# Patient Record
Sex: FEMALE | Race: WHITE | NOT HISPANIC OR LATINO | Employment: OTHER | URBAN - METROPOLITAN AREA
[De-identification: names, ages, dates, MRNs, and addresses within clinical notes are randomized per-mention and may not be internally consistent; named-entity substitution may affect disease eponyms.]

---

## 2017-01-09 ENCOUNTER — TRANSCRIBE ORDERS (OUTPATIENT)
Dept: ADMINISTRATIVE | Facility: HOSPITAL | Age: 67
End: 2017-01-09

## 2017-01-09 DIAGNOSIS — Z12.31 ENCOUNTER FOR MAMMOGRAM TO ESTABLISH BASELINE MAMMOGRAM: Primary | ICD-10-CM

## 2017-01-12 ENCOUNTER — HOSPITAL ENCOUNTER (OUTPATIENT)
Dept: RADIOLOGY | Age: 67
Discharge: HOME | End: 2017-01-12
Payer: COMMERCIAL

## 2017-01-12 DIAGNOSIS — Z12.31 ENCOUNTER FOR MAMMOGRAM TO ESTABLISH BASELINE MAMMOGRAM: ICD-10-CM

## 2017-01-12 PROCEDURE — G0202 SCR MAMMO BI INCL CAD: HCPCS

## 2017-02-16 ENCOUNTER — ALLSCRIPTS OFFICE VISIT (OUTPATIENT)
Dept: OTHER | Facility: OTHER | Age: 67
End: 2017-02-16

## 2017-02-16 DIAGNOSIS — M76.891 OTHER SPECIFIED ENTHESOPATHIES OF RIGHT LOWER LIMB, EXCLUDING FOOT: ICD-10-CM

## 2017-02-21 ENCOUNTER — APPOINTMENT (OUTPATIENT)
Dept: PHYSICAL THERAPY | Facility: CLINIC | Age: 67
End: 2017-02-21
Payer: COMMERCIAL

## 2017-02-21 DIAGNOSIS — M76.891 OTHER SPECIFIED ENTHESOPATHIES OF RIGHT LOWER LIMB, EXCLUDING FOOT: ICD-10-CM

## 2017-02-21 PROCEDURE — 97162 PT EVAL MOD COMPLEX 30 MIN: CPT

## 2017-02-21 PROCEDURE — 97110 THERAPEUTIC EXERCISES: CPT

## 2017-02-23 ENCOUNTER — APPOINTMENT (OUTPATIENT)
Dept: PHYSICAL THERAPY | Facility: CLINIC | Age: 67
End: 2017-02-23
Payer: COMMERCIAL

## 2017-02-23 PROCEDURE — 97140 MANUAL THERAPY 1/> REGIONS: CPT

## 2017-02-23 PROCEDURE — 97110 THERAPEUTIC EXERCISES: CPT

## 2017-02-28 ENCOUNTER — APPOINTMENT (OUTPATIENT)
Dept: PHYSICAL THERAPY | Facility: CLINIC | Age: 67
End: 2017-02-28
Payer: COMMERCIAL

## 2017-02-28 PROCEDURE — 97110 THERAPEUTIC EXERCISES: CPT

## 2017-02-28 PROCEDURE — 97140 MANUAL THERAPY 1/> REGIONS: CPT

## 2017-03-02 ENCOUNTER — GENERIC CONVERSION - ENCOUNTER (OUTPATIENT)
Dept: OTHER | Facility: OTHER | Age: 67
End: 2017-03-02

## 2017-03-02 ENCOUNTER — APPOINTMENT (OUTPATIENT)
Dept: PHYSICAL THERAPY | Facility: CLINIC | Age: 67
End: 2017-03-02
Payer: COMMERCIAL

## 2017-03-02 PROCEDURE — 97140 MANUAL THERAPY 1/> REGIONS: CPT

## 2017-03-07 ENCOUNTER — APPOINTMENT (OUTPATIENT)
Dept: PHYSICAL THERAPY | Facility: CLINIC | Age: 67
End: 2017-03-07
Payer: COMMERCIAL

## 2017-03-07 PROCEDURE — 97140 MANUAL THERAPY 1/> REGIONS: CPT

## 2017-03-07 PROCEDURE — 97110 THERAPEUTIC EXERCISES: CPT

## 2017-03-09 ENCOUNTER — APPOINTMENT (OUTPATIENT)
Dept: PHYSICAL THERAPY | Facility: CLINIC | Age: 67
End: 2017-03-09
Payer: COMMERCIAL

## 2017-03-09 PROCEDURE — 97116 GAIT TRAINING THERAPY: CPT

## 2017-03-09 PROCEDURE — 97140 MANUAL THERAPY 1/> REGIONS: CPT

## 2017-03-09 PROCEDURE — 97110 THERAPEUTIC EXERCISES: CPT

## 2017-03-14 ENCOUNTER — APPOINTMENT (OUTPATIENT)
Dept: PHYSICAL THERAPY | Facility: CLINIC | Age: 67
End: 2017-03-14
Payer: COMMERCIAL

## 2017-03-15 ENCOUNTER — APPOINTMENT (OUTPATIENT)
Dept: PHYSICAL THERAPY | Facility: CLINIC | Age: 67
End: 2017-03-15
Payer: COMMERCIAL

## 2017-03-15 PROCEDURE — 97140 MANUAL THERAPY 1/> REGIONS: CPT

## 2017-03-15 PROCEDURE — 97110 THERAPEUTIC EXERCISES: CPT

## 2017-03-16 ENCOUNTER — APPOINTMENT (OUTPATIENT)
Dept: PHYSICAL THERAPY | Facility: CLINIC | Age: 67
End: 2017-03-16
Payer: COMMERCIAL

## 2017-03-16 PROCEDURE — 97010 HOT OR COLD PACKS THERAPY: CPT

## 2017-03-16 PROCEDURE — 97116 GAIT TRAINING THERAPY: CPT

## 2017-03-16 PROCEDURE — 97140 MANUAL THERAPY 1/> REGIONS: CPT

## 2017-03-16 PROCEDURE — 97110 THERAPEUTIC EXERCISES: CPT

## 2017-03-21 ENCOUNTER — APPOINTMENT (OUTPATIENT)
Dept: PHYSICAL THERAPY | Facility: CLINIC | Age: 67
End: 2017-03-21
Payer: COMMERCIAL

## 2017-03-21 PROCEDURE — 97116 GAIT TRAINING THERAPY: CPT

## 2017-03-21 PROCEDURE — 97140 MANUAL THERAPY 1/> REGIONS: CPT

## 2017-03-21 PROCEDURE — 97110 THERAPEUTIC EXERCISES: CPT

## 2017-03-23 ENCOUNTER — APPOINTMENT (OUTPATIENT)
Dept: PHYSICAL THERAPY | Facility: CLINIC | Age: 67
End: 2017-03-23
Payer: COMMERCIAL

## 2017-03-23 PROCEDURE — 97140 MANUAL THERAPY 1/> REGIONS: CPT

## 2017-03-23 PROCEDURE — 97110 THERAPEUTIC EXERCISES: CPT

## 2017-03-28 ENCOUNTER — APPOINTMENT (OUTPATIENT)
Dept: PHYSICAL THERAPY | Facility: CLINIC | Age: 67
End: 2017-03-28
Payer: COMMERCIAL

## 2017-03-28 PROCEDURE — 97116 GAIT TRAINING THERAPY: CPT

## 2017-03-28 PROCEDURE — 97110 THERAPEUTIC EXERCISES: CPT

## 2017-03-28 PROCEDURE — 97140 MANUAL THERAPY 1/> REGIONS: CPT

## 2017-03-29 DIAGNOSIS — E78.2 MIXED HYPERLIPIDEMIA: ICD-10-CM

## 2017-03-29 DIAGNOSIS — J45.909 UNCOMPLICATED ASTHMA: ICD-10-CM

## 2017-03-29 DIAGNOSIS — D17.71 BENIGN LIPOMATOUS NEOPLASM OF KIDNEY: ICD-10-CM

## 2017-03-29 DIAGNOSIS — M81.0 AGE-RELATED OSTEOPOROSIS WITHOUT CURRENT PATHOLOGICAL FRACTURE: ICD-10-CM

## 2017-03-29 DIAGNOSIS — K76.0 FATTY (CHANGE OF) LIVER, NOT ELSEWHERE CLASSIFIED: ICD-10-CM

## 2017-03-29 DIAGNOSIS — E72.11 HOMOCYSTINURIA (HCC): ICD-10-CM

## 2017-03-29 DIAGNOSIS — E03.9 HYPOTHYROIDISM: ICD-10-CM

## 2017-03-30 ENCOUNTER — APPOINTMENT (OUTPATIENT)
Dept: PHYSICAL THERAPY | Facility: CLINIC | Age: 67
End: 2017-03-30
Payer: COMMERCIAL

## 2017-03-30 PROCEDURE — 97140 MANUAL THERAPY 1/> REGIONS: CPT

## 2017-03-30 PROCEDURE — 97110 THERAPEUTIC EXERCISES: CPT

## 2017-04-04 ENCOUNTER — APPOINTMENT (OUTPATIENT)
Dept: PHYSICAL THERAPY | Facility: CLINIC | Age: 67
End: 2017-04-04
Payer: COMMERCIAL

## 2017-04-04 PROCEDURE — 97140 MANUAL THERAPY 1/> REGIONS: CPT

## 2017-04-04 PROCEDURE — 97110 THERAPEUTIC EXERCISES: CPT

## 2017-04-06 ENCOUNTER — APPOINTMENT (OUTPATIENT)
Dept: PHYSICAL THERAPY | Facility: CLINIC | Age: 67
End: 2017-04-06
Payer: COMMERCIAL

## 2017-04-06 PROCEDURE — 97140 MANUAL THERAPY 1/> REGIONS: CPT

## 2017-04-06 PROCEDURE — 97110 THERAPEUTIC EXERCISES: CPT

## 2017-04-11 ENCOUNTER — APPOINTMENT (OUTPATIENT)
Dept: PHYSICAL THERAPY | Facility: CLINIC | Age: 67
End: 2017-04-11
Payer: COMMERCIAL

## 2017-04-11 PROCEDURE — 97140 MANUAL THERAPY 1/> REGIONS: CPT

## 2017-04-11 PROCEDURE — 97110 THERAPEUTIC EXERCISES: CPT

## 2017-04-13 ENCOUNTER — APPOINTMENT (OUTPATIENT)
Dept: PHYSICAL THERAPY | Facility: CLINIC | Age: 67
End: 2017-04-13
Payer: COMMERCIAL

## 2017-04-13 PROCEDURE — 97110 THERAPEUTIC EXERCISES: CPT

## 2017-04-13 PROCEDURE — 97140 MANUAL THERAPY 1/> REGIONS: CPT

## 2017-04-18 ENCOUNTER — APPOINTMENT (OUTPATIENT)
Dept: PHYSICAL THERAPY | Facility: CLINIC | Age: 67
End: 2017-04-18
Payer: COMMERCIAL

## 2017-04-18 PROCEDURE — 97140 MANUAL THERAPY 1/> REGIONS: CPT

## 2017-04-18 PROCEDURE — 97110 THERAPEUTIC EXERCISES: CPT

## 2017-04-20 ENCOUNTER — APPOINTMENT (OUTPATIENT)
Dept: PHYSICAL THERAPY | Facility: CLINIC | Age: 67
End: 2017-04-20
Payer: COMMERCIAL

## 2017-04-21 ENCOUNTER — APPOINTMENT (OUTPATIENT)
Dept: PHYSICAL THERAPY | Facility: CLINIC | Age: 67
End: 2017-04-21
Payer: COMMERCIAL

## 2017-04-21 PROCEDURE — L3010 FOOT LONGITUDINAL ARCH SUPPO: HCPCS

## 2017-04-25 ENCOUNTER — APPOINTMENT (OUTPATIENT)
Dept: PHYSICAL THERAPY | Facility: CLINIC | Age: 67
End: 2017-04-25
Payer: COMMERCIAL

## 2017-04-25 PROCEDURE — 97140 MANUAL THERAPY 1/> REGIONS: CPT

## 2017-04-25 PROCEDURE — 97110 THERAPEUTIC EXERCISES: CPT

## 2017-04-27 ENCOUNTER — APPOINTMENT (OUTPATIENT)
Dept: PHYSICAL THERAPY | Facility: CLINIC | Age: 67
End: 2017-04-27
Payer: COMMERCIAL

## 2017-04-27 PROCEDURE — 97140 MANUAL THERAPY 1/> REGIONS: CPT

## 2017-04-27 PROCEDURE — 97110 THERAPEUTIC EXERCISES: CPT

## 2017-05-02 ENCOUNTER — APPOINTMENT (OUTPATIENT)
Dept: PHYSICAL THERAPY | Facility: CLINIC | Age: 67
End: 2017-05-02
Payer: COMMERCIAL

## 2017-05-02 PROCEDURE — 97110 THERAPEUTIC EXERCISES: CPT

## 2017-05-02 PROCEDURE — 97140 MANUAL THERAPY 1/> REGIONS: CPT

## 2017-05-04 ENCOUNTER — APPOINTMENT (OUTPATIENT)
Dept: PHYSICAL THERAPY | Facility: CLINIC | Age: 67
End: 2017-05-04
Payer: COMMERCIAL

## 2017-05-04 PROCEDURE — 97110 THERAPEUTIC EXERCISES: CPT

## 2017-05-04 PROCEDURE — 97140 MANUAL THERAPY 1/> REGIONS: CPT

## 2017-05-09 ENCOUNTER — APPOINTMENT (OUTPATIENT)
Dept: PHYSICAL THERAPY | Facility: CLINIC | Age: 67
End: 2017-05-09
Payer: COMMERCIAL

## 2017-05-09 PROCEDURE — 97110 THERAPEUTIC EXERCISES: CPT

## 2017-05-09 PROCEDURE — 97140 MANUAL THERAPY 1/> REGIONS: CPT

## 2017-05-11 ENCOUNTER — APPOINTMENT (OUTPATIENT)
Dept: PHYSICAL THERAPY | Facility: CLINIC | Age: 67
End: 2017-05-11
Payer: COMMERCIAL

## 2017-05-11 PROCEDURE — 97140 MANUAL THERAPY 1/> REGIONS: CPT

## 2017-05-11 PROCEDURE — 97110 THERAPEUTIC EXERCISES: CPT

## 2017-05-16 ENCOUNTER — APPOINTMENT (OUTPATIENT)
Dept: PHYSICAL THERAPY | Facility: CLINIC | Age: 67
End: 2017-05-16
Payer: COMMERCIAL

## 2017-05-16 PROCEDURE — 97110 THERAPEUTIC EXERCISES: CPT

## 2017-05-16 PROCEDURE — 97140 MANUAL THERAPY 1/> REGIONS: CPT

## 2017-05-18 ENCOUNTER — APPOINTMENT (OUTPATIENT)
Dept: PHYSICAL THERAPY | Facility: CLINIC | Age: 67
End: 2017-05-18
Payer: COMMERCIAL

## 2017-05-18 PROCEDURE — 97110 THERAPEUTIC EXERCISES: CPT

## 2017-05-18 PROCEDURE — 97140 MANUAL THERAPY 1/> REGIONS: CPT

## 2017-05-23 ENCOUNTER — APPOINTMENT (OUTPATIENT)
Dept: PHYSICAL THERAPY | Facility: CLINIC | Age: 67
End: 2017-05-23
Payer: COMMERCIAL

## 2017-05-23 PROCEDURE — 97110 THERAPEUTIC EXERCISES: CPT

## 2017-05-23 PROCEDURE — 97150 GROUP THERAPEUTIC PROCEDURES: CPT

## 2017-05-23 PROCEDURE — 97140 MANUAL THERAPY 1/> REGIONS: CPT

## 2017-05-25 ENCOUNTER — APPOINTMENT (OUTPATIENT)
Dept: PHYSICAL THERAPY | Facility: CLINIC | Age: 67
End: 2017-05-25
Payer: COMMERCIAL

## 2017-05-25 PROCEDURE — 97140 MANUAL THERAPY 1/> REGIONS: CPT

## 2017-05-25 PROCEDURE — 97110 THERAPEUTIC EXERCISES: CPT

## 2017-05-30 ENCOUNTER — APPOINTMENT (OUTPATIENT)
Dept: PHYSICAL THERAPY | Facility: CLINIC | Age: 67
End: 2017-05-30
Payer: COMMERCIAL

## 2017-05-30 PROCEDURE — 97110 THERAPEUTIC EXERCISES: CPT

## 2017-05-30 PROCEDURE — 97140 MANUAL THERAPY 1/> REGIONS: CPT

## 2017-06-01 ENCOUNTER — APPOINTMENT (OUTPATIENT)
Dept: PHYSICAL THERAPY | Facility: CLINIC | Age: 67
End: 2017-06-01
Payer: COMMERCIAL

## 2017-06-01 PROCEDURE — 97140 MANUAL THERAPY 1/> REGIONS: CPT

## 2017-06-01 PROCEDURE — 97110 THERAPEUTIC EXERCISES: CPT

## 2017-06-06 ENCOUNTER — APPOINTMENT (OUTPATIENT)
Dept: PHYSICAL THERAPY | Facility: CLINIC | Age: 67
End: 2017-06-06
Payer: COMMERCIAL

## 2017-06-06 PROCEDURE — 97140 MANUAL THERAPY 1/> REGIONS: CPT

## 2017-06-06 PROCEDURE — 97110 THERAPEUTIC EXERCISES: CPT

## 2017-06-08 ENCOUNTER — APPOINTMENT (OUTPATIENT)
Dept: PHYSICAL THERAPY | Facility: CLINIC | Age: 67
End: 2017-06-08
Payer: COMMERCIAL

## 2017-06-08 PROCEDURE — 97110 THERAPEUTIC EXERCISES: CPT

## 2017-06-08 PROCEDURE — 97140 MANUAL THERAPY 1/> REGIONS: CPT

## 2017-06-13 ENCOUNTER — APPOINTMENT (OUTPATIENT)
Dept: PHYSICAL THERAPY | Facility: CLINIC | Age: 67
End: 2017-06-13
Payer: COMMERCIAL

## 2017-06-15 ENCOUNTER — APPOINTMENT (OUTPATIENT)
Dept: PHYSICAL THERAPY | Facility: CLINIC | Age: 67
End: 2017-06-15
Payer: COMMERCIAL

## 2017-06-19 ENCOUNTER — ALLSCRIPTS OFFICE VISIT (OUTPATIENT)
Dept: OTHER | Facility: OTHER | Age: 67
End: 2017-06-19

## 2017-06-19 DIAGNOSIS — K58.9 IRRITABLE BOWEL SYNDROME WITHOUT DIARRHEA: ICD-10-CM

## 2017-06-19 DIAGNOSIS — E72.11 HOMOCYSTINURIA (HCC): ICD-10-CM

## 2017-06-19 DIAGNOSIS — E78.2 MIXED HYPERLIPIDEMIA: ICD-10-CM

## 2017-06-19 DIAGNOSIS — E03.9 HYPOTHYROIDISM: ICD-10-CM

## 2017-06-19 DIAGNOSIS — R15.9 FULL INCONTINENCE OF FECES: ICD-10-CM

## 2017-06-20 ENCOUNTER — APPOINTMENT (OUTPATIENT)
Dept: PHYSICAL THERAPY | Facility: CLINIC | Age: 67
End: 2017-06-20
Payer: COMMERCIAL

## 2017-06-20 PROCEDURE — 97110 THERAPEUTIC EXERCISES: CPT

## 2017-06-20 PROCEDURE — 97140 MANUAL THERAPY 1/> REGIONS: CPT

## 2017-06-22 ENCOUNTER — APPOINTMENT (OUTPATIENT)
Dept: PHYSICAL THERAPY | Facility: CLINIC | Age: 67
End: 2017-06-22
Payer: COMMERCIAL

## 2017-06-23 ENCOUNTER — LAB CONVERSION - ENCOUNTER (OUTPATIENT)
Dept: OTHER | Facility: OTHER | Age: 67
End: 2017-06-23

## 2017-06-23 LAB
A/G RATIO (HISTORICAL): 1.7 (CALC) (ref 1–2.5)
ALBUMIN SERPL BCP-MCNC: 4.3 G/DL (ref 3.6–5.1)
ALP SERPL-CCNC: 76 U/L (ref 33–130)
ALT SERPL W P-5'-P-CCNC: 38 U/L (ref 6–29)
AST SERPL W P-5'-P-CCNC: 33 U/L (ref 10–35)
BASOPHILS # BLD AUTO: 0.5 %
BASOPHILS # BLD AUTO: 20 CELLS/UL (ref 0–200)
BILIRUB SERPL-MCNC: 0.7 MG/DL (ref 0.2–1.2)
BUN SERPL-MCNC: 16 MG/DL (ref 7–25)
BUN/CREA RATIO (HISTORICAL): 14 (CALC) (ref 6–22)
CALCIUM SERPL-MCNC: 9.7 MG/DL (ref 8.6–10.4)
CHLORIDE SERPL-SCNC: 106 MMOL/L (ref 98–110)
CHOLEST SERPL-MCNC: 194 MG/DL (ref 125–200)
CHOLEST/HDLC SERPL: 2.9 (CALC)
CO2 SERPL-SCNC: 26 MMOL/L (ref 20–31)
CREAT SERPL-MCNC: 1.11 MG/DL (ref 0.5–0.99)
DEPRECATED RDW RBC AUTO: 13.2 % (ref 11–15)
EGFR AFRICAN AMERICAN (HISTORICAL): 60 ML/MIN/1.73M2
EGFR-AMERICAN CALC (HISTORICAL): 52 ML/MIN/1.73M2
EOSINOPHIL # BLD AUTO: 1.6 %
EOSINOPHIL # BLD AUTO: 64 CELLS/UL (ref 15–500)
GAMMA GLOBULIN (HISTORICAL): 2.6 G/DL (CALC) (ref 1.9–3.7)
GLUCOSE (HISTORICAL): 93 MG/DL (ref 65–99)
HCT VFR BLD AUTO: 42.5 % (ref 35–45)
HDLC SERPL-MCNC: 66 MG/DL
HGB BLD-MCNC: 14.2 G/DL (ref 11.7–15.5)
LDL CHOLESTEROL (HISTORICAL): 100 MG/DL (CALC)
LYMPHOCYTES # BLD AUTO: 1592 CELLS/UL (ref 850–3900)
LYMPHOCYTES # BLD AUTO: 39.8 %
MCH RBC QN AUTO: 31 PG (ref 27–33)
MCHC RBC AUTO-ENTMCNC: 33.5 G/DL (ref 32–36)
MCV RBC AUTO: 92.8 FL (ref 80–100)
MONOCYTES # BLD AUTO: 200 CELLS/UL (ref 200–950)
MONOCYTES (HISTORICAL): 5 %
NEUTROPHILS # BLD AUTO: 2124 CELLS/UL (ref 1500–7800)
NEUTROPHILS # BLD AUTO: 53.1 %
NON-HDL-CHOL (CHOL-HDL) (HISTORICAL): 128 MG/DL (CALC)
PLATELET # BLD AUTO: 311 THOUSAND/UL (ref 140–400)
PMV BLD AUTO: 9.6 FL (ref 7.5–12.5)
POTASSIUM SERPL-SCNC: 4.1 MMOL/L (ref 3.5–5.3)
RBC # BLD AUTO: 4.59 MILLION/UL (ref 3.8–5.1)
SODIUM SERPL-SCNC: 140 MMOL/L (ref 135–146)
TOTAL PROTEIN (HISTORICAL): 6.9 G/DL (ref 6.1–8.1)
TRIGL SERPL-MCNC: 138 MG/DL
TSH SERPL DL<=0.05 MIU/L-ACNC: 3.29 MIU/L (ref 0.4–4.5)
WBC # BLD AUTO: 4 THOUSAND/UL (ref 3.8–10.8)

## 2017-06-26 ENCOUNTER — GENERIC CONVERSION - ENCOUNTER (OUTPATIENT)
Dept: OTHER | Facility: OTHER | Age: 67
End: 2017-06-26

## 2017-07-20 ENCOUNTER — GENERIC CONVERSION - ENCOUNTER (OUTPATIENT)
Dept: OTHER | Facility: OTHER | Age: 67
End: 2017-07-20

## 2017-07-27 ENCOUNTER — GENERIC CONVERSION - ENCOUNTER (OUTPATIENT)
Dept: OTHER | Facility: OTHER | Age: 67
End: 2017-07-27

## 2017-08-15 ENCOUNTER — ALLSCRIPTS OFFICE VISIT (OUTPATIENT)
Dept: OTHER | Facility: OTHER | Age: 67
End: 2017-08-15

## 2017-09-21 ENCOUNTER — GENERIC CONVERSION - ENCOUNTER (OUTPATIENT)
Dept: OTHER | Facility: OTHER | Age: 67
End: 2017-09-21

## 2017-09-21 DIAGNOSIS — E55.9 VITAMIN D DEFICIENCY: ICD-10-CM

## 2017-09-21 DIAGNOSIS — M81.0 AGE-RELATED OSTEOPOROSIS WITHOUT CURRENT PATHOLOGICAL FRACTURE: ICD-10-CM

## 2017-09-28 ENCOUNTER — HOSPITAL ENCOUNTER (OUTPATIENT)
Dept: RADIOLOGY | Age: 67
Discharge: HOME | End: 2017-09-28
Payer: COMMERCIAL

## 2017-09-28 DIAGNOSIS — E55.9 VITAMIN D DEFICIENCY: ICD-10-CM

## 2017-09-28 DIAGNOSIS — M81.0 AGE-RELATED OSTEOPOROSIS WITHOUT CURRENT PATHOLOGICAL FRACTURE: ICD-10-CM

## 2017-09-28 PROCEDURE — 77080 DXA BONE DENSITY AXIAL: CPT

## 2017-10-03 ENCOUNTER — GENERIC CONVERSION - ENCOUNTER (OUTPATIENT)
Dept: OTHER | Facility: OTHER | Age: 67
End: 2017-10-03

## 2017-10-09 ENCOUNTER — GENERIC CONVERSION - ENCOUNTER (OUTPATIENT)
Dept: OTHER | Facility: OTHER | Age: 67
End: 2017-10-09

## 2017-10-26 ENCOUNTER — ALLSCRIPTS OFFICE VISIT (OUTPATIENT)
Dept: OTHER | Facility: OTHER | Age: 67
End: 2017-10-26

## 2017-10-26 DIAGNOSIS — K76.0 FATTY (CHANGE OF) LIVER, NOT ELSEWHERE CLASSIFIED: ICD-10-CM

## 2017-10-26 DIAGNOSIS — J30.9 ALLERGIC RHINITIS: ICD-10-CM

## 2017-10-26 DIAGNOSIS — E03.9 HYPOTHYROIDISM: ICD-10-CM

## 2017-10-26 DIAGNOSIS — E55.9 VITAMIN D DEFICIENCY: ICD-10-CM

## 2017-10-26 DIAGNOSIS — M81.0 AGE-RELATED OSTEOPOROSIS WITHOUT CURRENT PATHOLOGICAL FRACTURE: ICD-10-CM

## 2017-10-26 DIAGNOSIS — Z00.00 ENCOUNTER FOR GENERAL ADULT MEDICAL EXAMINATION WITHOUT ABNORMAL FINDINGS: ICD-10-CM

## 2017-10-26 DIAGNOSIS — D17.71 BENIGN LIPOMATOUS NEOPLASM OF KIDNEY: ICD-10-CM

## 2017-10-26 DIAGNOSIS — E78.2 MIXED HYPERLIPIDEMIA: ICD-10-CM

## 2018-01-04 ENCOUNTER — LAB CONVERSION - ENCOUNTER (OUTPATIENT)
Dept: OTHER | Facility: OTHER | Age: 68
End: 2018-01-04

## 2018-01-04 ENCOUNTER — GENERIC CONVERSION - ENCOUNTER (OUTPATIENT)
Dept: OTHER | Facility: OTHER | Age: 68
End: 2018-01-04

## 2018-01-04 LAB
25(OH)D3 SERPL-MCNC: 40 NG/ML (ref 30–100)
A/G RATIO (HISTORICAL): 1.8 (CALC) (ref 1–2.5)
ALBUMIN SERPL BCP-MCNC: 4.4 G/DL (ref 3.6–5.1)
ALP SERPL-CCNC: 88 U/L (ref 33–130)
ALT SERPL W P-5'-P-CCNC: 30 U/L (ref 6–29)
AST SERPL W P-5'-P-CCNC: 28 U/L (ref 10–35)
BILIRUB SERPL-MCNC: 0.6 MG/DL (ref 0.2–1.2)
BUN SERPL-MCNC: 17 MG/DL (ref 7–25)
BUN/CREA RATIO (HISTORICAL): 15 (CALC) (ref 6–22)
CALCIUM SERPL-MCNC: 10.1 MG/DL (ref 8.6–10.4)
CHLORIDE SERPL-SCNC: 107 MMOL/L (ref 98–110)
CHOLEST SERPL-MCNC: 213 MG/DL
CHOLEST/HDLC SERPL: 3.4 (CALC)
CO2 SERPL-SCNC: 27 MMOL/L (ref 20–31)
CREAT SERPL-MCNC: 1.13 MG/DL (ref 0.5–0.99)
DEPRECATED RDW RBC AUTO: 12.4 % (ref 11–15)
EGFR AFRICAN AMERICAN (HISTORICAL): 59 ML/MIN/1.73M2
EGFR-AMERICAN CALC (HISTORICAL): 51 ML/MIN/1.73M2
GAMMA GLOBULIN (HISTORICAL): 2.5 G/DL (CALC) (ref 1.9–3.7)
GLUCOSE (HISTORICAL): 90 MG/DL (ref 65–99)
HCT VFR BLD AUTO: 43.5 % (ref 35–45)
HDLC SERPL-MCNC: 62 MG/DL
HGB BLD-MCNC: 14.9 G/DL (ref 11.7–15.5)
LDL CHOLESTEROL (HISTORICAL): 125 MG/DL (CALC)
MCH RBC QN AUTO: 30.9 PG (ref 27–33)
MCHC RBC AUTO-ENTMCNC: 34.3 G/DL (ref 32–36)
MCV RBC AUTO: 90.2 FL (ref 80–100)
NON-HDL-CHOL (CHOL-HDL) (HISTORICAL): 151 MG/DL (CALC)
PLATELET # BLD AUTO: 325 THOUSAND/UL (ref 140–400)
PMV BLD AUTO: 11.5 FL (ref 7.5–12.5)
POTASSIUM SERPL-SCNC: 4.4 MMOL/L (ref 3.5–5.3)
RBC # BLD AUTO: 4.82 MILLION/UL (ref 3.8–5.1)
SODIUM SERPL-SCNC: 143 MMOL/L (ref 135–146)
TOTAL PROTEIN (HISTORICAL): 6.9 G/DL (ref 6.1–8.1)
TRIGL SERPL-MCNC: 147 MG/DL
TSH SERPL DL<=0.05 MIU/L-ACNC: 2.36 MIU/L (ref 0.4–4.5)
WBC # BLD AUTO: 4.1 THOUSAND/UL (ref 3.8–10.8)

## 2018-01-10 NOTE — PROGRESS NOTES
Assessment    1  Encounter for preventive health examination (V70 0) (Z00 00)   2  Advance directive discussed with patient (V65 49) (Z71 89)   3  Asthma (493 90) (J45 909)   4  Allergic rhinitis (477 9) (J30 9)   5  Fatty liver (571 8) (K76 0)   6  Hypothyroidism (244 9) (E03 9)   7  Mixed hyperlipidemia (272 2) (E78 2)   8  Osteoporosis (733 00) (M81 0)   9  Vitamin D deficiency (268 9) (E55 9)   10  Irritable bowel syndrome (564 1) (K58 9)    Plan  Allergic rhinitis, Angiomyolipoma of kidney, Health Maintenance, Fatty liver,  Hypothyroidism, Mixed hyperlipidemia, Osteoporosis, Vitamin D deficiency    · (1) CBC/ PLT (NO DIFF); Status:Active; Requested OKS:[id number];    · (1) COMPREHENSIVE METABOLIC PANEL; Status:Active; Requested CJV:[id number];    · (1) LIPID PANEL, FASTING; Status:Active; Requested for:26Oct2017;    · (1) TSH WITH FT4 REFLEX; Status:Active; Requested BZI:[id number];    · (1) VITAMIN D 25-HYDROXY; Status:Active; Requested XUX:[id number];   Elevated homocysteine    · L-Methylfolate-B6-B12 3-35-2 MG Oral Tablet; take 1 tablet by mouth every day  Need for 23-polyvalent pneumococcal polysaccharide vaccine    · Pneumovax 23 25 MCG/0 5ML Injection Injectable  Need for prophylactic vaccination and inoculation against influenza    · Fluzone High-Dose 0 5 ML Intramuscular Suspension Prefilled Syringe  Need for shingles vaccine    · Zostavax 42042 UNT/0 65ML Subcutaneous Solution Reconstituted (Zostavax  93014 UNT/0 65ML Subcutaneous Solution Reconstituted); INJECT 0 65 ML Once  Screening for genitourinary condition    · *VB - Urinary Incontinence Screen (Dx Z13 89 Screen for UI); Status:Complete -  Retrospective By Protocol Authorization;   Done: 27SWG0288 09:33AM    Discussion/Summary    Continue with current medications  flu vaccine and Pneumovax 23 today  script for Zostavax  Patient is scheduled to see Rheumatology next month for for evaluation of osteoporosis   continue with calcium and vitamin-D supplementation for now  Impression: Initial Annual Wellness Visit, with preventive exam as well as age and risk appropriate counseling completed  Cardiovascular screening and counseling: screening not indicated, counseling was given on maintaining a healthy diet and counseling was given on maintaining a healthy weight  Diabetes screening and counseling: screening is current  Colorectal cancer screening and counseling: screening is current and 07/2017 colonoscopy  Breast cancer screening and counseling: screening is current  Cervical cancer screening and counseling: screening is current  Osteoporosis screening and counseling: screening is current  Abdominal aortic aneurysm screening and counseling: screening not indicated  Glaucoma screening and counseling: screening is current  Immunizations: influenza vaccine is due today, pneumococcal vaccine due today, Prevnar 13 and the risks and benefits of the Zostavax vaccine were discussed with the patient  Advice and education were given regarding fall risk reduction, increasing physical activity, nutrition (non-diabetic) and weight loss  She was referred to rheumatology  Patient Discussion: follow-up visit needed in 6 months   Possible side effects of new medications were reviewed with the patient/guardian today  The treatment plan was reviewed with the patient/guardian  The patient/guardian understands and agrees with the treatment plan      History of Present Illness  HPI: follow up visit  medications reviewed  labs 06/2017 see note  mixed hyperlipidemia  on Fenofibrate 160 mg daily and fish oil capsules  cholesterol 194  TGs 138  HDL 66    LFTs normal except for ALT 38  FBS 93  creatinine 1 11  Hgb 14 2       Welcome to [employer] and Wellness Visits: The patient is being seen for the initial annual wellness visit  Medicare Screening and Risk Factors   Hospitalizations: she has been previously hospitalizied     Once per lifetime medicare screening tests: ECG (10/2016)  Medicare Screening Tests Risk Questions   Abdominal aortic aneurysm risk assessment: none indicated  Osteoporosis risk assessment: , female gender and over 48years of age  Drug and Alcohol Use: The patient has never smoked cigarettes  The patient reports occasional alcohol use  Diet and Physical Activity: Current diet includes well balanced meals  Exercise: walking  Mood Disorder and Cognitive Impairment Screening: She reports feeling down, depressed, or hopeless over the past two weeks  She reports feeling little interest or pleasure in doing things over the past two weeks  Cognitive impairment screening: denies difficulty learning/retaining new information, denies difficulty handling complex tasks, denies difficulty with reasoning, denies difficulty with spatial ability and orientation, denies difficulty with language and denies difficulty with behavior  Functional Ability/Level of Safety: She denies hearing difficulties  She does not use a hearing aid  The patient is currently able to do activities of daily living without limitations, able to do instrumental activities of daily living without limitations, able to participate in social activities without limitations and able to drive without limitations  Fall risk factors: The patient fell 1 times in the past 12 months  Injury History: no polypharmacy, no mobility impairment, no antidepressant use, no deconditioning, no postural hypotension, no sedative use, no visual impairment, no urinary incontinence, no antihypertensive use, no cognitive impairment and no previous fall  Advance Directives: Advance directives: no living will and no durable power of  for health care directives     Co-Managers and Medical Equipment/Suppliers: See Patient Care Team      Patient Care Team    Care Team Member Role Specialty Office Number   Luisana De Souza MD  Obstetrics/Gynecology [phone]   Monroe Clinton Dover MD  Urology [phone]     Review of Systems    Constitutional: no recent weight gain and no recent weight loss  Eyes: no eyesight problems  ENT: no earache, no sore throat, no nasal discharge and no hoarseness  Cardiovascular: no chest pain, no palpitations and no lower extremity edema  Respiratory: recurrent cough secondary to asthma  she uses infrequent prn Albuterol MDI  CT scan 01/2016 chest normal except for small HH  no reflux symptoms  allergy testing 10/2015 normal  remote history of allergy testing 40+ years ago with + test for mold and dust , but no orthopnea and no PND  Gastrointestinal: fatty liver  06/2017 LFTs normal except for ALT 38  colonoscopy 07/2017  IBS diarrhea  stable at present  on probiotics  Genitourinary: history of recurrent UTIs  on Macrodantin 50 mg at HS  followed by urology  07/2016 renal u/s several large echogenic lesions right kidney compatible with angiomyolipomas  possible 5 mm non obstructing left renal stone  PVR 24 ML  CT scan 10/2014 abdomen right renal angiomyolipoma  , but no dysuria  Musculoskeletal: no arthralgias and no myalgias  Integumentary: no rashes and no skin lesions  Neurological: no headache and no dizziness  Psychiatric: no anxiety and no depression  Endocrine: osteoporosis prior treatment with monthly Actonel  intolerance to Actonel  DEXA scan 09/2017 + osteoporosis with decrease in BMD compared to last study in 2016  vitamin D level 27  on vitamin D two 2,000 IU daily  hypothyroidism on Levothyroxine 50 mcg daily  10/2016 TSH 2 22  Hematologic/Lymphatic: history of elevated homocysteine 10/2016 homocysteine level 9 3 decrease from 13  on vitamin supplement  no history of venous thrombosis  , but no swollen glands and no tendency for easy bruising  Active Problems    1  Allergic rhinitis (477 9) (J30 9)   2  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)   3  Asthma (493 90) (J45 909)   4   Elevated homocysteine (270 4) (E72 11)   5  Fatty liver (571 8) (K76 0)   6  Hypothyroidism (244 9) (E03 9)   7  Irritable bowel syndrome (564 1) (K58 9)   8  Lumbar spondylosis (721 3) (M47 816)   9  Mixed hyperlipidemia (272 2) (E78 2)   10  Osteoporosis (733 00) (M81 0)   11  Right lumbar radiculopathy (724 4) (M54 16)   12  Vitamin D deficiency (268 9) (E55 9)    Past Medical History    · History of Acute asthma exacerbation (493 92) (J45 901)   · History of Greater trochanteric bursitis of right hip (726 5) (M70 61)   · History of Hip flexor tendinitis, right (727 09) (M76 891)   · History of furuncle (V13 3) (Z87 2)   · History of renal colic (R79 14) (D64 862)    Surgical History    · History of Appendectomy   · History of Hand Surgery    Family History  Mother    · Family history of Cirrhosis  Father    · Family history of Hypertension (V17 49)  Paternal Grandmother    · Family history of Cancer  Paternal Grandfather    · Family history of Heart Disease (V17 49)    The family history was reviewed and updated today  Social History    · Never A Smoker  The social history was reviewed and updated today  Current Meds   1  Albuterol Sulfate 0 63 MG/3ML Inhalation Nebulization Solution; USE 1 UNIT DOSE IN   NEBULIZER EVERY 4 TO 6 HOURS AS NEEDED; Therapy: 09COA5566 to [phone]  Requested for: 93Sft9974; Last   Rx:36Hpf0531 Ordered   2  Aspirin 81 MG TABS; Therapy: (Recorded:75Oxc3190) to Recorded   3  CoQ10 CAPS; Therapy: (Recorded:12Mar2014) to Recorded   4  Cyclobenzaprine HCl - 10 MG Oral Tablet; TAKE 1 TAB @ HS; Therapy: 08MYY7451 to (Last Rx:79Cvb0601)  Requested for: 65FZQ7488 Ordered   5  Fenofibrate 160 MG Oral Tablet; take 1 tablet by mouth every day; Therapy: 23IYY0141 to (Evaluate:27Pjq3345)  Requested for: 83SDC5957; Last   Rx:12Oct2017 Ordered   6  Fish Oil CAPS; Therapy: (Recorded:84Amy8736) to Recorded   7  Levothyroxine Sodium 50 MCG Oral Tablet; take 1 tablet by mouth every day;    Therapy: 86ARA4990 to (Evaluate:27Mar2018)  Requested for: 71EQN9115; Last   Rx:28Sep2017 Ordered   8  L-Methylfolate-B6-B12 3-35-2 MG Oral Tablet; take 1 tablet by mouth every day; Therapy: 13OEK0247 to (Olga Meier)  Requested for: 27PFA4258; Last   Rx:29Mar2017 Ordered   9  Nitrofurantoin Macrocrystal 50 MG Oral Capsule; Therapy: (Recorded:23Bln1756) to Recorded   10  Ventolin  (90 Base) MCG/ACT Inhalation Aerosol Solution; INHALE 1 TO 2    PUFFS EVERY 4 TO 6 HOURS AS NEEDED; Therapy: 79DWA5406 to (Evaluate:10Nov2017)  Requested for: 14Apr2017; Last    Rx:14Apr2017 Ordered   11  Vitamin D3 3000 UNIT Oral Tablet; Therapy: (Recorded:44Sjh4636) to Recorded    Allergies    1  Penicillins   2  Sulfa Drugs    3  Animal dander - Rabbits   4  Dust   5  Mold    Immunizations   1 2 3 4    Influenza  04-Nov-2013  (63y) 29-Oct-2014  (64y) 10-Dec-2015  (65y) 19-Oct-2016  (66y)    PCV  19-Oct-2016  (66y)        Vitals  Signs    Temperature: 96 7 F, Tympanic  Heart Rate: 78  Respiration: 16  Systolic: 596  Diastolic: 62  Height: 5 ft 5 in  Weight: 189 lb   BMI Calculated: 31 45  BSA Calculated: 1 93    Physical Exam    Constitutional   General appearance: No acute distress, well appearing and well nourished  Head and Face   Palpation of the face and sinuses: No sinus tenderness  Eyes   Conjunctiva and lids: No swelling, erythema or discharge  Ears, Nose, Mouth, and Throat   Otoscopic examination: Tympanic membranes translucent with normal light reflex  Canals patent without erythema  Oropharynx: Normal with no erythema, edema, exudate or lesions  Neck   Neck: Supple, symmetric, trachea midline, no masses  Thyroid: Normal, no thyromegaly  There were no thyroid nodules  Pulmonary   Auscultation of lungs: Clear to auscultation  Cardiovascular   Auscultation of heart: Normal rate and rhythm, normal S1 and S2, no murmurs  Heart sounds: no gallop heard  Carotid pulses: 2+ bilaterally    no bruit heard over the right carotid and no bruit heard over the left carotid  Abdominal aorta: Normal   Abdominal aorta: no bruit heard  Examination of extremities for edema and/or varicosities: Normal     Abdomen   Abdomen: Non-tender, no masses  Liver and spleen: No hepatomegaly or splenomegaly  Lymphatic   Palpation of lymph nodes in neck: No lymphadenopathy  no anterior cervical node enlargement, no posterior cervical node enlargement, no submandibular node enlargement and no supraclavicular node enlargement  Musculoskeletal   Gait and station: Normal     Skin   Skin and subcutaneous tissue: Normal without rashes or lesions  Neurologic   Cranial nerves: Cranial nerves II-XII intact  Psychiatric   Recent and remote memory: Intact  Mood and affect: Normal        Results/Data  Falls Risk Assessment (Dx Z13 89 Screen for Neurologic Disorder) 26Oct2017 09:29AM User, Caption Data     Test Name Result Flag Reference   Falls Risk      1 fall without injury in the past year     PHQ-2 Adult Depression Screening 26Oct2017 09:29AM User, Vadios     Test Name Result Flag Reference   PHQ-2 Adult Depression Score 2     Over the last two weeks, how often have you been bothered by any of the following problems? Little interest or pleasure in doing things: Several days - 1  Feeling down, depressed, or hopeless: Several days - 1   PHQ-2 Adult Depression Screening Negative       * DXA BONE DENSITY SPINE HIP AND PELVIS 48Vwg9181 06:56AM Errol Dumont Order Number: [id number]    - Patient Instructions: To schedule this appointment, please contact Central Scheduling at [phone]  Test Name Result Flag Reference   DXA BONE DENSITY SPINE HIP AND PELVIS (Report)     CENTRAL DXA SCAN     CLINICAL HISTORY:  78year old post-menopausal  female with history of hypothyroidism  The patient does not exercise and takes vitamin D supplements   She took Actonel in the past      TECHNIQUE: Bone densitometry was performed using a Hologic Horizon A bone densitometer  Regions of interest appear properly placed  There are degenerative changes of the lumbar spine, which can artificially elevate bone mineral density results  COMPARISON: July 7, 2016 and before     RESULTS:    LUMBAR SPINE: L1-L4:   BMD 0 948 gm/cm2   T-score -0 9   Z-score 0 9     LEFT TOTAL HIP:   BMD 0 756 gm/cm2   T-score -1 5   Z-score -0 2     LEFT FEMORAL NECK:   BMD 0 558 gm/cm2   T-score -2 6   Z-score -1 1             IMPRESSION:   1  Based on the [facility] classification, the T-score of -2 6 in the left femoral neck is consistent with osteoporosis  2  Since the prior study, there has been a significant decrease in BMD in the lumbar spine of 0 029 gm/cm2 or 3 0%  In the left hip, there has been a significant decrease in BMD of 0 035 gm/cm2 or 4 4%  These changes do exceed our own least    significant change and, therefore, are statistically significant within 95% confidence level  3  According to the [address] [place] [address], prescription therapy is recommended with a T-score of -2 5 or less in the spine or hip after appropriate evaluation to exclude secondary causes  4  A daily intake of at least 1200 mg Calcium and 800 to 1000 IU of Vitamin D, as well as weight bearing and muscle strengthening exercise, fall prevention and avoidance of tobacco and excessive alcohol intake as basic preventive measures are    suggested  5  Repeat DXA in 18 - 24 months, on the same machine, as clinically indicated  The 10 year risk of hip fracture is 5 2%, with the 10 year risk of major osteoporotic fracture being 21%, as calculated by the [facility] fracture risk assessment tool (FRAX)  The current NOF guidelines recommend treating patients with FRAX 10 year risk score    of >3% for hip fracture and >20% for major osteoporotic fracture        WHO CLASSIFICATION:   Normal (a T-score of -1 0 or higher)   Low bone mineral density (a T-score of less than -1 0 but higher than -2 5)   Osteoporosis (a T-score of -2 5 or less)   Severe osteoporosis (a T-score of -2 5 or less with a fragility fracture)             Workstation performed: [id number]     Signed by:   Jossue Vargas MD   9/28/17     COLONOSCOPY 43UQL3633 12:00AM      Test Name Result Flag Reference   Colonoscopy 07/20/2017       Summary / No summary entered :      No summary entered  Documents attached :      Thomas Amin; Enc: [id number] - Image Encounter - Marshal Cummins -      (Family Medicine) (Result Document)  (1) CBC/PLT/DIFF [id number] 07:19AM Marshal Cummins     Test Name Result Flag Reference   WHITE BLOOD CELL COUNT 4 0 Thousand/uL  3 8-10 8   RED BLOOD CELL COUNT 4 59 Million/uL  3 80-5 10   HEMOGLOBIN 14 2 g/dL  11 7-15 5   HEMATOCRIT 42 5 %  35 0-45 0   MCV 92 8 fL  80 0-100 0   MCH 31 0 pg  27 0-33 0   MCHC 33 5 g/dL  32 0-36 0   RDW 13 2 %  11 0-15 0   PLATELET COUNT 895 Thousand/uL  140-400   ABSOLUTE NEUTROPHILS 2124 cells/uL  9948-4434   ABSOLUTE LYMPHOCYTES 1592 cells/uL  850-3900   ABSOLUTE MONOCYTES 200 cells/uL  200-950   ABSOLUTE EOSINOPHILS 64 cells/uL     ABSOLUTE BASOPHILS 20 cells/uL  0-200   NEUTROPHILS 53 1 %     LYMPHOCYTES 39 8 %     MONOCYTES 5 0 %     EOSINOPHILS 1 6 %     BASOPHILS 0 5 %     MPV 9 6 fL  7 5-12 5     (1) COMPREHENSIVE METABOLIC PANEL [id number] 45:88EM Marshal Cummins     Test Name Result Flag Reference   GLUCOSE 93 mg/dL  65-99   Fasting reference interval   UREA NITROGEN (BUN) 16 mg/dL  7-25   CREATININE 1 11 mg/dL H 0 50-0 99   For patients >52years of age, the reference limit  for Creatinine is approximately 13% higher for people  identified as -American  eGFR NON-AFR   AMERICAN 52 mL/min/1 73m2 L > OR = 60   eGFR AFRICAN AMERICAN 60 mL/min/1 73m2  > OR = 60   BUN/CREATININE RATIO 14 (calc)  6-22   SODIUM 140 mmol/L  135-146   POTASSIUM 4 1 mmol/L  3 5-5 3   CHLORIDE 106 mmol/L     CARBON DIOXIDE 26 mmol/L  20-31   CALCIUM 9 7 mg/dL  8 6-10 4   PROTEIN, TOTAL 6 9 g/dL  6 1-8 1   ALBUMIN 4 3 g/dL  3 6-5 1   GLOBULIN 2 6 g/dL (calc)  1 9-3 7   ALBUMIN/GLOBULIN RATIO 1 7 (calc)  1 0-2 5   BILIRUBIN, TOTAL 0 7 mg/dL  0 2-1 2   ALKALINE PHOSPHATASE 76 U/L     AST 33 U/L  10-35   ALT 38 U/L H 6-29     (1) LIPID PANEL, FASTING 57LME9631 07:19AM Shelby Green     Test Name Result Flag Reference   CHOLESTEROL, TOTAL 194 mg/dL  125-200   HDL CHOLESTEROL 66 mg/dL  > OR = 46   TRIGLICERIDES 772 mg/dL  <150   LDL-CHOLESTEROL 100 mg/dL (calc)  <130   Desirable range <100 mg/dL for patients with CHD or  diabetes and <70 mg/dL for diabetic patients with  known heart disease  CHOL/HDLC RATIO 2 9 (calc)  < OR = 5 0   NON HDL CHOLESTEROL 128 mg/dL (calc)     Target for non-HDL cholesterol is 30 mg/dL higher than   LDL cholesterol target  (Q) TSH, 3RD GENERATION W/REFLEX TO FT4 [id number] 07:19AM Hallie Fitzpatrick   REPORT COMMENT:  FASTING:YES     Test Name Result Flag Reference   TSH W/REFLEX TO FT4 3 29 mIU/L  0 40-4 50     * MAMMO SCREENING BILATERAL W CAD 12Jan2017 11:40AM Dennys Thurman     Test Name Result Flag Reference   MAMMO SCREENING BILATERAL W CAD (Report)     Patient History:   Patient is postmenopausal    Family history of unknown cancer in father at age 80  Took hormonal contraceptives for 4 years  Patient is a former smoker, and smoked for 4 years  Patient's    BMI is 31 6  Reason for exam: screening (asymptomatic)  Mammo Screening Bilateral W CAD: January 12, 2017 - Check In #:    [de-identified]   Bilateral CC and MLO view(s) were taken  Technologist: RT Janis(R)(M)   Prior study comparison: September 15, 2015, digital bilateral    screening mammogram performed at [address] [place] [address]  August 12, 2014, digital bilateral screening mammogram performed    at [address] [place] [address]  August 8, 2013, left breast    unilateral diagnostic mammogram, performed at [address] [place] [address]  August 2, 2013, digital bilateral screening    mammogram performed at [facility]  February 27, 2009, bilateral SLNBIC DIGTL SCRN MAMMO performed at [facility]  August 20, 2007, bilateral screening mammogram   performed at [facility]  There are scattered fibroglandular densities  The parenchymal pattern appears stable  No dominant soft tissue    mass or suspicious calcifications are noted  The skin and nipple   contours are within normal limits  No mammographic evidence of malignancy  No    significant changes when compared with prior studies  ASSESSMENT: BiRad:1 - Negative     Recommendation:   Routine screening mammogram in 1 year  A reminder letter will be   scheduled  Analyzed by CAD     8-10% of cancers will be missed on mammography  Management of a    palpable abnormality must be based on clinical grounds  Patients   will be notified of their results via letter from our facility  Accredited by [employer] of Radiology and FDA  Transcription Location: LUCIA Loza 98: [id number]     Risk Value(s):   Tyrer-Cuzick 10 Year: 4 193%, Tyrer-Cuzick Lifetime: 8 696%,    Myriad Table: 1 5%, SHIVAM 5 Year: 2 1%, NCI Lifetime: 7 8%   Signed by:   Reid Fleming MD   1/12/17     * XR HIP/PELV 2-3 VWS RIGHT W PELVIS IF PERFORMED [id number] 11:51AM [place] Better Order Number: [id number]     Test Name Result Flag Reference   * XR HIP/PELV 2-3 VWS RIGHT (Report)     RIGHT HIP     INDICATION: Tom Arceo persistent pain     COMPARISON: None     VIEWS: AP pelvis and 2 coned down views of the hip; 4 images     FINDINGS:     There is no acute fracture or dislocation  No degenerative changes  No lytic or blastic lesions are seen  Soft tissues are unremarkable  IMPRESSION:     No acute osseous abnormality         Workstation performed: [id number]     Signed by:   Handy Vaca MD   12/20/16     EKG/ECG- POC [id number] 09:29AM Konrad Standard     Test Name Result Flag Reference   EKG/ECG 10/19/2016       Health Management  History of Colon cancer screening   COLONOSCOPY; every 5 years; Last 97Uln3124; Next Due: 35Wgm8373;  Active    Future Appointments    Date/Time Provider Specialty Site   09/26/2018 09:40 AM Travis Javier, [address] [facility]     Signatures   Electronically signed by : TANISHA Moore ; Oct 26 2017 10:58AM EST                       (Author)

## 2018-01-11 NOTE — RESULT NOTES
Message  Li Nbatoya I have enclosed a copy of your recent labs  all results are normal except for ALT one of several liver enzymes  this has been elevated in the past  no changes  most likely secondary to fatty liver  treatment diet and weight loss  creatinine or kidney function 1 11 normal range up to 1 30 normal  stay hydrated  try to limit Ibuprofen or Aleve use  Verified Results  (1) CBC/PLT/DIFF [id number] 07:19AM [facility]     Test Name Result Flag Reference   WHITE BLOOD CELL COUNT 4 0 Thousand/uL  3 8-10 8   RED BLOOD CELL COUNT 4 59 Million/uL  3 80-5 10   HEMOGLOBIN 14 2 g/dL  11 7-15 5   HEMATOCRIT 42 5 %  35 0-45 0   MCV 92 8 fL  80 0-100 0   MCH 31 0 pg  27 0-33 0   MCHC 33 5 g/dL  32 0-36 0   RDW 13 2 %  11 0-15 0   PLATELET COUNT 923 Thousand/uL  140-400   ABSOLUTE NEUTROPHILS 2124 cells/uL  8543-8869   ABSOLUTE LYMPHOCYTES 1592 cells/uL  850-3900   ABSOLUTE MONOCYTES 200 cells/uL  200-950   ABSOLUTE EOSINOPHILS 64 cells/uL     ABSOLUTE BASOPHILS 20 cells/uL  0-200   NEUTROPHILS 53 1 %     LYMPHOCYTES 39 8 %     MONOCYTES 5 0 %     EOSINOPHILS 1 6 %     BASOPHILS 0 5 %     MPV 9 6 fL  7 5-12 5     (1) COMPREHENSIVE METABOLIC PANEL 77PGF3442 65:27ZV [facility]     Test Name Result Flag Reference   GLUCOSE 93 mg/dL  65-99   Fasting reference interval   UREA NITROGEN (BUN) 16 mg/dL  7-25   CREATININE 1 11 mg/dL H 0 50-0 99   For patients >52years of age, the reference limit  for Creatinine is approximately 13% higher for people  identified as -American  eGFR NON-AFR   AMERICAN 52 mL/min/1 73m2 L > OR = 60   eGFR AFRICAN AMERICAN 60 mL/min/1 73m2  > OR = 60   BUN/CREATININE RATIO 14 (calc)  6-22   SODIUM 140 mmol/L  135-146   POTASSIUM 4 1 mmol/L  3 5-5 3   CHLORIDE 106 mmol/L     CARBON DIOXIDE 26 mmol/L  20-31   CALCIUM 9 7 mg/dL  8 6-10 4   PROTEIN, TOTAL 6 9 g/dL  6 1-8 1   ALBUMIN 4 3 g/dL  3 6-5 1   GLOBULIN 2 6 g/dL (calc)  1 9-3 7   ALBUMIN/GLOBULIN RATIO 1 7 (calc) 1 0-2 5   BILIRUBIN, TOTAL 0 7 mg/dL  0 2-1 2   ALKALINE PHOSPHATASE 76 U/L     AST 33 U/L  10-35   ALT 38 U/L H 6-29     (1) LIPID PANEL, FASTING 46DYH7818 07:19AM [facility]     Test Name Result Flag Reference   CHOLESTEROL, TOTAL 194 mg/dL  125-200   HDL CHOLESTEROL 66 mg/dL  > OR = 46   TRIGLICERIDES 630 mg/dL  <150   LDL-CHOLESTEROL 100 mg/dL (calc)  <130   Desirable range <100 mg/dL for patients with CHD or  diabetes and <70 mg/dL for diabetic patients with  known heart disease  CHOL/HDLC RATIO 2 9 (calc)  < OR = 5 0   NON HDL CHOLESTEROL 128 mg/dL (calc)     Target for non-HDL cholesterol is 30 mg/dL higher than   LDL cholesterol target       (Q) TSH, 3RD GENERATION W/REFLEX TO FT4 [id number] 07:19AM [facility]   REPORT COMMENT:  FASTING:YES     Test Name Result Flag Reference   TSH W/REFLEX TO FT4 3 29 mIU/L  0 40-4 50       Signatures   Electronically signed by : TANISHA Acevedo ; Jun 26 2017  8:11AM EST                       (Author)

## 2018-01-11 NOTE — MISCELLANEOUS
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  Please send slip for 25 0H vitamin D  If ok, we'll stick with plan and repeat BMD in 1-2 years, but she understands we may need to move on to other Rx  Please transmit to note  Diego Gilbert - 07 Jul 2016 2:02 PM     TASK IN PROGRESS   Avelino Shipman - 07 Jul 2016 2:06 PM     TASK EDITED  rx for lab work sent to EHR, lm making patient aware of needing to get labs done  Avelino Ramonita - 08 Jul 2016 9:38 AM     TASK EDITED  spoke to pt,rx faxed to  [address]  Active Problems    1  Allergic rhinitis (477 9) (J30 9)   2  Asthma (493 90) (J45 909)   3  Elevated homocysteine (270 4) (E72 11)   4  Fatty liver (571 8) (K76 0)   5  Hypothyroidism (244 9) (E03 9)   6  Low back pain (724 2) (M54 5)   7  Mixed hyperlipidemia (272 2) (E78 2)   8  Need for prophylactic vaccination and inoculation against influenza (V04 81) (Z23)   9  Osteoporosis (733 00) (M81 0)   10  Recurrent cough (786 2) (R05)   11  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)   12  URI (upper respiratory infection) (465 9) (J06 9)    Current Meds   1  Advair Diskus 500-50 MCG/DOSE Inhalation Aerosol Powder Breath Activated; INHALE   1 PUFFS Twice daily; Therapy: 53Izg0299 to (Last Rx:22Dec2015) Ordered   2  Albuterol Sulfate 0 63 MG/3ML Inhalation Nebulization Solution; USE 1 UNIT DOSE IN   NEBULIZER EVERY 4 TO 6 HOURS AS NEEDED; Therapy: 98VZG9394 to (Rilla Draft)  Requested for: 71STR9973; Last   Rx:05Jan2015 Ordered   3  Aspirin 81 MG TABS;    Therapy: (Recorded:66Jfn3437) to Recorded   4  CoQ10 CAPS; Therapy: (Recorded:12Mar2014) to Recorded   5  Cyclobenzaprine HCl - 10 MG Oral Tablet; TAKE 1 TAB @ HS; Therapy: 09CXJ1600 to (Last Rx:13May2014)  Requested for: 96OXZ6650 Ordered   6  Diazepam 5 MG Oral Tablet; TAKE 1 TABLET  THREE TIMES A DAY AS NEEDED; Last   Rx:88Fsz0370 Ordered   7  Fenofibrate 160 MG Oral Tablet; take 1 tablet by mouth every day; Therapy: 80TKB6028 to (Evaluate:42Tbc9526)  Requested for: 40HCP4622; Last   Rx:06Jan2016 Ordered   8  Fish Oil CAPS; Therapy: (Recorded:04May2015) to Recorded   9  Foltanx 3-35-2 MG Oral Tablet; One tablet daily  Requested for: 31XZC1045; Last   Rx:26Mar2015 Ordered   10  Levothyroxine Sodium 50 MCG Oral Tablet; take 1 tablet by mouth every day; Therapy: 99RGY7446 to (Evaluate:50Sad7294)  Requested for: 28Mar2016; Last    Rx:28Mar2016 Ordered   11  L-Methylfolate-B6-B12 3-35-2 MG Oral Tablet; take 1 tablet by mouth every day; Therapy: 43SRP2562 to (Evaluate:77Riz7516)  Requested for: 28WGT1617; Last    Rx:04Feb2016 Ordered   12  Meclizine HCl - 12 5 MG Oral Tablet; Therapy: (Recorded:26Mar2015) to Recorded   13  Methocarbamol 500 MG Oral Tablet; one tablet 4 times a day as needed; Therapy: 56XSR8769 to (Last Rx:01Mar2016)  Requested for: 04XSK0767 Ordered   14  Methocarbamol 500 MG Oral Tablet; Therapy: (Robb Willson) to Recorded   15  Montelukast Sodium 10 MG Oral Tablet (Singulair); TAKE 1 TABLET BY MOUTH ONCE    DAILY; Therapy: 68LLE1111 to (Last Rx:08Oct2015) Ordered   16  Nitrofurantoin Macrocrystal 50 MG Oral Capsule; Therapy: (Recorded:26Mar2015) to Recorded   17  Ventolin  (90 Base) MCG/ACT Inhalation Aerosol Solution; INHALE 1 TO 2    PUFFS EVERY 4 TO 6 HOURS AS NEEDED; Therapy: 08QCD6056 to (Evaluate:41Scp9811)  Requested for: 12JXY0246; Last    Rx:05Jan2015 Ordered   18  Vitamin D3 3000 UNIT Oral Tablet; Therapy: (Recorded:06Udc8824) to Recorded    Allergies    1  Penicillins   2  Sulfa Drugs    3  Animal dander - Rabbits   4  Dust   5   Mold    Signatures   Electronically signed by : Hiro Horta LPN; Jul 8 1980  5:28DP EST                       (Author)

## 2018-01-12 VITALS
WEIGHT: 189 LBS | RESPIRATION RATE: 16 BRPM | HEIGHT: 65 IN | DIASTOLIC BLOOD PRESSURE: 62 MMHG | BODY MASS INDEX: 31.49 KG/M2 | TEMPERATURE: 96.7 F | HEART RATE: 78 BPM | SYSTOLIC BLOOD PRESSURE: 128 MMHG

## 2018-01-12 DIAGNOSIS — Z12.31 ENCOUNTER FOR SCREENING MAMMOGRAM FOR MALIGNANT NEOPLASM OF BREAST: ICD-10-CM

## 2018-01-13 VITALS
HEART RATE: 91 BPM | HEIGHT: 65 IN | BODY MASS INDEX: 31.16 KG/M2 | DIASTOLIC BLOOD PRESSURE: 74 MMHG | SYSTOLIC BLOOD PRESSURE: 111 MMHG | WEIGHT: 187 LBS

## 2018-01-14 VITALS
SYSTOLIC BLOOD PRESSURE: 118 MMHG | HEIGHT: 65 IN | TEMPERATURE: 97.1 F | HEART RATE: 84 BPM | DIASTOLIC BLOOD PRESSURE: 66 MMHG | WEIGHT: 189 LBS | RESPIRATION RATE: 16 BRPM | BODY MASS INDEX: 31.49 KG/M2

## 2018-01-14 VITALS
TEMPERATURE: 97.1 F | HEIGHT: 65 IN | WEIGHT: 189 LBS | SYSTOLIC BLOOD PRESSURE: 110 MMHG | RESPIRATION RATE: 16 BRPM | DIASTOLIC BLOOD PRESSURE: 74 MMHG | HEART RATE: 80 BPM | BODY MASS INDEX: 31.49 KG/M2

## 2018-01-15 NOTE — MISCELLANEOUS
Message   Recorded as Task   Date: 10/02/2017 06:10 PM, Created By: Mira Carmichael   Task Name: Follow Up   Assigned To: Q.branch File   Regarding Patient: Leslye Cristina, Status: In Progress   WillyVilma Gupta - 02 Oct 2017 6:10 PM     TASK CREATED  Belinda Varela should be aware that her BMD is ever so slightly worst at -2 6 (was -2 5) but we did talk about her seeing a rheumatologist, and I believe Dr Casey Espinoza would be a good idea  thanks, Mihai Shearer - 48 Oct 2017 6:33 PM     TASK EDITED  lmtcb   Diogenes Mask - 37 Oct 2017 6:33 PM     TASK IN PROGRESS   Marielle Baumann - 66 Oct 2017 8:03 AM     TASK EDITED  Patient is aware of DEXA results and M87 recommendation  Mailed referral to pt  Active Problems    1  Acute asthma exacerbation (493 92) (J45 901)   2  Allergic rhinitis (477 9) (J30 9)   3  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)   4  Asthma (493 90) (J45 909)   5  Elevated homocysteine (270 4) (E72 11)   6  Encounter for gynecological examination without abnormal finding (V72 31) (Z01 419)   7  Encounter for screening mammogram for malignant neoplasm of breast (V76 12)   (Z12 31)   8  Fatty liver (571 8) (K76 0)   9  Hypothyroidism (244 9) (E03 9)   10  Irritable bowel syndrome (564 1) (K58 9)   11  Lumbar spondylosis (721 3) (M47 816)   12  Mixed hyperlipidemia (272 2) (E78 2)   13  Osteoporosis (733 00) (M81 0)   14  Right lumbar radiculopathy (724 4) (M54 16)   15  Vitamin D deficiency (268 9) (E55 9)    Current Meds   1  Albuterol Sulfate 0 63 MG/3ML Inhalation Nebulization Solution; USE 1 UNIT DOSE IN   NEBULIZER EVERY 4 TO 6 HOURS AS NEEDED; Therapy: 02YJE3131 to [phone])  Requested for: 76Aro6661; Last   Rx:10Gig0884 Ordered   2  Aspirin 81 MG TABS; Therapy: (Recorded:06Hbf0059) to Recorded   3  CoQ10 CAPS; Therapy: (Recorded:12Mar2014) to Recorded   4  Cyclobenzaprine HCl - 10 MG Oral Tablet; TAKE 1 TAB @ HS;    Therapy: 64PNY9568 to (Last Rx:94Cac4375) Requested for: 10LJJ2023 Ordered   5  Fenofibrate 160 MG Oral Tablet; take 1 tablet by mouth every day; Therapy: 07QFY6878 to (Evaluate:20Tes8951)  Requested for: 29NRX0401; Last   Rx:07Jhg6101 Ordered   6  Fish Oil CAPS; Therapy: (Recorded:53Aes2499) to Recorded   7  Levothyroxine Sodium 50 MCG Oral Tablet; take 1 tablet by mouth every day; Therapy: 67BXF3214 to (87 89 79)  Requested for: 70IGL9161; Last   Rx:62Mzw9112 Ordered   8  L-Methylfolate-B6-B12 3-35-2 MG Oral Tablet; take 1 tablet by mouth every day; Therapy: 92AWA6553 to (Simeon Garsia)  Requested for: 94CSO8562; Last   Rx:29Mar2017 Ordered   9  Nitrofurantoin Macrocrystal 50 MG Oral Capsule; Therapy: (Recorded:24Kaw7947) to Recorded   10  Ventolin  (90 Base) MCG/ACT Inhalation Aerosol Solution; INHALE 1 TO 2    PUFFS EVERY 4 TO 6 HOURS AS NEEDED; Therapy: 82JSD7953 to (Evaluate:01Ste0433)  Requested for: 40Naa1736; Last    Rx:32Kcu1216 Ordered   11  Vitamin D3 3000 UNIT Oral Tablet; Therapy: (Recorded:06Xdw8638) to Recorded    Allergies    1  Penicillins   2  Sulfa Drugs    3  Animal dander - Rabbits   4  Dust   5   Mold    Plan  Osteoporosis    · 2 Josep Blanco MD, Dior Perales  (Rheumatology) Co-Management  *  Status: Hold For -  Scheduling,Retrospective By Protocol Authorization  Requested for: 38AVC5933  Care Summary provided  : Yes    Signatures   Electronically signed by : Lisa Smith, ; Oct  3 2017  8:04AM EST                       (Author)

## 2018-01-15 NOTE — MISCELLANEOUS
Message   Recorded as Task   Date: 10/09/2017 11:30 AM, Created By: Marcella Lujan   Task Name: Care Coordination   Assigned To: John Hernandez   Regarding Patient: Ro Gallardo, Status: In Progress   Comment:    Adilene Richardson - 09 Oct 2017 11:30 AM     TASK CREATED  Caller: Self; Care Coordination; [phone] (Home)  pt was referred to Dr Vargas Garcia an appt 11/28-they need to know why she is being referred and test results  fax # [phone]  Their phone is [phone]  Marielle Baumann - 09 Oct 2017 11:33 AM     TASK IN PROGRESS   Marielle Baumann - 09 Oct 2017 11:39 [address] [place] [facility] office - wanted to know what they needed for the appointment  Faxed DEXA results, Dr Deep Crawford office note and telephone message  Active Problems    1  Acute asthma exacerbation (493 92) (J45 901)   2  Allergic rhinitis (477 9) (J30 9)   3  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)   4  Asthma (493 90) (J45 909)   5  Elevated homocysteine (270 4) (E72 11)   6  Encounter for gynecological examination without abnormal finding (V72 31) (Z01 419)   7  Encounter for screening mammogram for malignant neoplasm of breast (V76 12)   (Z12 31)   8  Fatty liver (571 8) (K76 0)   9  Hypothyroidism (244 9) (E03 9)   10  Irritable bowel syndrome (564 1) (K58 9)   11  Lumbar spondylosis (721 3) (M47 816)   12  Mixed hyperlipidemia (272 2) (E78 2)   13  Osteoporosis (733 00) (M81 0)   14  Right lumbar radiculopathy (724 4) (M54 16)   15  Vitamin D deficiency (268 9) (E55 9)    Current Meds   1  Albuterol Sulfate 0 63 MG/3ML Inhalation Nebulization Solution; USE 1 UNIT DOSE IN   NEBULIZER EVERY 4 TO 6 HOURS AS NEEDED; Therapy: 01MOZ1257 to ([phone])  Requested for: 51Ymq1185; Last   Rx:36Aho8476 Ordered   2  Aspirin 81 MG TABS; Therapy: (Recorded:04Hgj6068) to Recorded   3  CoQ10 CAPS; Therapy: (Recorded:12Mar2014) to Recorded   4  Cyclobenzaprine HCl - 10 MG Oral Tablet; TAKE 1 TAB @ HS;    Therapy: 70OAX9172 to (Last Rx:22Cnb3634)  Requested for: 10BEI2868 Ordered   5  Fenofibrate 160 MG Oral Tablet; take 1 tablet by mouth every day; Therapy: 38ZYU8663 to (Evaluate:07Vxh8564)  Requested for: 95KTA7445; Last   Rx:05Xwo5265 Ordered   6  Fish Oil CAPS; Therapy: (Recorded:80Eaw8986) to Recorded   7  Levothyroxine Sodium 50 MCG Oral Tablet; take 1 tablet by mouth every day; Therapy: 69MCX6717 to (Nohelia Moore)  Requested for: 46ONI9677; Last   Rx:30Zgp0540 Ordered   8  L-Methylfolate-B6-B12 3-35-2 MG Oral Tablet; take 1 tablet by mouth every day; Therapy: 57XTW2165 to (Kat Evans)  Requested for: 69KBE5968; Last   Rx:29Mar2017 Ordered   9  Nitrofurantoin Macrocrystal 50 MG Oral Capsule; Therapy: (Recorded:89Xoe0031) to Recorded   10  Ventolin  (90 Base) MCG/ACT Inhalation Aerosol Solution; INHALE 1 TO 2    PUFFS EVERY 4 TO 6 HOURS AS NEEDED; Therapy: 35WZU0643 to (Evaluate:10Nov2017)  Requested for: 14Apr2017; Last    Rx:14Apr2017 Ordered   11  Vitamin D3 3000 UNIT Oral Tablet; Therapy: (Recorded:65Pwn9243) to Recorded    Allergies    1  Penicillins   2  Sulfa Drugs    3  Animal dander - Rabbits   4  Dust   5   Mold    Signatures   Electronically signed by : Joe He, ; Oct  9 2017 11:39AM EST                       (Author)

## 2018-01-16 NOTE — PROGRESS NOTES
Assessment    1  Low back pain (724 2) (M54 5)    Plan  Low back pain    · Methocarbamol 500 MG Oral Tablet; one tablet 4 times a day as needed   · PredniSONE 20 MG Oral Tablet; TAKE 1 TABLET TWICE DAILY AFTER MEALS   · We recommend that you avoid straining your back while lifting ; Status:Complete;   Done:  49VMH5341   · Call [phone] if: The pain seems worse ; Status:Complete;   Done: 28AEQ0827   · Call [phone] if: You have pain or numbness from your back to your hip and leg ;  Status:Complete;   Done: 62GUU9533    Discussion/Summary    Trial of oral steroids  as needed Robaxin  heat  continue with PT  advised to call with any changes  Chief Complaint    1  Back Pain    History of Present Illness  HPI: history of recurrent lower back pain currently receiving PT with significant improvement  she bent over to grab her dog several days ago and developed acute onset of lower back pain  no pain radiating into either leg  no leg weakness or paresthesias  no bowel or bladder changes  no relief with Aleve  she had PT yesterday and was treated with heat and electric stim  ER evaluation 12/2015 for LBP  UA negative  x rays lumbar spine multilevel spondylosis particularly at L1-L2 increased from 2012 and stable mild disc space narrowing at L4-L5 and L5-S1  No fractures or osseous lesions seen  labs 09/2015 see note  Review of Systems    Constitutional: no fever, no recent weight gain, no chills and no recent weight loss  Gastrointestinal: no abdominal pain, no nausea, no constipation and no diarrhea  Genitourinary: no dysuria and no incontinence  Musculoskeletal: no joint swelling and no joint stiffness  Integumentary: no rashes  Active Problems    1  Allergic rhinitis (477 9) (J30 9)   2  Asthma (493 90) (J45 909)   3  Elevated homocysteine (270 4) (E72 11)   4  Fatty liver (571 8) (K76 0)   5  Hypothyroidism (244 9) (E03 9)   6  Low back pain (724 2) (M54 5)   7   Mixed hyperlipidemia (272  2) (E78 2)   8  Need for prophylactic vaccination and inoculation against influenza (V04 81) (Z23)   9  Osteoporosis (733 00) (M81 0)   10  Recurrent cough (786 2) (R05)   11  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)   12  URI (upper respiratory infection) (465 9) (J06 9)    Past Medical History    1  History of Encounter for routine gynecological examination (V72 31) (Z01 419)   2  History of Encounter for screening mammogram for malignant neoplasm of breast   (V76 12) (Z12 31)   3  History of furuncle (V13 3) (Z87 2)   4  History of renal colic (F25 05) (Y63 678)   5  History of Urinary Tract Infection (V13 02)    Family History    1  Family history of Cirrhosis    2  Family history of Hypertension (V17 49)    3  Family history of Cancer    4  Family history of Heart Disease (V17 49)    Social History    · Never A Smoker    Surgical History    1  History of Appendectomy   2  History of Hand Surgery    Current Meds   1  Advair Diskus 500-50 MCG/DOSE Inhalation Aerosol Powder Breath Activated; INHALE   1 PUFFS Twice daily; Therapy: 82Ohi7105 to (Last Rx:05Alp1545) Ordered   2  Albuterol Sulfate 0 63 MG/3ML Inhalation Nebulization Solution; USE 1 UNIT DOSE IN   NEBULIZER EVERY 4 TO 6 HOURS AS NEEDED; Therapy: 28YMA6365 to (Paulding Rosalba)  Requested for: 33XUQ7380; Last   Rx:05Jan2015 Ordered   3  Aspirin 81 MG Oral Tablet; Therapy: (99 668293) to Recorded   4  CoQ10 CAPS; Therapy: (Recorded:12Mar2014) to Recorded   5  Cyclobenzaprine HCl - 10 MG Oral Tablet; TAKE 1 TAB @ HS; Therapy: 18RNJ5994 to (Last Rx:99Def5346)  Requested for: 49MYU5359 Ordered   6  Diazepam 5 MG Oral Tablet; TAKE 1 TABLET  THREE TIMES A DAY AS NEEDED; Last   Rx:52Xzb5999 Ordered   7  Fenofibrate 160 MG Oral Tablet; take 1 tablet by mouth every day; Therapy: 77JHC8968 to (Evaluate:85Yct1723)  Requested for: 14UJF3828; Last   Rx:06Jan2016 Ordered   8  Fish Oil CAPS; Therapy: (Recorded:68Eyp6405) to Recorded   9  Foltanx 3-35-2 MG Oral Tablet; One tablet daily  Requested for: 89KSW9138; Last   Rx:26Mar2015 Ordered   10  Levothyroxine Sodium 50 MCG Oral Tablet; TAKE 1 TABLET DAILY; Therapy: 21QVK2020 to (Evaluate:05Apr2016); Last Rx:08Oct2015 Ordered   11  L-Methylfolate-B6-B12 3-35-2 MG Oral Tablet; take 1 tablet by mouth every day; Therapy: 68HGY5634 to (Evaluate:02Aug2016)  Requested for: 98EJZ1866; Last    Rx:04Feb2016 Ordered   12  Meclizine HCl - 12 5 MG Oral Tablet; Therapy: (Recorded:26Mar2015) to Recorded   13  Methocarbamol 500 MG Oral Tablet; Therapy: (Srini Princloyd) to Recorded   14  Montelukast Sodium 10 MG Oral Tablet; TAKE 1 TABLET BY MOUTH ONCE DAILY; Therapy: 58CHD6044 to (Last Rx:08Oct2015) Ordered   15  Nitrofurantoin Macrocrystal 50 MG Oral Capsule; Therapy: (Recorded:26Mar2015) to Recorded   16  Ventolin  (90 Base) MCG/ACT Inhalation Aerosol Solution; INHALE 1 TO 2    PUFFS EVERY 4 TO 6 HOURS AS NEEDED; Therapy: 41NBX8383 to (Evaluate:03Aug2015)  Requested for: 51FHF1496; Last    Rx:05Jan2015 Ordered   17  Vitamin D3 3000 UNIT Oral Tablet; Therapy: (Recorded:94Dbq4681) to Recorded    Allergies    1  Penicillins   2  Sulfa Drugs    3  Animal dander - Rabbits   4  Dust   5  Mold    Vitals   Recorded: 48LJG5526 02:44PM   Temperature 97 4 F   Heart Rate 82   Respiration 16   Systolic 401   Diastolic 78   Height 5 ft 5 in   Weight 192 lb    BMI Calculated 31 95   BSA Calculated 1 95     Physical Exam    Constitutional   General appearance: Abnormal   uncomfortable  Cardiovascular   Examination of extremities for edema and/or varicosities: Normal     Musculoskeletal   Gait and station: Normal     Joints, bones, and muscles: Abnormal   tenderness and spasms lumbar paraspinal muscles bilaterally  Range of motion: Normal   Range of Motion: Right Hip: Full  Left Hip: Full  Right Knee: Full  Left Knee: Full  negative SLR     Muscle strength/tone: Normal   Motor Strength Findings: normal lower extremity strength  Neurologic   Reflexes: 2+ and symmetric  Sensation: No sensory loss  Results/Data  * XR Chest XR PA/Lat 54HRH8370 09:24AM Clementina Jaylen     Test Name Result Flag Reference   XR Chest XR PA/Lat (Report)     [facility];[address] [facility];;[place];[address]   10/07/2015 9498   10/07/2015 0931   4 VIEWS     CHEST     INDICATION- Wheezing  Cough  Shortness of breath  COMPARISON- None     VIEWS- Frontal and lateral projections& 2 images^ 2 images     FINDINGS-       The cardiomediastinal silhouette is unremarkable  The lungs are clear  No pleural effusions  Osseous structures are age appropriate  IMPRESSION-     No active pulmonary disease           Transcribed on- TRACY Bush MD   Reading Radiologist- TRACY Enrique MD   Electronically Signed- TRACY Enrique MD   Released Date Time- 10/07/15 1552   ------------------------------------------------------------------------------   [id number]^TRACIE PANIAGUA   44140^TRACIE PANIAGUA     (1) CBC/PLT/DIFF 03UND6599 06:45AM Radha Diallo     Test Name Result Flag Reference   WHITE BLOOD CELL COUNT 4 6 Thousand/uL  3 8-10 8   RED BLOOD CELL COUNT 4 64 Million/uL  3 80-5 10   HEMOGLOBIN 14 0 g/dL  11 7-15 5   HEMATOCRIT 43 4 %  35 0-45 0   MCV 93 5 fL  80 0-100 0   MCH 30 2 pg  27 0-33 0   MCHC 32 3 g/dL  32 0-36 0   RDW 12 9 %  11 0-15 0   PLATELET COUNT 915 Thousand/uL  140-400   MPV 10 3 fL  7 5-11 5   ABSOLUTE NEUTROPHILS 2604 cells/uL  0802-1564   ABSOLUTE LYMPHOCYTES 1743 cells/uL  850-3900   ABSOLUTE MONOCYTES 161 cells/uL L 200-950   ABSOLUTE EOSINOPHILS 78 cells/uL     ABSOLUTE BASOPHILS 14 cells/uL  0-200   NEUTROPHILS 56 6 %     LYMPHOCYTES 37 9 %     MONOCYTES 3 5 %     EOSINOPHILS 1 7 %     BASOPHILS 0 3 %       (1) COMPREHENSIVE METABOLIC PANEL 09QQH4968 18:55CN Radha Diallo     Test Name Result Flag Reference   GLUCOSE 92 mg/dL  65-99   Fasting reference interval   UREA NITROGEN (BUN) 16 mg/dL  7-25   CREATININE 1 06 mg/dL H 0 50-0 99   For patients >52years of age, the reference limit  for Creatinine is approximately 13% higher for people  identified as -American  eGFR NON-AFR  AMERICAN 55 mL/min/1 73m2 L > OR = 60   eGFR AFRICAN AMERICAN 64 mL/min/1 73m2  > OR = 60   BUN/CREATININE RATIO 15 (calc)  6-22   SODIUM 142 mmol/L  135-146   POTASSIUM 4 1 mmol/L  3 5-5 3   CHLORIDE 108 mmol/L     CARBON DIOXIDE 26 mmol/L  19-30   CALCIUM 9 9 mg/dL  8 6-10 4   PROTEIN, TOTAL 6 4 g/dL  6 1-8 1   ALBUMIN 4 3 g/dL  3 6-5 1   GLOBULIN 2 1 g/dL (calc)  1 9-3 7   ALBUMIN/GLOBULIN RATIO 2 0 (calc)  1 0-2 5   BILIRUBIN, TOTAL 0 5 mg/dL  0 2-1 2   ALKALINE PHOSPHATASE 53 U/L     AST 33 U/L  10-35   ALT 39 U/L H 6-29     (1) HOMOCYSTEINE 22Txs6471 06:45AM [facility]     Test Name Result Flag Reference   HOMOCYSTEINE,$CARDIOVASCULAR 13 0 umol/L H <10 4   Homocysteine is increased by functional deficiency of   folate or vitamin B12  Testing for methylmalonic acid   differentiates between these deficiencies  Other causes   of increased homocysteine include renal failure, folate   antagonists such as methotrexate and phenytoin, and   exposure to nitrous oxide  (1) LIPID PANEL, FASTING 25Qmw5014 06:45AM [facility]     Test Name Result Flag Reference   CHOLESTEROL, TOTAL 191 mg/dL  125-200   HDL CHOLESTEROL 61 mg/dL  > OR = 46   TRIGLICERIDES 771 mg/dL H <150   LDL-CHOLESTEROL 97 mg/dL (calc)  <130   Desirable range <100 mg/dL for patients with CHD or  diabetes and <70 mg/dL for diabetic patients with  known heart disease  CHOL/HDLC RATIO 3 1 (calc)  < OR = 5 0   NON HDL CHOLESTEROL 130 mg/dL (calc)     Target for non-HDL cholesterol is 30 mg/dL higher than   LDL cholesterol target       (Q) TSH, 3RD GENERATION 83Jmw8604 06:45AM [facility] [employer]     Test Name Result Flag Reference   TSH 8 34 mIU/L H 0 40-4 50     *(Q) VITAMIN D, 25-HYDROXY, LC/MS/MS 23KLG8131 06:45AM Thomas Higgins   REPORT COMMENT:  FASTING:YES     Test Name Result Flag Reference   VITAMIN D, 25-OH, TOTAL 35 ng/mL     Vitamin D Status         25-OH Vitamin D:     Deficiency:                    <20 ng/mL  Insufficiency:             20 - 29 ng/mL  Optimal:                 > or = 30 ng/mL     For 25-OH Vitamin D testing on patients on   D2-supplementation and patients for whom quantitation   of D2 and D3 fractions is required, the QuestAssureD(TM)  25-OH VIT D, (D2,D3), LC/MS/MS is recommended: order   code 92016 (patients >2yrs)  For more information on this test, go to:  http://education  Nu-Pulse/faq/SPW928  (This link is being provided for   informational/educational purposes only )     Future Appointments    Date/Time Provider Specialty Site   09/08/2016 10:40 AM Rozella Schlatter, MD Obstetrics/Gynecology [facility] & [address], [address]     Signatures   Electronically signed by : MANOLO Crowder MD; Mar  1 2016  5:30PM EST                       (Author)

## 2018-01-22 VITALS
BODY MASS INDEX: 31.65 KG/M2 | HEIGHT: 65 IN | DIASTOLIC BLOOD PRESSURE: 76 MMHG | SYSTOLIC BLOOD PRESSURE: 134 MMHG | WEIGHT: 190 LBS

## 2018-01-23 NOTE — RESULT NOTES
Message  Leonid Rivas I have enclosed a copy of your recent labs  All results are normal except for mildly elevated cholesterol at 213 normal less than 200  watch diet  slight increase in one of your liver enzymes ALT 30  weight loss can correct this problem  Creatinine or kidney function is listed as mildly elevated at 1 13 up to 1 30 can be still be normal  try to stay hydrated  limit use of medications such as Advil or Aleve  TSH or thyroid test normal  no changes needed in your Levothyroxine dose  repeat labs 6 months  Verified Results  (1) COMPREHENSIVE METABOLIC PANEL 55MWR1654 67:40JJ Reather Karrie     Test Name Result Flag Reference   GLUCOSE 90 mg/dL  65-99   Fasting reference interval   UREA NITROGEN (BUN) 17 mg/dL  7-25   CREATININE 1 13 mg/dL H 0 50-0 99   For patients >52years of age, the reference limit  for Creatinine is approximately 13% higher for people  identified as -American  eGFR NON-AFR  AMERICAN 51 mL/min/1 73m2 L > OR = 60   eGFR AFRICAN AMERICAN 59 mL/min/1 73m2 L > OR = 60   BUN/CREATININE RATIO 15 (calc)  6-22   SODIUM 143 mmol/L  135-146   POTASSIUM 4 4 mmol/L  3 5-5 3   CHLORIDE 107 mmol/L     CARBON DIOXIDE 27 mmol/L  20-31   CALCIUM 10 1 mg/dL  8 6-10 4   PROTEIN, TOTAL 6 9 g/dL  6 1-8 1   ALBUMIN 4 4 g/dL  3 6-5 1   GLOBULIN 2 5 g/dL (calc)  1 9-3 7   ALBUMIN/GLOBULIN RATIO 1 8 (calc)  1 0-2 5   BILIRUBIN, TOTAL 0 6 mg/dL  0 2-1 2   ALKALINE PHOSPHATASE 88 U/L     AST 28 U/L  10-35   ALT 30 U/L H 6-29     (1) LIPID PANEL, FASTING [id number] 09:44AM Reather Karrie     Test Name Result Flag Reference   CHOLESTEROL, TOTAL 213 mg/dL H <200   HDL CHOLESTEROL 62 mg/dL  >71   TRIGLICERIDES 144 mg/dL  <150   LDL-CHOLESTEROL 125 mg/dL (calc) H    Reference range: <100     Desirable range <100 mg/dL for patients with CHD or  diabetes and <70 mg/dL for diabetic patients with  known heart disease       LDL-C is now calculated using the Speedy-Acuña   calculation, which is a validated novel method providing   better accuracy than the Friedewald equation in the   estimation of LDL-C  Kadeem Diaz  [facility]  8094;348(11): 3091-2995   (http://Re.Mu/faq/SYG270)   CHOL/HDLC RATIO 3 4 (calc)  <5 0   NON HDL CHOLESTEROL 151 mg/dL (calc) H <130   For patients with diabetes plus 1 major ASCVD risk   factor, treating to a non-HDL-C goal of <100 mg/dL   (LDL-C of <70 mg/dL) is considered a therapeutic   option  (Q) CBC (H/H, RBC, INDICES, WBC, PLT) [id number] 09:44AM [facility]     Test Name Result Flag Reference   WHITE BLOOD CELL COUNT 4 1 Thousand/uL  3 8-10 8   RED BLOOD CELL COUNT 4 82 Million/uL  3 80-5 10   HEMOGLOBIN 14 9 g/dL  11 7-15 5   HEMATOCRIT 43 5 %  35 0-45 0   MCV 90 2 fL  80 0-100 0   MCH 30 9 pg  27 0-33 0   MCHC 34 3 g/dL  32 0-36 0   RDW 12 4 %  11 0-15 0   PLATELET COUNT 418 Thousand/uL  140-400   MPV 11 5 fL  7 5-12 5     *(Q) VITAMIN D, 25-HYDROXY, LC/MS/MS [id number] 09:44AM [facility]     Test Name Result Flag Reference   VITAMIN D, 25-OH, TOTAL 40 ng/mL     Vitamin D Status         25-OH Vitamin D:     Deficiency:                    <20 ng/mL  Insufficiency:             20 - 29 ng/mL  Optimal:                 > or = 30 ng/mL     For 25-OH Vitamin D testing on patients on   D2-supplementation and patients for whom quantitation   of D2 and D3 fractions is required, the QuestAssureD(TM)  25-OH VIT D, (D2,D3), LC/MS/MS is recommended: order   code 52756 (patients >2yrs)  For more information on this test, go to:  http://Trovix/faq/KKV386  (This link is being provided for   informational/educational purposes only )     (Q) TSH, 3RD GENERATION W/REFLEX TO FT4 [id number] 09:44AM [facility]   REPORT COMMENT:  FASTING:YES     Test Name Result Flag Reference   TSH W/REFLEX TO FT4 2 36 mIU/L  0 40-4 50       Signatures   Electronically signed by : Noni Cabot, M D ; Jan 4 2018 10:50AM EST (Author)

## 2018-01-25 ENCOUNTER — HOSPITAL ENCOUNTER (OUTPATIENT)
Dept: RADIOLOGY | Age: 68
Discharge: HOME | End: 2018-01-25
Payer: COMMERCIAL

## 2018-01-25 DIAGNOSIS — Z12.31 ENCOUNTER FOR SCREENING MAMMOGRAM FOR MALIGNANT NEOPLASM OF BREAST: ICD-10-CM

## 2018-01-25 PROCEDURE — 77067 SCR MAMMO BI INCL CAD: CPT

## 2018-02-13 ENCOUNTER — OFFICE VISIT (OUTPATIENT)
Dept: FAMILY MEDICINE CLINIC | Facility: CLINIC | Age: 68
End: 2018-02-13
Payer: COMMERCIAL

## 2018-02-13 VITALS
SYSTOLIC BLOOD PRESSURE: 152 MMHG | DIASTOLIC BLOOD PRESSURE: 80 MMHG | HEART RATE: 120 BPM | TEMPERATURE: 98.4 F | OXYGEN SATURATION: 95 % | RESPIRATION RATE: 18 BRPM

## 2018-02-13 DIAGNOSIS — J45.41 MODERATE PERSISTENT ASTHMA WITH ACUTE EXACERBATION: Primary | ICD-10-CM

## 2018-02-13 PROBLEM — E55.9 VITAMIN D DEFICIENCY: Status: ACTIVE | Noted: 2017-09-21

## 2018-02-13 PROBLEM — K58.9 IRRITABLE BOWEL SYNDROME: Status: ACTIVE | Noted: 2017-06-19

## 2018-02-13 PROCEDURE — 99214 OFFICE O/P EST MOD 30 MIN: CPT | Performed by: FAMILY MEDICINE

## 2018-02-13 RX ORDER — CYCLOBENZAPRINE HCL 10 MG
1 TABLET ORAL AS NEEDED
COMMUNITY
Start: 2014-05-13 | End: 2019-06-05 | Stop reason: SDUPTHER

## 2018-02-13 RX ORDER — PREDNISONE 10 MG/1
10 TABLET ORAL 2 TIMES DAILY WITH MEALS
Qty: 8 TABLET | Refills: 0 | Status: SHIPPED | OUTPATIENT
Start: 2018-02-13 | End: 2018-02-17

## 2018-02-13 RX ORDER — ALBUTEROL SULFATE 90 UG/1
1-2 AEROSOL, METERED RESPIRATORY (INHALATION) AS NEEDED
COMMUNITY
Start: 2015-01-05 | End: 2018-02-26 | Stop reason: SDUPTHER

## 2018-02-13 RX ORDER — ALBUTEROL SULFATE 0.63 MG/3ML
1 SOLUTION RESPIRATORY (INHALATION) AS NEEDED
COMMUNITY
Start: 2015-01-05 | End: 2018-10-19 | Stop reason: SDUPTHER

## 2018-02-13 RX ORDER — NITROFURANTOIN MACROCRYSTALS 50 MG/1
1 CAPSULE ORAL DAILY
COMMUNITY

## 2018-02-13 RX ORDER — FENOFIBRATE 160 MG/1
1 TABLET ORAL DAILY
COMMUNITY
Start: 2014-01-20 | End: 2018-06-05 | Stop reason: SDUPTHER

## 2018-02-13 RX ORDER — BENZONATATE 100 MG/1
100 CAPSULE ORAL 3 TIMES DAILY PRN
Qty: 30 CAPSULE | Refills: 0 | Status: SHIPPED | OUTPATIENT
Start: 2018-02-13 | End: 2018-02-23

## 2018-02-13 RX ORDER — BUTALB/ACETAMINOPHEN/CAFFEINE 50-325-40
30 TABLET ORAL DAILY
COMMUNITY

## 2018-02-13 RX ORDER — LEVOTHYROXINE SODIUM 0.05 MG/1
1 TABLET ORAL DAILY
COMMUNITY
Start: 2015-10-08 | End: 2018-03-26 | Stop reason: SDUPTHER

## 2018-02-13 RX ORDER — MECOBAL/LEVOMEFOLAT CA/B6 PHOS 2-3-35 MG
1 TABLET ORAL DAILY
COMMUNITY
Start: 2016-02-04 | End: 2018-11-24 | Stop reason: SDUPTHER

## 2018-02-13 NOTE — PATIENT INSTRUCTIONS
-Start taking Mucinex DM 1200mg twice a day  -Drink at least 10 glasses of water per day  -Honey as been shown to help with coughs  -You can use Tylenol as needed for fevers  -Practice good hygiene and cover your mouth if you cough  -Call us back if you have new or worsening fevers and other symptoms  -Patient instructions handout provided      Bronchospasm   WHAT YOU NEED TO KNOW:   Bronchospasm is a narrowing of the airway that usually comes and goes  You may be at risk for bronchospasm if you have a chest cold or allergies  You may also be at risk if you are bothered by air pollution, certain medicines, cold, dry air, smoke, or strong odors  Exercise may worsen your symptoms  Bronchospasms may make it hard for you to breathe  DISCHARGE INSTRUCTIONS:   Medicines: You may need any of the following:  · Bronchodilators  help expand your airway for easier breathing  Some of these medicines may help prevent future spasms  · Inhaled steroids  help reduce swelling in your airway and soothe your breathing  These are used for long-term control  · Anticholinergics  help relax and open your airway  · Take your medicine as directed  Contact your healthcare provider if you think your medicine is not helping or if you have side effects  Tell him or her if you are allergic to any medicine  Keep a list of the medicines, vitamins, and herbs you take  Include the amounts, and when and why you take them  Bring the list or the pill bottles to follow-up visits  Carry your medicine list with you in case of an emergency  Follow up with your healthcare provider as directed: You may need more testing to find the cause of your condition  Write down your questions so you remember to ask them during your visits  Self-care:   · Avoid triggers  · Warm up before you exercise  Ask your healthcare provider about the best exercise plan for you  · Try to avoid people who are sick   Ask your healthcare provider if you need a flu or pneumonia vaccine  · Breathe through your nose when you are in cold, dry air or weather  This may help reduce lung irritation by warming the air before it reaches your lungs  Contact your healthcare provider if:   · You have a fever  · You have a cough that will not go away  · Your wheezing worsens  · You have questions or concerns about your condition or care  Seek care immediately or call 911 if:   · You cough or spit up blood  · You are short of breath  · You have blue fingernails or toenails  · You have chest pain  · You have a fast or uneven heartbeat  © 2017 [employer]  Information is for End User's use only and may not be sold, redistributed or otherwise used for commercial purposes  All illustrations and images included in CareNotes® are the copyrighted property of [employer]  or Carlos Laguna  The above information is an  only  It is not intended as medical advice for individual conditions or treatments  Talk to your doctor, nurse or pharmacist before following any medical regimen to see if it is safe and effective for you

## 2018-02-13 NOTE — PROGRESS NOTES
Assessment/Plan: Ethan Reyes is a 78 y o  female with:   Problem List Items Addressed This Visit        Respiratory    Asthma - Primary- pt with acute asthma exacerbation given persistent cough, wheezing and respiratory distress, though is not hypoxemic  Will have patient do scheduled albuterol nebulizers and prednisone burst  Will add Mucinex and Tessalon perles and reviewed symptomatic care  Will get CXR to rule out PNA and consider adding Z-jaylene  Relevant Medications    albuterol (ACCUNEB) 0 63 MG/3ML nebulizer solution    albuterol (VENTOLIN HFA) 90 mcg/act inhaler    predniSONE 10 mg tablet    benzonatate (TESSALON PERLES) 100 mg capsule    Other Relevant Orders    XR chest pa & lateral        Subjective:   Ethan Reyes is a 78 y o  female who presents today with a chief complaint of Cough and Nasal Congestion    Started 4-5 days ago with a hoarse voice, cough, rhinnorhea, chest congestion  Her cough is full of mucous and heard her cough whistling, but not quite wheezing  Had a really bad coughing fit where she thought she would vomit prior to coming to the hospital today  Has a history of asthma and uses an inhaler if she needs, but it doesn't feel like that  Cough   This is a new problem  The current episode started in the past 7 days  The problem has been rapidly worsening  The cough is productive of sputum  Associated symptoms include nasal congestion, rhinorrhea and a sore throat  Pertinent negatives include no chest pain, chills, ear pain, fever, hemoptysis, postnasal drip, shortness of breath or wheezing  She has tried rest and OTC inhaler for the symptoms  Review of Systems   Constitutional: Negative for chills, diaphoresis and fever  HENT: Positive for congestion, rhinorrhea, sneezing, sore throat and voice change  Negative for ear pain, postnasal drip, sinus pain and sinus pressure  Respiratory: Positive for cough  Negative for hemoptysis, shortness of breath and wheezing  Cardiovascular: Negative for chest pain and palpitations  Gastrointestinal: Negative for abdominal pain, diarrhea, nausea and vomiting  Objective:  /80 (BP Location: Left arm, Patient Position: Sitting, Cuff Size: Large)   Pulse (!) 120   Temp 98 4 °F (36 9 °C)   Resp 18   SpO2 95%   Breastfeeding? No   Physical Exam   Constitutional: She appears well-developed and well-nourished  No distress  HENT:   Head: Normocephalic and atraumatic  Nose: Right sinus exhibits no maxillary sinus tenderness and no frontal sinus tenderness  Left sinus exhibits no maxillary sinus tenderness and no frontal sinus tenderness  Mouth/Throat: Oropharynx is clear and moist  No oropharyngeal exudate  Eyes: Conjunctivae are normal  Pupils are equal, round, and reactive to light  Right eye exhibits no discharge  Left eye exhibits no discharge  Neck: Normal range of motion  Neck supple  Cardiovascular: Normal rate and regular rhythm  Pulmonary/Chest: Breath sounds normal  She is in respiratory distress  She has no wheezes  Inspiratory and expiratory wheezes worse on right   Lymphadenopathy:     She has cervical adenopathy  Skin: She is not diaphoretic  Vitals reviewed        Histories Reviewed 2/13/2018:  Patient's Medications   New Prescriptions    BENZONATATE (TESSALON PERLES) 100 MG CAPSULE    Take 1 capsule (100 mg total) by mouth 3 (three) times a day as needed for cough for up to 10 days    PREDNISONE 10 MG TABLET    Take 1 tablet (10 mg total) by mouth 2 (two) times a day with meals for 4 days   Previous Medications    ALBUTEROL (ACCUNEB) 0 63 MG/3ML NEBULIZER SOLUTION    Inhale 1 each as needed    ALBUTEROL (VENTOLIN HFA) 90 MCG/ACT INHALER    Inhale 1-2 puffs as needed    ASPIRIN 81 MG TABLET    Take 1 tablet by mouth daily    CHOLECALCIFEROL 4000 UNITS CAPS    Take 1 capsule by mouth daily    COENZYME Q10 (CO Q-10) 30 MG CAPS    Take 30 mg by mouth daily    CYCLOBENZAPRINE (FLEXERIL) 10 MG TABLET    Take 1 tablet by mouth as needed    FENOFIBRATE (TRIGLIDE) 160 MG TABLET    Take 1 tablet by mouth daily    L-METHYLFOLATE-B6-B12 3-35-2 MG TABS    Take 1 tablet by mouth daily    LEVOTHYROXINE 50 MCG TABLET    Take 1 tablet by mouth daily    NITROFURANTOIN (MACRODANTIN) 50 MG CAPSULE    Take 1 capsule by mouth daily   Modified Medications    No medications on file   Discontinued Medications    No medications on file     Allergies   Allergen Reactions    Dust Mite Extract     Molds & Smuts     Penicillins      Shown on allergy testing   Pseudoephedrine     Rabbit Epithelium     Sulfa Antibiotics Hives    Codeine Anxiety     No past medical history on file  Social History     Social History    Marital status: /Civil Union     Spouse name: N/A    Number of children: N/A    Years of education: N/A     Occupational History    Not on file  Social History Main Topics    Smoking status: Former Smoker    Smokeless tobacco: Never Used    Alcohol use No    Drug use: No    Sexual activity: Not on file     Other Topics Concern    Not on file     Social History Narrative    No narrative on file     Future Appointments  Date Time Provider Carlee Bravo   9/26/2018 9:40 AM JES[facility]   1/31/2019 8:00 AM [facility]     Patient Instructions   -Start taking Mucinex DM 1200mg twice a day  -Drink at least 10 glasses of water per day  -Honey as been shown to help with coughs  -You can use Tylenol as needed for fevers  -Practice good hygiene and cover your mouth if you cough  -Call us back if you have new or worsening fevers and other symptoms  -Patient instructions handout provided      Bronchospasm   WHAT YOU NEED TO KNOW:   Bronchospasm is a narrowing of the airway that usually comes and goes  You may be at risk for bronchospasm if you have a chest cold or allergies   You may also be at risk if you are bothered by air pollution, certain medicines, cold, dry air, smoke, or strong odors  Exercise may worsen your symptoms  Bronchospasms may make it hard for you to breathe  DISCHARGE INSTRUCTIONS:   Medicines: You may need any of the following:  · Bronchodilators  help expand your airway for easier breathing  Some of these medicines may help prevent future spasms  · Inhaled steroids  help reduce swelling in your airway and soothe your breathing  These are used for long-term control  · Anticholinergics  help relax and open your airway  · Take your medicine as directed  Contact your healthcare provider if you think your medicine is not helping or if you have side effects  Tell him or her if you are allergic to any medicine  Keep a list of the medicines, vitamins, and herbs you take  Include the amounts, and when and why you take them  Bring the list or the pill bottles to follow-up visits  Carry your medicine list with you in case of an emergency  Follow up with your healthcare provider as directed: You may need more testing to find the cause of your condition  Write down your questions so you remember to ask them during your visits  Self-care:   · Avoid triggers  · Warm up before you exercise  Ask your healthcare provider about the best exercise plan for you  · Try to avoid people who are sick  Ask your healthcare provider if you need a flu or pneumonia vaccine  · Breathe through your nose when you are in cold, dry air or weather  This may help reduce lung irritation by warming the air before it reaches your lungs  Contact your healthcare provider if:   · You have a fever  · You have a cough that will not go away  · Your wheezing worsens  · You have questions or concerns about your condition or care  Seek care immediately or call 911 if:   · You cough or spit up blood  · You are short of breath  · You have blue fingernails or toenails  · You have chest pain      · You have a fast or uneven heartbeat  © 2017 [facility] Information is for End User's use only and may not be sold, redistributed or otherwise used for commercial purposes  All illustrations and images included in CareNotes® are the copyrighted property of A D [employer] M [employer]  or [address]  The above information is an  only  It is not intended as medical advice for individual conditions or treatments  Talk to your doctor, nurse or pharmacist before following any medical regimen to see if it is safe and effective for you

## 2018-02-14 ENCOUNTER — TRANSCRIBE ORDERS (OUTPATIENT)
Dept: ADMINISTRATIVE | Age: 68
End: 2018-02-14

## 2018-02-14 ENCOUNTER — APPOINTMENT (OUTPATIENT)
Dept: RADIOLOGY | Age: 68
End: 2018-02-14
Payer: COMMERCIAL

## 2018-02-14 ENCOUNTER — TELEPHONE (OUTPATIENT)
Dept: FAMILY MEDICINE CLINIC | Facility: CLINIC | Age: 68
End: 2018-02-14

## 2018-02-14 DIAGNOSIS — J45.41 MODERATE PERSISTENT ASTHMA WITH EXACERBATION: ICD-10-CM

## 2018-02-14 DIAGNOSIS — J45.41 MODERATE PERSISTENT ASTHMA WITH EXACERBATION: Primary | ICD-10-CM

## 2018-02-14 PROCEDURE — 71046 X-RAY EXAM CHEST 2 VIEWS: CPT

## 2018-02-26 DIAGNOSIS — J45.20 MILD INTERMITTENT ASTHMA WITHOUT COMPLICATION: Primary | ICD-10-CM

## 2018-02-26 RX ORDER — ALBUTEROL SULFATE 90 UG/1
1-2 AEROSOL, METERED RESPIRATORY (INHALATION) EVERY 4 HOURS PRN
Qty: 1 INHALER | Refills: 2 | Status: SHIPPED | OUTPATIENT
Start: 2018-02-26 | End: 2018-10-19 | Stop reason: SDUPTHER

## 2018-03-26 DIAGNOSIS — E03.9 ACQUIRED HYPOTHYROIDISM: Primary | ICD-10-CM

## 2018-03-26 RX ORDER — LEVOTHYROXINE SODIUM 0.05 MG/1
50 TABLET ORAL DAILY
Qty: 90 TABLET | Refills: 1 | Status: SHIPPED | OUTPATIENT
Start: 2018-03-26 | End: 2018-09-12 | Stop reason: SDUPTHER

## 2018-06-05 DIAGNOSIS — E78.2 MIXED HYPERLIPIDEMIA: Primary | ICD-10-CM

## 2018-06-05 RX ORDER — FENOFIBRATE 160 MG/1
TABLET ORAL
Qty: 30 TABLET | Refills: 5 | Status: SHIPPED | OUTPATIENT
Start: 2018-06-05 | End: 2018-12-30 | Stop reason: SDUPTHER

## 2018-06-28 ENCOUNTER — TRANSCRIBE ORDERS (OUTPATIENT)
Dept: ADMINISTRATIVE | Facility: HOSPITAL | Age: 68
End: 2018-06-28

## 2018-06-28 DIAGNOSIS — C64.1 MALIGNANT NEOPLASM OF RIGHT KIDNEY, EXCEPT RENAL PELVIS (HCC): Primary | ICD-10-CM

## 2018-07-11 ENCOUNTER — HOSPITAL ENCOUNTER (OUTPATIENT)
Dept: RADIOLOGY | Age: 68
Discharge: HOME | End: 2018-07-11
Payer: COMMERCIAL

## 2018-07-11 DIAGNOSIS — D17.71 ANGIOMYOLIPOMA OF RIGHT KIDNEY: ICD-10-CM

## 2018-07-11 DIAGNOSIS — C64.1 MALIGNANT NEOPLASM OF RIGHT KIDNEY, EXCEPT RENAL PELVIS (HCC): ICD-10-CM

## 2018-07-11 PROCEDURE — 76770 US EXAM ABDO BACK WALL COMP: CPT

## 2018-08-08 ENCOUNTER — TELEPHONE (OUTPATIENT)
Dept: FAMILY MEDICINE CLINIC | Facility: CLINIC | Age: 68
End: 2018-08-08

## 2018-08-13 ENCOUNTER — OFFICE VISIT (OUTPATIENT)
Dept: FAMILY MEDICINE CLINIC | Facility: CLINIC | Age: 68
End: 2018-08-13
Payer: COMMERCIAL

## 2018-08-13 VITALS
RESPIRATION RATE: 16 BRPM | DIASTOLIC BLOOD PRESSURE: 70 MMHG | SYSTOLIC BLOOD PRESSURE: 128 MMHG | BODY MASS INDEX: 31.25 KG/M2 | WEIGHT: 187.8 LBS | TEMPERATURE: 97.5 F
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
## 2018-08-13 DIAGNOSIS — J01.90 ACUTE NON-RECURRENT SINUSITIS, UNSPECIFIED LOCATION: ICD-10-CM

## 2018-08-13 DIAGNOSIS — M54.50 ACUTE BILATERAL LOW BACK PAIN WITHOUT SCIATICA: ICD-10-CM

## 2018-08-13 PROCEDURE — 99214 OFFICE O/P EST MOD 30 MIN: CPT | Performed by: FAMILY MEDICINE

## 2018-08-13 RX ORDER — AZITHROMYCIN 250 MG/1
TABLET, FILM COATED ORAL
Qty: 6 TABLET | Refills: 0 | Status: SHIPPED | OUTPATIENT
Start: 2018-08-13 | End: 2018-08-17

## 2018-08-13 NOTE — PROGRESS NOTES
Assessment/Plan:     Diagnoses and all orders for this visit:    Fall, initial encounter  -     XR spine lumbar 2 or 3 views injury; Future    Acute bilateral low back pain without sciatica  -     XR spine lumbar 2 or 3 views injury; Future    Acute non-recurrent sinusitis, unspecified location  -     azithromycin (ZITHROMAX) 250 mg tablet; 2 tablets day 1  1 tablet daily for days 2 through 5    Other orders  -     BIOTIN PO; Take by mouth        X rays lumbar spine  Continue with heat  Aleve 1 to 2 tablets BID with food  Trial of antibiotics for suspected sinusitis  Consider further testing for persistent headaches CT head/sinuses  Advised to call if any changes  Patient ID: Duane Chant is a 80 y o  female  Recurrent lower back pain  No radicular pain  No leg weakness or numbness  No new bowel or bladder changes  Symptoms started on 07/24/2018 when she fell  She went to sit on a chair but the chair had been moved  She landed on her buttocks  She has taken one dose of Advil and one dose of Flexeril  She has been using heat prn and doing home exercises and stretching  She has had problems in the past with lower back pain  She completed a course of PT  07/2018 renal 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  Labs 01/2018 see note  The following portions of the patient's history were reviewed and updated as appropriate: allergies, current medications, past family history, past medical history, past social history, past surgical history and problem list     Review of Systems   Constitutional: Negative for chills and fever  HENT: Positive for congestion, sinus pain and sinus pressure  Negative for ear pain, facial swelling, postnasal drip, rhinorrhea, sore throat and trouble swallowing  Head pressure/ head pain on the top and the back of her head  Pressure temple areas  No pain with chewing  Mild nasal congestion  Eyes: Negative for visual disturbance     Respiratory: Negative for cough, shortness of breath and wheezing  Cardiovascular: Negative for chest pain and palpitations  Gastrointestinal: Negative for abdominal distention, abdominal pain, constipation, diarrhea, nausea and vomiting  Endocrine:        See HPI 09/2017 DEXA scan shows osteoporosis   Genitourinary: Negative for difficulty urinating, dysuria and hematuria  Musculoskeletal: Negative for joint swelling and myalgias  See HPI  No pain in shoulders, neck and/or buttocks    Skin: Negative for rash  Neurological: Negative for dizziness and headaches  Hematological: Negative for adenopathy  Objective:      /70 (BP Location: Left arm, Patient Position: Sitting, Cuff Size: Large)   Temp 97 5 °F (36 4 °C) (Tympanic)   Resp 16   Wt 85 2 kg (187 lb 12 8 oz)   BMI 31 25 kg/m²          Physical Exam   Constitutional: She is oriented to person, place, and time  She appears well-developed and well-nourished  No distress  HENT:   Right Ear: Tympanic membrane normal    Left Ear: Tympanic membrane normal    Nose: Right sinus exhibits no maxillary sinus tenderness and no frontal sinus tenderness  Left sinus exhibits no maxillary sinus tenderness and no frontal sinus tenderness  Mouth/Throat: Oropharynx is clear and moist  No oral lesions  No trismus in the jaw  Normal dentition  No scalp or temple area tenderness  Temporal arteries normal  No TMJ tenderness  Eyes: Conjunctivae and EOM are normal  Pupils are equal, round, and reactive to light  No scleral icterus  Neck: Normal range of motion  Neck supple  No tracheal deviation present  No thyromegaly present  Cardiovascular: Normal rate, regular rhythm and normal heart sounds  Exam reveals no gallop  No murmur heard  Pulmonary/Chest: Effort normal and breath sounds normal  No respiratory distress  She has no wheezes  She has no rales  Musculoskeletal: Normal range of motion  Full ROM hips and knees  Negative SLR   Mild tenderness lower lumbar spine  No SI tenderness  No lumbar paraspinal tenderness  Lymphadenopathy:     She has no cervical adenopathy  Neurological: She is alert and oriented to person, place, and time  She has normal strength and normal reflexes  No sensory deficit  Gait normal    No ankle clonus  Skin: No rash noted  Psychiatric: She has a normal mood and affect  Nursing note and vitals reviewed         Recent Results (from the past 6048 hour(s))   TSH, 3RD GENERATION WITH T4 REFLEX (HISTORICAL)    Collection Time: 01/03/18  9:44 AM   Result Value Ref Range    TSH 3RD GENERATON 2 36 0 40 - 4 50 mIU/L   VITAMIN D 25 HYDROXY (HISTORICAL)    Collection Time: 01/03/18  9:44 AM   Result Value Ref Range    Vit D, 25-Hydroxy 40 30 - 100 ng/mL   CBC AND PLATELET (HISTORICAL)    Collection Time: 01/03/18  9:44 AM   Result Value Ref Range    WBC 4 1 3 8 - 10 8 Thousand/uL    RBC 4 82 3 80 - 5 10 Million/uL    Hemoglobin 14 9 11 7 - 15 5 g/dL    Hematocrit 43 5 35 0 - 45 0 %    MCV 90 2 80 0 - 100 0 fL    MCH 30 9 27 0 - 33 0 pg    MCHC 34 3 32 0 - 36 0 g/dL    RDW 12 4 11 0 - 15 0 %    Platelets 282 120 - 465 Thousand/uL    MPV 11 5 7 5 - 12 5 fL   LIPID PANEL (HISTORICAL)    Collection Time: 01/03/18  9:44 AM   Result Value Ref Range    Cholesterol 213 (H) <200 mg/dL    HDL 62 >50 mg/dL    Triglycerides 147 <150 mg/dL    LDL CHOLESTEROL 125 (H) mg/dL (calc)    Chol/HDL Ratio 3 4 <5 0 (calc)    NON-HDL-CHOL (CHOL-HDL) 151 (H) <130 mg/dL (calc)   COMPREHENSIVE METABOLIC PANEL (HISTORICAL)    Collection Time: 01/03/18  9:44 AM   Result Value Ref Range    Glucose 90 65 - 99 mg/dL    BUN 17 7 - 25 mg/dL    Creatinine 1 13 (H) 0 50 - 0 99 mg/dL    EGFR-AMERICAN CALC 51 (L) > OR = 60 mL/min/1 73m2    eGFR  59 (L) > OR = 60 mL/min/1 73m2    BUN/CREA Ratio 15 6 - 22 (calc)    Sodium 143 135 - 146 mmol/L    Potassium 4 4 3 5 - 5 3 mmol/L    Chloride 107 98 - 110 mmol/L    CO2 27 20 - 31 mmol/L    Calcium 10 1 8 6 - 10 4 mg/dL    Total Protein 6 9 6 1 - 8 1 g/dL    Albumin 4 4 3 6 - 5 1 g/dL    GAMMA GLOBULIN 2 5 1 9 - 3 7 g/dL (calc)    A/G RATIO 1 8 1 0 - 2 5 (calc)    Total Bilirubin 0 6 0 2 - 1 2 mg/dL    Alkaline Phosphatase 88 33 - 130 U/L    AST 28 10 - 35 U/L    ALT 30 (H) 6 - 29 U/L      Procedure: Us Kidney And Bladder    Result Date: 7/13/2018  Narrative: RENAL ULTRASOUND INDICATION:   Follow-up angiomyolipoma  COMPARISON: 7/7/2016 and 10/1/2014 TECHNIQUE:   Ultrasound of the retroperitoneum was performed with a curvilinear transducer utilizing volumetric sweeps and still imaging techniques  FINDINGS: KIDNEYS: Symmetric and normal size  Right kidney:  10 7 x 4 2 cm  Left kidney:  10 6 x 5 2 cm  Right kidney Normal echogenicity and contour  No suspicious masses detected  Fat-containing mass in the right kidney as seen on prior CT is measured as 3 separate lesions though this appeared as a single confluent mass on CT  Right interpolar angiomyolipoma measures 1 8 x 1 7 x 1 8 cm, previously 1 7 x 1 6 x 2 0 cm  Adjacent angiomyolipoma measures 2 9 x 3 0 x 3 0 cm, previously 2 5 x 2 8 x 2 8 cm  The largest lesion measures 5 9 x 4 7 x 11 3 cm, previously 6 0 x 3 7 x 11 2 cm  No hydronephrosis  No shadowing calculi  No perinephric fluid collections  Left kidney Normal echogenicity and contour  No suspicious masses detected  No hydronephrosis  No shadowing calculi  No perinephric fluid collections  URETERS: Nonvisualized  BLADDER: Normally distended  No focal thickening or mass lesions  Bilateral ureteral jets detected  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

## 2018-08-14 NOTE — TELEPHONE ENCOUNTER
Patient's insurance will require authorization  Has she tried and failed on oral meds or is there a reason she cannot take them?

## 2018-08-16 ENCOUNTER — TRANSCRIBE ORDERS (OUTPATIENT)
Dept: ADMINISTRATIVE | Age: 68
End: 2018-08-16

## 2018-08-16 ENCOUNTER — APPOINTMENT (OUTPATIENT)
Dept: RADIOLOGY | Age: 68
End: 2018-08-16
Payer: COMMERCIAL

## 2018-08-16 ENCOUNTER — TELEPHONE (OUTPATIENT)
Dept: FAMILY MEDICINE CLINIC | Facility: CLINIC | Age: 68
End: 2018-08-16

## 2018-08-16 DIAGNOSIS — M54.50 ACUTE BILATERAL LOW BACK PAIN WITHOUT SCIATICA: ICD-10-CM
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
## 2018-08-16 PROCEDURE — 72100 X-RAY EXAM L-S SPINE 2/3 VWS: CPT

## 2018-08-16 NOTE — TELEPHONE ENCOUNTER
Call re x rays  Lumbar spine degenerative disc disease / arthritis no fracture seen  consider MRI for persistent pain symptoms    Procedure: Xr Spine Lumbar 2 Or 3 Views Injury    Result Date: 8/16/2018  Narrative: LUMBAR SPINE INDICATION:   W19  XXXA: Unspecified fall, initial encounter M54 5: Low back pain  COMPARISON:  12/24/2015 VIEWS:  XR SPINE LUMBAR 2 OR 3 VIEWS INJURY FINDINGS: There is no evidence of acute fracture or destructive osseous lesion  Curvature of the lumbar spine convex right noted Multilevel discogenic degenerative changes of the lumbar spine again noted  Facet joint arthritis is present at the lumbosacral junction The pedicles appear intact  Soft tissues are unremarkable  Impression: 1  No fracture or subluxation 2    Discogenic degenerative changes of the lumbar spine Workstation performed: [id number]

## 2018-09-12 DIAGNOSIS — E03.9 ACQUIRED HYPOTHYROIDISM: ICD-10-CM

## 2018-09-12 RX ORDER — LEVOTHYROXINE SODIUM 0.05 MG/1
50 TABLET ORAL DAILY
Qty: 90 TABLET | Refills: 2 | Status: SHIPPED | OUTPATIENT
Start: 2018-09-12 | End: 2019-06-10 | Stop reason: SDUPTHER

## 2018-09-20 ENCOUNTER — CLINICAL SUPPORT (OUTPATIENT)
Dept: FAMILY MEDICINE CLINIC | Facility: CLINIC | Age: 68
End: 2018-09-20
Payer: COMMERCIAL

## 2018-09-20 DIAGNOSIS — M81.0 AGE-RELATED OSTEOPOROSIS WITHOUT CURRENT PATHOLOGICAL FRACTURE: Primary | ICD-10-CM

## 2018-09-20 PROCEDURE — 96372 THER/PROPH/DIAG INJ SC/IM: CPT

## 2018-09-28 ENCOUNTER — TELEPHONE (OUTPATIENT)
Dept: FAMILY MEDICINE CLINIC | Facility: CLINIC | Age: 68
End: 2018-09-28

## 2018-09-28 NOTE — TELEPHONE ENCOUNTER
Patient called  She has an appt scheduled for November  She is supposed to have labs drawn Thurs for her Rheum   She wants to know if you want labs also and if so please order so that she can get them done Thursday

## 2018-10-01 DIAGNOSIS — E03.9 ACQUIRED HYPOTHYROIDISM: ICD-10-CM

## 2018-10-01 DIAGNOSIS — K76.0 FATTY LIVER: ICD-10-CM

## 2018-10-01 DIAGNOSIS — E78.2 MIXED HYPERLIPIDEMIA: ICD-10-CM

## 2018-10-01 DIAGNOSIS — E55.9 VITAMIN D DEFICIENCY: Primary | ICD-10-CM

## 2018-10-01 DIAGNOSIS — M81.0 AGE-RELATED OSTEOPOROSIS WITHOUT CURRENT PATHOLOGICAL FRACTURE: ICD-10-CM

## 2018-10-05 LAB
25(OH)D3 SERPL-MCNC: 44 NG/ML (ref 30–100)
ALBUMIN SERPL-MCNC: 4.3 G/DL (ref 3.6–5.1)
ALBUMIN/GLOB SERPL: 1.8 (CALC) (ref 1–2.5)
ALP SERPL-CCNC: 73 U/L (ref 33–130)
ALT SERPL-CCNC: 27 U/L (ref 6–29)
AST SERPL-CCNC: 25 U/L (ref 10–35)
BASOPHILS # BLD AUTO: 22 CELLS/UL (ref 0–200)
BASOPHILS NFR BLD AUTO: 0.5 %
BILIRUB SERPL-MCNC: 0.7 MG/DL (ref 0.2–1.2)
BUN SERPL-MCNC: 20 MG/DL (ref 7–25)
BUN/CREAT SERPL: 18 (CALC) (ref 6–22)
CALCIUM SERPL-MCNC: 9.2 MG/DL (ref 8.6–10.4)
CHLORIDE SERPL-SCNC: 105 MMOL/L (ref 98–110)
CHOLEST SERPL-MCNC: 181 MG/DL
CHOLEST/HDLC SERPL: 3 (CALC)
CO2 SERPL-SCNC: 29 MMOL/L (ref 20–32)
CREAT SERPL-MCNC: 1.09 MG/DL (ref 0.5–0.99)
EOSINOPHIL # BLD AUTO: 101 CELLS/UL (ref 15–500)
EOSINOPHIL NFR BLD AUTO: 2.3 %
ERYTHROCYTE [DISTWIDTH] IN BLOOD BY AUTOMATED COUNT: 12.5 % (ref 11–15)
GLOBULIN SER CALC-MCNC: 2.4 G/DL (CALC) (ref 1.9–3.7)
GLUCOSE SERPL-MCNC: 89 MG/DL (ref 65–99)
HCT VFR BLD AUTO: 39.7 % (ref 35–45)
HDLC SERPL-MCNC: 61 MG/DL
HGB BLD-MCNC: 13.6 G/DL (ref 11.7–15.5)
LDLC SERPL CALC-MCNC: 97 MG/DL (CALC)
LYMPHOCYTES # BLD AUTO: 1421 CELLS/UL (ref 850–3900)
LYMPHOCYTES NFR BLD AUTO: 32.3 %
MCH RBC QN AUTO: 30.8 PG (ref 27–33)
MCHC RBC AUTO-ENTMCNC: 34.3 G/DL (ref 32–36)
MCV RBC AUTO: 90 FL (ref 80–100)
MONOCYTES # BLD AUTO: 229 CELLS/UL (ref 200–950)
MONOCYTES NFR BLD AUTO: 5.2 %
NEUTROPHILS # BLD AUTO: 2627 CELLS/UL (ref 1500–7800)
NEUTROPHILS NFR BLD AUTO: 59.7 %
NONHDLC SERPL-MCNC: 120 MG/DL (CALC)
PLATELET # BLD AUTO: 326 THOUSAND/UL (ref 140–400)
PMV BLD REES-ECKER: 10.9 FL (ref 7.5–12.5)
POTASSIUM SERPL-SCNC: 4.6 MMOL/L (ref 3.5–5.3)
PROT SERPL-MCNC: 6.7 G/DL (ref 6.1–8.1)
RBC # BLD AUTO: 4.41 MILLION/UL (ref 3.8–5.1)
SL AMB EGFR AFRICAN AMERICAN: 61 ML/MIN/1.73M2
SL AMB EGFR NON AFRICAN AMERICAN: 52 ML/MIN/1.73M2
SODIUM SERPL-SCNC: 141 MMOL/L (ref 135–146)
TRIGL SERPL-MCNC: 124 MG/DL
TSH SERPL-ACNC: 2.62 MIU/L (ref 0.4–4.5)
WBC # BLD AUTO: 4.4 THOUSAND/UL (ref 3.8–10.8)

## 2018-10-19 DIAGNOSIS — J45.20 MILD INTERMITTENT ASTHMA WITHOUT COMPLICATION: ICD-10-CM

## 2018-10-19 RX ORDER — ALBUTEROL SULFATE 0.63 MG/3ML
0.63 SOLUTION RESPIRATORY (INHALATION) EVERY 6 HOURS PRN
Qty: 75 ML | Refills: 3 | Status: SHIPPED | OUTPATIENT
Start: 2018-10-19 | End: 2020-02-05 | Stop reason: ALTCHOICE

## 2018-10-19 RX ORDER — ALBUTEROL SULFATE 90 UG/1
1-2 AEROSOL, METERED RESPIRATORY (INHALATION) EVERY 6 HOURS PRN
Qty: 1 INHALER | Refills: 3 | Status: SHIPPED | OUTPATIENT
Start: 2018-10-19 | End: 2020-03-26

## 2018-10-19 NOTE — TELEPHONE ENCOUNTER
Patient requesting 0 63/ 3 ml vials for nebulizer  90 day supply with refills to [facility] in [place]

## 2018-11-19 ENCOUNTER — OFFICE VISIT (OUTPATIENT)
Dept: FAMILY MEDICINE CLINIC | Facility: CLINIC | Age: 68
End: 2018-11-19
Payer: COMMERCIAL

## 2018-11-19 VITALS
SYSTOLIC BLOOD PRESSURE: 124 MMHG | TEMPERATURE: 95.5 F | BODY MASS INDEX: 35.87 KG/M2 | WEIGHT: 190 LBS | DIASTOLIC BLOOD PRESSURE: 80 MMHG | RESPIRATION RATE: 16 BRPM | HEIGHT: 61 IN | HEART RATE: 72 BPM

## 2018-11-19 DIAGNOSIS — J45.20 MILD INTERMITTENT ASTHMA WITHOUT COMPLICATION: ICD-10-CM

## 2018-11-19 DIAGNOSIS — E55.9 VITAMIN D DEFICIENCY: ICD-10-CM

## 2018-11-19 DIAGNOSIS — Z00.00 MEDICARE ANNUAL WELLNESS VISIT, SUBSEQUENT: ICD-10-CM

## 2018-11-19 DIAGNOSIS — E78.2 MIXED HYPERLIPIDEMIA: ICD-10-CM

## 2018-11-19 DIAGNOSIS — R79.89 ELEVATED HOMOCYSTEINE: ICD-10-CM

## 2018-11-19 DIAGNOSIS — E03.9 ACQUIRED HYPOTHYROIDISM: Primary | ICD-10-CM

## 2018-11-19 DIAGNOSIS — Z23 NEED FOR SHINGLES VACCINE: ICD-10-CM

## 2018-11-19 DIAGNOSIS — K58.0 IRRITABLE BOWEL SYNDROME WITH DIARRHEA: ICD-10-CM

## 2018-11-19 DIAGNOSIS — M81.0 AGE-RELATED OSTEOPOROSIS WITHOUT CURRENT PATHOLOGICAL FRACTURE: ICD-10-CM

## 2018-11-19 PROCEDURE — 1170F FXNL STATUS ASSESSED: CPT | Performed by: FAMILY MEDICINE

## 2018-11-19 PROCEDURE — G0439 PPPS, SUBSEQ VISIT: HCPCS | Performed by: FAMILY MEDICINE

## 2018-11-19 PROCEDURE — 1160F RVW MEDS BY RX/DR IN RCRD: CPT | Performed by: FAMILY MEDICINE

## 2018-11-19 PROCEDURE — 1125F AMNT PAIN NOTED PAIN PRSNT: CPT | Performed by: FAMILY MEDICINE

## 2018-11-19 PROCEDURE — 3008F BODY MASS INDEX DOCD: CPT | Performed by: FAMILY MEDICINE

## 2018-11-19 PROCEDURE — 99214 OFFICE O/P EST MOD 30 MIN: CPT | Performed by: FAMILY MEDICINE

## 2018-11-19 PROCEDURE — 4040F PNEUMOC VAC/ADMIN/RCVD: CPT | Performed by: FAMILY MEDICINE

## 2018-11-19 NOTE — PROGRESS NOTES
Assessment and Plan:    Problem List Items Addressed This Visit     Asthma    Elevated homocysteine (HCC)    Relevant Orders    Homocysteine, serum    Hypothyroidism - Primary    Relevant Orders    TSH, 3rd generation with Free T4 reflex    Irritable bowel syndrome    Mixed hyperlipidemia    Osteoporosis    Vitamin D deficiency    Relevant Orders    Basic metabolic panel      Other Visit Diagnoses     Need for shingles vaccine        Relevant Medications    Zoster Vac Recomb Adjuvanted 50 MCG SUSR    Medicare annual wellness visit, subsequent            Health Maintenance Due   Topic Date Due    Hepatitis C Screening  1950    DTaP,Tdap,and Td Vaccines (1 - Tdap) 07/24/1972     Hep C and Tdap declined  Colonoscopy 07/2017  Up to date with flu vaccine and pneumococcal vaccines  HPI:  Alfredo Hooper is a 80 y o  female here for her Subsequent Wellness Visit      Patient Active Problem List   Diagnosis    Allergic rhinitis    Angiomyolipoma of kidney    Asthma    Elevated homocysteine (HCC)    Fatty liver    Hypothyroidism    Irritable bowel syndrome    Lumbar spondylosis    Mixed hyperlipidemia    Osteoporosis    Right lumbar radiculopathy    Vitamin D deficiency     Past Medical History:   Diagnosis Date    Acute asthma exacerbation     last assessed 8/15/17     Past Surgical History:   Procedure Laterality Date    APPENDECTOMY  1979    HAND SURGERY Right 2007    thumb     Family History   Problem Relation Age of Onset    Cirrhosis Mother     Hypertension Father     Cancer Paternal Grandmother     Heart disease Paternal Grandfather      History   Smoking Status    Former Smoker    Quit date: 1/1/1979   Smokeless Tobacco    Never Used     Comment:       History   Alcohol Use No      History   Drug Use No       Current Outpatient Prescriptions   Medication Sig Dispense Refill    albuterol (ACCUNEB) 0 63 MG/3ML nebulizer solution Take 3 mL (0 63 mg total) by nebulization every 6 (six) hours as needed for wheezing 75 mL 3    albuterol (VENTOLIN HFA) 90 mcg/act inhaler Inhale 1-2 puffs every 6 (six) hours as needed for wheezing (for asthma) 1 Inhaler 3    aspirin 81 MG tablet Take 1 tablet by mouth daily      BIOTIN PO Take by mouth      Cholecalciferol 4000 units CAPS Take 1 capsule by mouth daily      Coenzyme Q10 (CO Q-10) 30 MG CAPS Take 30 mg by mouth daily      cyclobenzaprine (FLEXERIL) 10 mg tablet Take 1 tablet by mouth as needed      fenofibrate (TRIGLIDE) 160 MG tablet TAKE 1 TABLET BY MOUTH EVERY DAY 30 tablet 5    L-Methylfolate-B6-B12 3-35-2 MG TABS Take 1 tablet by mouth daily      levothyroxine 50 mcg tablet Take 1 tablet (50 mcg total) by mouth daily 90 tablet 2    nitrofurantoin (MACRODANTIN) 50 mg capsule Take 1 capsule by mouth daily      Zoster Vac Recomb Adjuvanted 50 MCG SUSR Inject 50 mcg into a muscle once for 1 dose 1 each 1     No current facility-administered medications for this visit  Allergies   Allergen Reactions    Dust Mite Extract     Molds & Smuts     Penicillins      Shown on allergy testing   Pseudoephedrine     Rabbit Epithelium     Sulfa Antibiotics Hives    Codeine Anxiety     Immunization History   Administered Date(s) Administered    Influenza 12/10/2015, 10/19/2016, 10/26/2017, 10/20/2018    Influenza Quadrivalent, 6-35 Months IM 10/29/2014, 12/10/2015    Influenza Split High Dose Preservative Free IM 10/19/2016, 10/26/2017    Influenza TIV (IM) 11/04/2013    Pneumococcal Conjugate 13-Valent 10/19/2016    Pneumococcal Polysaccharide PPV23 10/26/2017       Patient Care Team:  MD José Miguel Walsh MD (Urology)  Berry Marin MD (Rheumatology)    Medicare Screening Tests and Risk Assessments:      Health Risk Assessment:  Patient rates overall health as good  Patient feels that their physical health rating is Same  Eyesight was rated as Same  Hearing was rated as Same   Patient feels that their emotional and mental health rating is Much better  Pain experienced by patient in the last 7 days has been None  Emotional/Mental Health:  Patient has been feeling nervous/anxious  PHQ-9 Depression Screening:    Frequency of the following problems over the past two weeks:      1  Little interest or pleasure in doing things: 0 - not at all      2  Feeling down, depressed, or hopeless: 0 - not at all  PHQ-2 Score: 0          Broken Bones/Falls: Fall Risk Assessment:    In the past year, patient has experienced: History of falling in past year     Number of falls: 1  Patient does not feel she is unsteady standing  Patient is not taking medication that can cause feelings of lightheadedness or tiredness  Bladder/Bowel:  Patient has not leaked urine accidently in the last six months  Patient reports no loss of bowel control  Immunizations:  Patient has had a flu vaccination within the last year  Patient has received a pneumonia shot  Patient has not received a shingles shot  Patient has not received tetanus/diphtheria shot  Home Safety:  Patient does not have trouble with stairs inside or outside of their home  Patient currently reports that there are safety hazards present in home , working smoke alarms, working carbon monoxide detectors  Preventative Screenings:   Breast cancer screening performed, colon cancer screen completed, cholesterol screen completed, glaucoma eye exam completed, (Additional Comments: Mammogram 01/2019  Colonoscopy 07/2017  Lipid profile 10/2018  Eye exam 08/2018)    Nutrition:  Current diet: Low Saturated Fat and Limited junk food with servings of the following:Lifestyle Choices:  Patient reports no tobacco use  Patient has smoked or used tobacco in the past   Patient has stopped her tobacco use  Tobacco use quit date: 07/1997  Patient reports no alcohol use  Patient drives a vehicle  Patient wears seat belt      Current level of exercise of physical activity described by patient as: walking   Activities of Daily Living:  Can get out of bed by his or her self, able to dress self, able to make own meals, able to do own shopping, able to bathe self, can do own laundry/housekeeping, can manage own money, pay bills and track expenses    Previous Hospitalizations:  No hospitalization or ED visit in past 12 months        Advanced Directives:  Patient has decided on a power of   Patient has not spoken to designated power of   Patient has not completed advanced directive  Preventative Screening/Counseling:      Cardiovascular:      General: Screening Not Indicated      Counseling: Healthy Diet and Healthy Weight          Diabetes:      General: Screening Current          Colorectal Cancer:      General: Screening Current          Breast Cancer:      General: Screening Current          Cervical Cancer:      General: Screening Current          Osteoporosis:      General: Screening Current          AAA:      General: Screening Not Indicated          Glaucoma:      General: Screening Current          HIV:      General: Patient Declines          Hepatitis C:      General: Patient Declines        Advanced Directives:   Patient has no living will for healthcare, does not have durable POA for healthcare, patient does not have an advanced directive  Immunizations:      Influenza: Influenza UTD This Year      Pneumococcal: Lifetime Vaccine Completed      Shingrix: Shingrix Vaccine UTD      TD: Risks & Benefits Discussed and Patient Declines      Other Preventative Counseling (Non-Medicare):   Fall Prevention, Increase physical activity, Nutrition Counseling and Weight reduction discussed

## 2018-11-19 NOTE — PROGRESS NOTES
Assessment/Plan:         Diagnoses and all orders for this visit:    Acquired hypothyroidism  -     TSH, 3rd generation with Free T4 reflex    Mild intermittent asthma without complication    Mixed hyperlipidemia    Age-related osteoporosis without current pathological fracture    Vitamin D deficiency  -     Basic metabolic panel; Future    Irritable bowel syndrome with diarrhea    Elevated homocysteine (HCC)  -     Homocysteine, serum; Future    Need for shingles vaccine  -     Zoster Vac Recomb Adjuvanted 50 MCG SUSR; Inject 50 mcg into a muscle once for 1 dose    Medicare annual wellness visit, subsequent        Up-to-date with flu vaccine and pneumococcal vaccine  Script for shingles  Continue with Prolia injections every 6 months  DEXA scan next year  Mammogram 01/2019  She is scheduled to see GYN this week  Patient ID: Sandra Rubio is a 80 y o  female  Follow up visit  Medications reviewed  Labs 10/2018 see note  Hyperlipidemia mixed type on Fenofibrate 160 mg daily  Lipid profile cholesterol 181  Triglycerides 124  HDL 61  LDL 97  LFTs normal   FBS 89         The following portions of the patient's history were reviewed and updated as appropriate: allergies, current medications, past family history, past medical history, past social history, past surgical history and problem list     Review of Systems   Constitutional: Negative for appetite change, chills, fatigue, fever and unexpected weight change  HENT: Negative for congestion, ear pain, postnasal drip, rhinorrhea, sore throat and trouble swallowing  Eyes: Negative for visual disturbance  Respiratory: Negative for cough, shortness of breath and wheezing  History of asthma patient uses as needed albuterol inhaler  Prior evaluation included CT scan chest 01/2016 normal except for small hiatal hernia  Infrequent reflux symptoms  Allergy testing normal    Cardiovascular: Negative for chest pain, palpitations and leg swelling  Gastrointestinal: Negative for abdominal pain, blood in stool, constipation, diarrhea, nausea and vomiting  fatty liver 10/2018 LFTs normal   Albumin 4 3  I BS diarrhea  Stable on probiotics  Last colonoscopy 07/2017   Endocrine:        09/2017 DEXA scan osteoporosis  Decrease in BMD compared to study 2016  On Prolia  Prior intolerance to Actonel  Vitamin-D level 44  On vitamin D 4,000 IU daily  Hypothyroidism on Levothyroxine 50 mcg daily  10/2018  TSH 2 62   Genitourinary: Negative for difficulty urinating  History of recurrent UTIs on Macrodantin 50 mg at [facility]  She is followed by Urology renal ultrasound 07/2016 several large echogenic lesions right kidney compatible with angiomyolipomas  Possible 5 mm nonobstructing left renal stone  PVR 24 mL  CT scan abdomen pelvis 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  Pap smear 09/2016   Musculoskeletal: Negative for arthralgias and myalgias  Skin: Negative for rash  Neurological: Negative for dizziness and headaches  Hematological: Negative for adenopathy  Does not bruise/bleed easily  History of elevated homocystine level on vitamin supplement  No history of venous thrombosis   Psychiatric/Behavioral: Negative for dysphoric mood and sleep disturbance  Objective:      /80   Pulse 72   Temp (!) 95 5 °F (35 3 °C)   Resp 16   Ht 5' 0 5" (1 537 m)   Wt 86 2 kg (190 lb)   BMI 36 50 kg/m²          Physical Exam   Constitutional: She is oriented to person, place, and time  She appears well-developed and well-nourished  No distress  HENT:   Mouth/Throat: Oropharynx is clear and moist and mucous membranes are normal  No oral lesions  Normal dentition  Eyes: Pupils are equal, round, and reactive to light  Conjunctivae and EOM are normal  No scleral icterus  Neck: No JVD present  Carotid bruit is not present  No tracheal deviation present  No thyroid mass and no thyromegaly present     Cardiovascular: Normal rate, regular rhythm and normal heart sounds  Exam reveals no gallop  No murmur heard  Pulmonary/Chest: Effort normal and breath sounds normal  No respiratory distress  She has no wheezes  She has no rales  Abdominal: Soft  Bowel sounds are normal  She exhibits no distension and no mass  There is no tenderness  There is no rebound and no guarding  Musculoskeletal: Normal range of motion  She exhibits no edema or deformity  Lymphadenopathy:     She has no cervical adenopathy  Neurological: She is alert and oriented to person, place, and time  She displays normal reflexes  No cranial nerve deficit  Skin: No rash noted  Psychiatric: She has a normal mood and affect  Nursing note and vitals reviewed          Recent Results (from the past 1344 hour(s))   Lipid panel    Collection Time: 10/04/18  9:14 AM   Result Value Ref Range    Total Cholesterol 181 <200 mg/dL    HDL 61 >50 mg/dL    Triglycerides 124 <150 mg/dL    LDL Direct 97 mg/dL (calc)    Chol HDLC Ratio 3 0 <5 0 (calc)    Non-HDL Cholesterol 120 <130 mg/dL (calc)   Comprehensive metabolic panel    Collection Time: 10/04/18  9:14 AM   Result Value Ref Range    Glucose, Random 89 65 - 99 mg/dL    BUN 20 7 - 25 mg/dL    Creatinine 1 09 (H) 0 50 - 0 99 mg/dL    eGFR Non  52 (L) > OR = 60 mL/min/1 73m2    SL AMB EGFR  61 > OR = 60 mL/min/1 73m2    SL AMB BUN/CREATININE RATIO 18 6 - 22 (calc)    Sodium 141 135 - 146 mmol/L    Potassium 4 6 3 5 - 5 3 mmol/L    Chloride 105 98 - 110 mmol/L    CO2 29 20 - 32 mmol/L    SL AMB CALCIUM 9 2 8 6 - 10 4 mg/dL    SL AMB PROTEIN, TOTAL 6 7 6 1 - 8 1 g/dL    Albumin 4 3 3 6 - 5 1 g/dL    Globulin 2 4 1 9 - 3 7 g/dL (calc)    Albumin/Globulin Ratio 1 8 1 0 - 2 5 (calc)    TOTAL BILIRUBIN 0 7 0 2 - 1 2 mg/dL    Alkaline Phosphatase 73 33 - 130 U/L    SL AMB AST 25 10 - 35 U/L    SL AMB ALT 27 6 - 29 U/L   CBC and differential    Collection Time: 10/04/18  9:14 AM   Result Value Ref Range    White Blood Cell Count 4 4 3 8 - 10 8 Thousand/uL    Red Blood Cell Count 4 41 3 80 - 5 10 Million/uL    Hemoglobin 13 6 11 7 - 15 5 g/dL    HCT 39 7 35 0 - 45 0 %    MCV 90 0 80 0 - 100 0 fL    MCH 30 8 27 0 - 33 0 pg    MCHC 34 3 32 0 - 36 0 g/dL    RDW 12 5 11 0 - 15 0 %    Platelet Count 298 045 - 400 Thousand/uL    SL AMB MPV 10 9 7 5 - 12 5 fL    Neutrophils (Absolute) 2,627 1,500 - 7,800 cells/uL    Lymphocytes (Absolute) 1,421 850 - 3,900 cells/uL    Monocytes (Absolute) 229 200 - 950 cells/uL    Eosinophils (Absolute) 101 15 - 500 cells/uL    Basophils ABS 22 0 - 200 cells/uL    Neutrophils 59 7 %    Lymphocytes 32 3 %    Monocytes 5 2 %    Eosinophils 2 3 %    Basophils PCT 0 5 %   Vitamin D 25 hydroxy    Collection Time: 10/04/18  9:14 AM   Result Value Ref Range    Vitamin D, 25-Hydroxy, Serum 44 30 - 100 ng/mL   TSH, 3rd generation with Free T4 reflex    Collection Time: 10/04/18  9:14 AM   Result Value Ref Range    TSH W/RFX TO FREE T4 2 62 0 40 - 4 50 mIU/L

## 2018-11-21 ENCOUNTER — ANNUAL EXAM (OUTPATIENT)
Dept: OBGYN CLINIC | Facility: MEDICAL CENTER | Age: 68
End: 2018-11-21
Payer: COMMERCIAL

## 2018-11-21 VITALS
DIASTOLIC BLOOD PRESSURE: 74 MMHG | BODY MASS INDEX: 31.29 KG/M2 | SYSTOLIC BLOOD PRESSURE: 120 MMHG | HEIGHT: 65 IN | WEIGHT: 187.8 LBS

## 2018-11-21 DIAGNOSIS — Z12.39 ENCOUNTER FOR SCREENING FOR MALIGNANT NEOPLASM OF BREAST: ICD-10-CM

## 2018-11-21 DIAGNOSIS — Z01.419 ENCOUNTER FOR GYNECOLOGICAL EXAMINATION (GENERAL) (ROUTINE) WITHOUT ABNORMAL FINDINGS: Primary | ICD-10-CM

## 2018-11-21 PROCEDURE — G0101 CA SCREEN;PELVIC/BREAST EXAM: HCPCS | Performed by: NURSE PRACTITIONER

## 2018-11-24 DIAGNOSIS — R79.89 ELEVATED HOMOCYSTEINE: Primary | ICD-10-CM

## 2018-11-25 RX ORDER — MECOBAL/LEVOMEFOLAT CA/B6 PHOS 2-3-35 MG
TABLET ORAL
Qty: 90 TABLET | Refills: 3 | Status: SHIPPED | OUTPATIENT
Start: 2018-11-25 | End: 2019-12-03 | Stop reason: SDUPTHER

## 2018-11-27 PROBLEM — Z01.419 ENCOUNTER FOR GYNECOLOGICAL EXAMINATION (GENERAL) (ROUTINE) WITHOUT ABNORMAL FINDINGS: Status: ACTIVE | Noted: 2018-11-27

## 2018-11-27 NOTE — PROGRESS NOTES
Assessment/Plan:    Encounter for gynecological examination (general) (routine) without abnormal findings  Benign findings on routine gyn exam  Recommended monthly SBE, annual CBE and annual screening mammo  ASCCP guidelines reviewed and this low risk patient was advised she meets criteria to d/c pap screening given age >71  DEXA and colonoscopy up to date per pt  The patient denies STI risk factors and declines testing at this time  Reviewed diet/activity recommendations  Discussed postmenopausal considerations and symptoms to report  RTO in one year for routine annual gyn exam or sooner PRN  Diagnoses and all orders for this visit:    Encounter for gynecological examination (general) (routine) without abnormal findings    Encounter for screening for malignant neoplasm of breast  -     Mammo screening bilateral w cad; Future    Other orders  -     denosumab (PROLIA) 60 mg/mL; Inject 60 mg under the skin once          Subjective:      Patient ID: Lian Mcadams is a 80 y o  female  This patient presents for routine annual gyn exam   Medically stable  Bone health managed by Rheum  She denies acute gyn complaints  She denies  bleeding or spotting, VM sx, pelvic pain, breast concerns, abn discharge, bowel/bladder dysfunction, depression/anx  Not currently SA  Denies STI concerns  The following portions of the patient's history were reviewed and updated as appropriate: allergies, current medications, past family history, past medical history, past social history, past surgical history and problem list     Review of Systems   Constitutional: Negative  HENT: Negative  Eyes: Negative  Respiratory: Negative  Cardiovascular: Negative  Gastrointestinal: Negative  Endocrine: Negative  Genitourinary: Negative  Musculoskeletal: Negative  Skin: Negative  Allergic/Immunologic: Negative  Neurological: Negative  Hematological: Negative  Psychiatric/Behavioral: Negative  Objective:      /74 (BP Location: Left arm, Cuff Size: Standard)   Ht 5' 4 5" (1 638 m)   Wt 85 2 kg (187 lb 12 8 oz)   BMI 31 74 kg/m²          Physical Exam   Constitutional: She is oriented to person, place, and time  She appears well-developed and well-nourished  HENT:   Head: Normocephalic and atraumatic  Eyes: Pupils are equal, round, and reactive to light  EOM are normal    Neck: Normal range of motion  Neck supple  No thyromegaly present  Cardiovascular: Normal rate, regular rhythm and normal heart sounds  Pulmonary/Chest: Effort normal and breath sounds normal  No respiratory distress  She has no wheezes  She has no rales  She exhibits no mass, no tenderness and no deformity  Right breast exhibits no inverted nipple, no mass, no nipple discharge, no skin change and no tenderness  Left breast exhibits no inverted nipple, no mass, no nipple discharge, no skin change and no tenderness  Breasts are symmetrical    Abdominal: Soft  She exhibits no distension and no mass  There is no splenomegaly or hepatomegaly  There is no tenderness  There is no rebound and no guarding  Genitourinary: Rectum normal, vagina normal and uterus normal        No breast swelling, tenderness or discharge  No labial fusion  There is no rash, tenderness, lesion or injury on the right labia  There is no rash, tenderness, lesion or injury on the left labia  Cervix exhibits no motion tenderness, no discharge and no friability  Right adnexum displays no mass, no tenderness and no fullness  Left adnexum displays no mass, no tenderness and no fullness  No erythema, tenderness or bleeding in the vagina  No foreign body in the vagina  No vaginal discharge found  Musculoskeletal: Normal range of motion  Lymphadenopathy:     She has no cervical adenopathy  She has no axillary adenopathy  Neurological: She is alert and oriented to person, place, and time  No cranial nerve deficit  Skin: Skin is warm and dry  No rash noted  No cyanosis  Nails show no clubbing  Psychiatric: She has a normal mood and affect   Her speech is normal and behavior is normal  Judgment and thought content normal  Cognition and memory are normal

## 2018-11-27 NOTE — ASSESSMENT & PLAN NOTE
Benign findings on routine gyn exam  Recommended monthly SBE, annual CBE and annual screening mammo  ASCCP guidelines reviewed and this low risk patient was advised she meets criteria to d/c pap screening given age >72  DEXA and colonoscopy up to date per pt  The patient denies STI risk factors and declines testing at this time  Reviewed diet/activity recommendations  Discussed postmenopausal considerations and symptoms to report  RTO in one year for routine annual gyn exam or sooner PRN

## 2018-12-30 DIAGNOSIS — E78.2 MIXED HYPERLIPIDEMIA: ICD-10-CM

## 2018-12-31 RX ORDER — FENOFIBRATE 160 MG/1
TABLET ORAL
Qty: 30 TABLET | Refills: 0 | Status: SHIPPED | OUTPATIENT
Start: 2018-12-31 | End: 2019-01-31 | Stop reason: SDUPTHER

## 2019-01-31 DIAGNOSIS — E78.2 MIXED HYPERLIPIDEMIA: ICD-10-CM

## 2019-01-31 RX ORDER — FENOFIBRATE 160 MG/1
TABLET ORAL
Qty: 30 TABLET | Refills: 5 | Status: SHIPPED | OUTPATIENT
Start: 2019-01-31 | End: 2019-08-23 | Stop reason: SDUPTHER

## 2019-03-07 ENCOUNTER — HOSPITAL ENCOUNTER (OUTPATIENT)
Dept: RADIOLOGY | Age: 69
Discharge: HOME | End: 2019-03-07
Payer: COMMERCIAL

## 2019-03-07 VITALS — WEIGHT: 187 LBS | BODY MASS INDEX: 31.16 KG/M2 | HEIGHT: 65 IN

## 2019-03-07 DIAGNOSIS — Z12.39 ENCOUNTER FOR SCREENING FOR MALIGNANT NEOPLASM OF BREAST: ICD-10-CM

## 2019-03-07 PROCEDURE — 77067 SCR MAMMO BI INCL CAD: CPT

## 2019-03-18 ENCOUNTER — TELEPHONE (OUTPATIENT)
Dept: FAMILY MEDICINE CLINIC | Facility: CLINIC | Age: 69
End: 2019-03-18

## 2019-03-21 ENCOUNTER — CLINICAL SUPPORT (OUTPATIENT)
Dept: FAMILY MEDICINE CLINIC | Facility: CLINIC | Age: 69
End: 2019-03-21
Payer: COMMERCIAL

## 2019-03-21 DIAGNOSIS — M81.0 AGE-RELATED OSTEOPOROSIS WITHOUT CURRENT PATHOLOGICAL FRACTURE: Primary | ICD-10-CM

## 2019-03-21 PROCEDURE — 96372 THER/PROPH/DIAG INJ SC/IM: CPT

## 2019-04-18 ENCOUNTER — OFFICE VISIT (OUTPATIENT)
Dept: FAMILY MEDICINE CLINIC | Facility: CLINIC | Age: 69
End: 2019-04-18
Payer: COMMERCIAL

## 2019-04-18 VITALS
HEIGHT: 65 IN | HEART RATE: 76 BPM | RESPIRATION RATE: 16 BRPM | BODY MASS INDEX: 31.65 KG/M2 | WEIGHT: 190 LBS | OXYGEN SATURATION: 98 % | TEMPERATURE: 98.1 F | DIASTOLIC BLOOD PRESSURE: 64 MMHG | SYSTOLIC BLOOD PRESSURE: 124 MMHG

## 2019-04-18 DIAGNOSIS — G43.109 OCULAR MIGRAINE: Primary | ICD-10-CM

## 2019-04-18 PROBLEM — Z01.419 ENCOUNTER FOR GYNECOLOGICAL EXAMINATION (GENERAL) (ROUTINE) WITHOUT ABNORMAL FINDINGS: Status: RESOLVED | Noted: 2018-11-27 | Resolved: 2019-04-18

## 2019-04-18 PROCEDURE — 1111F DSCHRG MED/CURRENT MED MERGE: CPT | Performed by: FAMILY MEDICINE

## 2019-04-18 PROCEDURE — 99214 OFFICE O/P EST MOD 30 MIN: CPT | Performed by: FAMILY MEDICINE

## 2019-04-18 RX ORDER — NAPROXEN 500 MG/1
500 TABLET ORAL 2 TIMES DAILY PRN
Qty: 20 TABLET | Refills: 0 | Status: SHIPPED | OUTPATIENT
Start: 2019-04-18 | End: 2019-06-05

## 2019-06-05 ENCOUNTER — OFFICE VISIT (OUTPATIENT)
Dept: FAMILY MEDICINE CLINIC | Facility: CLINIC | Age: 69
End: 2019-06-05
Payer: COMMERCIAL

## 2019-06-05 VITALS
WEIGHT: 194 LBS | DIASTOLIC BLOOD PRESSURE: 82 MMHG | TEMPERATURE: 98 F | BODY MASS INDEX: 32.32 KG/M2 | SYSTOLIC BLOOD PRESSURE: 124 MMHG | HEIGHT: 65 IN

## 2019-06-05 DIAGNOSIS — M54.50 LOW BACK PAIN WITHOUT SCIATICA, UNSPECIFIED BACK PAIN LATERALITY, UNSPECIFIED CHRONICITY: Primary | ICD-10-CM

## 2019-06-05 LAB
SL AMB  POCT GLUCOSE, UA: NORMAL
SL AMB LEUKOCYTE ESTERASE,UA: NORMAL
SL AMB POCT BILIRUBIN,UA: NORMAL
SL AMB POCT BLOOD,UA: NORMAL
SL AMB POCT CLARITY,UA: CLEAR
SL AMB POCT COLOR,UA: YELLOW
SL AMB POCT KETONES,UA: NORMAL
SL AMB POCT NITRITE,UA: NORMAL
SL AMB POCT PH,UA: 6.5
SL AMB POCT SPECIFIC GRAVITY,UA: 1
SL AMB POCT URINE PROTEIN: NORMAL
SL AMB POCT UROBILINOGEN: 0.2

## 2019-06-05 PROCEDURE — 3008F BODY MASS INDEX DOCD: CPT | Performed by: NURSE PRACTITIONER

## 2019-06-05 PROCEDURE — 81002 URINALYSIS NONAUTO W/O SCOPE: CPT | Performed by: NURSE PRACTITIONER

## 2019-06-05 PROCEDURE — 1160F RVW MEDS BY RX/DR IN RCRD: CPT | Performed by: NURSE PRACTITIONER

## 2019-06-05 PROCEDURE — 99213 OFFICE O/P EST LOW 20 MIN: CPT | Performed by: NURSE PRACTITIONER

## 2019-06-05 PROCEDURE — 1036F TOBACCO NON-USER: CPT | Performed by: NURSE PRACTITIONER

## 2019-06-05 RX ORDER — FENOFIBRATE 160 MG/1
TABLET ORAL
COMMUNITY
Start: 2017-04-25 | End: 2020-03-01

## 2019-06-05 RX ORDER — CYCLOBENZAPRINE HCL 5 MG
5 TABLET ORAL
Qty: 5 TABLET | Refills: 0 | Status: SHIPPED | OUTPATIENT
Start: 2019-06-05

## 2019-06-05 RX ORDER — ALBUTEROL SULFATE 90 UG/1
AEROSOL, METERED RESPIRATORY (INHALATION)
COMMUNITY
Start: 2017-04-14 | End: 2019-08-29 | Stop reason: SDUPTHER

## 2019-06-05 RX ORDER — LEVOTHYROXINE SODIUM 0.05 MG/1
TABLET ORAL
COMMUNITY
Start: 2017-05-28 | End: 2019-11-27 | Stop reason: SDUPTHER

## 2019-06-10 DIAGNOSIS — E03.9 ACQUIRED HYPOTHYROIDISM: ICD-10-CM

## 2019-06-10 RX ORDER — LEVOTHYROXINE SODIUM 0.05 MG/1
TABLET ORAL
Qty: 90 TABLET | Refills: 2 | Status: SHIPPED | OUTPATIENT
Start: 2019-06-10 | End: 2019-08-29 | Stop reason: SDUPTHER

## 2019-07-12 LAB
BUN SERPL-MCNC: 19 MG/DL (ref 7–25)
BUN/CREAT SERPL: 17 (CALC) (ref 6–22)
CALCIUM SERPL-MCNC: 9.4 MG/DL (ref 8.6–10.4)
CHLORIDE SERPL-SCNC: 107 MMOL/L (ref 98–110)
CO2 SERPL-SCNC: 27 MMOL/L (ref 20–32)
CREAT SERPL-MCNC: 1.11 MG/DL (ref 0.5–0.99)
GLUCOSE SERPL-MCNC: 92 MG/DL (ref 65–99)
HCYS SERPL-SCNC: 11.9 UMOL/L
POTASSIUM SERPL-SCNC: 4.6 MMOL/L (ref 3.5–5.3)
SL AMB EGFR AFRICAN AMERICAN: 60 ML/MIN/1.73M2
SL AMB EGFR NON AFRICAN AMERICAN: 51 ML/MIN/1.73M2
SODIUM SERPL-SCNC: 141 MMOL/L (ref 135–146)
TSH SERPL-ACNC: 2.88 MIU/L (ref 0.4–4.5)

## 2019-08-23 DIAGNOSIS — E78.2 MIXED HYPERLIPIDEMIA: ICD-10-CM

## 2019-08-23 RX ORDER — FENOFIBRATE 160 MG/1
TABLET ORAL
Qty: 30 TABLET | Refills: 5 | Status: SHIPPED | OUTPATIENT
Start: 2019-08-23 | End: 2019-08-29 | Stop reason: SDUPTHER

## 2019-08-29 ENCOUNTER — OFFICE VISIT (OUTPATIENT)
Dept: FAMILY MEDICINE CLINIC | Facility: CLINIC | Age: 69
End: 2019-08-29
Payer: COMMERCIAL

## 2019-08-29 VITALS
BODY MASS INDEX: 32.45 KG/M2 | WEIGHT: 195 LBS | TEMPERATURE: 98.2 F | OXYGEN SATURATION: 93 % | RESPIRATION RATE: 16 BRPM | SYSTOLIC BLOOD PRESSURE: 124 MMHG | DIASTOLIC BLOOD PRESSURE: 62 MMHG | HEART RATE: 90 BPM

## 2019-08-29 DIAGNOSIS — J40 BRONCHITIS: Primary | ICD-10-CM

## 2019-08-29 PROCEDURE — 99214 OFFICE O/P EST MOD 30 MIN: CPT | Performed by: FAMILY MEDICINE

## 2019-08-29 RX ORDER — BUDESONIDE AND FORMOTEROL FUMARATE DIHYDRATE 80; 4.5 UG/1; UG/1
2 AEROSOL RESPIRATORY (INHALATION) 2 TIMES DAILY
Qty: 1 INHALER | Refills: 0 | Status: SHIPPED | COMMUNITY
Start: 2019-08-29 | End: 2019-11-27 | Stop reason: ALTCHOICE

## 2019-08-29 RX ORDER — DOXYCYCLINE 100 MG/1
100 CAPSULE ORAL 2 TIMES DAILY
Qty: 14 CAPSULE | Refills: 0 | Status: SHIPPED | OUTPATIENT
Start: 2019-08-29 | End: 2019-09-05

## 2019-08-29 RX ORDER — BENZONATATE 200 MG/1
200 CAPSULE ORAL 3 TIMES DAILY PRN
Qty: 20 CAPSULE | Refills: 0 | Status: SHIPPED | OUTPATIENT
Start: 2019-08-29 | End: 2019-12-29

## 2019-08-29 NOTE — PROGRESS NOTES
FAMILY MEDICINE PROGRESS NOTE  Cuong Ramos 77 y o  female   DATE: August 29, 2019     ASSESSMENT and PLAN:  Cuong Ramos is a 77 y o  female with:     1  Bronchitis  Likely acute bronchitis, but given symptoms have been present for >2 weeks, will add Abx and Tessalon perles  Do Symbicort x 1 week and use Albuterol PRN  Once acute symptoms resolve, will recommend routine complete spirometry  CXR if symptoms don't improve  - benzonatate (TESSALON) 200 MG capsule; Take 1 capsule (200 mg total) by mouth 3 (three) times a day as needed for cough  Dispense: 20 capsule; Refill: 0  - doxycycline monohydrate (MONODOX) 100 mg capsule; Take 1 capsule (100 mg total) by mouth 2 (two) times a day for 7 days  Dispense: 14 capsule; Refill: 0  - budesonide-formoterol (SYMBICORT) 80-4 5 MCG/ACT inhaler; Inhale 2 puffs 2 (two) times a day Rinse mouth after use  Dispense: 1 Inhaler; Refill: 0    Patient agreeable with the plan and expressed understanding  I discucssed signs and symptoms for which to RTC, go to ER or seek urgent medical care  SUBJECTIVE:  Cuong Ramos is a 77 y o  female who presents today with a chief complaint of Cough and Shortness of Breath  Cough   This is a new problem  The current episode started 1 to 4 weeks ago  The problem has been unchanged  The problem occurs every few minutes  The cough is non-productive  Pertinent negatives include no chills, nasal congestion, shortness of breath or sweats  Nothing aggravates the symptoms  She has tried a beta-agonist inhaler for the symptoms  The treatment provided mild relief  Review of Systems   Constitutional: Negative for chills  HENT: Positive for congestion  Negative for sinus pressure, sinus pain and sneezing  Respiratory: Positive for cough  Negative for chest tightness and shortness of breath  Gastrointestinal: Negative for diarrhea and nausea  I have reviewed the patient's Past Medical History      OBJECTIVE:  /62   Pulse 90   Temp 98 2 °F (36 8 °C)   Resp 16   Wt 88 5 kg (195 lb)   SpO2 93%   BMI 32 45 kg/m²    Physical Exam   Constitutional: She appears well-developed and well-nourished  No distress  HENT:   Head: Normocephalic and atraumatic  Right Ear: External ear normal    Left Ear: External ear normal    Nose: Right sinus exhibits no maxillary sinus tenderness and no frontal sinus tenderness  Left sinus exhibits no maxillary sinus tenderness and no frontal sinus tenderness  Mouth/Throat: Uvula is midline, oropharynx is clear and moist and mucous membranes are normal  No oropharyngeal exudate, posterior oropharyngeal edema, posterior oropharyngeal erythema or tonsillar abscesses  Eyes: Conjunctivae are normal  Right eye exhibits no discharge  Left eye exhibits no discharge  Neck: Normal range of motion  Neck supple  Cardiovascular: Normal rate and normal heart sounds  Pulmonary/Chest: Effort normal  No respiratory distress  She has wheezes in the right middle field and the right lower field  She has rhonchi in the right lower field  She has no rales  Lymphadenopathy:     She has no cervical adenopathy  Skin: She is not diaphoretic  Vitals reviewed  Daphne Liriano MD    Note: Portions of the record have been created with voice recognition software  Occasional wrong word or "sound a like" substitutions may have occurred due to the inherent limitations of voice recognition software  Read the chart carefully and recognize, using context, where substitutions have occurred

## 2019-11-27 ENCOUNTER — OFFICE VISIT (OUTPATIENT)
Dept: FAMILY MEDICINE CLINIC | Facility: CLINIC | Age: 69
End: 2019-11-27
Payer: COMMERCIAL

## 2019-11-27 VITALS
DIASTOLIC BLOOD PRESSURE: 62 MMHG | BODY MASS INDEX: 32.49 KG/M2 | SYSTOLIC BLOOD PRESSURE: 118 MMHG | TEMPERATURE: 97.1 F | RESPIRATION RATE: 16 BRPM | HEIGHT: 65 IN | WEIGHT: 195 LBS | HEART RATE: 76 BPM

## 2019-11-27 DIAGNOSIS — J45.20 MILD INTERMITTENT ASTHMA WITHOUT COMPLICATION: Primary | ICD-10-CM

## 2019-11-27 DIAGNOSIS — Z00.00 MEDICARE ANNUAL WELLNESS VISIT, SUBSEQUENT: ICD-10-CM

## 2019-11-27 DIAGNOSIS — E03.9 ACQUIRED HYPOTHYROIDISM: ICD-10-CM

## 2019-11-27 DIAGNOSIS — D17.71 ANGIOMYOLIPOMA OF KIDNEY: ICD-10-CM

## 2019-11-27 DIAGNOSIS — E78.2 MIXED HYPERLIPIDEMIA: ICD-10-CM

## 2019-11-27 DIAGNOSIS — R79.89 ELEVATED HOMOCYSTEINE: ICD-10-CM

## 2019-11-27 DIAGNOSIS — E55.9 VITAMIN D DEFICIENCY: ICD-10-CM

## 2019-11-27 DIAGNOSIS — K76.0 FATTY LIVER: ICD-10-CM

## 2019-11-27 DIAGNOSIS — M81.0 AGE-RELATED OSTEOPOROSIS WITHOUT CURRENT PATHOLOGICAL FRACTURE: ICD-10-CM

## 2019-11-27 PROBLEM — C64.1 MALIGNANT NEOPLASM OF RIGHT KIDNEY, EXCEPT RENAL PELVIS (HCC): Status: ACTIVE | Noted: 2019-11-27

## 2019-11-27 PROBLEM — C64.1 MALIGNANT NEOPLASM OF RIGHT KIDNEY, EXCEPT RENAL PELVIS (HCC): Status: RESOLVED | Noted: 2019-11-27 | Resolved: 2019-11-27

## 2019-11-27 PROCEDURE — 1101F PT FALLS ASSESS-DOCD LE1/YR: CPT | Performed by: FAMILY MEDICINE

## 2019-11-27 PROCEDURE — 3725F SCREEN DEPRESSION PERFORMED: CPT | Performed by: FAMILY MEDICINE

## 2019-11-27 PROCEDURE — 1036F TOBACCO NON-USER: CPT | Performed by: FAMILY MEDICINE

## 2019-11-27 PROCEDURE — G0439 PPPS, SUBSEQ VISIT: HCPCS | Performed by: FAMILY MEDICINE

## 2019-11-27 PROCEDURE — 1160F RVW MEDS BY RX/DR IN RCRD: CPT | Performed by: FAMILY MEDICINE

## 2019-11-27 PROCEDURE — 99214 OFFICE O/P EST MOD 30 MIN: CPT | Performed by: FAMILY MEDICINE

## 2019-11-27 RX ORDER — MONTELUKAST SODIUM 10 MG/1
10 TABLET ORAL
Qty: 30 TABLET | Refills: 1 | Status: SHIPPED | OUTPATIENT
Start: 2019-11-27 | End: 2020-02-03 | Stop reason: SDUPTHER

## 2019-11-27 RX ORDER — LEVOTHYROXINE SODIUM 0.05 MG/1
50 TABLET ORAL DAILY
Qty: 90 TABLET | Refills: 3 | Status: SHIPPED | OUTPATIENT
Start: 2019-11-27 | End: 2020-11-30

## 2019-11-27 NOTE — PATIENT INSTRUCTIONS
Medicare Preventive Visit Patient Instructions  Thank you for completing your Welcome to Medicare Visit or Medicare Annual Wellness Visit today  Your next wellness visit will be due in one year (11/27/2020)  The screening/preventive services that you may require over the next 5-10 years are detailed below  Some tests may not apply to you based off risk factors and/or age  Screening tests ordered at today's visit but not completed yet may show as past due  Also, please note that scanned in results may not display below  Preventive Screenings:  Service Recommendations Previous Testing/Comments   Colorectal Cancer Screening  * Colonoscopy    * Fecal Occult Blood Test (FOBT)/Fecal Immunochemical Test (FIT)  * Fecal DNA/Cologuard Test  * Flexible Sigmoidoscopy Age: 54-65 years old   Colonoscopy: every 10 years (may be performed more frequently if at higher risk)  OR  FOBT/FIT: every 1 year  OR  Cologuard: every 3 years  OR  Sigmoidoscopy: every 5 years  Screening may be recommended earlier than age 48 if at higher risk for colorectal cancer  Also, an individualized decision between you and your healthcare provider will decide whether screening between the ages of 74-80 would be appropriate  Colonoscopy: 07/20/2017  FOBT/FIT: Not on file  Cologuard: Not on file  Sigmoidoscopy: Not on file    Screening Current     Breast Cancer Screening Age: 36 years old  Frequency: every 1-2 years  Not required if history of left and right mastectomy Mammogram: 03/07/2019    Screening Current   Cervical Cancer Screening Between the ages of 21-29, pap smear recommended once every 3 years  Between the ages of 33-67, can perform pap smear with HPV co-testing every 5 years     Recommendations may differ for women with a history of total hysterectomy, cervical cancer, or abnormal pap smears in past  Pap Smear: 11/21/2018    Screening Not Indicated   Hepatitis C Screening Once for adults born between 1945 and 1965  More frequently in patients at high risk for Hepatitis C Hep C Antibody: Not on file    Screening Current   Diabetes Screening 1-2 times per year if you're at risk for diabetes or have pre-diabetes Fasting glucose: No results in last 5 years   A1C: No results in last 5 years    Screening Current   Cholesterol Screening Once every 5 years if you don't have a lipid disorder  May order more often based on risk factors  Lipid panel: 10/04/2018    Screening Not Indicated  History Lipid Disorder     Other Preventive Screenings Covered by Medicare:  1  Abdominal Aortic Aneurysm (AAA) Screening: covered once if your at risk  You're considered to be at risk if you have a family history of AAA  2  Lung Cancer Screening: covers low dose CT scan once per year if you meet all of the following conditions: (1) Age 50-69; (2) No signs or symptoms of lung cancer; (3) Current smoker or have quit smoking within the last 15 years; (4) You have a tobacco smoking history of at least 30 pack years (packs per day multiplied by number of years you smoked); (5) You get a written order from a healthcare provider  3  Glaucoma Screening: covered annually if you're considered high risk: (1) You have diabetes OR (2) Family history of glaucoma OR (3)  aged 48 and older OR (3)  American aged 72 and older  3  Osteoporosis Screening: covered every 2 years if you meet one of the following conditions: (1) You're estrogen deficient and at risk for osteoporosis based off medical history and other findings; (2) Have a vertebral abnormality; (3) On glucocorticoid therapy for more than 3 months; (4) Have primary hyperparathyroidism; (5) On osteoporosis medications and need to assess response to drug therapy  · Last bone density test (DXA Scan): 09/28/2017  5  HIV Screening: covered annually if you're between the age of 12-76  Also covered annually if you are younger than 13 and older than 72 with risk factors for HIV infection   For pregnant patients, it is covered up to 3 times per pregnancy  Immunizations:  Immunization Recommendations   Influenza Vaccine Annual influenza vaccination during flu season is recommended for all persons aged >= 6 months who do not have contraindications   Pneumococcal Vaccine (Prevnar and Pneumovax)  * Prevnar = PCV13  * Pneumovax = PPSV23   Adults 25-60 years old: 1-3 doses may be recommended based on certain risk factors  Adults 72 years old: Prevnar (PCV13) vaccine recommended followed by Pneumovax (PPSV23) vaccine  If already received PPSV23 since turning 65, then PCV13 recommended at least one year after PPSV23 dose  Hepatitis B Vaccine 3 dose series if at intermediate or high risk (ex: diabetes, end stage renal disease, liver disease)   Tetanus (Td) Vaccine - COST NOT COVERED BY MEDICARE PART B Following completion of primary series, a booster dose should be given every 10 years to maintain immunity against tetanus  Td may also be given as tetanus wound prophylaxis  Tdap Vaccine - COST NOT COVERED BY MEDICARE PART B Recommended at least once for all adults  For pregnant patients, recommended with each pregnancy  Shingles Vaccine (Shingrix) - COST NOT COVERED BY MEDICARE PART B  2 shot series recommended in those aged 48 and above     Health Maintenance Due:      Topic Date Due    Hepatitis C Screening  04/18/2020 (Originally 1950)    MAMMOGRAM  03/07/2020    CRC Screening: Colonoscopy  07/20/2022     Immunizations Due:  There are no preventive care reminders to display for this patient  Advance Directives   What are advance directives? Advance directives are legal documents that state your wishes and plans for medical care  These plans are made ahead of time in case you lose your ability to make decisions for yourself  Advance directives can apply to any medical decision, such as the treatments you want, and if you want to donate organs  What are the types of advance directives?   There are many types of advance directives, and each state has rules about how to use them  You may choose a combination of any of the following:  · Living will: This is a written record of the treatment you want  You can also choose which treatments you do not want, which to limit, and which to stop at a certain time  This includes surgery, medicine, IV fluid, and tube feedings  · Durable power of  for healthcare [facility]): This is a written record that states who you want to make healthcare choices for you when you are unable to make them for yourself  This person, called a proxy, is usually a family member or a friend  You may choose more than 1 proxy  · Do not resuscitate (DNR) order:  A DNR order is used in case your heart stops beating or you stop breathing  It is a request not to have certain forms of treatment, such as CPR  A DNR order may be included in other types of advance directives  · Medical directive: This covers the care that you want if you are in a coma, near death, or unable to make decisions for yourself  You can list the treatments you want for each condition  Treatment may include pain medicine, surgery, blood transfusions, dialysis, IV or tube feedings, and a ventilator (breathing machine)  · Values history: This document has questions about your views, beliefs, and how you feel and think about life  This information can help others choose the care that you would choose  Why are advance directives important? An advance directive helps you control your care  Although spoken wishes may be used, it is better to have your wishes written down  Spoken wishes can be misunderstood, or not followed  Treatments may be given even if you do not want them  An advance directive may make it easier for your family to make difficult choices about your care     Weight Management   Why it is important to manage your weight:  Being overweight increases your risk of health conditions such as heart disease, high blood pressure, type 2 diabetes, and certain types of cancer  It can also increase your risk for osteoarthritis, sleep apnea, and other respiratory problems  Aim for a slow, steady weight loss  Even a small amount of weight loss can lower your risk of health problems  How to lose weight safely:  A safe and healthy way to lose weight is to eat fewer calories and get regular exercise  You can lose up about 1 pound a week by decreasing the number of calories you eat by 500 calories each day  Healthy meal plan for weight management:  A healthy meal plan includes a variety of foods, contains fewer calories, and helps you stay healthy  A healthy meal plan includes the following:  · Eat whole-grain foods more often  A healthy meal plan should contain fiber  Fiber is the part of grains, fruits, and vegetables that is not broken down by your body  Whole-grain foods are healthy and provide extra fiber in your diet  Some examples of whole-grain foods are whole-wheat breads and pastas, oatmeal, brown rice, and bulgur  · Eat a variety of vegetables every day  Include dark, leafy greens such as spinach, kale, liz greens, and mustard greens  Eat yellow and orange vegetables such as carrots, sweet potatoes, and winter squash  · Eat a variety of fruits every day  Choose fresh or canned fruit (canned in its own juice or light syrup) instead of juice  Fruit juice has very little or no fiber  · Eat low-fat dairy foods  Drink fat-free (skim) milk or 1% milk  Eat fat-free yogurt and low-fat cottage cheese  Try low-fat cheeses such as mozzarella and other reduced-fat cheeses  · Choose meat and other protein foods that are low in fat  Choose beans or other legumes such as split peas or lentils  Choose fish, skinless poultry (chicken or turkey), or lean cuts of red meat (beef or pork)  Before you cook meat or poultry, cut off any visible fat  · Use less fat and oil  Try baking foods instead of frying them   Add less fat, such as margarine, sour cream, regular salad dressing and mayonnaise to foods  Eat fewer high-fat foods  Some examples of high-fat foods include french fries, doughnuts, ice cream, and cakes  · Eat fewer sweets  Limit foods and drinks that are high in sugar  This includes candy, cookies, regular soda, and sweetened drinks  Exercise:  Exercise at least 30 minutes per day on most days of the week  Some examples of exercise include walking, biking, dancing, and swimming  You can also fit in more physical activity by taking the stairs instead of the elevator or parking farther away from stores  Ask your healthcare provider about the best exercise plan for you  © Copyright [employer] 2018 Information is for End User's use only and may not be sold, redistributed or otherwise used for commercial purposes   All illustrations and images included in CareNotes® are the copyrighted property of A D A M , Inc  or [address] [place] [address]

## 2019-11-27 NOTE — PROGRESS NOTES
Assessment and Plan:     Problem List Items Addressed This Visit        Digestive    Fatty liver    Relevant Orders    CBC and differential    Comprehensive metabolic panel       Endocrine    Hypothyroidism    Relevant Medications    levothyroxine 50 mcg tablet       Respiratory    Asthma - Primary    Relevant Medications    montelukast (SINGULAIR) 10 mg tablet       Musculoskeletal and Integument    Osteoporosis       Genitourinary    Angiomyolipoma of kidney       Other    Elevated homocysteine (HCC)    Mixed hyperlipidemia    Relevant Orders    Lipid panel    Vitamin D deficiency    Relevant Orders    Vitamin D 25 hydroxy      Other Visit Diagnoses     Medicare annual wellness visit, subsequent               Preventive health issues were discussed with patient, and age appropriate screening tests were ordered as noted in patient's After Visit Summary  Personalized health advice and appropriate referrals for health education or preventive services given if needed, as noted in patient's After Visit Summary       History of Present Illness:     Patient presents for Medicare Annual Wellness visit    Patient Care Team:  Cindi Mosher MD as PCP - General (Family Medicine)  MD Johnny Estevez MD (Urology)  Hany Navarrete MD, [facility] (Rheumatology)     Problem List:     Patient Active Problem List   Diagnosis    Allergic rhinitis    Angiomyolipoma of kidney    Asthma    Elevated homocysteine ([address] )    Fatty liver    Hypothyroidism    Irritable bowel syndrome    Lumbar spondylosis    Mixed hyperlipidemia    Osteoporosis    Right lumbar radiculopathy    Vitamin D deficiency      Past Medical and Surgical History:     Past Medical History:   Diagnosis Date    Acute asthma exacerbation     last assessed 8/15/17    Disease of thyroid gland     Hypertriglyceridemia     Osteoporosis     Urinary tract infection      Past Surgical History:   Procedure Laterality Date    APPENDECTOMY  1979    HAND SURGERY Right 2007    thumb    LAPAROSCOPY        Family History:     Family History   Problem Relation Age of Onset    Cirrhosis Mother     Kidney nephrosis Mother     Hypertension Father     Other Father 80        extra mammary Padget's dx    Cancer Paternal Grandmother     Heart disease Paternal Grandfather     Heart attack Paternal Grandfather     Kidney nephrosis Daughter     Osteoporosis Maternal Grandmother     Arthritis Maternal Grandmother     Heart attack Maternal Grandfather     Heart disease Maternal Grandfather     Kidney nephrosis Daughter       Social History:     Social History     Socioeconomic History    Marital status: /Civil Union     Spouse name: None    Number of children: None    Years of education: None    Highest education level: None   Occupational History    None   Social Needs    Financial resource strain: None    Food insecurity:     Worry: None     Inability: None    Transportation needs:     Medical: None     Non-medical: None   Tobacco Use    Smoking status: Former Smoker     Last attempt to quit: 1979     Years since quittin 9    Smokeless tobacco: Never Used    Tobacco comment:     Substance and Sexual Activity    Alcohol use: No    Drug use: No    Sexual activity: None   Lifestyle    Physical activity:     Days per week: None     Minutes per session: None    Stress: None   Relationships    Social connections:     Talks on phone: None     Gets together: None     Attends Hinduism service: None     Active member of club or organization: None     Attends meetings of clubs or organizations: None     Relationship status: None    Intimate partner violence:     Fear of current or ex partner: None     Emotionally abused: None     Physically abused: None     Forced sexual activity: None   Other Topics Concern    None   Social History Narrative    None       Medications and Allergies:     Current Outpatient Medications   Medication Sig Dispense Refill    albuterol (ACCUNEB) 0 63 MG/3ML nebulizer solution Take 3 mL (0 63 mg total) by nebulization every 6 (six) hours as needed for wheezing 75 mL 3    albuterol (VENTOLIN HFA) 90 mcg/act inhaler Inhale 1-2 puffs every 6 (six) hours as needed for wheezing (for asthma) 1 Inhaler 3    aspirin 81 MG tablet Take 1 tablet by mouth daily      benzonatate (TESSALON) 200 MG capsule Take 1 capsule (200 mg total) by mouth 3 (three) times a day as needed for cough 20 capsule 0    BIOTIN PO Take by mouth      Cholecalciferol 4000 units CAPS Take 1 capsule by mouth daily      Coenzyme Q10 (CO Q-10) 30 MG CAPS Take 30 mg by mouth daily      cyclobenzaprine (FLEXERIL) 5 mg tablet Take 1 tablet (5 mg total) by mouth daily at bedtime 5 tablet 0    denosumab (PROLIA) 60 mg/mL Inject 60 mg under the skin once      fenofibrate (TRIGLIDE) 160 MG tablet       L-Methylfolate-B6-B12 3-35-2 MG TABS TAKE 1 TABLET BY MOUTH EVERY DAY 90 tablet 3    levothyroxine 50 mcg tablet Take 1 tablet (50 mcg total) by mouth daily 90 tablet 3    nitrofurantoin (MACRODANTIN) 50 mg capsule Take 1 capsule by mouth daily Patient takes 4 times a week      montelukast (SINGULAIR) 10 mg tablet Take 1 tablet (10 mg total) by mouth daily at bedtime 30 tablet 1     No current facility-administered medications for this visit  Allergies   Allergen Reactions    Penicillins      Shown on allergy testing      Codeine Anxiety    Dust Mite Extract     Molds & Smuts     Pseudoephedrine     Rabbit Epithelium     Sulfa Antibiotics Hives      Immunizations:     Immunization History   Administered Date(s) Administered    INFLUENZA 12/10/2015, 10/19/2016, 10/26/2017, 10/20/2018    Influenza Quadrivalent, 6-35 Months IM 10/29/2014, 12/10/2015    Influenza Split High Dose Preservative Free IM 10/19/2016, 10/26/2017, 10/20/2018, 10/07/2019    Influenza TIV (IM) 11/04/2013    Pneumococcal Conjugate 13-Valent 10/19/2016    Pneumococcal Polysaccharide PPV23 10/26/2017      Health Maintenance:         Topic Date Due    Hepatitis C Screening  04/18/2019 (Originally 1950)    MAMMOGRAM  03/07/2020    CRC Screening: Colonoscopy  07/20/2022     There are no preventive care reminders to display for this patient  Medicare Health Risk Assessment:     /62   Pulse 76   Temp (!) 97 1 °F (36 2 °C)   Resp 16   Ht 5' 5" (1 651 m)   Wt 88 5 kg (195 lb)   BMI 32 45 kg/m²      Dwayne Barrios is here for her Subsequent Wellness visit  Last Medicare Wellness visit information reviewed, patient interviewed and updates made to the record today  Health Risk Assessment:   Patient rates overall health as very good  Patient feels that their physical health rating is same  Eyesight was rated as slightly worse  Hearing was rated as same  Patient feels that their emotional and mental health rating is same  Pain experienced in the last 7 days has been some  Patient's pain rating has been 7/10  Patient states that she has experienced no weight loss or gain in last 6 months  Depression Screening:   PHQ-2 Score: 0      Fall Risk Screening: In the past year, patient has experienced: no history of falling in past year      Urinary Incontinence Screening:   Patient has not leaked urine accidently in the last six months  Home Safety:  Patient does not have trouble with stairs inside or outside of their home  Patient has working smoke alarms and has working carbon monoxide detector  Home safety hazards include: loose rugs on the floor  Nutrition:   Current diet is Regular and Limited junk food  Medications:   Patient is currently taking over-the-counter supplements  OTC medications include: see medication list  Patient is able to manage medications       Activities of Daily Living (ADLs)/Instrumental Activities of Daily Living (IADLs):   Walk and transfer into and out of bed and chair?: Yes  Dress and groom yourself?: Yes    Bathe or shower yourself?: Yes    Feed yourself?  Yes  Do your laundry/housekeeping?: Yes  Manage your money, pay your bills and track your expenses?: Yes  Make your own meals?: Yes    Do your own shopping?: Yes    Previous Hospitalizations:   Any hospitalizations or ED visits within the last 12 months?: No      Advance Care Planning:   Living will: No      PREVENTIVE SCREENINGS      Cardiovascular Screening:    General: Screening Not Indicated and History Lipid Disorder      Diabetes Screening:     General: Screening Current      Colorectal Cancer Screening:     General: Screening Current      Breast Cancer Screening:     General: Screening Current      Cervical Cancer Screening:    General: Screening Not Indicated      Osteoporosis Screening:    General: Screening Not Indicated and History Osteoporosis      Hepatitis C Screening:    General: Screening Current      Mirela Varela MD
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
## 2019-12-03 ENCOUNTER — TELEPHONE (OUTPATIENT)
Dept: FAMILY MEDICINE CLINIC | Facility: CLINIC | Age: 69
End: 2019-12-03

## 2019-12-03 DIAGNOSIS — R79.89 ELEVATED HOMOCYSTEINE: ICD-10-CM

## 2019-12-03 RX ORDER — MECOBAL/LEVOMEFOLAT CA/B6 PHOS 2-3-35 MG
TABLET ORAL
Qty: 90 TABLET | Refills: 3 | Status: SHIPPED | OUTPATIENT
Start: 2019-12-03 | End: 2021-01-15

## 2019-12-04 ENCOUNTER — ANNUAL EXAM (OUTPATIENT)
Dept: OBGYN CLINIC | Facility: MEDICAL CENTER | Age: 69
End: 2019-12-04
Payer: COMMERCIAL

## 2019-12-04 VITALS — BODY MASS INDEX: 32.62 KG/M2 | SYSTOLIC BLOOD PRESSURE: 138 MMHG | DIASTOLIC BLOOD PRESSURE: 70 MMHG | WEIGHT: 196 LBS

## 2019-12-04 DIAGNOSIS — Z12.31 ENCOUNTER FOR SCREENING MAMMOGRAM FOR MALIGNANT NEOPLASM OF BREAST: ICD-10-CM

## 2019-12-04 DIAGNOSIS — M81.0 AGE-RELATED OSTEOPOROSIS WITHOUT CURRENT PATHOLOGICAL FRACTURE: ICD-10-CM

## 2019-12-04 DIAGNOSIS — Z01.419 ENCOUNTER FOR GYNECOLOGICAL EXAMINATION (GENERAL) (ROUTINE) WITHOUT ABNORMAL FINDINGS: Primary | ICD-10-CM

## 2019-12-04 DIAGNOSIS — Z78.0 POSTMENOPAUSAL STATUS: ICD-10-CM

## 2019-12-04 PROCEDURE — G0101 CA SCREEN;PELVIC/BREAST EXAM: HCPCS | Performed by: NURSE PRACTITIONER

## 2019-12-04 NOTE — PROGRESS NOTES
Assessment/Plan:    Osteoporosis  Managed by Rheum at [facility]  Tolerating prolia; s/p dose #3  Order provided today for DEXA scan  Reviewed additional bone health considerations  Encounter for gynecological examination (general) (routine) without abnormal findings  Benign findings on routine gyn exam  Recommended monthly SBE, annual CBE and annual screening mammo  ASCCP guidelines reviewed and  this low risk patient was advised she meets criteria to d/c pap screening given age >71  DEXA order provided today  Colonoscopy up to date per pt  The patient denies STI risk factors and declines testing at this time  Reviewed diet/activity recommendations:  Encouraged daily Ca++ and vitamin D intake as well as daily weight bearing exercise for promotion of bone health    Discussed postmenopausal considerations and symptoms to report  RTO in one year for routine annual gyn exam or sooner PRN  Diagnoses and all orders for this visit:    Encounter for gynecological examination (general) (routine) without abnormal findings    Encounter for screening mammogram for malignant neoplasm of breast  -     Mammo screening bilateral w cad; Future    Postmenopausal status  -     DXA bone density spine hip and pelvis; Future    Age-related osteoporosis without current pathological fracture          Subjective:      Patient ID: Eder Bernstein is a 77 y o  female  This patient presents for routine annual gyn exam   Medically stable  Osteoporosis managed by Rheum  Tolerating prolia  She denies acute gyn complaints  She denies  bleeding or spotting, VM sx, pelvic pain, breast concerns, abn discharge, bowel/bladder dysfunction, depression/anx   and monog  Denies STI concerns        Family is well       The following portions of the patient's history were reviewed and updated as appropriate: allergies, current medications, past family history, past medical history, past social history, past surgical history and problem list     Review of Systems   Constitutional: Negative  Respiratory: Negative  Cardiovascular: Negative  Gastrointestinal: Negative  Genitourinary: Negative  Musculoskeletal: Negative  Skin: Negative  Neurological: Negative  Psychiatric/Behavioral: Negative  Objective:      /70   Wt 88 9 kg (196 lb)   BMI 32 62 kg/m²          Physical Exam   Constitutional: She is oriented to person, place, and time  She appears well-developed and well-nourished  HENT:   Head: Normocephalic and atraumatic  Eyes: Pupils are equal, round, and reactive to light  EOM are normal    Neck: Normal range of motion  Neck supple  No thyromegaly present  Cardiovascular: Normal rate, regular rhythm and normal heart sounds  Pulmonary/Chest: Effort normal and breath sounds normal  No respiratory distress  She has no wheezes  She has no rales  She exhibits no mass, no tenderness and no deformity  Right breast exhibits no inverted nipple, no mass, no nipple discharge, no skin change and no tenderness  Left breast exhibits no inverted nipple, no mass, no nipple discharge, no skin change and no tenderness  No breast tenderness or discharge  Breasts are symmetrical    Abdominal: Soft  She exhibits no distension and no mass  There is no splenomegaly or hepatomegaly  There is no tenderness  There is no rebound and no guarding  Genitourinary: Rectum normal, vagina normal and uterus normal        No breast tenderness or discharge  No labial fusion  There is no rash, tenderness, lesion or injury on the right labia  There is no rash, tenderness, lesion or injury on the left labia  Cervix exhibits no motion tenderness, no discharge and no friability  Right adnexum displays no mass, no tenderness and no fullness  Left adnexum displays no mass, no tenderness and no fullness  No erythema, tenderness or bleeding in the vagina  No foreign body in the vagina  No vaginal discharge found         Musculoskeletal: Normal range of motion  Lymphadenopathy:     She has no cervical adenopathy  She has no axillary adenopathy  Neurological: She is alert and oriented to person, place, and time  No cranial nerve deficit  Skin: Skin is warm and dry  No rash noted  No cyanosis  Nails show no clubbing  Psychiatric: She has a normal mood and affect   Her speech is normal and behavior is normal  Judgment and thought content normal  Cognition and memory are normal

## 2019-12-04 NOTE — ASSESSMENT & PLAN NOTE
Benign findings on routine gyn exam  Recommended monthly SBE, annual CBE and annual screening mammo  ASCCP guidelines reviewed and  this low risk patient was advised she meets criteria to d/c pap screening given age >72  DEXA order provided today  Colonoscopy up to date per pt  The patient denies STI risk factors and declines testing at this time  Reviewed diet/activity recommendations:  Encouraged daily Ca++ and vitamin D intake as well as daily weight bearing exercise for promotion of bone health    Discussed postmenopausal considerations and symptoms to report  RTO in one year for routine annual gyn exam or sooner PRN

## 2019-12-04 NOTE — ASSESSMENT & PLAN NOTE
Managed by Rheum at [facility]  Tolerating prolia; s/p dose #3  Order provided today for DEXA scan  Reviewed additional bone health considerations

## 2019-12-29 ENCOUNTER — OFFICE VISIT (OUTPATIENT)
Dept: URGENT CARE | Facility: CLINIC | Age: 69
End: 2019-12-29
Payer: COMMERCIAL

## 2019-12-29 VITALS
DIASTOLIC BLOOD PRESSURE: 80 MMHG | WEIGHT: 195 LBS | RESPIRATION RATE: 18 BRPM | HEART RATE: 78 BPM | SYSTOLIC BLOOD PRESSURE: 140 MMHG | BODY MASS INDEX: 32.49 KG/M2 | OXYGEN SATURATION: 96 % | TEMPERATURE: 98 F | HEIGHT: 65 IN

## 2019-12-29 DIAGNOSIS — J45.21 MILD INTERMITTENT ASTHMA WITH ACUTE EXACERBATION: Primary | ICD-10-CM

## 2019-12-29 PROCEDURE — 99213 OFFICE O/P EST LOW 20 MIN: CPT | Performed by: NURSE PRACTITIONER

## 2019-12-29 PROCEDURE — S9083 URGENT CARE CENTER GLOBAL: HCPCS | Performed by: NURSE PRACTITIONER

## 2019-12-29 RX ORDER — PREDNISONE 20 MG/1
40 TABLET ORAL DAILY
Qty: 10 TABLET | Refills: 0 | Status: SHIPPED | OUTPATIENT
Start: 2019-12-29 | End: 2020-01-03

## 2019-12-29 RX ORDER — ALBUTEROL SULFATE 2.5 MG/3ML
2.5 SOLUTION RESPIRATORY (INHALATION) EVERY 6 HOURS PRN
Qty: 30 VIAL | Refills: 0 | Status: SHIPPED | OUTPATIENT
Start: 2019-12-29

## 2019-12-29 NOTE — PROGRESS NOTES
[facility]        NAME: Sofiya Palmer is a 77 y o  female  : 1950    MRN: [id number]  DATE: 2019  TIME: 12:17 PM    Assessment and Plan   Mild intermittent asthma with acute exacerbation [J45 21]  1  Mild intermittent asthma with acute exacerbation           Patient Instructions   Albuterol nebulizer or inhaler every 4 hours   Increase fluid intake   Tylenol or motrin as needed for pain   Throat lozenges, honey, salt water gargles for throat discomfort  Delsym or Robitussin as directed for cough   Follow up with your PCP for worsening or concerning symptoms     Follow up with PCP in 3-5 days  Proceed to  ER if symptoms worsen  Chief Complaint     Chief Complaint   Patient presents with    Laryngitis     x this am    Cough     fits    Shortness of Breath     using inhailer/neb    Allergies         History of Present Illness       Patient is a 61-year-old female with significant past medical history of asthma presents with cough and hoarse voice for the past 2 days  Associated with rhinorrhea  Denies fever, chills, or ear pain  She has been using the inhaler and nebulizer with improvement  Review of Systems   Review of Systems   Constitutional: Negative for activity change, chills and fever  HENT: Positive for postnasal drip, rhinorrhea and voice change  Negative for congestion, ear pain and sore throat  Respiratory: Positive for cough  Negative for shortness of breath  Gastrointestinal: Negative for abdominal pain, diarrhea, nausea and vomiting  Skin: Negative for rash  Neurological: Negative for headaches           Current Medications       Current Outpatient Medications:     albuterol (ACCUNEB) 0 63 MG/3ML nebulizer solution, Take 3 mL (0 63 mg total) by nebulization every 6 (six) hours as needed for wheezing, Disp: 75 mL, Rfl: 3    albuterol (VENTOLIN HFA) 90 mcg/act inhaler, Inhale 1-2 puffs every 6 (six) hours as needed for wheezing (for asthma), Disp: 1 Inhaler, Rfl: 3    aspirin 81 MG tablet, Take 1 tablet by mouth daily, Disp: , Rfl:     BIOTIN PO, Take by mouth, Disp: , Rfl:     Cholecalciferol 4000 units CAPS, Take 1 capsule by mouth daily, Disp: , Rfl:     Coenzyme Q10 (CO Q-10) 30 MG CAPS, Take 30 mg by mouth daily, Disp: , Rfl:     denosumab (PROLIA) 60 mg/mL, Inject 60 mg under the skin once, Disp: , Rfl:     fenofibrate (TRIGLIDE) 160 MG tablet, , Disp: , Rfl:     L-Methylfolate-B6-B12 3-35-2 MG TABS, TAKE 1 TABLET BY MOUTH EVERY DAY, Disp: 90 tablet, Rfl: 3    levothyroxine 50 mcg tablet, Take 1 tablet (50 mcg total) by mouth daily, Disp: 90 tablet, Rfl: 3    nitrofurantoin (MACRODANTIN) 50 mg capsule, Take 1 capsule by mouth daily Patient takes 4 times a week, Disp: , Rfl:     cyclobenzaprine (FLEXERIL) 5 mg tablet, Take 1 tablet (5 mg total) by mouth daily at bedtime, Disp: 5 tablet, Rfl: 0    montelukast (SINGULAIR) 10 mg tablet, Take 1 tablet (10 mg total) by mouth daily at bedtime, Disp: 30 tablet, Rfl: 1    Current Allergies     Allergies as of 12/29/2019 - Reviewed 12/29/2019   Allergen Reaction Noted    Penicillins  06/11/2017    Codeine Anxiety 06/11/2017    Dust mite extract  07/24/2013    Molds & smuts  07/24/2013    Pseudoephedrine  06/11/2017    Rabbit epithelium  07/24/2013    Sulfa antibiotics Hives 06/11/2017            The following portions of the patient's history were reviewed and updated as appropriate: allergies, current medications, past family history, past medical history, past social history, past surgical history and problem list      Past Medical History:   Diagnosis Date    Acute asthma exacerbation     last assessed 8/15/17    Disease of thyroid gland     Hypertriglyceridemia     Osteoporosis     Urinary tract infection        Past Surgical History:   Procedure Laterality Date    APPENDECTOMY  1979    HAND SURGERY Right 2007    thumb    LAPAROSCOPY         Family History   Problem Relation Age of Onset    Cirrhosis Mother     Kidney nephrosis Mother     Hypertension Father    [employer] Other Father 80        extra mammary Padget's dx    Cancer Paternal Grandmother     Heart disease Paternal Grandfather     Heart attack Paternal Grandfather     Kidney nephrosis Daughter     Osteoporosis Maternal Grandmother     Arthritis Maternal Grandmother     Heart attack Maternal Grandfather     Heart disease Maternal Grandfather     Kidney nephrosis Daughter          Medications have been verified  Objective   /80   Pulse 78   Temp 98 °F (36 7 °C)   Resp 18   Ht 5' 5 25" (1 657 m)   Wt 88 5 kg (195 lb)   SpO2 96%   BMI 32 20 kg/m²        Physical Exam     Physical Exam   Constitutional: She is oriented to person, place, and time  Vital signs are normal  She appears well-developed and well-nourished  She is active  She does not appear ill  HENT:   Head: Normocephalic  Right Ear: Hearing, tympanic membrane, external ear and ear canal normal    Left Ear: Hearing, tympanic membrane, external ear and ear canal normal    Nose: Rhinorrhea present  Mouth/Throat: Oropharynx is clear and moist and mucous membranes are normal    Cardiovascular: Normal rate, regular rhythm, S1 normal, S2 normal and normal heart sounds  Pulmonary/Chest: Effort normal  No respiratory distress  She has wheezes (scattered )  She has no rhonchi  She has no rales  Neurological: She is alert and oriented to person, place, and time  She is not disoriented

## 2019-12-29 NOTE — PATIENT INSTRUCTIONS
Albuterol nebulizer or inhaler every 4 hours   Increase fluid intake   Tylenol or motrin as needed for pain   Throat lozenges, honey, salt water gargles for throat discomfort  Delsym or Robitussin as directed for cough   Follow up with your PCP for worsening or concerning symptoms       Asthma   WHAT YOU NEED TO KNOW:   Asthma is a lung disease that makes breathing difficult  Chronic inflammation and reactions to triggers narrow the airways in the lungs  Asthma can become life-threatening if it is not managed  DISCHARGE INSTRUCTIONS:   Return to the emergency department if:   · You have severe shortness of breath  · Your lips or nails turn blue or gray  · The skin around your neck and ribs pulls in with each breath  · You have shortness of breath, even after you take your short-term medicine as directed  · Your peak flow numbers are in the red zone of your AAP  Contact your healthcare provider if:   · You run out of medicine before your next refill is due  · Your symptoms get worse  · You need to take more medicine than usual to control your symptoms  · You have questions or concerns about your condition or care  Medicines:   · Medicines  decrease inflammation, open airways, and make it easier to breathe  Medicines may be inhaled, taken as a pill, or injected  Short-term medicines relieve your symptoms quickly  Long-term medicines are used to prevent future attacks  You may also need medicine to help control your allergies  Ask your healthcare provider for more information about the medicine you are given and how to take it safely  · Take your medicine as directed  Contact your healthcare provider if you think your medicine is not helping or if you have side effects  Tell him of her if you are allergic to any medicine  Keep a list of the medicines, vitamins, and herbs you take  Include the amounts, and when and why you take them   Bring the list or the pill bottles to follow-up visits  Carry your medicine list with you in case of an emergency  Follow up with your healthcare provider as directed: You will need to return to make sure your medicine is working and your symptoms are controlled  You may be referred to an asthma specialist  Letitiadeonte Baker may be asked to keep a record of your peak flow values and bring it with you to your appointments  Write down your questions so you remember to ask them during your visits  Manage your symptoms and prevent future attacks:   · Follow your Asthma Action Plan (AAP)  This is a written plan that you and your healthcare provider create  It explains which medicine you need and when to change doses if necessary  It also explains how you can monitor symptoms and use a peak flow meter  The meter measures how well your lungs are working  · Manage other health conditions , such as allergies, acid reflux, and sleep apnea  · Identify and avoid triggers  These may include pets, dust mites, mold, and cockroaches  · Do not smoke or be around others who smoke  Nicotine and other chemicals in cigarettes and cigars can cause lung damage  Ask your healthcare provider for information if you currently smoke and need help to quit  E-cigarettes or smokeless tobacco still contain nicotine  Talk to your healthcare provider before you use these products  · Ask about the flu vaccine  The flu can make your asthma worse  You may need a yearly flu shot  © 2017 [address] Information is for End User's use only and may not be sold, redistributed or otherwise used for commercial purposes  All illustrations and images included in CareNotes® are the copyrighted property of [employer]  or [employer] Laguna  The above information is an  only  It is not intended as medical advice for individual conditions or treatments   Talk to your doctor, nurse or pharmacist before following any medical regimen to see if it is safe and effective for you

## 2020-01-03 ENCOUNTER — OFFICE VISIT (OUTPATIENT)
Dept: FAMILY MEDICINE CLINIC | Facility: CLINIC | Age: 70
End: 2020-01-03
Payer: COMMERCIAL

## 2020-01-03 VITALS
BODY MASS INDEX: 32.15 KG/M2 | DIASTOLIC BLOOD PRESSURE: 82 MMHG | WEIGHT: 193 LBS | HEART RATE: 76 BPM | HEIGHT: 65 IN | TEMPERATURE: 97.9 F | SYSTOLIC BLOOD PRESSURE: 122 MMHG

## 2020-01-03 DIAGNOSIS — T78.40XS ALLERGIC STATE, SEQUELA: ICD-10-CM

## 2020-01-03 DIAGNOSIS — J45.20 MILD INTERMITTENT ASTHMA WITHOUT COMPLICATION: Primary | ICD-10-CM

## 2020-01-03 PROCEDURE — 1160F RVW MEDS BY RX/DR IN RCRD: CPT | Performed by: NURSE PRACTITIONER

## 2020-01-03 PROCEDURE — 3008F BODY MASS INDEX DOCD: CPT | Performed by: NURSE PRACTITIONER

## 2020-01-03 PROCEDURE — 99213 OFFICE O/P EST LOW 20 MIN: CPT | Performed by: NURSE PRACTITIONER

## 2020-01-03 RX ORDER — AZITHROMYCIN 250 MG/1
TABLET, FILM COATED ORAL
Qty: 6 TABLET | Refills: 0 | Status: SHIPPED | OUTPATIENT
Start: 2020-01-03 | End: 2020-01-07

## 2020-01-03 NOTE — PROGRESS NOTES
Assessment/Plan:     Diagnoses and all orders for this visit:    Mild intermittent asthma without complication  -     Mometasone Furoate (ASMANEX HFA) 100 MCG/ACT AERO; Inhale 2 Act (200 mcg total) 2 (two) times a day  -     azithromycin (ZITHROMAX) 250 mg tablet; Take 2 tabs on Day 1 than 1 tab Days 2-5    Allergic state, sequela  -     Ambulatory referral to Allergy; Future    #1 Mild intermittent asthma without complication  Discussed with patient plan to have her try an inhaled steroid so Asmanex sample given to patient  Discussed with patient that she may need an inhaler with a steroid and long term bronchodilator  Discussed with patient plan to treat potential bronchitis with a course of azithromycin  #2 Allergic state, sequela  Discussed with patient plan to refer her to a new allergist to have allergy testing updated  Patient instructed to call if no improvement in 72 hours or symptoms worsen    Subjective:      Patient ID: Ok Liu is a 77 y o  female  77 y  o female presenting with increasing chest tightness and cough  Patient was seen at [address] [place] [address] on 12/29/2019 for mild intermittent asthma with acute exacerbation  She was given 80 mg prednisone for 5 days, instructed to use albuterol nebulizer every 6 hours along with albuterol inhaler as needed  She was also given benzonatate 200 mg to be used three times a day as needed  She reports that her PCP spoke to her about possible need for newer allergy testing due to recent asthma excerebration occurring more frequently  She was started on montelukast on 11/27/19 and discussion about start a steroid inhaler occurred        Family History   Problem Relation Age of Onset    Cirrhosis Mother     Kidney nephrosis Mother     Hypertension Father    [facility] Other Father 80        extra mammary Padget's dx    Cancer Paternal Grandmother     Heart disease Paternal Grandfather     Heart attack Paternal Grandfather     Kidney nephrosis Daughter     Osteoporosis Maternal Grandmother     Arthritis Maternal Grandmother     Heart attack Maternal Grandfather     Heart disease Maternal Grandfather     Kidney nephrosis Daughter      Social History     Socioeconomic History    Marital status: /Civil Union     Spouse name: Not on file    Number of children: Not on file    Years of education: Not on file    Highest education level: Not on file   Occupational History    Not on file   Social Needs    Financial resource strain: Not on file    Food insecurity:     Worry: Not on file     Inability: Not on file    Transportation needs:     Medical: Not on file     Non-medical: Not on file   Tobacco Use    Smoking status: Former Smoker     Last attempt to quit: 1979     Years since quittin 0    Smokeless tobacco: Never Used    Tobacco comment:     Substance and Sexual Activity    Alcohol use: No    Drug use: No    Sexual activity: Not Currently   Lifestyle    Physical activity:     Days per week: Not on file     Minutes per session: Not on file    Stress: Not on file   Relationships    Social connections:     Talks on phone: Not on file     Gets together: Not on file     Attends Oriental orthodox service: Not on file     Active member of club or organization: Not on file     Attends meetings of clubs or organizations: Not on file     Relationship status: Not on file    Intimate partner violence:     Fear of current or ex partner: Not on file     Emotionally abused: Not on file     Physically abused: Not on file     Forced sexual activity: Not on file   Other Topics Concern    Not on file   Social History Narrative    Not on file     Past Medical History:   Diagnosis Date    Acute asthma exacerbation     last assessed 8/15/17    Disease of thyroid gland     Hypertriglyceridemia     Osteoporosis     Urinary tract infection      Past Surgical History:   Procedure Laterality Date    APPENDECTOMY  1979    HAND SURGERY Right 2007 thumb    LAPAROSCOPY       Allergies   Allergen Reactions    Penicillins      Shown on allergy testing      Codeine Anxiety    Dust Mite Extract     Molds & Smuts     Pseudoephedrine     Rabbit Epithelium     Sulfa Antibiotics Hives       Current Outpatient Medications:     albuterol (2 5 mg/3 mL) 0 083 % nebulizer solution, Take 1 vial (2 5 mg total) by nebulization every 6 (six) hours as needed for wheezing or shortness of breath, Disp: 30 vial, Rfl: 0    albuterol (ACCUNEB) 0 63 MG/3ML nebulizer solution, Take 3 mL (0 63 mg total) by nebulization every 6 (six) hours as needed for wheezing, Disp: 75 mL, Rfl: 3    albuterol (VENTOLIN HFA) 90 mcg/act inhaler, Inhale 1-2 puffs every 6 (six) hours as needed for wheezing (for asthma), Disp: 1 Inhaler, Rfl: 3    aspirin 81 MG tablet, Take 1 tablet by mouth daily, Disp: , Rfl:     azithromycin (ZITHROMAX) 250 mg tablet, Take 2 tabs on Day 1 than 1 tab Days 2-5, Disp: 6 tablet, Rfl: 0    BIOTIN PO, Take by mouth, Disp: , Rfl:     Cholecalciferol 4000 units CAPS, Take 1 capsule by mouth daily, Disp: , Rfl:     Coenzyme Q10 (CO Q-10) 30 MG CAPS, Take 30 mg by mouth daily, Disp: , Rfl:     cyclobenzaprine (FLEXERIL) 5 mg tablet, Take 1 tablet (5 mg total) by mouth daily at bedtime, Disp: 5 tablet, Rfl: 0    denosumab (PROLIA) 60 mg/mL, Inject 60 mg under the skin once, Disp: , Rfl:     fenofibrate (TRIGLIDE) 160 MG tablet, , Disp: , Rfl:     L-Methylfolate-B6-B12 3-35-2 MG TABS, TAKE 1 TABLET BY MOUTH EVERY DAY, Disp: 90 tablet, Rfl: 3    levothyroxine 50 mcg tablet, Take 1 tablet (50 mcg total) by mouth daily, Disp: 90 tablet, Rfl: 3    Mometasone Furoate (ASMANEX HFA) 100 MCG/ACT AERO, Inhale 2 Act (200 mcg total) 2 (two) times a day, Disp: 1 Inhaler, Rfl: 0    montelukast (SINGULAIR) 10 mg tablet, Take 1 tablet (10 mg total) by mouth daily at bedtime, Disp: 30 tablet, Rfl: 1    nitrofurantoin (MACRODANTIN) 50 mg capsule, Take 1 capsule by mouth daily Patient takes 4 times a week, Disp: , Rfl:     predniSONE 20 mg tablet, Take 2 tablets (40 mg total) by mouth daily for 5 days, Disp: 10 tablet, Rfl: 0      Review of Systems   Constitutional: Negative for chills and fever  HENT: Positive for congestion  Respiratory: Positive for cough, chest tightness and wheezing  Negative for shortness of breath  Cardiovascular: Negative for chest pain, palpitations and leg swelling  Gastrointestinal: Negative for abdominal pain, diarrhea and nausea  Musculoskeletal: Negative for myalgias  Allergic/Immunologic: Positive for environmental allergies  Neurological: Negative for dizziness, weakness, light-headedness and headaches  Psychiatric/Behavioral: Negative  Objective:    /82   Pulse 76   Temp 97 9 °F (36 6 °C)   Ht 5' 5 25" (1 657 m)   Wt 87 5 kg (193 lb)   BMI 31 87 kg/m² (Reviewed)     Physical Exam   Constitutional: She is oriented to person, place, and time  Vital signs are normal  She appears well-developed and well-nourished  HENT:   Head: Normocephalic and atraumatic  Right Ear: Tympanic membrane, external ear and ear canal normal    Left Ear: Tympanic membrane, external ear and ear canal normal    Nose: Nose normal    Mouth/Throat: Uvula is midline and mucous membranes are normal  Posterior oropharyngeal erythema present  No oropharyngeal exudate  Eyes: Pupils are equal, round, and reactive to light  Conjunctivae, EOM and lids are normal    Neck: Trachea normal  Neck supple  Cardiovascular: Normal rate, regular rhythm and normal heart sounds  Pulmonary/Chest: Effort normal  She has no decreased breath sounds  She has wheezes in the right upper field and the left upper field  She has rhonchi in the right lower field and the left lower field  She has no rales  Lymphadenopathy:     She has no cervical adenopathy  Neurological: She is alert and oriented to person, place, and time  Skin: Skin is warm and dry  Psychiatric: She has a normal mood and affect  Her behavior is normal          BMI Counseling: There is no height or weight on file to calculate BMI  The BMI is above normal  Nutrition recommendations include decreasing portion sizes, encouraging healthy choices of fruits and vegetables, consuming healthier snacks, moderation in carbohydrate intake and increasing intake of lean protein  Exercise recommendations include exercising 3-5 times per week and strength training exercises

## 2020-02-03 DIAGNOSIS — J45.20 MILD INTERMITTENT ASTHMA WITHOUT COMPLICATION: ICD-10-CM

## 2020-02-03 RX ORDER — MONTELUKAST SODIUM 10 MG/1
10 TABLET ORAL
Qty: 30 TABLET | Refills: 3 | Status: SHIPPED | OUTPATIENT
Start: 2020-02-03 | End: 2020-11-18 | Stop reason: ALTCHOICE

## 2020-02-03 NOTE — TELEPHONE ENCOUNTER
Refill request for Singulair 10 mg  1 tab daily   [facility]

## 2020-02-04 NOTE — PROGRESS NOTES
Assessment/Plan:     Diagnoses and all orders for this visit:    Recurrent cough  -     XR chest pa & lateral; Future  -     benzonatate (TESSALON) 200 MG capsule; Take 1 capsule (200 mg total) by mouth 3 (three) times a day as needed for cough    Mild intermittent asthma without complication    Non-seasonal allergic rhinitis due to fungal spores          CXR PA and lateral  Prn Tessalon 200 mg for cough  Sample of Symbicort 160/4 5 two puffs BID  Continue with Singulair  Allergist referral  Richar Loja to call if any changes  Patient ID: Michaela Garcia is a 77 y o  female  Follow up visit for recurrent cough  Patient was seen at urgent care on 12/29/2019 with a cough  She was treated with Albuterol via nebulizer, Prednisone and prn Mucinex DM  Her symptoms persisted and she was re evaluated on 01/03/2020  She was treated with a steroid inhaler ( Asmanex) and a Z pack  She stopped using Asmanex and Mucinex as she reports not feeling well  Cough has improved but has persisted  No nasal congestion or post nasal drainage  Not on ACE inhibitor  History of asthma on daily Singulair  02/2018 CXR normal except for mildly elevated right hemidiaphragm  CT scan chest 01/2016 normal except for small hiatal hernia  Infrequent reflux symptoms  Remote allergy testing + mold allergy  Hyperlipidemia mixed type on Fenofibrate 160 mg daily  Lipid profile 10/2018  cholesterol 181  Triglycerides 124  HDL 61  LDL 97  LFTs normal   07/2019 FBS 92  Creatinine 1  11  Electrolytes normal       The following portions of the patient's history were reviewed and updated as appropriate: allergies, current medications, past family history, past medical history, past social history, past surgical history and problem list     Review of Systems   Constitutional: Negative for appetite change, chills, fatigue, fever and unexpected weight change  No fevers or night sweats      HENT: Negative for congestion, ear pain, postnasal drip, rhinorrhea, sore throat and trouble swallowing  See HPI   Eyes: Negative for visual disturbance  Respiratory: Positive for cough and shortness of breath  Negative for wheezing  See HPI  Shortness of breath during coughing episodes  Cardiovascular: Negative for chest pain, palpitations and leg swelling  Gastrointestinal: Negative for abdominal pain, blood in stool, constipation, diarrhea, nausea and vomiting  fatty liver 10/2018 LFTs normal   Albumin 4 3  I BS diarrhea  Stable on probiotics  Last colonoscopy 07/2017   Endocrine:        Hypothyroidism on Levothyroxine 50 mcg daily  07/2019  TSH 2 88   09/2017 DEXA scan osteoporosis with a decrease in BMD compared to study 2016  On Prolia  Prior intolerance to Actonel  11/2019 Ca++ 9 4  Vitamin-D level 44  On vitamin D 4,000 IU daily  Genitourinary: Negative for difficulty urinating  History of recurrent UTIs on Macrodantin 50 mg every other day at [facility]  She is followed by Urology renal ultrasound 07/2016 several large echogenic lesions right kidney compatible with angiomyolipomas  Possible 5 mm nonobstructing left renal stone  PVR 24 mL  CT scan abdomen pelvis 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  Pap smear 09/2016   Musculoskeletal: Negative for arthralgias and myalgias  Skin: Negative for rash  Neurological: Negative for dizziness and headaches  Hematological: Negative for adenopathy  Does not bruise/bleed easily  History of elevated homocystine level on vitamin supplement  No history of venous thrombosis  07/2019 homocysteine mildly elevated at 11 9 increased from 9 3   Psychiatric/Behavioral: Negative for dysphoric mood and sleep disturbance  Objective:      /64   Pulse 84   Temp (!) 96 5 °F (35 8 °C)   Resp 16   Ht 5' 5 2" (1 656 m)   Wt 89 4 kg (197 lb)   BMI 32 58 kg/m²          Physical Exam   Constitutional: She is oriented to person, place, and time   She appears well-developed and well-nourished  No distress  HENT:   Nose: Right sinus exhibits no maxillary sinus tenderness and no frontal sinus tenderness  Left sinus exhibits no maxillary sinus tenderness and no frontal sinus tenderness  Mouth/Throat: Oropharynx is clear and moist and mucous membranes are normal  No oral lesions  Normal dentition  Eyes: Pupils are equal, round, and reactive to light  Conjunctivae and EOM are normal  No scleral icterus  Neck: No JVD present  Carotid bruit is not present  No tracheal deviation present  No thyroid mass and no thyromegaly present  Cardiovascular: Normal rate, regular rhythm and normal heart sounds  Exam reveals no gallop  No murmur heard  Pulmonary/Chest: Effort normal and breath sounds normal  No respiratory distress  She has no wheezes  She has no rales  Abdominal: Soft  Bowel sounds are normal  She exhibits no distension, no abdominal bruit and no mass  There is no hepatosplenomegaly  There is no tenderness  There is no rebound and no guarding  Musculoskeletal: She exhibits no edema  Lymphadenopathy:     She has no cervical adenopathy  Neurological: She is alert and oriented to person, place, and time  Skin: No rash noted  No cyanosis  Nails show no clubbing  Psychiatric: She has a normal mood and affect  Nursing note and vitals reviewed          Lab Results   Component Value Date    WBC 4 4 10/04/2018    HGB 13 6 10/04/2018    HCT 39 7 10/04/2018    MCV 90 0 10/04/2018     10/04/2018       Lab Results   Component Value Date     01/03/2018    SODIUM 141 07/11/2019    K 4 6 07/11/2019     07/11/2019    CO2 27 07/11/2019    BUN 19 07/11/2019    CREATININE 1 11 (H) 07/11/2019    GLUC 92 07/11/2019    CALCIUM 9 4 07/11/2019    AST 25 10/04/2018    ALT 27 10/04/2018    ALKPHOS 73 10/04/2018    PROT 6 9 01/03/2018    TP 6 7 10/04/2018    BILITOT 0 6 01/03/2018    TBILI 0 7 10/04/2018

## 2020-02-05 ENCOUNTER — TELEPHONE (OUTPATIENT)
Dept: FAMILY MEDICINE CLINIC | Facility: CLINIC | Age: 70
End: 2020-02-05

## 2020-02-05 ENCOUNTER — OFFICE VISIT (OUTPATIENT)
Dept: FAMILY MEDICINE CLINIC | Facility: CLINIC | Age: 70
End: 2020-02-05
Payer: COMMERCIAL

## 2020-02-05 VITALS
SYSTOLIC BLOOD PRESSURE: 118 MMHG | HEIGHT: 65 IN | HEART RATE: 84 BPM | DIASTOLIC BLOOD PRESSURE: 64 MMHG | TEMPERATURE: 96.5 F | WEIGHT: 197 LBS | BODY MASS INDEX: 32.82 KG/M2 | RESPIRATION RATE: 16 BRPM

## 2020-02-05 DIAGNOSIS — J45.20 MILD INTERMITTENT ASTHMA WITHOUT COMPLICATION: ICD-10-CM

## 2020-02-05 DIAGNOSIS — R05.8 RECURRENT COUGH: Primary | ICD-10-CM

## 2020-02-05 DIAGNOSIS — J30.89 NON-SEASONAL ALLERGIC RHINITIS DUE TO FUNGAL SPORES: ICD-10-CM

## 2020-02-05 PROBLEM — Z01.419 ENCOUNTER FOR GYNECOLOGICAL EXAMINATION (GENERAL) (ROUTINE) WITHOUT ABNORMAL FINDINGS: Status: RESOLVED | Noted: 2019-12-04 | Resolved: 2020-02-05

## 2020-02-05 PROCEDURE — 1160F RVW MEDS BY RX/DR IN RCRD: CPT | Performed by: FAMILY MEDICINE

## 2020-02-05 PROCEDURE — 3008F BODY MASS INDEX DOCD: CPT | Performed by: FAMILY MEDICINE

## 2020-02-05 PROCEDURE — 1036F TOBACCO NON-USER: CPT | Performed by: FAMILY MEDICINE

## 2020-02-05 PROCEDURE — 99214 OFFICE O/P EST MOD 30 MIN: CPT | Performed by: FAMILY MEDICINE

## 2020-02-05 PROCEDURE — 4040F PNEUMOC VAC/ADMIN/RCVD: CPT | Performed by: FAMILY MEDICINE

## 2020-02-05 RX ORDER — BENZONATATE 200 MG/1
200 CAPSULE ORAL 3 TIMES DAILY PRN
Qty: 20 CAPSULE | Refills: 1 | Status: SHIPPED | OUTPATIENT
Start: 2020-02-05 | End: 2020-11-18 | Stop reason: ALTCHOICE

## 2020-02-05 NOTE — TELEPHONE ENCOUNTER
Patient called requesting a doctor to doctor referral to be added to her chart to the an allergist  She made an apt with Dr Radha Gabriel [id number] [id number]

## 2020-02-06 ENCOUNTER — TRANSCRIBE ORDERS (OUTPATIENT)
Dept: ADMINISTRATIVE | Age: 70
End: 2020-02-06

## 2020-02-06 ENCOUNTER — TELEPHONE (OUTPATIENT)
Dept: FAMILY MEDICINE CLINIC | Facility: CLINIC | Age: 70
End: 2020-02-06

## 2020-02-06 ENCOUNTER — APPOINTMENT (OUTPATIENT)
Dept: RADIOLOGY | Age: 70
End: 2020-02-06
Payer: COMMERCIAL

## 2020-02-06 DIAGNOSIS — R05.8 RECURRENT COUGH: ICD-10-CM

## 2020-02-06 DIAGNOSIS — T78.40XS ALLERGIC STATE, SEQUELA: Primary | ICD-10-CM

## 2020-02-06 PROCEDURE — 71046 X-RAY EXAM CHEST 2 VIEWS: CPT

## 2020-02-06 NOTE — TELEPHONE ENCOUNTER
Call re CXR normal     Procedure: Xr Chest Pa & Lateral    Result Date: 2/6/2020  Narrative: CHEST INDICATION:   R05: Cough  History of asthma  COMPARISON:  Chest radiograph from 2/14/2018 and 10/7/2015  Chest CT from 1/7/2016  EXAM PERFORMED/VIEWS:  XR CHEST PA & LATERAL  DUAL ENERGY SUBTRACTION TECHNIQUE FINDINGS: Cardiomediastinal silhouette appears unremarkable  The lungs are clear  No pneumothorax or pleural effusion  Osseous structures appear within normal limits for patient age  Chronic elevation of the right hemidiaphragm  Impression: No acute cardiopulmonary disease   Workstation performed: [id number]

## 2020-02-06 NOTE — TELEPHONE ENCOUNTER
[facility] Radiology department called just an FYI on (STAT Results)   No acute cardio pulmonary disease

## 2020-02-27 ENCOUNTER — OFFICE VISIT (OUTPATIENT)
Dept: FAMILY MEDICINE CLINIC | Facility: CLINIC | Age: 70
End: 2020-02-27
Payer: COMMERCIAL

## 2020-02-27 VITALS
TEMPERATURE: 98.9 F | DIASTOLIC BLOOD PRESSURE: 76 MMHG | HEART RATE: 100 BPM | SYSTOLIC BLOOD PRESSURE: 138 MMHG | BODY MASS INDEX: 32.82 KG/M2 | WEIGHT: 197 LBS | HEIGHT: 65 IN | OXYGEN SATURATION: 98 %

## 2020-02-27 DIAGNOSIS — H81.12 BENIGN PAROXYSMAL POSITIONAL VERTIGO OF LEFT EAR: Primary | ICD-10-CM

## 2020-02-27 PROCEDURE — 4040F PNEUMOC VAC/ADMIN/RCVD: CPT | Performed by: PHYSICIAN ASSISTANT

## 2020-02-27 PROCEDURE — 1036F TOBACCO NON-USER: CPT | Performed by: PHYSICIAN ASSISTANT

## 2020-02-27 PROCEDURE — 1160F RVW MEDS BY RX/DR IN RCRD: CPT | Performed by: PHYSICIAN ASSISTANT

## 2020-02-27 PROCEDURE — 3008F BODY MASS INDEX DOCD: CPT | Performed by: PHYSICIAN ASSISTANT

## 2020-02-27 PROCEDURE — 99214 OFFICE O/P EST MOD 30 MIN: CPT | Performed by: PHYSICIAN ASSISTANT

## 2020-02-27 NOTE — PROGRESS NOTES
Assessment/Plan:     Diagnoses and all orders for this visit:    Benign paroxysmal positional vertigo of left ear  Triggered vertigo on left side occurring at rest while lying in bed  Positive [place]-Hallpike maneuver  No evidence of cardiac etiology  - referral to PT for vestibular therapy  -     Ambulatory referral to Physical Therapy; Future              Subjective:    Patient ID: Shukri Matthews is a 77 y o  female  Pt is presenting today for Room spinning for 6 days  Started in middle of night  Occurs with head movement  Has occurred while lying flat    Difficulty ambulating feeling off balance    Last week she finished a 7 days course of Prednisone  Denies any change in hearing  Dizziness   Associated symptoms include vertigo  Pertinent negatives include no arthralgias, chest pain, chills, coughing, fatigue, fever, headaches, myalgias, nausea, numbness, rash or sore throat  The following portions of the patient's history were reviewed and updated as appropriate: allergies, current medications and problem list     Review of Systems   Constitutional: Negative for chills, fatigue, fever and unexpected weight change  HENT: Negative for rhinorrhea and sore throat  Respiratory: Negative for cough, shortness of breath and wheezing  Cardiovascular: Negative for chest pain, palpitations and leg swelling  Gastrointestinal: Negative for constipation, diarrhea and nausea  Musculoskeletal: Negative for arthralgias and myalgias  Skin: Negative for rash  Neurological: Positive for dizziness and vertigo  Negative for seizures, numbness and headaches  Objective:  /76 (BP Location: Left arm, Patient Position: Sitting, Cuff Size: Standard)   Pulse 100   Temp 98 9 °F (37 2 °C)   Ht 5' 5 2" (1 656 m)   Wt 89 4 kg (197 lb)   SpO2 98%   BMI 32 58 kg/m²      Physical Exam   Constitutional: She is oriented to person, place, and time  She appears well-developed and well-nourished   No distress  HENT:   Head: Normocephalic and atraumatic  Eyes: Pupils are equal, round, and reactive to light  Conjunctivae and EOM are normal    Neck: Normal range of motion  Neck supple  Cardiovascular: Normal rate, regular rhythm, normal heart sounds and intact distal pulses  Exam reveals no gallop and no friction rub  No murmur heard  Pulmonary/Chest: Effort normal and breath sounds normal  No respiratory distress  She has no wheezes  She has no rales  Neurological: She is alert and oriented to person, place, and time  No cranial nerve deficit  Positive [place]-Hallpike   Skin: Skin is warm and dry  She is not diaphoretic  Psychiatric: She has a normal mood and affect  Her behavior is normal  Thought content normal    Vitals reviewed

## 2020-02-28 ENCOUNTER — TRANSCRIBE ORDERS (OUTPATIENT)
Dept: ADMINISTRATIVE | Facility: HOSPITAL | Age: 70
End: 2020-02-28

## 2020-02-28 DIAGNOSIS — R05.3 CHRONIC COUGH: Primary | ICD-10-CM

## 2020-02-28 DIAGNOSIS — J98.6 DISORDERS OF DIAPHRAGM: ICD-10-CM

## 2020-03-01 DIAGNOSIS — E78.2 MIXED HYPERLIPIDEMIA: Primary | ICD-10-CM

## 2020-03-01 RX ORDER — FENOFIBRATE 160 MG/1
TABLET ORAL
Qty: 30 TABLET | Refills: 5 | Status: SHIPPED | OUTPATIENT
Start: 2020-03-01 | End: 2020-09-15

## 2020-03-01 NOTE — PROGRESS NOTES
PT Evaluation     Today's date: 3/3/2020  Patient name: Reese Canavan  : 1950  MRN: [id number]  Referring provider: Diogenes Spencer  Dx:   Encounter Diagnosis     ICD-10-CM    1  BPPV (benign paroxysmal positional vertigo), left H81 12        Start Time: 930  Stop Time: 1010  Total time in clinic (min): 40 minutes    Assessment  Assessment details: Reese Canavan is a 77 y o  female who presents with signs and symptoms consistent of left sided BPPV  Patient demonstrates a positive left torsional upbeating nystagmus during left chyna-hallpike  She demonstrates a positive results to the Epley maneuver after following the examination and had no dizziness when she sat up right  Patient also demonstrate decreased convergence with decreased teaming of left eye at 8"  Patient also has hypometric and slow saccades, vertical worse than horizontal  Due to these impairments, Patient has difficulty performing mostly lying on the left side, looking, up, and quick head movements  She does feel as though she need dizziness in crowded spaces; however, this seems chronic compared to recent onset dizziness  Patient would benefit from skilled physical therapy to address the impairments, improve their level of function, and to improve their overall quality of life  Impairments: abnormal movement, activity intolerance, difficulty understanding and impaired balance  Understanding of Dx/Px/POC: good   Prognosis: good    Goals  Short Term Goals: to be achieved by 4 weeks  1) Decrease episodes of dizziness with head turns by 75%  2) Patient will demonstrate full relief of symptoms     Long Term Goals: to be achieved by discharge  1) FOTO equal to or greater than expected    2) Negative [place] Hallpike test to the L   3) Improve vertical and horizontal saccades to normal    Plan  Patient would benefit from: PT eval and skilled physical therapy  Planned therapy interventions: balance, manual therapy, neuromuscular re-education, patient education, therapeutic activities, therapeutic exercise and home exercise program  Frequency: 1-2 x per week for 3-4 weeks  Treatment plan discussed with: patient        Subjective Evaluation    History of Present Illness  Mechanism of injury: History of Current Injury: Patient referred to PT by PCP with diagnosis of BPPV  Patient reports she has had multiple episodes of BPPV  Patient states that she woke up during the middle of the night (last week) and was spinning  She states that she had to close her eyes for symptoms to subside  Patient states she was like this all day and symptoms slowly resolved over 4 days  Patient states that she could lie on her right side but unable on the left  Pt states that she has been off and on sick since [address] and had been taking prednisone, antiboitics, and other medication to manage her wheezing, coughing, and asthma like symptoms  Pt states that she is feeling much better recently but is here for evaluation and treatment  Aggravating factors: lying on left side  Easing factors: lying still, lying on right side  24 HR pattern: Movement dependent  Imaging: None    Patient goals:  Reduce dizziness, return to normal lifestyle      Pain  No pain reported          Dysequilibrium: Yes  Lightheadedness: No  Vertigo: Yes  Rocking or Swaying: No         Oscillopsia: Yes  Diplopia: No  Motion sickness: No  Floating, Swimming, Disconnected: No    Exacerbation Factors:  Bending over: Yes  Turning Head: Yes  Rolling in bed: Yes  Walking: No  Looking up: No  Supine to/from sitting: Yes  Optokinetic movement: No  Walking in busy environment: No    Duration of Symptoms:     Concurrent Complaints:  Tinnitus:Yes  Aural Fullness:No  Known hearing loss:Yes  Nausea, Vomiting: No  Altered Vision: No  Poor Concentration: Yes  Memory Loss: No  Peripheral Neuropathy:No  Cervical Pain: No   Headache: Yes      PHYSICAL FINDINGS:  Cervical ROM:  Flexion: 40  Extension: 45  Right rotation: 75  Left rotation: 75  Right lateral flexion: 35  Left lateral flexion: 25    Oculomotor ROM :  Resting nystagmus: No  Gaze holding nystagmus No   Smooth pursuit Normal    Vertical Saccades:abnormal  Hypometric  Horizontal Saccades:hypometric but normal   Convergence: Abnormal  Decrease teaming of left eye  8" max       Head thrust (room light):  Abnormal when head turned fast to the left    VOR Cancellation: negative    VBI testing: normal      Positional testing: Right Left   Sherman Zarate pike  Positive with left torsional upbeating nystagmus    Roll test:         Objective    Flowsheet Rows      Most Recent Value   PT/OT G-Codes   Current Score  69   Projected Score  83             Precautions: IBS, Asthma, Osteoporosis      Manual  3/3            Epley maneuver to the left  performed                                                                    Exercise Diary              VOR x1 viewing             Convergence             Horizontal saccades             Vertical saccades             M-CTSIB test on Checkd.In Prn                                                                                                                                                                                                                    Modalities

## 2020-03-03 ENCOUNTER — EVALUATION (OUTPATIENT)
Dept: PHYSICAL THERAPY | Facility: CLINIC | Age: 70
End: 2020-03-03
Payer: COMMERCIAL

## 2020-03-03 DIAGNOSIS — H81.12 BPPV (BENIGN PAROXYSMAL POSITIONAL VERTIGO), LEFT: Primary | ICD-10-CM

## 2020-03-03 PROCEDURE — 97162 PT EVAL MOD COMPLEX 30 MIN: CPT | Performed by: PHYSICAL THERAPIST

## 2020-03-03 PROCEDURE — 97140 MANUAL THERAPY 1/> REGIONS: CPT | Performed by: PHYSICAL THERAPIST

## 2020-03-06 ENCOUNTER — OFFICE VISIT (OUTPATIENT)
Dept: PHYSICAL THERAPY | Facility: CLINIC | Age: 70
End: 2020-03-06
Payer: COMMERCIAL

## 2020-03-06 DIAGNOSIS — H81.12 BPPV (BENIGN PAROXYSMAL POSITIONAL VERTIGO), LEFT: Primary | ICD-10-CM

## 2020-03-06 PROCEDURE — 97112 NEUROMUSCULAR REEDUCATION: CPT | Performed by: PHYSICAL THERAPIST

## 2020-03-06 PROCEDURE — 97110 THERAPEUTIC EXERCISES: CPT | Performed by: PHYSICAL THERAPIST

## 2020-03-06 PROCEDURE — 97140 MANUAL THERAPY 1/> REGIONS: CPT | Performed by: PHYSICAL THERAPIST

## 2020-03-06 NOTE — PROGRESS NOTES
Daily Note     Today's date: 3/6/2020  Patient name: Brady Jay  : 1950  MRN: [id number]  Referring provider: Kirstin Gaming  Dx:   Encounter Diagnosis     ICD-10-CM    1  BPPV (benign paroxysmal positional vertigo), left H81 12        Start Time: 930  Stop Time: 1030  Total time in clinic (min): 60 minutes    Subjective: Patient reports that she had two good days following initial evaluation  However, this morning she complains of increased dizziness, unsteadiness with gait, and nausea  Objective: See treatment diary below    Roll test L: negative   Roll test R: positive torsional upbeating  L Sherman-Hallpike: positive with torsional upbeating nystagmus > 2 minutes initially (~30 sec during last Epley maneuver) with report of dizziness  R Ericson-Hallpike: negative with neck in extension but positive torsional upbeating nystagmus ~10 seconds with neck in neutral/slight flexion with report of dizziness    L Epley x 2   L Wallace x 1  L Epley x 1    Assessment: Due to duration of sx, sx on L side may be due to cupulolithiasis vs  Canalithiasis, which was treated with both the Epley and Wallace maneuvers  Following these techniques, patient reported full resolution of nausea and significant improvement in dizziness sx that she initially reported upon entering clinic  During Epley, patient demonstrated positive R posterior canalithiasis (more in neutral head position vs  Extended position), which should be addressed next session  Maneuvers: billed as neuromuscular re-education  Plan: Assess/treat R posterior canalithiasis  Then assess/treat any residual L posterior cupulolithiasis Eli Doshi followed by Epley maneuver)        Precautions: IBS, Asthma, Osteoporosis      Manual  3/3 3/6           Epley maneuver to the left  performed x3           L Wallace  x1           Canal assessment  10'                                         Exercise Diary              VOR x1 viewing             Convergence Horizontal saccades             Vertical saccades             M-CTSIB test on Nanomech Prn                          Patient education  13'                                                                                                                                                                                        Modalities

## 2020-03-10 ENCOUNTER — OFFICE VISIT (OUTPATIENT)
Dept: PHYSICAL THERAPY | Facility: CLINIC | Age: 70
End: 2020-03-10
Payer: COMMERCIAL

## 2020-03-10 DIAGNOSIS — H81.12 BPPV (BENIGN PAROXYSMAL POSITIONAL VERTIGO), LEFT: Primary | ICD-10-CM

## 2020-03-10 PROCEDURE — 97112 NEUROMUSCULAR REEDUCATION: CPT | Performed by: PHYSICAL THERAPIST

## 2020-03-10 PROCEDURE — 97110 THERAPEUTIC EXERCISES: CPT | Performed by: PHYSICAL THERAPIST

## 2020-03-10 NOTE — PROGRESS NOTES
Daily Note     Today's date: 3/10/2020  Patient name: Keara Staley  : 1950  MRN: [id number]  Referring provider: Alexia Prado  Dx:   Encounter Diagnosis     ICD-10-CM    1  BPPV (benign paroxysmal positional vertigo), left H81 12        Start Time: 930  Stop Time: 1005  Total time in clinic (min): 35 minutes    Subjective: Patient states that last evening she was able to lie on her right side but did feel "waving" when she lied on her left sided  Objective: See treatment diary below  L Sherman-Hallpike: positive with torsional upbeating nystagmus > 2 minutes with dizziness which may a result of cupulolithiasis  Increased dizziness with torsional upbeating nystagmus with position two of L epley maneuver  Assessment: Positive Bolivar hallpike to the left was treated with the epley maneuver  Due to increased dizziness with position 2 and >2' of dizziness/nysatgmus of position 1, this vestibular issues seems more complex than Left sided posterior canal BPPV  Patient will be referred to [address] [place] [address] neuro [place] for PT evaluation and treatment  Patient felt minimal dizziness or symptoms upon leaving the office this morning  Plan: Transfer to [address] [place] [address] neuro PT center        Precautions: IBS, Asthma, Osteoporosis      Manual  3/3 3/6 3/10          Epley maneuver to the left  performed x3 x1          L Wallace  x1           Canal assessment  10' 10'                                        Exercise Diary   3/6 3/10          VOR x1 viewing             Convergence             Horizontal saccades             Vertical saccades             M-CTSIB test on biodex Prn                          Patient education  13'                                                                                                                                                                                        Modalities                                                     1:1 with PT from 930-1005  Patient was educated about planned transfer to [address] [place] [address] neuro PTs and probable causes of her continued dizziness

## 2020-03-12 ENCOUNTER — HOSPITAL ENCOUNTER (OUTPATIENT)
Dept: RADIOLOGY | Age: 70
Discharge: HOME | End: 2020-03-12
Payer: COMMERCIAL

## 2020-03-12 ENCOUNTER — EVALUATION (OUTPATIENT)
Dept: PHYSICAL THERAPY | Facility: CLINIC | Age: 70
End: 2020-03-12
Payer: COMMERCIAL

## 2020-03-12 ENCOUNTER — APPOINTMENT (OUTPATIENT)
Dept: PHYSICAL THERAPY | Facility: CLINIC | Age: 70
End: 2020-03-12
Payer: COMMERCIAL

## 2020-03-12 VITALS — BODY MASS INDEX: 30.53 KG/M2 | WEIGHT: 190 LBS | HEIGHT: 66 IN

## 2020-03-12 DIAGNOSIS — Z78.0 POSTMENOPAUSAL STATUS: ICD-10-CM

## 2020-03-12 DIAGNOSIS — Z12.31 ENCOUNTER FOR SCREENING MAMMOGRAM FOR MALIGNANT NEOPLASM OF BREAST: ICD-10-CM

## 2020-03-12 DIAGNOSIS — H81.12 BPPV (BENIGN PAROXYSMAL POSITIONAL VERTIGO), LEFT: Primary | ICD-10-CM

## 2020-03-12 PROCEDURE — 77080 DXA BONE DENSITY AXIAL: CPT

## 2020-03-12 PROCEDURE — 77063 BREAST TOMOSYNTHESIS BI: CPT

## 2020-03-12 PROCEDURE — 97164 PT RE-EVAL EST PLAN CARE: CPT | Performed by: PHYSICAL THERAPIST

## 2020-03-12 PROCEDURE — 77067 SCR MAMMO BI INCL CAD: CPT

## 2020-03-12 PROCEDURE — 97112 NEUROMUSCULAR REEDUCATION: CPT | Performed by: PHYSICAL THERAPIST

## 2020-03-12 NOTE — PROGRESS NOTES
PT Re-Evaluation     Today's date: 3/12/2020  Patient name: Brady Jay  : 1950  MRN: [id number]  Referring provider: Kirstin Gaming  Dx:   Encounter Diagnosis     ICD-10-CM    1  BPPV (benign paroxysmal positional vertigo), left H81 12                   Assessment  Assessment details: Pt presents to PT with L sided posterior canal cupulolithiasis with concurrent L sided vestibular hypofunction, convergence insufficiency, VOR insufficiency, and hypertonic cervical musculature with limited ROM  Pt would benefit from PT 2x/week for 2 weeks to resolve BPPV and continue to monitor symptoms of convergence and VOR insufficency  Pt in agreement with POC  Impairments: abnormal muscle tone, impaired balance and lacks appropriate home exercise program  Understanding of Dx/Px/POC: good   Prognosis: good    Goals  ST week  Pt will be HEP as needed within 1 week  Pt will report reduction of dizziness for 1:30 or less within 1 week  Pt will test 31 or less on DHI      LT week  Pt will deny spinning dizziness within 2 weeks  Pt will test negative for L BPPV within 3 treatments or within 2 weeks  Pt will test in mild category for [facility]    Plan  Plan details: Perform FGA, DVA, assess L chyna  Planned therapy interventions: joint mobilization, manual therapy, balance, canalith repositioning, therapeutic exercise, therapeutic training, home exercise program, graded exercise, graded activity, therapeutic activities, patient education and neuromuscular re-education  Duration in visits: 4  Duration in weeks: 2  Plan of Care beginning date: 3/12/2020  Plan of Care expiration date: 3/26/2020  Treatment plan discussed with: patient        Subjective Evaluation    History of Present Illness  Mechanism of injury: 3 weeks on started  Woke up in the middle of the night and the room was spinning, tried to focus on target and it wasn't able to go away    Wnt to go to the bathroom and was wall walking on the way to bathroom and felt completely offbalanced  If she looked to the side then it made it worse     The next day was better but still had balance issues and was wall walking    Went to doctor the following Thursday because she was going to allergist as she just came off pregnisone and he recommended , had been on pregnisone due    soped sleeping on L side,     Sleeping flat     Went to other     Has gotten better since previous treatment    When she went to L its decreased in intensity    Severity has gone done since previous treatment,    Now she is having issues with R side but it feels different it feels like swaying    Events havent been bad since that initial event  Quality of life: fair    Pain  No pain reported    Social Support  Steps to enter house: yes (2 flights)  Stairs in house: yes (2 flights )   Lives in: multiple-level home  Lives with: spouse    Treatments  Previous treatment: physical therapy  Patient Goals  Patient goals for therapy: improved balance  Patient goal: reduce dizziness        Objective       Dysequilibrium: Yes  Lightheadedness: Yes- sometimes  Vertigo: Yes  Rocking or Swaying: Yes         Oscillopsia: No  Diplopia: says it takes a while for things to focus   Motion sickness: No  Floating, Swimming, Disconnected: No    Exacerbation Factors:  Bending over: Yes  Turning Head: Yes  Rolling in bed: Yes to L is worse than R  Walking: No  Looking up: No  Supine to/from sitting: No- moves slowly in bed to avoid excerabation  Optokinetic movement: No  Walking in busy environment: No     Concurrent Complaints:  Tinnitus:Yes- both   Aural Fullness:No  Known hearing loss:Yes- unsepcified  Nausea, Vomiting: Yes- nausea  Altered Vision: No  Peripheral Neuropathy:No      Pain Assessment      Headache Frequency:Feels fullness like pressure when episodes occur but denies h/a  Duration:  Intensity:        Best:        Worst:        Average:   Location:  Exacerbating Factors:  Relieving Factors:   Cervical  Denies neck pain w/ dizziness     Cervical Range of Motion     Flexion     Extension 25% limited     Left Right   Lateral Flexion 75% limited 75% limited   Rotation WNL WNL       Ligament Laxity Testing:    Alar Ligament: NW   Transverse Ligament:WNL    Modified VBI: R -" felt something coing on"    Cervical Palpation: UT and zahida b/l   Cervical Posture:     Computerized Posturography:         PHYSICAL FINDINGS:  Oculomotor ROM :  Resting nystagmus: No  Gaze holding nystagmus No   Smooth pursuit Normal    Vertical Saccades:Normal hypometric   Horizontal Saccades:Normal  Convergence: Abnormal 27Cm    Cover/Uncover/Crosscover Test: Normal    Head thrust (room light):  Abnormal when head turned fast to the left     Dynamic Visual Acuity:   Dynamic Head: 20/  Static Head: 20/      MCTSIB  30 eyes open firm surface   30 eyes closed form surface  30 eyes open foam surface  30 eyes closed foam surface    DGI:    DHI:   0-30 mild , 30-60  Positional testing  Sherman-Hallpike: positive L torsional upbeat nystagmus greater than 1min  Roll Test:  Positive L torsional negative right       Performed semontx1    Dixhallpike L : torsional upbeat greater than 1 min    Semontx1: torsional upbeat less than 1 min    epleyx1 for L: torsional upbeat in R head position of epley                         Precautions: Osteoporosis      Manual                                                                                   Exercise Diary              DVA             FGA                                                                                                                                                                                                                                                           Modalities

## 2020-03-16 ENCOUNTER — HOSPITAL ENCOUNTER (OUTPATIENT)
Dept: PULMONOLOGY | Facility: HOSPITAL | Age: 70
Discharge: HOME | End: 2020-03-16
Payer: COMMERCIAL

## 2020-03-16 DIAGNOSIS — R05.3 CHRONIC COUGH: ICD-10-CM

## 2020-03-16 DIAGNOSIS — J98.6 DISORDERS OF DIAPHRAGM: ICD-10-CM

## 2020-03-16 PROCEDURE — 94729 DIFFUSING CAPACITY: CPT

## 2020-03-16 PROCEDURE — 94727 GAS DIL/WSHOT DETER LNG VOL: CPT | Performed by: INTERNAL MEDICINE

## 2020-03-16 PROCEDURE — 94760 N-INVAS EAR/PLS OXIMETRY 1: CPT

## 2020-03-16 PROCEDURE — 94727 GAS DIL/WSHOT DETER LNG VOL: CPT

## 2020-03-16 PROCEDURE — 94060 EVALUATION OF WHEEZING: CPT | Performed by: INTERNAL MEDICINE

## 2020-03-16 PROCEDURE — 94729 DIFFUSING CAPACITY: CPT | Performed by: INTERNAL MEDICINE

## 2020-03-16 PROCEDURE — 94060 EVALUATION OF WHEEZING: CPT

## 2020-03-16 RX ORDER — ALBUTEROL SULFATE 2.5 MG/3ML
2.5 SOLUTION RESPIRATORY (INHALATION) ONCE
Status: COMPLETED | OUTPATIENT
Start: 2020-03-16 | End: 2020-03-16

## 2020-03-16 RX ADMIN — ALBUTEROL SULFATE 2.5 MG: 2.5 SOLUTION RESPIRATORY (INHALATION) at 07:30

## 2020-03-17 ENCOUNTER — TRANSCRIBE ORDERS (OUTPATIENT)
Dept: MAMMOGRAPHY | Facility: CLINIC | Age: 70
End: 2020-03-17

## 2020-03-17 ENCOUNTER — APPOINTMENT (OUTPATIENT)
Dept: PHYSICAL THERAPY | Facility: CLINIC | Age: 70
End: 2020-03-17
Payer: COMMERCIAL

## 2020-03-17 ENCOUNTER — HOSPITAL ENCOUNTER (OUTPATIENT)
Dept: MAMMOGRAPHY | Facility: CLINIC | Age: 70
Discharge: HOME | End: 2020-03-17
Payer: COMMERCIAL

## 2020-03-17 ENCOUNTER — HOSPITAL ENCOUNTER (OUTPATIENT)
Dept: ULTRASOUND IMAGING | Facility: CLINIC | Age: 70
Discharge: HOME | End: 2020-03-17
Payer: COMMERCIAL

## 2020-03-17 VITALS — BODY MASS INDEX: 33.66 KG/M2 | WEIGHT: 190 LBS | HEIGHT: 63 IN

## 2020-03-17 VITALS — HEIGHT: 63 IN | BODY MASS INDEX: 33.39 KG/M2

## 2020-03-17 DIAGNOSIS — R92.8 ABNORMAL MAMMOGRAM: ICD-10-CM

## 2020-03-17 DIAGNOSIS — R92.8 ABNORMAL FINDINGS ON DIAGNOSTIC IMAGING OF BREAST: Primary | ICD-10-CM

## 2020-03-17 PROCEDURE — 76642 ULTRASOUND BREAST LIMITED: CPT

## 2020-03-17 PROCEDURE — G0279 TOMOSYNTHESIS, MAMMO: HCPCS

## 2020-03-17 PROCEDURE — 77065 DX MAMMO INCL CAD UNI: CPT

## 2020-03-19 ENCOUNTER — APPOINTMENT (OUTPATIENT)
Dept: PHYSICAL THERAPY | Facility: CLINIC | Age: 70
End: 2020-03-19
Payer: COMMERCIAL

## 2020-03-20 ENCOUNTER — TELEPHONE (OUTPATIENT)
Dept: OBGYN CLINIC | Facility: CLINIC | Age: 70
End: 2020-03-20

## 2020-03-20 DIAGNOSIS — R92.8 ABNORMAL MAMMOGRAM: Primary | ICD-10-CM

## 2020-03-20 NOTE — TELEPHONE ENCOUNTER
Spoke with Pt today via phone call  Pt states she is aware that Radiology recommended additional imaging of her left breast   Pt further states she has scheduled an appointment on 9/17/20 for additional imaging of left breast   Radiology order completed in Epic for diagnostic mammogram of left breast within 6 months (9/2020)  Copy of said order mailed today to mailing address in Pt's EHR per Pt's request   Reiterated to Pt that if she has any questions/concerns to contact office

## 2020-03-20 NOTE — TELEPHONE ENCOUNTER
----- Message from Didier Sanders, [address]  sent at 3/20/2020  2:19 PM EDT -----  Diagnostic imaging with probably benign findings in the left breast   Diagnostic mammo in 6 mos was advised per radiology   Please notify patient and confirm she is aware of recommendations

## 2020-03-26 DIAGNOSIS — J45.20 MILD INTERMITTENT ASTHMA WITHOUT COMPLICATION: ICD-10-CM

## 2020-07-27 ENCOUNTER — TRANSCRIBE ORDERS (OUTPATIENT)
Dept: ADMINISTRATIVE | Facility: HOSPITAL | Age: 70
End: 2020-07-27

## 2020-07-27 DIAGNOSIS — D17.71 RENAL ANGIOMYOLIPOMA: ICD-10-CM

## 2020-07-27 DIAGNOSIS — N39.0 URINARY TRACT INFECTION WITHOUT HEMATURIA, SITE UNSPECIFIED: Primary | ICD-10-CM

## 2020-07-29 ENCOUNTER — HOSPITAL ENCOUNTER (OUTPATIENT)
Dept: RADIOLOGY | Age: 70
Discharge: HOME | End: 2020-07-29
Payer: COMMERCIAL

## 2020-07-29 DIAGNOSIS — D17.71 RENAL ANGIOMYOLIPOMA: ICD-10-CM

## 2020-07-29 DIAGNOSIS — N39.0 URINARY TRACT INFECTION WITHOUT HEMATURIA, SITE UNSPECIFIED: ICD-10-CM

## 2020-07-29 PROCEDURE — 76770 US EXAM ABDO BACK WALL COMP: CPT

## 2020-08-19 NOTE — PROGRESS NOTES
PT-Discharge:  DC from from [facility] office to Neuro PT specialist- Jethro Petersen DPT secondary to complex vestibular diagnosis

## 2020-09-15 DIAGNOSIS — E78.2 MIXED HYPERLIPIDEMIA: ICD-10-CM

## 2020-09-15 RX ORDER — FENOFIBRATE 160 MG/1
TABLET ORAL
Qty: 30 TABLET | Refills: 5 | Status: SHIPPED | OUTPATIENT
Start: 2020-09-15 | End: 2021-05-11

## 2020-09-17 ENCOUNTER — HOSPITAL ENCOUNTER (OUTPATIENT)
Dept: MAMMOGRAPHY | Facility: CLINIC | Age: 70
Discharge: HOME | End: 2020-09-17
Payer: COMMERCIAL

## 2020-09-17 ENCOUNTER — HOSPITAL ENCOUNTER (OUTPATIENT)
Dept: ULTRASOUND IMAGING | Facility: CLINIC | Age: 70
Discharge: HOME | End: 2020-09-17
Payer: COMMERCIAL

## 2020-09-17 VITALS — WEIGHT: 190 LBS | BODY MASS INDEX: 33.66 KG/M2 | HEIGHT: 63 IN

## 2020-09-17 DIAGNOSIS — R92.8 ABNORMAL MAMMOGRAM: ICD-10-CM

## 2020-09-17 DIAGNOSIS — R92.8 ABNORMAL FINDINGS ON DIAGNOSTIC IMAGING OF BREAST: ICD-10-CM

## 2020-09-17 PROCEDURE — 77065 DX MAMMO INCL CAD UNI: CPT

## 2020-09-17 PROCEDURE — 76642 ULTRASOUND BREAST LIMITED: CPT

## 2020-09-17 PROCEDURE — G0279 TOMOSYNTHESIS, MAMMO: HCPCS

## 2020-09-18 ENCOUNTER — TELEPHONE (OUTPATIENT)
Dept: OBGYN CLINIC | Facility: CLINIC | Age: 70
End: 2020-09-18

## 2020-09-18 NOTE — TELEPHONE ENCOUNTER
----- Message from [place], [place] sent at 9/17/2020 12:46 PM EDT -----  Benign findings on diagnostic follow up breast imaging   Please notify patient that she may resume routine annual screening mammo per radiology

## 2020-10-15 ENCOUNTER — TELEPHONE (OUTPATIENT)
Dept: FAMILY MEDICINE CLINIC | Facility: CLINIC | Age: 70
End: 2020-10-15

## 2020-10-15 DIAGNOSIS — R79.89 ELEVATED HOMOCYSTEINE: Primary | ICD-10-CM

## 2020-11-13 LAB
25(OH)D3 SERPL-MCNC: 42 NG/ML (ref 30–100)
ALBUMIN SERPL-MCNC: 4.2 G/DL (ref 3.6–5.1)
ALBUMIN/GLOB SERPL: 1.8 (CALC) (ref 1–2.5)
ALP SERPL-CCNC: 50 U/L (ref 37–153)
ALT SERPL-CCNC: 29 U/L (ref 6–29)
AST SERPL-CCNC: 26 U/L (ref 10–35)
BASOPHILS # BLD AUTO: 41 CELLS/UL (ref 0–200)
BASOPHILS NFR BLD AUTO: 0.9 %
BILIRUB SERPL-MCNC: 0.6 MG/DL (ref 0.2–1.2)
BUN SERPL-MCNC: 17 MG/DL (ref 7–25)
BUN/CREAT SERPL: ABNORMAL (CALC) (ref 6–22)
CALCIUM SERPL-MCNC: 9.6 MG/DL (ref 8.6–10.4)
CHLORIDE SERPL-SCNC: 106 MMOL/L (ref 98–110)
CHOLEST SERPL-MCNC: 167 MG/DL
CHOLEST/HDLC SERPL: 3 (CALC)
CO2 SERPL-SCNC: 29 MMOL/L (ref 20–32)
CREAT SERPL-MCNC: 0.99 MG/DL (ref 0.5–0.99)
EOSINOPHIL # BLD AUTO: 59 CELLS/UL (ref 15–500)
EOSINOPHIL NFR BLD AUTO: 1.3 %
ERYTHROCYTE [DISTWIDTH] IN BLOOD BY AUTOMATED COUNT: 12.4 % (ref 11–15)
GLOBULIN SER CALC-MCNC: 2.3 G/DL (CALC) (ref 1.9–3.7)
GLUCOSE SERPL-MCNC: 86 MG/DL (ref 65–99)
HCT VFR BLD AUTO: 43 % (ref 35–45)
HCYS SERPL-SCNC: 11.4 UMOL/L
HDLC SERPL-MCNC: 56 MG/DL
HGB BLD-MCNC: 14.4 G/DL (ref 11.7–15.5)
LDLC SERPL CALC-MCNC: 91 MG/DL (CALC)
LYMPHOCYTES # BLD AUTO: 1499 CELLS/UL (ref 850–3900)
LYMPHOCYTES NFR BLD AUTO: 33.3 %
MCH RBC QN AUTO: 30.6 PG (ref 27–33)
MCHC RBC AUTO-ENTMCNC: 33.5 G/DL (ref 32–36)
MCV RBC AUTO: 91.5 FL (ref 80–100)
MONOCYTES # BLD AUTO: 203 CELLS/UL (ref 200–950)
MONOCYTES NFR BLD AUTO: 4.5 %
NEUTROPHILS # BLD AUTO: 2700 CELLS/UL (ref 1500–7800)
NEUTROPHILS NFR BLD AUTO: 60 %
NONHDLC SERPL-MCNC: 111 MG/DL (CALC)
PLATELET # BLD AUTO: 339 THOUSAND/UL (ref 140–400)
PMV BLD REES-ECKER: 11.1 FL (ref 7.5–12.5)
POTASSIUM SERPL-SCNC: 4.3 MMOL/L (ref 3.5–5.3)
PROT SERPL-MCNC: 6.5 G/DL (ref 6.1–8.1)
RBC # BLD AUTO: 4.7 MILLION/UL (ref 3.8–5.1)
SL AMB EGFR AFRICAN AMERICAN: 67 ML/MIN/1.73M2
SL AMB EGFR NON AFRICAN AMERICAN: 58 ML/MIN/1.73M2
SODIUM SERPL-SCNC: 142 MMOL/L (ref 135–146)
TRIGL SERPL-MCNC: 107 MG/DL
WBC # BLD AUTO: 4.5 THOUSAND/UL (ref 3.8–10.8)

## 2020-11-18 ENCOUNTER — OFFICE VISIT (OUTPATIENT)
Dept: FAMILY MEDICINE CLINIC | Facility: CLINIC | Age: 70
End: 2020-11-18
Payer: COMMERCIAL

## 2020-11-18 VITALS
BODY MASS INDEX: 34.02 KG/M2 | SYSTOLIC BLOOD PRESSURE: 128 MMHG | WEIGHT: 192 LBS | HEIGHT: 63 IN | RESPIRATION RATE: 16 BRPM | DIASTOLIC BLOOD PRESSURE: 66 MMHG | HEART RATE: 86 BPM | TEMPERATURE: 96.4 F

## 2020-11-18 DIAGNOSIS — E55.9 VITAMIN D DEFICIENCY: ICD-10-CM

## 2020-11-18 DIAGNOSIS — M81.0 AGE-RELATED OSTEOPOROSIS WITHOUT CURRENT PATHOLOGICAL FRACTURE: ICD-10-CM

## 2020-11-18 DIAGNOSIS — D17.71 ANGIOMYOLIPOMA OF KIDNEY: ICD-10-CM

## 2020-11-18 DIAGNOSIS — K76.0 FATTY LIVER: ICD-10-CM

## 2020-11-18 DIAGNOSIS — E03.9 ACQUIRED HYPOTHYROIDISM: ICD-10-CM

## 2020-11-18 DIAGNOSIS — E78.2 MIXED HYPERLIPIDEMIA: ICD-10-CM

## 2020-11-18 DIAGNOSIS — J45.30 MILD PERSISTENT ASTHMA WITHOUT COMPLICATION: ICD-10-CM

## 2020-11-18 DIAGNOSIS — Z01.818 PRE-OPERATIVE CLEARANCE: Primary | ICD-10-CM

## 2020-11-18 PROCEDURE — 3725F SCREEN DEPRESSION PERFORMED: CPT | Performed by: FAMILY MEDICINE

## 2020-11-18 PROCEDURE — 99214 OFFICE O/P EST MOD 30 MIN: CPT | Performed by: FAMILY MEDICINE

## 2020-11-18 PROCEDURE — 1036F TOBACCO NON-USER: CPT | Performed by: FAMILY MEDICINE

## 2020-11-18 PROCEDURE — 3008F BODY MASS INDEX DOCD: CPT | Performed by: FAMILY MEDICINE

## 2020-11-18 PROCEDURE — 1160F RVW MEDS BY RX/DR IN RCRD: CPT | Performed by: FAMILY MEDICINE

## 2020-11-30 DIAGNOSIS — E03.9 ACQUIRED HYPOTHYROIDISM: ICD-10-CM

## 2020-11-30 RX ORDER — LEVOTHYROXINE SODIUM 0.05 MG/1
TABLET ORAL
Qty: 90 TABLET | Refills: 3 | Status: SHIPPED | OUTPATIENT
Start: 2020-11-30 | End: 2021-05-28 | Stop reason: SDUPTHER

## 2020-12-30 ENCOUNTER — ANNUAL EXAM (OUTPATIENT)
Dept: OBGYN CLINIC | Facility: MEDICAL CENTER | Age: 70
End: 2020-12-30
Payer: COMMERCIAL

## 2020-12-30 VITALS — DIASTOLIC BLOOD PRESSURE: 62 MMHG | BODY MASS INDEX: 34.54 KG/M2 | WEIGHT: 195 LBS | SYSTOLIC BLOOD PRESSURE: 118 MMHG

## 2020-12-30 DIAGNOSIS — Z01.419 ENCOUNTER FOR GYNECOLOGICAL EXAMINATION (GENERAL) (ROUTINE) WITHOUT ABNORMAL FINDINGS: Primary | ICD-10-CM

## 2020-12-30 PROCEDURE — G0101 CA SCREEN;PELVIC/BREAST EXAM: HCPCS | Performed by: NURSE PRACTITIONER

## 2021-01-15 DIAGNOSIS — R79.89 ELEVATED HOMOCYSTEINE: ICD-10-CM

## 2021-01-15 RX ORDER — MECOBAL/LEVOMEFOLAT CA/B6 PHOS 2-3-35 MG
TABLET ORAL
Qty: 90 TABLET | Refills: 3 | Status: SHIPPED | OUTPATIENT
Start: 2021-01-15 | End: 2022-02-21 | Stop reason: SDUPTHER

## 2021-03-15 ENCOUNTER — HOSPITAL ENCOUNTER (OUTPATIENT)
Dept: RADIOLOGY | Age: 71
Discharge: HOME | End: 2021-03-15
Payer: COMMERCIAL

## 2021-03-15 VITALS — HEIGHT: 66 IN | BODY MASS INDEX: 31.34 KG/M2 | WEIGHT: 195 LBS

## 2021-03-15 DIAGNOSIS — Z12.31 ENCOUNTER FOR SCREENING MAMMOGRAM FOR MALIGNANT NEOPLASM OF BREAST: ICD-10-CM

## 2021-03-15 PROCEDURE — 77067 SCR MAMMO BI INCL CAD: CPT

## 2021-03-15 PROCEDURE — 77063 BREAST TOMOSYNTHESIS BI: CPT

## 2021-04-02 LAB
25(OH)D3 SERPL-MCNC: 65 NG/ML (ref 30–100)
ALBUMIN SERPL-MCNC: 4.3 G/DL (ref 3.6–5.1)
ALBUMIN/GLOB SERPL: 1.9 (CALC) (ref 1–2.5)
ALP SERPL-CCNC: 75 U/L (ref 37–153)
ALT SERPL-CCNC: 30 U/L (ref 6–29)
AST SERPL-CCNC: 25 U/L (ref 10–35)
BASOPHILS # BLD AUTO: 38 CELLS/UL (ref 0–200)
BASOPHILS NFR BLD AUTO: 0.8 %
BILIRUB SERPL-MCNC: 0.6 MG/DL (ref 0.2–1.2)
BUN SERPL-MCNC: 17 MG/DL (ref 7–25)
BUN/CREAT SERPL: ABNORMAL (CALC) (ref 6–22)
CALCIUM SERPL-MCNC: 9.7 MG/DL (ref 8.6–10.4)
CHLORIDE SERPL-SCNC: 105 MMOL/L (ref 98–110)
CHOLEST SERPL-MCNC: 189 MG/DL
CHOLEST/HDLC SERPL: 3.2 (CALC)
CO2 SERPL-SCNC: 28 MMOL/L (ref 20–32)
CREAT SERPL-MCNC: 0.97 MG/DL (ref 0.5–0.99)
EOSINOPHIL # BLD AUTO: 58 CELLS/UL (ref 15–500)
EOSINOPHIL NFR BLD AUTO: 1.2 %
ERYTHROCYTE [DISTWIDTH] IN BLOOD BY AUTOMATED COUNT: 12.6 % (ref 11–15)
GLOBULIN SER CALC-MCNC: 2.3 G/DL (CALC) (ref 1.9–3.7)
GLUCOSE SERPL-MCNC: 97 MG/DL (ref 65–99)
HCT VFR BLD AUTO: 41.8 % (ref 35–45)
HDLC SERPL-MCNC: 59 MG/DL
HGB BLD-MCNC: 14.3 G/DL (ref 11.7–15.5)
LDLC SERPL CALC-MCNC: 107 MG/DL (CALC)
LYMPHOCYTES # BLD AUTO: 1330 CELLS/UL (ref 850–3900)
LYMPHOCYTES NFR BLD AUTO: 27.7 %
MCH RBC QN AUTO: 31 PG (ref 27–33)
MCHC RBC AUTO-ENTMCNC: 34.2 G/DL (ref 32–36)
MCV RBC AUTO: 90.5 FL (ref 80–100)
MONOCYTES # BLD AUTO: 269 CELLS/UL (ref 200–950)
MONOCYTES NFR BLD AUTO: 5.6 %
NEUTROPHILS # BLD AUTO: 3106 CELLS/UL (ref 1500–7800)
NEUTROPHILS NFR BLD AUTO: 64.7 %
NONHDLC SERPL-MCNC: 130 MG/DL (CALC)
PLATELET # BLD AUTO: 341 THOUSAND/UL (ref 140–400)
PMV BLD REES-ECKER: 10.7 FL (ref 7.5–12.5)
POTASSIUM SERPL-SCNC: 4.3 MMOL/L (ref 3.5–5.3)
PROT SERPL-MCNC: 6.6 G/DL (ref 6.1–8.1)
RBC # BLD AUTO: 4.62 MILLION/UL (ref 3.8–5.1)
SL AMB EGFR AFRICAN AMERICAN: 69 ML/MIN/1.73M2
SL AMB EGFR NON AFRICAN AMERICAN: 60 ML/MIN/1.73M2
SODIUM SERPL-SCNC: 140 MMOL/L (ref 135–146)
TRIGL SERPL-MCNC: 122 MG/DL
TSH SERPL-ACNC: 2.71 MIU/L (ref 0.4–4.5)
WBC # BLD AUTO: 4.8 THOUSAND/UL (ref 3.8–10.8)

## 2021-05-11 DIAGNOSIS — E78.2 MIXED HYPERLIPIDEMIA: ICD-10-CM

## 2021-05-11 RX ORDER — FENOFIBRATE 160 MG/1
TABLET ORAL
Qty: 30 TABLET | Refills: 5 | Status: SHIPPED | OUTPATIENT
Start: 2021-05-11 | End: 2021-05-18

## 2021-05-18 ENCOUNTER — OFFICE VISIT (OUTPATIENT)
Dept: FAMILY MEDICINE CLINIC | Facility: CLINIC | Age: 71
End: 2021-05-18
Payer: COMMERCIAL

## 2021-05-18 VITALS
SYSTOLIC BLOOD PRESSURE: 128 MMHG | DIASTOLIC BLOOD PRESSURE: 64 MMHG | HEART RATE: 84 BPM | WEIGHT: 198 LBS | TEMPERATURE: 97.4 F | OXYGEN SATURATION: 96 % | HEIGHT: 66 IN | BODY MASS INDEX: 31.82 KG/M2 | RESPIRATION RATE: 18 BRPM

## 2021-05-18 DIAGNOSIS — E78.2 MIXED HYPERLIPIDEMIA: ICD-10-CM

## 2021-05-18 DIAGNOSIS — S46.811A TRAPEZIUS MUSCLE STRAIN, RIGHT, INITIAL ENCOUNTER: Primary | ICD-10-CM

## 2021-05-18 PROBLEM — Z01.419 ENCOUNTER FOR GYNECOLOGICAL EXAMINATION (GENERAL) (ROUTINE) WITHOUT ABNORMAL FINDINGS: Status: RESOLVED | Noted: 2020-12-30 | Resolved: 2021-05-18

## 2021-05-18 PROCEDURE — 3725F SCREEN DEPRESSION PERFORMED: CPT | Performed by: FAMILY MEDICINE

## 2021-05-18 PROCEDURE — 99213 OFFICE O/P EST LOW 20 MIN: CPT | Performed by: FAMILY MEDICINE

## 2021-05-18 RX ORDER — DENOSUMAB 60 MG/ML
60 INJECTION SUBCUTANEOUS
COMMUNITY

## 2021-05-18 RX ORDER — FENOFIBRATE 160 MG/1
160 TABLET ORAL DAILY
Qty: 90 TABLET | Refills: 3 | Status: SHIPPED | OUTPATIENT
Start: 2021-05-18 | End: 2022-05-31 | Stop reason: SDUPTHER

## 2021-05-18 RX ORDER — FENOFIBRATE 160 MG/1
160 TABLET ORAL DAILY
Qty: 30 TABLET | Refills: 5 | Status: CANCELLED | OUTPATIENT
Start: 2021-05-18

## 2021-05-18 NOTE — PATIENT INSTRUCTIONS
Cervical Spine Strain, Ambulatory Care   GENERAL INFORMATION:   A cervical spine strain  is when the tissues and muscles in your neck are stretched  It is called whiplash because it happens when your neck is quickly whipped forward and back  The pain may be sudden, or it may begin hours after the injury  Cervical spine strain is most commonly caused by a car accident or a contact sports injury  Seek immediate care for the following symptoms:   · Pain, numbness, tingling, or weakness in your arms, face, or scalp    · Shortness of breath, a hoarse voice, or problems swallowing  Treatment for a cervical spine strain  may include any of the following:  · Ibuprofen  decreases pain and swelling  It is available without a doctor's order  Ask how much to take and how often to take it  Follow directions  Ibuprofen can cause stomach bleeding and kidney damage if not taken correctly  · Acetaminophen  decreases pain  It is available without a doctor's order  Ask how much to take and how often to take it  Follow directions  Acetaminophen can cause liver damage if not taken correctly  · Pain medicine  may be prescribed for you  Ask for information on how to take this medicine safely  · Muscle relaxers  decrease pain and muscle spasms  Manage your symptoms:   · Wear a soft cervical collar to support your neck and hold it still  You may need to wear this collar for 7 to 10 days  By day 3, your healthcare provider may tell you to take the collar off for short periods of time  He may tell you to wear the collar less each day until you no longer need it  · Rest and avoid moving your neck as your injury heals  Rest will help decrease the risk of more damage to your neck  Gradually return to your normal activities  Stop if you have pain  Avoid activities that can cause more damage to your neck, such as heavy lifting or strenuous exercise  · Ice your neck to help decrease swelling and pain    Put crushed ice in a plastic bag  Cover the ice bag with a towel and place the ice on your neck for 15 to 20 minutes every hour  Do this for as many days as directed  · Sleep without a pillow to help decrease pain  Instead of a pillow, you may roll a small towel tightly and place it under your neck  · Go to physical therapy as directed by your healthcare provider  A physical therapist can teach you exercises to help improve movement and decrease pain  Physical therapy can also help improve strength and decrease your risk for loss of function  Follow up with your healthcare provider as directed:  Write down your questions so you remember to ask them during your visits  CARE AGREEMENT:   You have the right to help plan your care  Learn about your health condition and how it may be treated  Discuss treatment options with your caregivers to decide what care you want to receive  You always have the right to refuse treatment  The above information is an  only  It is not intended as medical advice for individual conditions or treatments  Talk to your doctor, nurse or pharmacist before following any medical regimen to see if it is safe and effective for you  © 2014 [address] is for End User's use only and may not be sold, redistributed or otherwise used for commercial purposes  All illustrations and images included in CareNotes® are the copyrighted property of [employer]  or Carlos Luz Elena  Neck Exercises   AMBULATORY CARE:   Neck exercises  help reduce neck pain, and improve neck movement and strength  Neck exercises also help prevent long-term neck problems  What you need to know about neck exercises:   Do the exercises every day,  or as often as directed by your healthcare provider  Move slowly, gently, and smoothly  Avoid fast or jerky motions  Stand and sit the way your healthcare provider shows you  Good posture may reduce your neck pain   Check your posture often, even when you are not doing your neck exercises  How to perform neck exercises safely:   Exercise position:  You may sit or stand while you do neck exercises  Face forward  Your shoulders should be straight and relaxed, with a good posture  Head tilts, forward and back:  Gently bow your head and try to touch your chin to your chest  Your healthcare provider may tell you to push on the back of your neck to help bow your head  Raise your chin back to the starting position  Tilt your head back as far as possible so you are looking up at the ceiling  Your healthcare provider may tell you to lift your chin to help tilt your head back  Return your head to the starting position  Head tilts, side to side:  Tilt your head, bringing your ear toward your shoulder  Then tilt your head toward the other shoulder  Head turns:  Turn your head to look over your shoulder  Tilt your chin down and try to touch it to your shoulder  Do not raise your shoulder to your chin  Face forward again  Do the same on the other side  Head rolls:  Slowly bring your chin toward your chest  Next, roll your head to the right  Your ear should be positioned over your shoulder  Hold this position for 5 seconds  Roll your head back toward your chest and to the left into the same position  Hold for 5 seconds  Gently roll your head back and around in a clockwise [place] 3 times  Next, move your head in the reverse direction (counterclockwise) in a [place] 3 times  Do not shrug your shoulders upwards while you do this exercise  Contact your healthcare provider if:   Your pain does not get better, or gets worse  You have questions or concerns about your condition, care, or exercise program     © Copyright [address] Information is for End User's use only and may not be sold, redistributed or otherwise used for commercial purposes   All illustrations and images included in CareNotes® are the copyrighted property of [employer]  or [facility] Bob Deras   The above information is an  only  It is not intended as medical advice for individual conditions or treatments  Talk to your doctor, nurse or pharmacist before following any medical regimen to see if it is safe and effective for you

## 2021-05-18 NOTE — PROGRESS NOTES
FAMILY MEDICINE PROGRESS NOTE    Date of Service: 21  Primary Care Provider:   Carlos Thomas MD       Name: Dinora Zafar       : 1950       Age:70 y o  Sex: female      MRN: [id number]      Chief Complaint:Neck Pain (For the last 2 weeks)       ASSESSMENT and PLAN:  Dinora Zafar is a 72 y o  female with:     Problem List Items Addressed This Visit        Other    Mixed hyperlipidemia      Other Visit Diagnoses     Trapezius muscle strain, right, initial encounter    -  Primary        Neck pain due to trapezius muscle strain  Recommended OTC NSAIDs with Aleve or ibuprofen  Reviewed gentle range of motion exercises  She does have flexeril at home which she can try as well  Consider referral to PT if symptoms do not improve  SUBJECTIVE:  Dinora Zafar is a 72 y o  female who presents today with a chief complaint of Neck Pain (For the last 2 weeks)  HPI     She reports neck pain for the last 2 5 weeks  The pain started on the right side of her neck, initially felt like she slept wrong  The pain is a dull pain  She denies any injury or trauma  She has been using heat twice daily  She took Aleve today which was helpful  She has decreased range of motion on right rotation  The pain starts in her trapezius muscles that radiates up to her neck  Review of Systems   Constitutional: Negative for chills and fever  Musculoskeletal: Positive for neck pain  Neurological: Negative for weakness and numbness  I have reviewed the patient's Past Medical History      Current Outpatient Medications:     albuterol (2 5 mg/3 mL) 0 083 % nebulizer solution, Take 1 vial (2 5 mg total) by nebulization every 6 (six) hours as needed for wheezing or shortness of breath (Patient taking differently: Take 2 5 mg by nebulization as needed for wheezing or shortness of breath ), Disp: 30 vial, Rfl: 0    aspirin 81 MG tablet, Take 1 tablet by mouth daily, Disp: , Rfl:     BREO ELLIPTA 200-25 MCG/INH inhaler, Inhale 1 puff as needed , Disp: , Rfl:     Cholecalciferol 4000 units CAPS, Take 1 capsule by mouth daily, Disp: , Rfl:     Coenzyme Q10 (CO Q-10) 30 MG CAPS, Take 30 mg by mouth daily, Disp: , Rfl:     cyclobenzaprine (FLEXERIL) 5 mg tablet, Take 1 tablet (5 mg total) by mouth daily at bedtime (Patient taking differently: Take 5 mg by mouth as needed ), Disp: 5 tablet, Rfl: 0    denosumab (Prolia) 60 mg/mL, Inject 60 mL under the skin every 6 (six) months, Disp: , Rfl:     fenofibrate (TRIGLIDE) 160 MG tablet, TAKE 1 TABLET BY MOUTH EVERY DAY, Disp: 30 tablet, Rfl: 5    L-Methylfolate-B6-B12 3-35-2 MG TABS, TAKE 1 TABLET BY MOUTH EVERY DAY, Disp: 90 tablet, Rfl: 3    levothyroxine 50 mcg tablet, TAKE 1 TABLET BY MOUTH EVERY DAY, Disp: 90 tablet, Rfl: 3    nitrofurantoin (MACRODANTIN) 50 mg capsule, Take 1 capsule by mouth daily , Disp: , Rfl:     VENTOLIN  (90 Base) MCG/ACT inhaler, INHALE 1-2 PUFFS INTO LUNGS EVERY 6 HOURS AS NEEDED FOR WHEEZING (FOR ASTHMA), Disp: 18 g, Rfl: 3    BIOTIN PO, Take by mouth, Disp: , Rfl:     denosumab (PROLIA) 60 mg/mL, Inject 60 mg under the skin once, Disp: , Rfl:     OBJECTIVE:  /64 (BP Location: Left arm, Patient Position: Sitting, Cuff Size: Large)   Pulse 84   Temp (!) 97 4 °F (36 3 °C)   Resp 18   Ht 5' 5 75" (1 67 m)   Wt 89 8 kg (198 lb)   SpO2 96%   Breastfeeding No   BMI 32 20 kg/m²    BP Readings from Last 3 Encounters:   05/18/21 128/64   12/30/20 118/62   11/18/20 128/66      Wt Readings from Last 3 Encounters:   05/18/21 89 8 kg (198 lb)   03/15/21 88 5 kg (195 lb)   12/30/20 88 5 kg (195 lb)      Physical Exam  Constitutional:       General: She is not in acute distress  Appearance: Normal appearance  She is normal weight  She is not ill-appearing or toxic-appearing  HENT:      Head: Normocephalic and atraumatic  Mouth/Throat:      Mouth: Mucous membranes are moist    Eyes:      Extraocular Movements: Extraocular movements intact  Conjunctiva/sclera: Conjunctivae normal    Neck:      Musculoskeletal: Neck supple  Decreased range of motion (on right rotation )  Pain with movement and muscular tenderness (over trapezius and suboccipital muscles ) present  No spinous process tenderness  Pulmonary:      Effort: Pulmonary effort is normal  No respiratory distress  Skin:     Findings: No erythema or rash  Neurological:      General: No focal deficit present  Mental Status: She is alert and oriented to person, place, and time  Psychiatric:         Mood and Affect: Mood normal          Behavior: Behavior normal                 Return if symptoms worsen or fail to improve  Mark Wood MD    Note: Portions of the record have been created with voice recognition software  Occasional wrong word or "sound a like" substitutions may have occurred due to the inherent limitations of voice recognition software  Read the chart carefully and recognize, using context, where substitutions have occurred

## 2021-05-21 ENCOUNTER — RA CDI HCC (OUTPATIENT)
Dept: OTHER | Facility: HOSPITAL | Age: 71
End: 2021-05-21

## 2021-05-21 NOTE — PROGRESS NOTES
[facility] 75  coding opportunities          Chart reviewed, no opportunity found: CHART REVIEWED, NO OPPORTUNITY FOUND              Patients insurance company: [facility] [address]  (Medicare Advantage and Commercial)

## 2021-05-28 ENCOUNTER — OFFICE VISIT (OUTPATIENT)
Dept: FAMILY MEDICINE CLINIC | Facility: CLINIC | Age: 71
End: 2021-05-28
Payer: COMMERCIAL

## 2021-05-28 VITALS
DIASTOLIC BLOOD PRESSURE: 80 MMHG | WEIGHT: 196 LBS | SYSTOLIC BLOOD PRESSURE: 128 MMHG | HEART RATE: 88 BPM | HEIGHT: 66 IN | BODY MASS INDEX: 31.5 KG/M2 | RESPIRATION RATE: 16 BRPM | TEMPERATURE: 98.2 F

## 2021-05-28 DIAGNOSIS — M81.0 AGE-RELATED OSTEOPOROSIS WITHOUT CURRENT PATHOLOGICAL FRACTURE: ICD-10-CM

## 2021-05-28 DIAGNOSIS — Z00.00 MEDICARE ANNUAL WELLNESS VISIT, SUBSEQUENT: ICD-10-CM

## 2021-05-28 DIAGNOSIS — K76.0 FATTY LIVER: ICD-10-CM

## 2021-05-28 DIAGNOSIS — E55.9 VITAMIN D DEFICIENCY: ICD-10-CM

## 2021-05-28 DIAGNOSIS — E03.9 ACQUIRED HYPOTHYROIDISM: ICD-10-CM

## 2021-05-28 DIAGNOSIS — J45.30 MILD PERSISTENT ASTHMA WITHOUT COMPLICATION: ICD-10-CM

## 2021-05-28 DIAGNOSIS — E78.2 MIXED HYPERLIPIDEMIA: Primary | ICD-10-CM

## 2021-05-28 PROCEDURE — G0439 PPPS, SUBSEQ VISIT: HCPCS | Performed by: FAMILY MEDICINE

## 2021-05-28 PROCEDURE — 99214 OFFICE O/P EST MOD 30 MIN: CPT | Performed by: FAMILY MEDICINE

## 2021-05-28 PROCEDURE — 1125F AMNT PAIN NOTED PAIN PRSNT: CPT | Performed by: FAMILY MEDICINE

## 2021-05-28 PROCEDURE — 1101F PT FALLS ASSESS-DOCD LE1/YR: CPT | Performed by: FAMILY MEDICINE

## 2021-05-28 PROCEDURE — 3008F BODY MASS INDEX DOCD: CPT | Performed by: FAMILY MEDICINE

## 2021-05-28 PROCEDURE — 1160F RVW MEDS BY RX/DR IN RCRD: CPT | Performed by: FAMILY MEDICINE

## 2021-05-28 PROCEDURE — 3288F FALL RISK ASSESSMENT DOCD: CPT | Performed by: FAMILY MEDICINE

## 2021-05-28 PROCEDURE — 1170F FXNL STATUS ASSESSED: CPT | Performed by: FAMILY MEDICINE

## 2021-05-28 PROCEDURE — 1036F TOBACCO NON-USER: CPT | Performed by: FAMILY MEDICINE

## 2021-05-28 RX ORDER — LEVOTHYROXINE SODIUM 0.05 MG/1
50 TABLET ORAL DAILY
Qty: 90 TABLET | Refills: 3 | Status: SHIPPED | OUTPATIENT
Start: 2021-05-28 | End: 2021-11-29 | Stop reason: SDUPTHER

## 2021-05-28 NOTE — PROGRESS NOTES
Assessment/Plan:         Diagnoses and all orders for this visit:    Mixed hyperlipidemia  -     Comprehensive metabolic panel  -     Lipid panel    Fatty liver  -     CBC and differential    Mild persistent asthma without complication    Acquired hypothyroidism  -     levothyroxine 50 mcg tablet; Take 1 tablet (50 mcg total) by mouth daily  -     TSH, 3rd generation with Free T4 reflex    Age-related osteoporosis without current pathological fracture    Vitamin D deficiency  -     Vitamin D 25 hydroxy    Medicare annual wellness visit, subsequent            Continue with current medications  Office visit in 6 months with repeat labs at that time  BMI Counseling: Body mass index is 32 12 kg/m²  The BMI is above normal  Nutrition recommendations include reducing portion sizes, decreasing overall calorie intake, consuming healthier snacks, moderation in carbohydrate intake, reducing intake of saturated fat and trans fat and reducing intake of cholesterol  Exercise recommendations include exercising 3-5 times per week  Patient ID: Alfonso Rahman is a 72 y o  female  Follow up visit  Medications reviewed  Hyperlipidemia mixed type on Fenofibrate 160 mg daily  Lipid profile 04/2021 cholesterol 189  Triglycerides 122  HDL 59    LFTs normal except for ALT 30  FBS 97  Creatinine 0 97    Electrolytes normal       Lab Results   Component Value Date    CHOLESTEROL 189 04/01/2021    CHOLESTEROL 167 11/11/2020    CHOLESTEROL 181 10/04/2018     Lab Results   Component Value Date    HDL 59 04/01/2021    HDL 56 11/11/2020    HDL 61 10/04/2018     Lab Results   Component Value Date    TRIG 122 04/01/2021    TRIG 107 11/11/2020    TRIG 124 10/04/2018     Lab Results   Component Value Date    LDLCALC 107 (H) 04/01/2021           Recent Results (from the past 2016 hour(s))   Lipid panel    Collection Time: 04/01/21  8:49 AM   Result Value Ref Range    Total Cholesterol 189 <200 mg/dL    HDL 59 > OR = 50 mg/dL Triglycerides 122 <150 mg/dL    LDL Calculated 107 (H) mg/dL (calc)    Chol HDLC Ratio 3 2 <5 0 (calc)    Non-HDL Cholesterol 130 (H) <130 mg/dL (calc)   Comprehensive metabolic panel    Collection Time: 04/01/21  8:49 AM   Result Value Ref Range    Glucose, Random 97 65 - 99 mg/dL    BUN 17 7 - 25 mg/dL    Creatinine 0 97 0 50 - 0 99 mg/dL    eGFR Non  60 > OR = 60 mL/min/1 73m2    eGFR  69 > OR = 60 mL/min/1 73m2    SL AMB BUN/CREATININE RATIO NOT APPLICABLE 6 - 22 (calc)    Sodium 140 135 - 146 mmol/L    Potassium 4 3 3 5 - 5 3 mmol/L    Chloride 105 98 - 110 mmol/L    CO2 28 20 - 32 mmol/L    Calcium 9 7 8 6 - 10 4 mg/dL    Protein, Total 6 6 6 1 - 8 1 g/dL    Albumin 4 3 3 6 - 5 1 g/dL    Globulin 2 3 1 9 - 3 7 g/dL (calc)    Albumin/Globulin Ratio 1 9 1 0 - 2 5 (calc)    TOTAL BILIRUBIN 0 6 0 2 - 1 2 mg/dL    Alkaline Phosphatase 75 37 - 153 U/L    AST 25 10 - 35 U/L    ALT 30 (H) 6 - 29 U/L   CBC and differential    Collection Time: 04/01/21  8:49 AM   Result Value Ref Range    White Blood Cell Count 4 8 3 8 - 10 8 Thousand/uL    Red Blood Cell Count 4 62 3 80 - 5 10 Million/uL    Hemoglobin 14 3 11 7 - 15 5 g/dL    HCT 41 8 35 0 - 45 0 %    MCV 90 5 80 0 - 100 0 fL    MCH 31 0 27 0 - 33 0 pg    MCHC 34 2 32 0 - 36 0 g/dL    RDW 12 6 11 0 - 15 0 %    Platelet Count 944 925 - 400 Thousand/uL    SL AMB MPV 10 7 7 5 - 12 5 fL    Neutrophils (Absolute) 3,106 1,500 - 7,800 cells/uL    Lymphocytes (Absolute) 1,330 850 - 3,900 cells/uL    Monocytes (Absolute) 269 200 - 950 cells/uL    Eosinophils (Absolute) 58 15 - 500 cells/uL    Basophils ABS 38 0 - 200 cells/uL    Neutrophils 64 7 %    Lymphocytes 27 7 %    Monocytes 5 6 %    Eosinophils 1 2 %    Basophils PCT 0 8 %   Vitamin D 25 hydroxy    Collection Time: 04/01/21  8:49 AM   Result Value Ref Range    Vitamin D, 25-Hydroxy, Serum 65 30 - 100 ng/mL   TSH, 3rd generation with Free T4 reflex    Collection Time: 04/01/21  8:49 AM Result Value Ref Range    TSH W/RFX TO FREE T4 2 71 0 40 - 4 50 mIU/L              The following portions of the patient's history were reviewed and updated as appropriate: allergies, current medications, past family history, past medical history, past social history, past surgical history and problem list     Review of Systems   Constitutional: Negative for appetite change, chills, fatigue, fever and unexpected weight change  HENT: Negative for congestion, ear pain, postnasal drip, rhinorrhea, sore throat and trouble swallowing  Eyes: Negative for visual disturbance  Cataract surgery 12/2020 OD   Respiratory: Negative for cough, shortness of breath and wheezing  Asthma on prn Breo daily Sept through April  infrequent Albuterol use  02/2020 PFTs no large airway obstruction or bronchodilator responsiveness  Decreased forced vital capacity and total lung capacity consistent with mild restrictive lung disease  Mild diffuse impairment  02/2018 CXR normal except for mildly elevated right hemidiaphragm  CT scan chest 01/2016 normal except for small hiatal hernia  Cardiovascular: Negative for chest pain, palpitations and leg swelling  Gastrointestinal: Negative for abdominal pain, blood in stool, constipation, diarrhea, nausea and vomiting  Small hiatal hernia on CT scan  Infrequent reflux symptoms  Fatty liver 11/2020 LFTs normal   Albumin 4 3  I BS diarrhea  Stable on probiotics  Last colonoscopy 07/2017   Endocrine:        Hypothyroidism on Levothyroxine 50 mcg daily  04/2021  TSH 2 71  Osteoporosis on 09/2017 DEXA scan  03/2020 repeat DEXA scan T-score of -2 4 in the left hip is consistent with low bone mineral density OSTEOPENIA)  When compared to the prior examination, there has been a  3 % INCREASE in the total bone mineral density of the lumbar spine and a  6 % INCREASE in the total bone mineral density of the left hip  On Prolia  Prior intolerance to Actonel   04/2021 Vitamin-D level 65  On vitamin D 5,000 IU daily  Genitourinary: Negative for difficulty urinating  History of recurrent UTIs on Macrodantin 50 mg every other day at [facility]  She is followed by Urology  07/2020 renal ultrasound 1 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 with increase in size of 1 of the measured components  No hydronephrosis  Bladder postvoid residual 247 mL  CT scan abdomen pelvis 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  Pap smear 09/2016   Musculoskeletal: Positive for myalgias and neck pain  Negative for arthralgias  Patient seen this month with neck and trapezius pain symptomatic improvement with NSAIDs, heating pad   Skin: Negative for rash  Allergic/Immunologic: Positive for environmental allergies  Remote allergy testing + mold allergy  Neurological: Negative for dizziness and headaches  Status post vestibular therapy for vertigo asymptomatic at present   Hematological: Negative for adenopathy  Does not bruise/bleed easily  History of elevated homocystine level on vitamin supplement  No history of venous thrombosis  11/2020 homocysteine mildly elevated at 11 4   Psychiatric/Behavioral: Negative for dysphoric mood and sleep disturbance  Objective:    /80   Pulse 88   Temp 98 2 °F (36 8 °C)   Resp 16   Ht 5' 5 5" (1 664 m)   Wt 88 9 kg (196 lb)   BMI 32 12 kg/m²     BP Readings from Last 3 Encounters:   05/28/21 128/80   05/18/21 128/64   12/30/20 118/62     Wt Readings from Last 3 Encounters:   05/28/21 88 9 kg (196 lb)   05/18/21 89 8 kg (198 lb)   03/15/21 88 5 kg (195 lb)          Physical Exam  Vitals signs and nursing note reviewed  Constitutional:       General: She is not in acute distress  Appearance: She is well-developed  HENT:      Right Ear: Tympanic membrane and ear canal normal       Left Ear: Tympanic membrane and ear canal normal    Eyes:      General: No scleral icterus  Extraocular Movements: Extraocular movements intact  Conjunctiva/sclera: Conjunctivae normal       Pupils: Pupils are equal, round, and reactive to light  Neck:      Thyroid: No thyroid mass or thyromegaly  Vascular: No carotid bruit or JVD  Trachea: No tracheal deviation  Cardiovascular:      Rate and Rhythm: Normal rate and regular rhythm  Heart sounds: Normal heart sounds  No murmur  No gallop  Pulmonary:      Effort: Pulmonary effort is normal  No respiratory distress  Breath sounds: Normal breath sounds  No wheezing or rales  Abdominal:      General: Bowel sounds are normal  There is no distension or abdominal bruit  Palpations: Abdomen is soft  There is no hepatomegaly, splenomegaly or mass  Tenderness: There is no abdominal tenderness  There is no guarding or rebound  Musculoskeletal:         General: Tenderness present  Right lower leg: No edema  Left lower leg: No edema  Comments: Mild trigger point tenderness both trapezius areas full range of motion of neck   Lymphadenopathy:      Cervical: No cervical adenopathy  Upper Body:      Right upper body: No supraclavicular adenopathy  Left upper body: No supraclavicular adenopathy  Skin:     Findings: No rash  Nails: There is no clubbing  Neurological:      General: No focal deficit present  Mental Status: She is alert and oriented to person, place, and time  Motor: No weakness     Psychiatric:         Mood and Affect: Mood normal          Behavior: Behavior normal          Cognition and Memory: Cognition normal

## 2021-05-28 NOTE — PROGRESS NOTES
Assessment and Plan:     Problem List Items Addressed This Visit        Digestive    Fatty liver    Relevant Orders    CBC and differential       Endocrine    Hypothyroidism    Relevant Medications    levothyroxine 50 mcg tablet    Other Relevant Orders    TSH, 3rd generation with Free T4 reflex       Respiratory    Asthma       Musculoskeletal and Integument    Osteoporosis       Other    Mixed hyperlipidemia - Primary    Relevant Orders    Comprehensive metabolic panel    Lipid panel    Vitamin D deficiency    Relevant Orders    Vitamin D 25 hydroxy      Other Visit Diagnoses     Medicare annual wellness visit, subsequent            BMI Counseling: Body mass index is 32 12 kg/m²  The BMI is above normal  Nutrition recommendations include decreasing portion sizes, consuming healthier snacks, moderation in carbohydrate intake, reducing intake of saturated and trans fat and reducing intake of cholesterol  Exercise recommendations include exercising 3-5 times per week  No pharmacotherapy was ordered  PHQ-9 Depression Screening    PHQ-9:   Frequency of the following problems over the past two weeks:      Little interest or pleasure in doing things: 0 - not at all  Feeling down, depressed, or hopeless: 0 - not at all  PHQ-2 Score: 0           Preventive health issues were discussed with patient, and age appropriate screening tests were ordered as noted in patient's After Visit Summary  Personalized health advice and appropriate referrals for health education or preventive services given if needed, as noted in patient's After Visit Summary       History of Present Illness:     Patient presents for Medicare Annual Wellness visit    Patient Care Team:  Katarzyna Lugo MD as PCP - General (Family Medicine)  MD Padma Everett MD (Urology)  Reynaldo Shannon MD, [facility] (Rheumatology)     Problem List:     Patient Active Problem List   Diagnosis    Allergic rhinitis    Angiomyolipoma of kidney    Asthma  Elevated homocysteine    Fatty liver    Hypothyroidism    Irritable bowel syndrome    Lumbar spondylosis    Mixed hyperlipidemia    Osteoporosis    Vitamin D deficiency      Past Medical and Surgical History:     Past Medical History:   Diagnosis Date    Acute asthma exacerbation     last assessed 8/15/17    Cataract     Disease of thyroid gland     Hypertriglyceridemia     Osteoporosis     Right lumbar radiculopathy 12/15/2016    Urinary tract infection     Vertigo      Past Surgical History:   Procedure Laterality Date    APPENDECTOMY  1979    CATARACT EXTRACTION      HAND SURGERY Right 2007    thumb    LAPAROSCOPY        Family History:     Family History   Problem Relation Age of Onset    Cirrhosis Mother     Kidney nephrosis Mother     Hypertension Father     Other Father 80        extra mammary Padget's dx    Cancer Paternal Grandmother [de-identified]        unknown type    Heart disease Paternal Grandfather     Heart attack Paternal Grandfather     Kidney nephrosis Daughter     Osteoporosis Maternal Grandmother     Arthritis Maternal Grandmother     Heart attack Maternal Grandfather     Heart disease Maternal Grandfather     Kidney nephrosis Daughter     Lymphoma Maternal Aunt 79    No Known Problems Paternal Aunt     No Known Problems Paternal Aunt     No Known Problems Paternal Aunt       Social History:        Social History     Socioeconomic History    Marital status: /Civil Union     Spouse name: None    Number of children: None    Years of education: None    Highest education level: None   Occupational History    None   Social Needs    Financial resource strain: None    Food insecurity     Worry: None     Inability: None    Transportation needs     Medical: None     Non-medical: None   Tobacco Use    Smoking status: Former Smoker     Packs/day: 0 25     Years: 4 00     Pack years: 1 00     Types: Cigarettes     Quit date: 1/1/1979     Years since quitting: 42 4    Smokeless tobacco: Never Used    Tobacco comment:     Substance and Sexual Activity    Alcohol use: No    Drug use: No    Sexual activity: Not Currently   Lifestyle    Physical activity     Days per week: None     Minutes per session: None    Stress: None   Relationships    Social connections     Talks on phone: None     Gets together: None     Attends Sikh service: None     Active member of club or organization: None     Attends meetings of clubs or organizations: None     Relationship status: None    Intimate partner violence     Fear of current or ex partner: None     Emotionally abused: None     Physically abused: None     Forced sexual activity: None   Other Topics Concern    None   Social History Narrative    None      Medications and Allergies:     Current Outpatient Medications   Medication Sig Dispense Refill    albuterol (2 5 mg/3 mL) 0 083 % nebulizer solution Take 1 vial (2 5 mg total) by nebulization every 6 (six) hours as needed for wheezing or shortness of breath (Patient taking differently: Take 2 5 mg by nebulization as needed for wheezing or shortness of breath ) 30 vial 0    aspirin 81 MG tablet Take 1 tablet by mouth daily      BIOTIN PO Take by mouth      BREO ELLIPTA 200-25 MCG/INH inhaler Inhale 1 puff as needed       Cholecalciferol 4000 units CAPS Take 1 capsule by mouth daily      Coenzyme Q10 (CO Q-10) 30 MG CAPS Take 30 mg by mouth daily      cyclobenzaprine (FLEXERIL) 5 mg tablet Take 1 tablet (5 mg total) by mouth daily at bedtime (Patient taking differently: Take 5 mg by mouth as needed ) 5 tablet 0    denosumab (PROLIA) 60 mg/mL Inject 60 mg under the skin once      denosumab (Prolia) 60 mg/mL Inject 60 mL under the skin every 6 (six) months      fenofibrate (TRIGLIDE) 160 MG tablet Take 1 tablet (160 mg total) by mouth daily 90 tablet 3    L-Methylfolate-B6-B12 3-35-2 MG TABS TAKE 1 TABLET BY MOUTH EVERY DAY 90 tablet 3    levothyroxine 50 mcg tablet Take 1 tablet (50 mcg total) by mouth daily 90 tablet 3    nitrofurantoin (MACRODANTIN) 50 mg capsule Take 1 capsule by mouth daily       VENTOLIN  (90 Base) MCG/ACT inhaler INHALE 1-2 PUFFS INTO LUNGS EVERY 6 HOURS AS NEEDED FOR WHEEZING (FOR ASTHMA) 18 g 3     No current facility-administered medications for this visit  Allergies   Allergen Reactions    Penicillins      Shown on allergy testing   Codeine Anxiety    Dust Mite Extract     Molds & Smuts     Pseudoephedrine     Rabbit Epithelium     Sulfa Antibiotics Hives      Immunizations:     Immunization History   Administered Date(s) Administered    INFLUENZA 12/10/2015, 10/19/2016, 10/26/2017, 10/20/2018    Influenza Quadrivalent, 6-35 Months IM 10/29/2014, 12/10/2015    Influenza Split High Dose Preservative Free IM 10/19/2016, 10/26/2017, 10/20/2018, 10/07/2019    Influenza, high dose seasonal 0 7 mL 09/30/2020    Influenza, seasonal, injectable 11/04/2013    Pneumococcal Conjugate 13-Valent 10/19/2016    Pneumococcal Polysaccharide PPV23 10/26/2017    SARS-CoV-2 / COVID-19 mRNA IM (Moderna) 01/27/2021    Zoster 10/22/2020    Zoster Vaccine Recombinant 10/22/2020, 01/06/2021      Health Maintenance:         Topic Date Due    Hepatitis C Screening  11/18/[birth year]21 (Originally 1950)    MAMMOGRAM  03/15/2022    Colonoscopy Surveillance  07/20/2022    Colorectal Cancer Screening  07/20/2027         Topic Date Due    COVID-19 Vaccine (2 - Moderna 2-dose series) 02/24/2021      Medicare Health Risk Assessment:     /80   Pulse 88   Temp 98 2 °F (36 8 °C)   Resp 16   Ht 5' 5 5" (1 664 m)   Wt 88 9 kg (196 lb)   BMI 32 12 kg/m²      Ignacio Cuevas is here for her Subsequent Wellness visit  Last Medicare Wellness visit information reviewed, patient interviewed and updates made to the record today  Health Risk Assessment:   Patient rates overall health as good  Patient feels that their physical health rating is same  Patient is satisfied with their life  Eyesight was rated as slightly worse  Hearing was rated as slightly worse  Patient feels that their emotional and mental health rating is slightly better  Patients states they are never, rarely angry  Patient states they are sometimes unusually tired/fatigued  Pain experienced in the last 7 days has been a lot  Patient's pain rating has been 6/10  Patient states that she has experienced no weight loss or gain in last 6 months  Depression Screening:   PHQ-2 Score: 0      Fall Risk Screening: In the past year, patient has experienced: no history of falling in past year      Urinary Incontinence Screening:   Patient has not leaked urine accidently in the last six months  Home Safety:  Patient does not have trouble with stairs inside or outside of their home  Patient has working smoke alarms and has working carbon monoxide detector  Home safety hazards include: loose rugs on the floor  Nutrition:   Current diet is Regular and Limited junk food  Medications:   Patient is currently taking over-the-counter supplements  OTC medications include: see medication list  Patient is able to manage medications  Activities of Daily Living (ADLs)/Instrumental Activities of Daily Living (IADLs):   Walk and transfer into and out of bed and chair?: Yes  Dress and groom yourself?: Yes    Bathe or shower yourself?: Yes    Feed yourself?  Yes  Do your laundry/housekeeping?: Yes  Manage your money, pay your bills and track your expenses?: Yes  Make your own meals?: Yes    Do your own shopping?: Yes    Previous Hospitalizations:   Any hospitalizations or ED visits within the last 12 months?: No      Advance Care Planning:   Living will: No    Advanced directive: No      Cognitive Screening:   Provider or family/friend/caregiver concerned regarding cognition?: No    PREVENTIVE SCREENINGS      Cardiovascular Screening:    General: Screening Not Indicated and History Lipid Disorder Diabetes Screening:     General: Screening Current      Colorectal Cancer Screening:     General: Screening Current      Breast Cancer Screening:     General: Screening Current      Cervical Cancer Screening:    General: Screening Not Indicated      Osteoporosis Screening:    General: Screening Not Indicated and History Osteoporosis      Abdominal Aortic Aneurysm (AAA) Screening:        General: Screening Not Indicated      Lung Cancer Screening:     General: Screening Not Indicated      Hepatitis C Screening:    General: Screening Current    Screening, Brief Intervention, and Referral to Treatment (SBIRT)    Screening  Typical number of drinks in a day: 0  Typical number of drinks in a week: 0  Interpretation: Low risk drinking behavior  Single Item Drug Screening:  How often have you used an illegal drug (including marijuana) or a prescription medication for non-medical reasons in the past year? never    Single Item Drug Screen Score: 0  Interpretation: Negative screen for possible drug use disorder    Brief Intervention  Alcohol & drug use screenings were reviewed  No concerns regarding substance use disorder identified  Other Counseling Topics:   Calcium and vitamin D intake and regular weightbearing exercise         Gamal Youngblood MD

## 2021-05-28 NOTE — PATIENT INSTRUCTIONS
Medicare Preventive Visit Patient Instructions  Thank you for completing your Welcome to Medicare Visit or Medicare Annual Wellness Visit today  Your next wellness visit will be due in one year (5/29/2022)  The screening/preventive services that you may require over the next 5-10 years are detailed below  Some tests may not apply to you based off risk factors and/or age  Screening tests ordered at today's visit but not completed yet may show as past due  Also, please note that scanned in results may not display below  Preventive Screenings:  Service Recommendations Previous Testing/Comments   Colorectal Cancer Screening  * Colonoscopy    * Fecal Occult Blood Test (FOBT)/Fecal Immunochemical Test (FIT)  * Fecal DNA/Cologuard Test  * Flexible Sigmoidoscopy Age: 54-65 years old   Colonoscopy: every 10 years (may be performed more frequently if at higher risk)  OR  FOBT/FIT: every 1 year  OR  Cologuard: every 3 years  OR  Sigmoidoscopy: every 5 years  Screening may be recommended earlier than age 48 if at higher risk for colorectal cancer  Also, an individualized decision between you and your healthcare provider will decide whether screening between the ages of 74-80 would be appropriate  Colonoscopy: 07/20/2017  FOBT/FIT: Not on file  Cologuard: Not on file  Sigmoidoscopy: Not on file    Screening Current     Breast Cancer Screening Age: 36 years old  Frequency: every 1-2 years  Not required if history of left and right mastectomy Mammogram: 03/15/2021    Screening Current   Cervical Cancer Screening Between the ages of 21-29, pap smear recommended once every 3 years  Between the ages of 33-67, can perform pap smear with HPV co-testing every 5 years     Recommendations may differ for women with a history of total hysterectomy, cervical cancer, or abnormal pap smears in past  Pap Smear: 12/30/2020    Screening Not Indicated   Hepatitis C Screening Once for adults born between 1945 and 1965  More frequently in patients at high risk for Hepatitis C Hep C Antibody: Not on file    Screening Current   Diabetes Screening 1-2 times per year if you're at risk for diabetes or have pre-diabetes Fasting glucose: No results in last 5 years   A1C: No results in last 5 years    Screening Current   Cholesterol Screening Once every 5 years if you don't have a lipid disorder  May order more often based on risk factors  Lipid panel: 04/01/2021    Screening Not Indicated  History Lipid Disorder     Other Preventive Screenings Covered by Medicare:  1  Abdominal Aortic Aneurysm (AAA) Screening: covered once if your at risk  You're considered to be at risk if you have a family history of AAA  2  Lung Cancer Screening: covers low dose CT scan once per year if you meet all of the following conditions: (1) Age 50-69; (2) No signs or symptoms of lung cancer; (3) Current smoker or have quit smoking within the last 15 years; (4) You have a tobacco smoking history of at least 30 pack years (packs per day multiplied by number of years you smoked); (5) You get a written order from a healthcare provider  3  Glaucoma Screening: covered annually if you're considered high risk: (1) You have diabetes OR (2) Family history of glaucoma OR (3)  aged 48 and older OR (3)  American aged 72 and older  3  Osteoporosis Screening: covered every 2 years if you meet one of the following conditions: (1) You're estrogen deficient and at risk for osteoporosis based off medical history and other findings; (2) Have a vertebral abnormality; (3) On glucocorticoid therapy for more than 3 months; (4) Have primary hyperparathyroidism; (5) On osteoporosis medications and need to assess response to drug therapy  · Last bone density test (DXA Scan): 03/12/2020   5  HIV Screening: covered annually if you're between the age of 15-65  Also covered annually if you are younger than 13 and older than 72 with risk factors for HIV infection   For pregnant patients, it is covered up to 3 times per pregnancy  Immunizations:  Immunization Recommendations   Influenza Vaccine Annual influenza vaccination during flu season is recommended for all persons aged >= 6 months who do not have contraindications   Pneumococcal Vaccine (Prevnar and Pneumovax)  * Prevnar = PCV13  * Pneumovax = PPSV23   Adults 25-60 years old: 1-3 doses may be recommended based on certain risk factors  Adults 72 years old: Prevnar (PCV13) vaccine recommended followed by Pneumovax (PPSV23) vaccine  If already received PPSV23 since turning 65, then PCV13 recommended at least one year after PPSV23 dose  Hepatitis B Vaccine 3 dose series if at intermediate or high risk (ex: diabetes, end stage renal disease, liver disease)   Tetanus (Td) Vaccine - COST NOT COVERED BY MEDICARE PART B Following completion of primary series, a booster dose should be given every 10 years to maintain immunity against tetanus  Td may also be given as tetanus wound prophylaxis  Tdap Vaccine - COST NOT COVERED BY MEDICARE PART B Recommended at least once for all adults  For pregnant patients, recommended with each pregnancy  Shingles Vaccine (Shingrix) - COST NOT COVERED BY MEDICARE PART B  2 shot series recommended in those aged 48 and above     Health Maintenance Due:      Topic Date Due    Hepatitis C Screening  11/18/2021 (Originally 1950)    MAMMOGRAM  03/15/2022    Colonoscopy Surveillance  07/20/2022    Colorectal Cancer Screening  07/20/2027     Immunizations Due:      Topic Date Due    DTaP,Tdap,and Td Vaccines (1 - Tdap) Never done    COVID-19 Vaccine (2 - Moderna 2-dose series) 02/24/2021     Advance Directives   What are advance directives? Advance directives are legal documents that state your wishes and plans for medical care  These plans are made ahead of time in case you lose your ability to make decisions for yourself   Advance directives can apply to any medical decision, such as the treatments you want, and if you want to donate organs  What are the types of advance directives? There are many types of advance directives, and each state has rules about how to use them  You may choose a combination of any of the following:  · Living will: This is a written record of the treatment you want  You can also choose which treatments you do not want, which to limit, and which to stop at a certain time  This includes surgery, medicine, IV fluid, and tube feedings  · Durable power of  for healthcare [facility]): This is a written record that states who you want to make healthcare choices for you when you are unable to make them for yourself  This person, called a proxy, is usually a family member or a friend  You may choose more than 1 proxy  · Do not resuscitate (DNR) order:  A DNR order is used in case your heart stops beating or you stop breathing  It is a request not to have certain forms of treatment, such as CPR  A DNR order may be included in other types of advance directives  · Medical directive: This covers the care that you want if you are in a coma, near death, or unable to make decisions for yourself  You can list the treatments you want for each condition  Treatment may include pain medicine, surgery, blood transfusions, dialysis, IV or tube feedings, and a ventilator (breathing machine)  · Values history: This document has questions about your views, beliefs, and how you feel and think about life  This information can help others choose the care that you would choose  Why are advance directives important? An advance directive helps you control your care  Although spoken wishes may be used, it is better to have your wishes written down  Spoken wishes can be misunderstood, or not followed  Treatments may be given even if you do not want them  An advance directive may make it easier for your family to make difficult choices about your care     Weight Management   Why it is important to manage your weight:  Being overweight increases your risk of health conditions such as heart disease, high blood pressure, type 2 diabetes, and certain types of cancer  It can also increase your risk for osteoarthritis, sleep apnea, and other respiratory problems  Aim for a slow, steady weight loss  Even a small amount of weight loss can lower your risk of health problems  How to lose weight safely:  A safe and healthy way to lose weight is to eat fewer calories and get regular exercise  You can lose up about 1 pound a week by decreasing the number of calories you eat by 500 calories each day  Healthy meal plan for weight management:  A healthy meal plan includes a variety of foods, contains fewer calories, and helps you stay healthy  A healthy meal plan includes the following:  · Eat whole-grain foods more often  A healthy meal plan should contain fiber  Fiber is the part of grains, fruits, and vegetables that is not broken down by your body  Whole-grain foods are healthy and provide extra fiber in your diet  Some examples of whole-grain foods are whole-wheat breads and pastas, oatmeal, brown rice, and bulgur  · Eat a variety of vegetables every day  Include dark, leafy greens such as spinach, kale, liz greens, and mustard greens  Eat yellow and orange vegetables such as carrots, sweet potatoes, and winter squash  · Eat a variety of fruits every day  Choose fresh or canned fruit (canned in its own juice or light syrup) instead of juice  Fruit juice has very little or no fiber  · Eat low-fat dairy foods  Drink fat-free (skim) milk or 1% milk  Eat fat-free yogurt and low-fat cottage cheese  Try low-fat cheeses such as mozzarella and other reduced-fat cheeses  · Choose meat and other protein foods that are low in fat  Choose beans or other legumes such as split peas or lentils  Choose fish, skinless poultry (chicken or turkey), or lean cuts of red meat (beef or pork)   Before you cook meat or poultry, cut off any visible fat  · Use less fat and oil  Try baking foods instead of frying them  Add less fat, such as margarine, sour cream, regular salad dressing and mayonnaise to foods  Eat fewer high-fat foods  Some examples of high-fat foods include french fries, doughnuts, ice cream, and cakes  · Eat fewer sweets  Limit foods and drinks that are high in sugar  This includes candy, cookies, regular soda, and sweetened drinks  Exercise:  Exercise at least 30 minutes per day on most days of the week  Some examples of exercise include walking, biking, dancing, and swimming  You can also fit in more physical activity by taking the stairs instead of the elevator or parking farther away from stores  Ask your healthcare provider about the best exercise plan for you  © Copyright [address] 2018 Information is for End User's use only and may not be sold, redistributed or otherwise used for commercial purposes   All illustrations and images included in CareNotes® are the copyrighted property of A D A M , Inc  or [address] [place] [address]

## 2021-08-10 ENCOUNTER — OFFICE VISIT (OUTPATIENT)
Dept: FAMILY MEDICINE CLINIC | Facility: CLINIC | Age: 71
End: 2021-08-10
Payer: COMMERCIAL

## 2021-08-10 VITALS
HEIGHT: 66 IN | DIASTOLIC BLOOD PRESSURE: 76 MMHG | WEIGHT: 194 LBS | TEMPERATURE: 98.7 F | HEART RATE: 96 BPM | SYSTOLIC BLOOD PRESSURE: 124 MMHG | BODY MASS INDEX: 31.18 KG/M2 | OXYGEN SATURATION: 96 %

## 2021-08-10 DIAGNOSIS — G89.29 CHRONIC RIGHT SHOULDER PAIN: Primary | ICD-10-CM

## 2021-08-10 DIAGNOSIS — M25.511 CHRONIC RIGHT SHOULDER PAIN: Primary | ICD-10-CM

## 2021-08-10 DIAGNOSIS — J45.30 MILD PERSISTENT ASTHMA WITHOUT COMPLICATION: ICD-10-CM

## 2021-08-10 DIAGNOSIS — G56.31 IRRITATION OF RADIAL NERVE, RIGHT: ICD-10-CM

## 2021-08-10 PROCEDURE — 1036F TOBACCO NON-USER: CPT | Performed by: PHYSICIAN ASSISTANT

## 2021-08-10 PROCEDURE — 1160F RVW MEDS BY RX/DR IN RCRD: CPT | Performed by: PHYSICIAN ASSISTANT

## 2021-08-10 PROCEDURE — 99214 OFFICE O/P EST MOD 30 MIN: CPT | Performed by: PHYSICIAN ASSISTANT

## 2021-08-10 NOTE — ASSESSMENT & PLAN NOTE
Mild diffuse wheezing bilaterally  Currently managed on Breo and albuterol PRN  Scheduled to see Asthma/Allergist

## 2021-08-10 NOTE — PROGRESS NOTES
Assessment/Plan:    Mild persistent asthma without complication  Mild diffuse wheezing bilaterally  Currently managed on Breo and albuterol PRN  Scheduled to see Asthma/Allergist     Chronic right shoulder pain  Only partal improvement with stretching and flexeril  - Referral to PT  -     Ambulatory referral to Physical Therapy; Future    Irritation of radial nerve, right  Posterior hand pain   Negative Phalen and finkelstein test  May be referred from shoulder injury  Discussed conservative treatment options  -     Ambulatory referral to Physical Therapy; Future                Scheduled visit with Asthma/Allergist in 1 month      Subjective:    Patient ID: Loida Weaver is a 80 y o  female  Pt is presenting today for Right hand/wrist pain and tingling for 2 months      worse with typing   Does not wake at night  Right hand dominant  Hand Pain   There was no injury mechanism  The pain is moderate  The pain has been worsening since the incident  Associated symptoms include muscle weakness and tingling  Pertinent negatives include no chest pain or numbness  The following portions of the patient's history were reviewed and updated as appropriate: allergies, current medications and problem list     Review of Systems   Constitutional: Negative for fatigue, fever and unexpected weight change  HENT: Negative for rhinorrhea and sore throat  Respiratory: Positive for shortness of breath (trace with stairs)  Negative for cough and wheezing  Cardiovascular: Negative for chest pain, palpitations and leg swelling  Gastrointestinal: Negative for constipation, diarrhea and nausea  Musculoskeletal: Positive for arthralgias (right neck pain)  Negative for myalgias  Neurological: Positive for tingling  Negative for numbness           Objective:  /76 (BP Location: Left arm, Patient Position: Sitting, Cuff Size: Standard)   Pulse 96   Temp 98 7 °F (37 1 °C)   Ht 5' 5 5" (1 664 m)   Wt 88 kg (194 lb) SpO2 96%   BMI 31 79 kg/m²      Physical Exam  Vitals reviewed  Constitutional:       General: She is not in acute distress  Appearance: She is well-developed  She is not diaphoretic  HENT:      Head: Normocephalic and atraumatic  Eyes:      Pupils: Pupils are equal, round, and reactive to light  Cardiovascular:      Rate and Rhythm: Normal rate and regular rhythm  Heart sounds: Normal heart sounds  No murmur heard  No friction rub  No gallop  Pulmonary:      Effort: Pulmonary effort is normal  No respiratory distress  Breath sounds: Wheezing (trace diffuse) present  No rales  Musculoskeletal:      Cervical back: Normal range of motion and neck supple  Comments: 4/5 right hand weakness, vs 5/5 left hand  Negative finkelstein and Phalen test   Skin:     General: Skin is warm and dry  Neurological:      Mental Status: She is alert and oriented to person, place, and time  Psychiatric:         Behavior: Behavior normal          Thought Content:  Thought content normal
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
## 2021-08-18 ENCOUNTER — EVALUATION (OUTPATIENT)
Dept: PHYSICAL THERAPY | Facility: CLINIC | Age: 71
End: 2021-08-18
Payer: COMMERCIAL

## 2021-08-18 DIAGNOSIS — G56.31 IRRITATION OF RADIAL NERVE, RIGHT: ICD-10-CM

## 2021-08-18 DIAGNOSIS — G89.29 CHRONIC RIGHT SHOULDER PAIN: ICD-10-CM

## 2021-08-18 DIAGNOSIS — M25.511 CHRONIC RIGHT SHOULDER PAIN: ICD-10-CM

## 2021-08-18 DIAGNOSIS — M54.12 CERVICAL RADICULOPATHY: Primary | ICD-10-CM

## 2021-08-18 PROCEDURE — 97162 PT EVAL MOD COMPLEX 30 MIN: CPT | Performed by: PHYSICAL THERAPIST

## 2021-08-18 PROCEDURE — 97530 THERAPEUTIC ACTIVITIES: CPT | Performed by: PHYSICAL THERAPIST

## 2021-08-18 PROCEDURE — 97110 THERAPEUTIC EXERCISES: CPT | Performed by: PHYSICAL THERAPIST

## 2021-08-20 DIAGNOSIS — J45.21 MILD INTERMITTENT ASTHMA WITH ACUTE EXACERBATION: ICD-10-CM

## 2021-08-20 DIAGNOSIS — J45.20 MILD INTERMITTENT ASTHMA WITHOUT COMPLICATION: ICD-10-CM

## 2021-08-20 RX ORDER — ALBUTEROL SULFATE 90 UG/1
AEROSOL, METERED RESPIRATORY (INHALATION)
Qty: 18 G | Refills: 3 | Status: SHIPPED | OUTPATIENT
Start: 2021-08-20

## 2021-08-23 ENCOUNTER — OFFICE VISIT (OUTPATIENT)
Dept: PHYSICAL THERAPY | Facility: CLINIC | Age: 71
End: 2021-08-23
Payer: COMMERCIAL

## 2021-08-23 DIAGNOSIS — G89.29 CHRONIC RIGHT SHOULDER PAIN: ICD-10-CM

## 2021-08-23 DIAGNOSIS — M25.511 CHRONIC RIGHT SHOULDER PAIN: ICD-10-CM

## 2021-08-23 DIAGNOSIS — M54.12 CERVICAL RADICULOPATHY: Primary | ICD-10-CM

## 2021-08-23 DIAGNOSIS — G56.31 IRRITATION OF RADIAL NERVE, RIGHT: ICD-10-CM

## 2021-08-23 PROCEDURE — 97112 NEUROMUSCULAR REEDUCATION: CPT

## 2021-08-23 PROCEDURE — 97110 THERAPEUTIC EXERCISES: CPT

## 2021-08-23 PROCEDURE — 97140 MANUAL THERAPY 1/> REGIONS: CPT

## 2021-08-23 NOTE — PROGRESS NOTES
Daily Note     Today's date: 2021  Patient name: Cesar Cornell  : 1950  MRN: [id number]  Referring provider: Keke Chavez  Dx:   Encounter Diagnosis     ICD-10-CM    1  Cervical radiculopathy  M54 12    2  Chronic right shoulder pain  M25 511     G89 29    3  Irritation of radial nerve, right  G56 31                   Subjective: Pt states compliance w/ current HEP  Objective: See treatment diary below      Assessment: Tolerated treatment fair  Patient would benefit from continued PT  Pt states her upper body/neck muscles feel "twitchy" following UBE  Pt notes relief from manual traction; TTP of rhomboids noted  No significant increase in radicular sx noted w/ tx  Plan: Progress treatment as tolerated  Update HEP NV       Precautions: Vertigo     Needs High Low, slightly elevated head of table   Manuals            Cervical traction NV 6'           UT/levator STM NV 9'           C/S PROM                          Neuro Re-Ed             Tb IR  NV            Tb ER NV            TB rows NV RTB 2x10           Tb extensions NV NV           Tb horizontal abd              Chin tucks  NV           sustained neck flexion supine  NV                                                                            Ther Ex             UBE NV 2'/2'           Door pec stretch  NV 15"x3           seated t/s extensions             Upper  Trap S NV 10"x3 ea           Levator stretch NV NV                        Wall push ups              scap retraction and depression             Supine scap punches             Towel rotation stretch 10" x5 10x5" ea           Towel extension stretch 10x5" 10x5"                        Ther Activity             Radial nerve glides                                                                  Modalities

## 2021-08-25 ENCOUNTER — OFFICE VISIT (OUTPATIENT)
Dept: PHYSICAL THERAPY | Facility: CLINIC | Age: 71
End: 2021-08-25
Payer: COMMERCIAL

## 2021-08-25 DIAGNOSIS — M25.511 CHRONIC RIGHT SHOULDER PAIN: ICD-10-CM

## 2021-08-25 DIAGNOSIS — G56.31 IRRITATION OF RADIAL NERVE, RIGHT: ICD-10-CM

## 2021-08-25 DIAGNOSIS — M54.12 CERVICAL RADICULOPATHY: Primary | ICD-10-CM

## 2021-08-25 DIAGNOSIS — G89.29 CHRONIC RIGHT SHOULDER PAIN: ICD-10-CM

## 2021-08-25 PROCEDURE — 97140 MANUAL THERAPY 1/> REGIONS: CPT | Performed by: PHYSICAL THERAPIST

## 2021-08-25 PROCEDURE — 97530 THERAPEUTIC ACTIVITIES: CPT | Performed by: PHYSICAL THERAPIST

## 2021-08-25 PROCEDURE — 97110 THERAPEUTIC EXERCISES: CPT | Performed by: PHYSICAL THERAPIST

## 2021-08-25 NOTE — PROGRESS NOTES
Daily Note     Today's date: 2021  Patient name: Chitra Wilson  : 1950  MRN: [id number]  Referring provider: Estelle Morocho  Dx:   Encounter Diagnosis     ICD-10-CM    1  Cervical radiculopathy  M54 12    2  Chronic right shoulder pain  M25 511     G89 29    3  Irritation of radial nerve, right  G56 31                   Subjective: Patient states she had discomfort after last PT session  Notes her neck spasm in neck after picking up 20 lb dog yesterday and found relief with heat  Objective: See treatment diary below      Assessment: Tolerated treatment fair  Patient would benefit from continued PT  Patient notes spasm in posterior cervical region from towel extensions  Patient notices symptoms radiating down to L elbow with the last rep of L upper trap stretch  Patient notes relief from manual traction though feels pressure  in L deltoid with sustained traction  Patient felt a spasm of L rhomboids while performing STM to upper trap  Plan: Progress treatment as tolerated         Precautions: Vertigo     Needs High Low, slightly elevated head of table   Manuals           Cervical traction NV 6' 6'          UT/levator STM NV 9' 6'          C/S PROM                          Neuro Re-Ed             Tb IR  NV            Tb ER NV            TB rows NV RTB 2x10 RTB 2x10          Tb extensions NV NV RTB x10          Tb horizontal abd              Chin tucks  NV           sustained neck flexion supine  NV                                                                            Ther Ex             UBE NV 2'/2' 2'/2'          Door pec stretch  NV 15"x3 20"x3          seated t/s extensions   NV          Upper  Trap S NV 10"x3 ea 20"x3 ea          Levator stretch NV NV 20"x3                       Wall push ups              scap retraction and depression             Supine scap punches             Towel rotation stretch 10" x5 10x5" ea 5x5" ea          Towel extension stretch 10x5" 10x5" HEP Ther Activity             Radial nerve glides                                                                  Modalities                                       Patient treated by LUCIAN Pope under my supervision    Layne Heck, PT,DPT, ATC

## 2021-08-30 ENCOUNTER — OFFICE VISIT (OUTPATIENT)
Dept: PHYSICAL THERAPY | Facility: CLINIC | Age: 71
End: 2021-08-30
Payer: COMMERCIAL

## 2021-08-30 DIAGNOSIS — G56.31 IRRITATION OF RADIAL NERVE, RIGHT: ICD-10-CM

## 2021-08-30 DIAGNOSIS — M54.12 CERVICAL RADICULOPATHY: Primary | ICD-10-CM

## 2021-08-30 DIAGNOSIS — G89.29 CHRONIC RIGHT SHOULDER PAIN: ICD-10-CM

## 2021-08-30 DIAGNOSIS — M25.511 CHRONIC RIGHT SHOULDER PAIN: ICD-10-CM

## 2021-08-30 PROCEDURE — 97112 NEUROMUSCULAR REEDUCATION: CPT | Performed by: PHYSICAL THERAPIST

## 2021-08-30 PROCEDURE — 97110 THERAPEUTIC EXERCISES: CPT | Performed by: PHYSICAL THERAPIST

## 2021-08-30 PROCEDURE — 97140 MANUAL THERAPY 1/> REGIONS: CPT | Performed by: PHYSICAL THERAPIST

## 2021-08-30 NOTE — PROGRESS NOTES
Daily Note     Today's date: 2021  Patient name: Cesar Cornell  : 1950  MRN: [id number]  Referring provider: Keke Chavez  Dx:   Encounter Diagnosis     ICD-10-CM    1  Cervical radiculopathy  M54 12    2  Chronic right shoulder pain  M25 511     G89 29    3  Irritation of radial nerve, right  G56 31        Start Time: 820  Stop Time: 903  Total time in clinic (min): 43 minutes    Subjective: Patient states she is having a bad day today  She had difficulty sleeping and getting comfortable last night  She did use HP before bed  She notes most pain in left side of neck into left shoulder with mild soreness in right  Objective: See treatment diary below      Assessment: Tolerated treatment fair  Patient would benefit from continued PT  Patient with increased soresness with added TB IR on left arm only today  She has good form with added exercises, moderate tenderness with chin tucks  Patient fearful of movement today, demonstrating minimal AROM while in PT session  Discussed with patient to attempt to focus on AROM throughout the day to decreased c/s tissue tension and improve mobility  She tolerates manuals with decreased pain today  Focused on added c/s stability exercises as well as increases RTC stretch for improved posture and shoulder function today as well  Plan: Progress treatment as tolerated         Precautions: Vertigo     Needs High Low, slightly elevated head of table   Manuals          Cervical traction NV 6' 6' 6'         UT/levator STM NV 9' 6' 5'         C/S PROM    3'                      Neuro Re-Ed             Tb IR  NV   R Tb x15 (x10 left)         Tb ER NV   R Tb x15          TB rows NV RTB 2x10 RTB 2x10 R TB 2x10          Tb extensions NV NV RTB x10 R TB 2x10          Tb horizontal abd              Chin tucks  NV  10x5"         sustained neck flexion supine  NV  NV                                                                          Ther Ex UBE NV 2'/2' 2'/2' 2'/2' 2'/2'        Door pec stretch  NV 15"x3 20"x3          seated t/s extensions   NV          Upper  Trap S NV 10"x3 ea 20"x3 ea          Levator stretch NV NV 20"x3                       Wall push ups              scap retraction and depression             Supine scap punches             Towel rotation stretch 10" x5 10x5" ea 5x5" ea          Towel extension stretch 10x5" 10x5" HEP                       Ther Activity             Radial nerve glides                                                                  Modalities

## 2021-09-01 ENCOUNTER — OFFICE VISIT (OUTPATIENT)
Dept: PHYSICAL THERAPY | Facility: CLINIC | Age: 71
End: 2021-09-01
Payer: COMMERCIAL

## 2021-09-01 DIAGNOSIS — M54.12 CERVICAL RADICULOPATHY: Primary | ICD-10-CM

## 2021-09-01 DIAGNOSIS — G56.31 IRRITATION OF RADIAL NERVE, RIGHT: ICD-10-CM

## 2021-09-01 DIAGNOSIS — M25.511 CHRONIC RIGHT SHOULDER PAIN: ICD-10-CM

## 2021-09-01 DIAGNOSIS — G89.29 CHRONIC RIGHT SHOULDER PAIN: ICD-10-CM

## 2021-09-01 PROCEDURE — 97140 MANUAL THERAPY 1/> REGIONS: CPT

## 2021-09-01 PROCEDURE — 97110 THERAPEUTIC EXERCISES: CPT

## 2021-09-01 PROCEDURE — 97112 NEUROMUSCULAR REEDUCATION: CPT

## 2021-09-01 NOTE — PROGRESS NOTES
Daily Note     Today's date: 2021  Patient name: Librado Cadena  : 1950  MRN: [id number]  Referring provider: Melisa Terry  Dx:   Encounter Diagnosis     ICD-10-CM    1  Cervical radiculopathy  M54 12    2  Chronic right shoulder pain  M25 511     G89 29    3  Irritation of radial nerve, right  G56 31                   Subjective: Pt states her neck is feeling better than Monday, noting she has more flexibility looking to the left  However, pt continues to c/o L UT tightness  Objective: See treatment diary below      Assessment: Tolerated treatment fair  Patient would benefit from continued PT  Most challenged by TB IR  Tolerates gentle cervical ROM fair  Cues needed for proper technique w/ TE  Plan: Progress treatment as tolerated         Precautions: Vertigo     Needs High Low, slightly elevated head of table   Manuals         Cervical traction NV 6' 6' 6' 5'        UT/levator STM NV 9' 6' 5' 6'        C/S PROM    3' 5'                     Neuro Re-Ed             Tb IR  NV   R Tb x15 (x10 left) RTB x15 ea        Tb ER NV   R Tb x15  RTB x15  ea        TB rows NV RTB 2x10 RTB 2x10 R TB 2x10  RTB 2x10        Tb extensions NV NV RTB x10 R TB 2x10  RTB 2x10        Tb horizontal abd              Chin tucks  NV  10x5" 10x5"        sustained neck flexion supine  NV  NV 5x5"                                                                         Ther Ex             UBE NV 2'/2' 2'/2' 2'/2' 2'/2'        Door pec stretch  NV 15"x3 20"x3          seated t/s extensions   NV          Upper  Trap S NV 10"x3 ea 20"x3 ea          Levator stretch NV NV 20"x3                       Wall push ups              scap retraction and depression             Supine scap punches             Towel rotation stretch 10" x5 10x5" ea 5x5" ea          Towel extension stretch 10x5" 10x5" HEP                       Ther Activity             Radial nerve glides Modalities

## 2021-09-07 ENCOUNTER — OFFICE VISIT (OUTPATIENT)
Dept: PHYSICAL THERAPY | Facility: CLINIC | Age: 71
End: 2021-09-07
Payer: COMMERCIAL

## 2021-09-07 DIAGNOSIS — G56.31 IRRITATION OF RADIAL NERVE, RIGHT: ICD-10-CM

## 2021-09-07 DIAGNOSIS — M25.511 CHRONIC RIGHT SHOULDER PAIN: ICD-10-CM

## 2021-09-07 DIAGNOSIS — G89.29 CHRONIC RIGHT SHOULDER PAIN: ICD-10-CM

## 2021-09-07 DIAGNOSIS — M54.12 CERVICAL RADICULOPATHY: Primary | ICD-10-CM

## 2021-09-07 PROCEDURE — 97110 THERAPEUTIC EXERCISES: CPT

## 2021-09-07 PROCEDURE — 97140 MANUAL THERAPY 1/> REGIONS: CPT

## 2021-09-07 PROCEDURE — 97112 NEUROMUSCULAR REEDUCATION: CPT

## 2021-09-07 NOTE — PROGRESS NOTES
Daily Note     Today's date: 2021  Patient name: Stacey Mosher  : 1950  MRN: [id number]  Referring provider: Mando Rosario  Dx:   Encounter Diagnosis     ICD-10-CM    1  Cervical radiculopathy  M54 12    2  Chronic right shoulder pain  M25 511     G89 29    3  Irritation of radial nerve, right  G56 31                   Subjective: Pt c/o increased neck spasm, discomfort when making cards at her friend's house over the weekend  Objective: See treatment diary below      Assessment: Tolerated treatment fair  Patient would benefit from continued PT  Need cues w/ chin tucks for proper technique  Pt experiences increased vertigo w/ manual cervical rotation to the R; sx resolve once sitting  Added seated thoracic extension w/out sig difficulty/discomfort  Plan: Progress treatment as tolerated         Precautions: Vertigo     Needs High Low, slightly elevated head of table   Manuals        Cervical traction NV 6' 6' 6' 5' 5'       UT/levator STM NV 9' 6' 5' 6' 5'       C/S PROM    3' 5' 5'                    Neuro Re-Ed             Tb IR  NV   R Tb x15 (x10 left) RTB x15 ea RTB 15 ea       Tb ER NV   R Tb x15  RTB x15  ea RTB 15 ea       TB rows NV RTB 2x10 RTB 2x10 R TB 2x10  RTB 2x10 RTB 2x10       Tb extensions NV NV RTB x10 R TB 2x10  RTB 2x10 RTB 2x10       Tb horizontal abd              Chin tucks  NV  10x5" 10x5" 10x5"       sustained neck flexion supine  NV  NV 5x5" 5x5"                                                                         Ther Ex             UBE NV 2'/2' 2'/2' 2'/2' 2'/2' 2'/2'       Door pec stretch  NV 15"x3 20"x3          seated t/s extensions   NV   10x5"       Upper  Trap S NV 10"x3 ea 20"x3 ea          Levator stretch NV NV 20"x3                       Wall push ups              scap retraction and depression             Supine scap punches             Towel rotation stretch 10" x5 10x5" ea 5x5" ea          Towel extension stretch 10x5" 10x5" HEP                       Ther Activity             Radial nerve glides                                                                  Modalities

## 2021-09-09 ENCOUNTER — OFFICE VISIT (OUTPATIENT)
Dept: PHYSICAL THERAPY | Facility: CLINIC | Age: 71
End: 2021-09-09
Payer: COMMERCIAL

## 2021-09-09 DIAGNOSIS — M54.12 CERVICAL RADICULOPATHY: Primary | ICD-10-CM

## 2021-09-09 DIAGNOSIS — G89.29 CHRONIC RIGHT SHOULDER PAIN: ICD-10-CM

## 2021-09-09 DIAGNOSIS — G56.31 IRRITATION OF RADIAL NERVE, RIGHT: ICD-10-CM

## 2021-09-09 DIAGNOSIS — M25.511 CHRONIC RIGHT SHOULDER PAIN: ICD-10-CM

## 2021-09-09 PROCEDURE — 97140 MANUAL THERAPY 1/> REGIONS: CPT

## 2021-09-09 PROCEDURE — 97110 THERAPEUTIC EXERCISES: CPT

## 2021-09-09 PROCEDURE — 97112 NEUROMUSCULAR REEDUCATION: CPT

## 2021-09-09 NOTE — PROGRESS NOTES
Daily Note     Today's date: 2021  Patient name: Heath Muniz  : 1950  MRN: [id number]  Referring provider: Lisa Rosario  Dx:   Encounter Diagnosis     ICD-10-CM    1  Cervical radiculopathy  M54 12    2  Chronic right shoulder pain  M25 511     G89 29    3  Irritation of radial nerve, right  G56 31                   Subjective: Pt c/o vertigo issues last night  Objective: See treatment diary below      Assessment: Tolerated treatment fair  Patient would benefit from continued PT  Held on manual cervical stretching and traction due to pt's reluctance to lie down due to vertigo sx night before; performed seated STM only  Min challenge w/ progression to GTB w/ TB rows, extension  Plan: Progress treatment as tolerated         Precautions: Vertigo     Needs High Low, slightly elevated head of table   Manuals       Cervical traction NV 6' 6' 6' 5' 5' NV      UT/levator STM NV 9' 6' 5' 6' 5' 10'      C/S PROM    3' 5' 5' NV                   Neuro Re-Ed             Tb IR  NV   R Tb x15 (x10 left) RTB x15 ea RTB 15 ea RTB 15      Tb ER NV   R Tb x15  RTB x15  ea RTB 15 ea RTB 15      TB rows NV RTB 2x10 RTB 2x10 R TB 2x10  RTB 2x10 RTB 2x10 GTB 2x10      Tb extensions NV NV RTB x10 R TB 2x10  RTB 2x10 RTB 2x10 GTB 2x10      Tb horizontal abd        RTB 15      Chin tucks  NV  10x5" 10x5" 10x5" 10x5" seated      sustained neck flexion supine  NV  NV 5x5" 5x5"                                                                         Ther Ex             UBE NV 2'/2' 2'/2' 2'/2' 2'/2' 2'/2' 2'/2'      Door pec stretch  NV 15"x3 20"x3          seated t/s extensions   NV   10x5" 10x5"      Upper  Trap S NV 10"x3 ea 20"x3 ea          Levator stretch NV NV 20"x3                       Wall push ups              scap retraction and depression             Supine scap punches             Towel rotation stretch 10" x5 10x5" ea 5x5" ea          Towel extension stretch 10x5" 10x5" HEP                       Ther Activity             Radial nerve glides                                                                  Modalities

## 2021-09-13 ENCOUNTER — OFFICE VISIT (OUTPATIENT)
Dept: PHYSICAL THERAPY | Facility: CLINIC | Age: 71
End: 2021-09-13
Payer: COMMERCIAL

## 2021-09-13 DIAGNOSIS — G89.29 CHRONIC RIGHT SHOULDER PAIN: ICD-10-CM

## 2021-09-13 DIAGNOSIS — M25.511 CHRONIC RIGHT SHOULDER PAIN: ICD-10-CM

## 2021-09-13 DIAGNOSIS — G56.31 IRRITATION OF RADIAL NERVE, RIGHT: ICD-10-CM

## 2021-09-13 DIAGNOSIS — M54.12 CERVICAL RADICULOPATHY: Primary | ICD-10-CM

## 2021-09-13 PROCEDURE — 97110 THERAPEUTIC EXERCISES: CPT

## 2021-09-13 PROCEDURE — 97112 NEUROMUSCULAR REEDUCATION: CPT

## 2021-09-13 PROCEDURE — 97140 MANUAL THERAPY 1/> REGIONS: CPT

## 2021-09-13 NOTE — PROGRESS NOTES
Daily Note     Today's date: 2021  Patient name: Duane Chant  : 1950  MRN: [id number]  Referring provider: Shubham Collazo  Dx:   Encounter Diagnosis     ICD-10-CM    1  Cervical radiculopathy  M54 12    2  Chronic right shoulder pain  M25 511     G89 29    3  Irritation of radial nerve, right  G56 31                   Subjective: Pt reports no incidences of vertigo lately  Pt also states her neck continues to be "sore" and feels applying heat continues to help alleviate those sx  Objective: See treatment diary below      Assessment: Tolerated treatment fair  Patient would benefit from continued PT  Increased discomfort w/ TB L ER  Pt guarded w/ manual rotation stretch to R  L cervical/UT soreness > R       Plan: Progress treatment as tolerated         Precautions: Vertigo     Needs High Low, slightly elevated head of table   Manuals      Cervical traction NV 6' 6' 6' 5' 5' NV 5'     UT/levator STM NV 9' 6' 5' 6' 5' 10' 5'     C/S PROM    3' 5' 5' NV 5'                  Neuro Re-Ed             Tb IR  NV   R Tb x15 (x10 left) RTB x15 ea RTB 15 ea RTB 15 RTB 15 ea     Tb ER NV   R Tb x15  RTB x15  ea RTB 15 ea RTB 15 RTB 15 ea     TB rows NV RTB 2x10 RTB 2x10 R TB 2x10  RTB 2x10 RTB 2x10 GTB 2x10 GTB 2x10     Tb extensions NV NV RTB x10 R TB 2x10  RTB 2x10 RTB 2x10 GTB 2x10 GTB 2x10     Tb horizontal abd        RTB 15 RTB 15     Chin tucks  NV  10x5" 10x5" 10x5" 10x5" seated 10x5" seated      sustained neck flexion supine  NV  NV 5x5" 5x5"                                                                         Ther Ex             UBE NV 2'/2' 2'/2' 2'/2' 2'/2' 2'/2' 2'/2' 2'/2'     Door pec stretch  NV 15"x3 20"x3          seated t/s extensions   NV   10x5" 10x5" 10x5"     Upper  Trap S NV 10"x3 ea 20"x3 ea          Levator stretch NV NV 20"x3                       Wall push ups              scap retraction and depression             Supine scap punches Towel rotation stretch 10" x5 10x5" ea 5x5" ea          Towel extension stretch 10x5" 10x5" HEP                       Ther Activity             Radial nerve glides                                                                  Modalities

## 2021-09-15 ENCOUNTER — OFFICE VISIT (OUTPATIENT)
Dept: PHYSICAL THERAPY | Facility: CLINIC | Age: 71
End: 2021-09-15
Payer: COMMERCIAL

## 2021-09-15 DIAGNOSIS — G56.31 IRRITATION OF RADIAL NERVE, RIGHT: ICD-10-CM

## 2021-09-15 DIAGNOSIS — M54.12 CERVICAL RADICULOPATHY: Primary | ICD-10-CM

## 2021-09-15 DIAGNOSIS — M25.511 CHRONIC RIGHT SHOULDER PAIN: ICD-10-CM

## 2021-09-15 DIAGNOSIS — G89.29 CHRONIC RIGHT SHOULDER PAIN: ICD-10-CM

## 2021-09-15 PROCEDURE — 97110 THERAPEUTIC EXERCISES: CPT | Performed by: PHYSICAL THERAPIST

## 2021-09-15 PROCEDURE — 97112 NEUROMUSCULAR REEDUCATION: CPT | Performed by: PHYSICAL THERAPIST

## 2021-09-15 PROCEDURE — 97140 MANUAL THERAPY 1/> REGIONS: CPT | Performed by: PHYSICAL THERAPIST

## 2021-09-15 NOTE — PROGRESS NOTES
Daily Note     Today's date: 9/15/2021  Patient name: Henrik Licona  : 1950  MRN: [id number]  Referring provider: Tiffany Ordaz  Dx:   Encounter Diagnosis     ICD-10-CM    1  Cervical radiculopathy  M54 12    2  Chronic right shoulder pain  M25 511     G89 29    3  Irritation of radial nerve, right  G56 31                   Subjective: Pt reports she felt really tight over the weekend and she needed to use heat repeatedly before it would loosen up  She reports increased tenderness into left side of neck now as apposed to right  Objective: See treatment diary below      Assessment: Tolerated treatment fair  Patient would benefit from continued PT   Patient with soreness with IR/ER in left shoulder today and requires cues for proper shoulder/arm position  She has significantly improved c/s mobility, most specifically right rotation, progressing ot nearly 80 degrees  Left rotation improved as well though continues to be more limited  Continue progressing as tolerated  RE next week  Plan: Progress treatment as tolerated         Precautions: Vertigo     Needs High Low, slightly elevated head of table   Manuals 8/18 8/23 8/25 8/30 9/1 9/7 9/9 9/13 9/15    Cervical traction NV 6' 6' 6' 5' 5' NV 5' 5'    UT/levator STM NV 9' 6' 5' 6' 5' 10' 5' 5'    C/S PROM    3' 5' 5' NV 5' 5'                 Neuro Re-Ed             Tb IR  NV   R Tb x15 (x10 left) RTB x15 ea RTB 15 ea RTB 15 RTB 15 ea R TB x15 bl    Tb ER NV   R Tb x15  RTB x15  ea RTB 15 ea RTB 15 RTB 15 ea R T bx15 bl     TB rows NV RTB 2x10 RTB 2x10 R TB 2x10  RTB 2x10 RTB 2x10 GTB 2x10 GTB 2x10 G TB 2x10     Tb extensions NV NV RTB x10 R TB 2x10  RTB 2x10 RTB 2x10 GTB 2x10 GTB 2x10 G TB 2x10     Tb horizontal abd        RTB 15 RTB 15 R TB 2x10     Chin tucks  NV  10x5" 10x5" 10x5" 10x5" seated 10x5" seated      sustained neck flexion supine  NV  NV 5x5" 5x5"                                                                         Ther Ex UBE NV 2'/2' 2'/2' 2'/2' 2'/2' 2'/2' 2'/2' 2'/2' 3'/3'    Door pec stretch  NV 15"x3 20"x3          seated t/s extensions   NV   10x5" 10x5" 10x5"     Upper  Trap S NV 10"x3 ea 20"x3 ea          Levator stretch NV NV 20"x3                       Wall push ups              scap retraction and depression             Supine scap punches             Towel rotation stretch 10" x5 10x5" ea 5x5" ea          Towel extension stretch 10x5" 10x5" HEP                       Ther Activity             Radial nerve glides                                                                  Modalities

## 2021-09-20 ENCOUNTER — OFFICE VISIT (OUTPATIENT)
Dept: PHYSICAL THERAPY | Facility: CLINIC | Age: 71
End: 2021-09-20
Payer: COMMERCIAL

## 2021-09-20 DIAGNOSIS — G56.31 IRRITATION OF RADIAL NERVE, RIGHT: ICD-10-CM

## 2021-09-20 DIAGNOSIS — M54.12 CERVICAL RADICULOPATHY: Primary | ICD-10-CM

## 2021-09-20 DIAGNOSIS — G89.29 CHRONIC RIGHT SHOULDER PAIN: ICD-10-CM

## 2021-09-20 DIAGNOSIS — M25.511 CHRONIC RIGHT SHOULDER PAIN: ICD-10-CM

## 2021-09-20 PROCEDURE — 97110 THERAPEUTIC EXERCISES: CPT

## 2021-09-20 PROCEDURE — 97112 NEUROMUSCULAR REEDUCATION: CPT

## 2021-09-20 PROCEDURE — 97140 MANUAL THERAPY 1/> REGIONS: CPT

## 2021-09-20 NOTE — PROGRESS NOTES
Daily Note     Today's date: 2021  Patient name: Raul Dove  : 1950  MRN: [id number]  Referring provider: Alexi Elizalde  Dx:   Encounter Diagnosis     ICD-10-CM    1  Cervical radiculopathy  M54 12    2  Chronic right shoulder pain  M25 511     G89 29    3  Irritation of radial nerve, right  G56 31                   Subjective: Pt states she was having a "pretty good week" until she lifted a pumpkin over the weekend; c/o L cervical tightness today  Objective: See treatment diary below      Assessment: Tolerated treatment fair  Patient would benefit from continued PT  No sig increase in discomfort noted w/ manual cervical stretching  Few cues needed for proper technique w/ balance activities  Plan: Progress treatment as tolerated         Precautions: Vertigo     Needs High Low, slightly elevated head of table   Manuals 8/18 8/23 8/25 8/30 9/1 9/7 9/9 9/13 9/15 9/20   Cervical traction NV 6' 6' 6' 5' 5' NV 5' 5' 5'   UT/levator STM NV 9' 6' 5' 6' 5' 10' 5' 5' 5'   C/S PROM    3' 5' 5' NV 5' 5' 5'                Neuro Re-Ed             Tb IR  NV   R Tb x15 (x10 left) RTB x15 ea RTB 15 ea RTB 15 RTB 15 ea R TB x15 bl RTB 15 ea   Tb ER NV   R Tb x15  RTB x15  ea RTB 15 ea RTB 15 RTB 15 ea R T bx15 bl  RTB 15 ea   TB rows NV RTB 2x10 RTB 2x10 R TB 2x10  RTB 2x10 RTB 2x10 GTB 2x10 GTB 2x10 G TB 2x10  GTB 2x10   Tb extensions NV NV RTB x10 R TB 2x10  RTB 2x10 RTB 2x10 GTB 2x10 GTB 2x10 G TB 2x10  GTB 2x10   Tb horizontal abd        RTB 15 RTB 15 R TB 2x10  RTB 2x10   Chin tucks  NV  10x5" 10x5" 10x5" 10x5" seated 10x5" seated   10x5" seated   sustained neck flexion supine  NV  NV 5x5" 5x5"                                                                         Ther Ex             UBE NV 2'/2' 2'/2' 2'/2' 2'/2' 2'/2' 2'/2' 2'/2' 3'/3' 2'/2'   Door pec stretch  NV 15"x3 20"x3          seated t/s extensions   NV   10x5" 10x5" 10x5"     Upper  Trap S NV 10"x3 ea 20"x3 ea          Levator stretch NV NV 20"x3                       Wall push ups              scap retraction and depression             Supine scap punches             Towel rotation stretch 10" x5 10x5" ea 5x5" ea          Towel extension stretch 10x5" 10x5" HEP                       Ther Activity             Radial nerve glides                                                                  Modalities

## 2021-09-22 ENCOUNTER — EVALUATION (OUTPATIENT)
Dept: PHYSICAL THERAPY | Facility: CLINIC | Age: 71
End: 2021-09-22
Payer: COMMERCIAL

## 2021-09-22 DIAGNOSIS — G56.31 IRRITATION OF RADIAL NERVE, RIGHT: ICD-10-CM

## 2021-09-22 DIAGNOSIS — M25.511 CHRONIC RIGHT SHOULDER PAIN: ICD-10-CM

## 2021-09-22 DIAGNOSIS — G89.29 CHRONIC RIGHT SHOULDER PAIN: ICD-10-CM

## 2021-09-22 DIAGNOSIS — M54.12 CERVICAL RADICULOPATHY: Primary | ICD-10-CM

## 2021-09-22 PROCEDURE — 97140 MANUAL THERAPY 1/> REGIONS: CPT | Performed by: PHYSICAL THERAPIST

## 2021-09-22 PROCEDURE — 97530 THERAPEUTIC ACTIVITIES: CPT | Performed by: PHYSICAL THERAPIST

## 2021-09-22 PROCEDURE — 97112 NEUROMUSCULAR REEDUCATION: CPT | Performed by: PHYSICAL THERAPIST
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
## 2021-09-27 ENCOUNTER — OFFICE VISIT (OUTPATIENT)
Dept: PHYSICAL THERAPY | Facility: CLINIC | Age: 71
End: 2021-09-27
Payer: COMMERCIAL

## 2021-09-27 DIAGNOSIS — G56.31 IRRITATION OF RADIAL NERVE, RIGHT: ICD-10-CM

## 2021-09-27 DIAGNOSIS — M54.12 CERVICAL RADICULOPATHY: Primary | ICD-10-CM

## 2021-09-27 DIAGNOSIS — G89.29 CHRONIC RIGHT SHOULDER PAIN: ICD-10-CM

## 2021-09-27 DIAGNOSIS — M25.511 CHRONIC RIGHT SHOULDER PAIN: ICD-10-CM

## 2021-09-27 PROCEDURE — 97112 NEUROMUSCULAR REEDUCATION: CPT

## 2021-09-27 PROCEDURE — 97140 MANUAL THERAPY 1/> REGIONS: CPT

## 2021-09-27 PROCEDURE — 97110 THERAPEUTIC EXERCISES: CPT

## 2021-09-27 NOTE — PROGRESS NOTES
Daily Note     Today's date: 2021  Patient name: Raul Dove  : 1950  MRN: [id number]  Referring provider: Alxei Elizalde  Dx:   Encounter Diagnosis     ICD-10-CM    1  Cervical radiculopathy  M54 12    2  Chronic right shoulder pain  M25 511     G89 29    3  Irritation of radial nerve, right  G56 31                   Subjective: Pt states she is feeling "pretty good" today  Objective: See treatment diary below      Assessment: Tolerated treatment well  Patient would benefit from continued PT  No adverse effects noted w/ MT today  Min cues needed for proper technique w/ exercises  Challenged by progression to GTB w/ HA  Plan: Progress treatment as tolerated  Progress strengthening exercises nv  Precautions: Vertigo       Needs High Low, slightly elevated head of table   Manuals  RE   Cervical traction 5'       5'   UT/levator STM 5'       5'   C/S PROM 5'       5'              Neuro Re-Ed           Tb IR  R Tb x15 ea       R TB x15 ea   Tb ER R Tb x15  ea       R TB x15   TB rows G TB 2x10        G TB 2x10   Tb extensions GTB 2x10        G TB 2x10    Tb horizontal abd  GTB x15       G TB x15   Chin tucks        NV    sustained neck flexion supine                                                                  Ther Ex           UBE 3'/3'       2'/2'   Door pec stretch            seated t/s extensions           Upper  Trap S           Levator stretch                      Wall push ups  NV?          scap retraction and depression           Supine scap punches NV?            Towel rotation stretch           Towel extension stretch                      Ther Activity           Radial nerve glides                                                        Modalities

## 2021-09-29 ENCOUNTER — APPOINTMENT (OUTPATIENT)
Dept: PHYSICAL THERAPY | Facility: CLINIC | Age: 71
End: 2021-09-29
Payer: COMMERCIAL

## 2021-10-04 ENCOUNTER — APPOINTMENT (OUTPATIENT)
Dept: PHYSICAL THERAPY | Facility: CLINIC | Age: 71
End: 2021-10-04
Payer: COMMERCIAL

## 2021-10-04 ENCOUNTER — TELEPHONE (OUTPATIENT)
Dept: FAMILY MEDICINE CLINIC | Facility: CLINIC | Age: 71
End: 2021-10-04

## 2021-10-04 DIAGNOSIS — Z11.9 ENCOUNTER FOR SCREENING FOR INFECTIOUS AND PARASITIC DISEASES, UNSPECIFIED: Primary | ICD-10-CM

## 2021-10-04 PROCEDURE — U0003 INFECTIOUS AGENT DETECTION BY NUCLEIC ACID (DNA OR RNA); SEVERE ACUTE RESPIRATORY SYNDROME CORONAVIRUS 2 (SARS-COV-2) (CORONAVIRUS DISEASE [COVID-19]), AMPLIFIED PROBE TECHNIQUE, MAKING USE OF HIGH THROUGHPUT TECHNOLOGIES AS DESCRIBED BY CMS-2020-01-R: HCPCS | Performed by: FAMILY MEDICINE

## 2021-10-04 PROCEDURE — U0005 INFEC AGEN DETEC AMPLI PROBE: HCPCS | Performed by: FAMILY MEDICINE

## 2021-10-06 ENCOUNTER — TELEPHONE (OUTPATIENT)
Dept: FAMILY MEDICINE CLINIC | Facility: CLINIC | Age: 71
End: 2021-10-06

## 2021-10-06 ENCOUNTER — APPOINTMENT (OUTPATIENT)
Dept: PHYSICAL THERAPY | Facility: CLINIC | Age: 71
End: 2021-10-06
Payer: COMMERCIAL

## 2021-10-06 ENCOUNTER — HOSPITAL ENCOUNTER (OUTPATIENT)
Dept: INFUSION CENTER | Facility: HOSPITAL | Age: 71
Discharge: HOME | End: 2021-10-06
Payer: COMMERCIAL

## 2021-10-06 VITALS
SYSTOLIC BLOOD PRESSURE: 139 MMHG | DIASTOLIC BLOOD PRESSURE: 63 MMHG | TEMPERATURE: 98.6 F | RESPIRATION RATE: 18 BRPM | HEART RATE: 75 BPM | OXYGEN SATURATION: 100 %

## 2021-10-06 DIAGNOSIS — U07.1 COVID-19: Primary | ICD-10-CM

## 2021-10-06 DIAGNOSIS — J45.30 MILD PERSISTENT ASTHMA WITHOUT COMPLICATION: ICD-10-CM

## 2021-10-06 DIAGNOSIS — U07.1 COVID-19: ICD-10-CM

## 2021-10-06 DIAGNOSIS — J45.30 MILD PERSISTENT ASTHMA WITHOUT COMPLICATION: Primary | ICD-10-CM

## 2021-10-06 PROCEDURE — RECHECK: Performed by: FAMILY MEDICINE

## 2021-10-06 PROCEDURE — M0243 CASIRIVI AND IMDEVI INFUSION: HCPCS | Performed by: FAMILY MEDICINE

## 2021-10-06 RX ORDER — SODIUM CHLORIDE 9 MG/ML
20 INJECTION, SOLUTION INTRAVENOUS ONCE
Status: CANCELLED | OUTPATIENT
Start: 2021-10-06

## 2021-10-06 RX ORDER — ONDANSETRON 2 MG/ML
4 INJECTION INTRAMUSCULAR; INTRAVENOUS ONCE AS NEEDED
Status: CANCELLED | OUTPATIENT
Start: 2021-10-06

## 2021-10-06 RX ORDER — ACETAMINOPHEN 325 MG/1
650 TABLET ORAL ONCE AS NEEDED
Status: DISCONTINUED | OUTPATIENT
Start: 2021-10-06 | End: 2021-10-09 | Stop reason: HOSPADM

## 2021-10-06 RX ORDER — ACETAMINOPHEN 325 MG/1
650 TABLET ORAL ONCE AS NEEDED
Status: CANCELLED | OUTPATIENT
Start: 2021-10-06

## 2021-10-06 RX ORDER — ALBUTEROL SULFATE 90 UG/1
3 AEROSOL, METERED RESPIRATORY (INHALATION) ONCE AS NEEDED
Status: CANCELLED | OUTPATIENT
Start: 2021-10-06

## 2021-10-06 RX ORDER — ALBUTEROL SULFATE 90 UG/1
3 AEROSOL, METERED RESPIRATORY (INHALATION) ONCE AS NEEDED
Status: DISCONTINUED | OUTPATIENT
Start: 2021-10-06 | End: 2021-10-09 | Stop reason: HOSPADM

## 2021-10-06 RX ORDER — SODIUM CHLORIDE 9 MG/ML
20 INJECTION, SOLUTION INTRAVENOUS ONCE
Status: COMPLETED | OUTPATIENT
Start: 2021-10-06 | End: 2021-10-06

## 2021-10-06 RX ORDER — ONDANSETRON 2 MG/ML
4 INJECTION INTRAMUSCULAR; INTRAVENOUS ONCE AS NEEDED
Status: DISCONTINUED | OUTPATIENT
Start: 2021-10-06 | End: 2021-10-09 | Stop reason: HOSPADM

## 2021-10-06 RX ADMIN — CASIRIVIMAB AND IMDEVIMAB 1200 MG COMBINED: 600; 600 INJECTION, SOLUTION, CONCENTRATE INTRAVENOUS at 13:35

## 2021-10-06 RX ADMIN — SODIUM CHLORIDE 20 ML/HR: 0.9 INJECTION, SOLUTION INTRAVENOUS at 13:36

## 2021-10-08 ENCOUNTER — TELEMEDICINE (OUTPATIENT)
Dept: FAMILY MEDICINE CLINIC | Facility: CLINIC | Age: 71
End: 2021-10-08
Payer: COMMERCIAL

## 2021-10-08 ENCOUNTER — TELEPHONE (OUTPATIENT)
Dept: FAMILY MEDICINE CLINIC | Facility: CLINIC | Age: 71
End: 2021-10-08

## 2021-10-08 DIAGNOSIS — J45.30 MILD PERSISTENT ASTHMA WITHOUT COMPLICATION: ICD-10-CM

## 2021-10-08 DIAGNOSIS — U07.1 COVID-19: Primary | ICD-10-CM

## 2021-10-08 PROCEDURE — 99213 OFFICE O/P EST LOW 20 MIN: CPT | Performed by: FAMILY MEDICINE

## 2021-10-08 PROCEDURE — 1036F TOBACCO NON-USER: CPT | Performed by: FAMILY MEDICINE

## 2021-10-08 PROCEDURE — 1160F RVW MEDS BY RX/DR IN RCRD: CPT | Performed by: FAMILY MEDICINE

## 2021-10-11 ENCOUNTER — APPOINTMENT (OUTPATIENT)
Dept: PHYSICAL THERAPY | Facility: CLINIC | Age: 71
End: 2021-10-11
Payer: COMMERCIAL

## 2021-10-13 ENCOUNTER — APPOINTMENT (OUTPATIENT)
Dept: PHYSICAL THERAPY | Facility: CLINIC | Age: 71
End: 2021-10-13
Payer: COMMERCIAL

## 2021-10-18 ENCOUNTER — APPOINTMENT (OUTPATIENT)
Dept: PHYSICAL THERAPY | Facility: CLINIC | Age: 71
End: 2021-10-18
Payer: COMMERCIAL

## 2021-10-20 ENCOUNTER — APPOINTMENT (OUTPATIENT)
Dept: PHYSICAL THERAPY | Facility: CLINIC | Age: 71
End: 2021-10-20
Payer: COMMERCIAL

## 2021-10-20 ENCOUNTER — APPOINTMENT (OUTPATIENT)
Dept: LAB | Facility: CLINIC | Age: 71
End: 2021-10-20
Payer: COMMERCIAL

## 2021-10-20 DIAGNOSIS — M81.0 SENILE OSTEOPOROSIS: ICD-10-CM

## 2021-10-20 LAB — CALCIUM SERPL-MCNC: 10 MG/DL (ref 8.3–10.1)

## 2021-10-20 PROCEDURE — 82310 ASSAY OF CALCIUM: CPT

## 2021-10-20 PROCEDURE — 36415 COLL VENOUS BLD VENIPUNCTURE: CPT

## 2021-10-25 ENCOUNTER — OFFICE VISIT (OUTPATIENT)
Dept: PHYSICAL THERAPY | Facility: CLINIC | Age: 71
End: 2021-10-25
Payer: COMMERCIAL

## 2021-10-25 DIAGNOSIS — G56.31 IRRITATION OF RADIAL NERVE, RIGHT: ICD-10-CM

## 2021-10-25 DIAGNOSIS — M25.511 CHRONIC RIGHT SHOULDER PAIN: ICD-10-CM

## 2021-10-25 DIAGNOSIS — M54.12 CERVICAL RADICULOPATHY: Primary | ICD-10-CM

## 2021-10-25 DIAGNOSIS — G89.29 CHRONIC RIGHT SHOULDER PAIN: ICD-10-CM

## 2021-10-25 PROCEDURE — 97112 NEUROMUSCULAR REEDUCATION: CPT | Performed by: PHYSICAL THERAPIST

## 2021-10-25 PROCEDURE — 97110 THERAPEUTIC EXERCISES: CPT | Performed by: PHYSICAL THERAPIST

## 2021-10-25 PROCEDURE — 97140 MANUAL THERAPY 1/> REGIONS: CPT | Performed by: PHYSICAL THERAPIST

## 2021-10-27 ENCOUNTER — OFFICE VISIT (OUTPATIENT)
Dept: PHYSICAL THERAPY | Facility: CLINIC | Age: 71
End: 2021-10-27
Payer: COMMERCIAL

## 2021-10-27 DIAGNOSIS — M25.511 CHRONIC RIGHT SHOULDER PAIN: ICD-10-CM

## 2021-10-27 DIAGNOSIS — M54.12 CERVICAL RADICULOPATHY: Primary | ICD-10-CM

## 2021-10-27 DIAGNOSIS — G89.29 CHRONIC RIGHT SHOULDER PAIN: ICD-10-CM

## 2021-10-27 DIAGNOSIS — G56.31 IRRITATION OF RADIAL NERVE, RIGHT: ICD-10-CM

## 2021-10-27 PROCEDURE — 97110 THERAPEUTIC EXERCISES: CPT

## 2021-10-27 PROCEDURE — 97112 NEUROMUSCULAR REEDUCATION: CPT

## 2021-10-27 PROCEDURE — 97140 MANUAL THERAPY 1/> REGIONS: CPT

## 2021-11-01 ENCOUNTER — OFFICE VISIT (OUTPATIENT)
Dept: PHYSICAL THERAPY | Facility: CLINIC | Age: 71
End: 2021-11-01
Payer: COMMERCIAL

## 2021-11-01 DIAGNOSIS — G56.31 IRRITATION OF RADIAL NERVE, RIGHT: ICD-10-CM

## 2021-11-01 DIAGNOSIS — M54.12 CERVICAL RADICULOPATHY: Primary | ICD-10-CM

## 2021-11-01 DIAGNOSIS — G89.29 CHRONIC RIGHT SHOULDER PAIN: ICD-10-CM

## 2021-11-01 DIAGNOSIS — M25.511 CHRONIC RIGHT SHOULDER PAIN: ICD-10-CM

## 2021-11-01 PROCEDURE — 97110 THERAPEUTIC EXERCISES: CPT

## 2021-11-01 PROCEDURE — 97140 MANUAL THERAPY 1/> REGIONS: CPT

## 2021-11-01 PROCEDURE — 97112 NEUROMUSCULAR REEDUCATION: CPT

## 2021-11-03 ENCOUNTER — OFFICE VISIT (OUTPATIENT)
Dept: PHYSICAL THERAPY | Facility: CLINIC | Age: 71
End: 2021-11-03
Payer: COMMERCIAL

## 2021-11-03 DIAGNOSIS — M54.12 CERVICAL RADICULOPATHY: Primary | ICD-10-CM

## 2021-11-03 DIAGNOSIS — G89.29 CHRONIC RIGHT SHOULDER PAIN: ICD-10-CM

## 2021-11-03 DIAGNOSIS — G56.31 IRRITATION OF RADIAL NERVE, RIGHT: ICD-10-CM

## 2021-11-03 DIAGNOSIS — M25.511 CHRONIC RIGHT SHOULDER PAIN: ICD-10-CM

## 2021-11-03 PROCEDURE — 97140 MANUAL THERAPY 1/> REGIONS: CPT | Performed by: PHYSICAL THERAPIST

## 2021-11-03 PROCEDURE — 97110 THERAPEUTIC EXERCISES: CPT | Performed by: PHYSICAL THERAPIST

## 2021-11-03 PROCEDURE — 97112 NEUROMUSCULAR REEDUCATION: CPT | Performed by: PHYSICAL THERAPIST

## 2021-11-08 ENCOUNTER — OFFICE VISIT (OUTPATIENT)
Dept: PHYSICAL THERAPY | Facility: CLINIC | Age: 71
End: 2021-11-08
Payer: COMMERCIAL

## 2021-11-08 DIAGNOSIS — M25.511 CHRONIC RIGHT SHOULDER PAIN: ICD-10-CM

## 2021-11-08 DIAGNOSIS — M54.12 CERVICAL RADICULOPATHY: Primary | ICD-10-CM

## 2021-11-08 DIAGNOSIS — G89.29 CHRONIC RIGHT SHOULDER PAIN: ICD-10-CM

## 2021-11-08 DIAGNOSIS — G56.31 IRRITATION OF RADIAL NERVE, RIGHT: ICD-10-CM

## 2021-11-08 PROCEDURE — 97112 NEUROMUSCULAR REEDUCATION: CPT

## 2021-11-08 PROCEDURE — 97110 THERAPEUTIC EXERCISES: CPT

## 2021-11-08 PROCEDURE — 97140 MANUAL THERAPY 1/> REGIONS: CPT

## 2021-11-10 ENCOUNTER — EVALUATION (OUTPATIENT)
Dept: PHYSICAL THERAPY | Facility: CLINIC | Age: 71
End: 2021-11-10
Payer: COMMERCIAL

## 2021-11-10 DIAGNOSIS — M25.511 CHRONIC RIGHT SHOULDER PAIN: ICD-10-CM

## 2021-11-10 DIAGNOSIS — G89.29 CHRONIC RIGHT SHOULDER PAIN: ICD-10-CM

## 2021-11-10 DIAGNOSIS — G56.31 IRRITATION OF RADIAL NERVE, RIGHT: ICD-10-CM

## 2021-11-10 DIAGNOSIS — M54.12 CERVICAL RADICULOPATHY: Primary | ICD-10-CM

## 2021-11-10 PROCEDURE — 97140 MANUAL THERAPY 1/> REGIONS: CPT | Performed by: PHYSICAL THERAPIST

## 2021-11-10 PROCEDURE — 97110 THERAPEUTIC EXERCISES: CPT | Performed by: PHYSICAL THERAPIST

## 2021-11-10 PROCEDURE — 97530 THERAPEUTIC ACTIVITIES: CPT | Performed by: PHYSICAL THERAPIST

## 2021-11-15 ENCOUNTER — APPOINTMENT (OUTPATIENT)
Dept: PHYSICAL THERAPY | Facility: CLINIC | Age: 71
End: 2021-11-15
Payer: COMMERCIAL

## 2021-11-17 ENCOUNTER — APPOINTMENT (OUTPATIENT)
Dept: PHYSICAL THERAPY | Facility: CLINIC | Age: 71
End: 2021-11-17
Payer: COMMERCIAL

## 2021-11-22 ENCOUNTER — OFFICE VISIT (OUTPATIENT)
Dept: PHYSICAL THERAPY | Facility: CLINIC | Age: 71
End: 2021-11-22
Payer: COMMERCIAL

## 2021-11-22 DIAGNOSIS — G89.29 CHRONIC RIGHT SHOULDER PAIN: ICD-10-CM

## 2021-11-22 DIAGNOSIS — M54.12 CERVICAL RADICULOPATHY: Primary | ICD-10-CM

## 2021-11-22 DIAGNOSIS — M25.511 CHRONIC RIGHT SHOULDER PAIN: ICD-10-CM

## 2021-11-22 DIAGNOSIS — G56.31 IRRITATION OF RADIAL NERVE, RIGHT: ICD-10-CM

## 2021-11-22 PROCEDURE — 97110 THERAPEUTIC EXERCISES: CPT

## 2021-11-22 PROCEDURE — 97140 MANUAL THERAPY 1/> REGIONS: CPT

## 2021-11-22 PROCEDURE — 97112 NEUROMUSCULAR REEDUCATION: CPT

## 2021-11-24 ENCOUNTER — OFFICE VISIT (OUTPATIENT)
Dept: PHYSICAL THERAPY | Facility: CLINIC | Age: 71
End: 2021-11-24
Payer: COMMERCIAL

## 2021-11-24 DIAGNOSIS — G89.29 CHRONIC RIGHT SHOULDER PAIN: ICD-10-CM

## 2021-11-24 DIAGNOSIS — M54.12 CERVICAL RADICULOPATHY: Primary | ICD-10-CM

## 2021-11-24 DIAGNOSIS — G56.31 IRRITATION OF RADIAL NERVE, RIGHT: ICD-10-CM

## 2021-11-24 DIAGNOSIS — M25.511 CHRONIC RIGHT SHOULDER PAIN: ICD-10-CM

## 2021-11-24 PROCEDURE — 97112 NEUROMUSCULAR REEDUCATION: CPT

## 2021-11-24 PROCEDURE — 97110 THERAPEUTIC EXERCISES: CPT

## 2021-11-24 PROCEDURE — 97140 MANUAL THERAPY 1/> REGIONS: CPT

## 2021-11-29 ENCOUNTER — OFFICE VISIT (OUTPATIENT)
Dept: PHYSICAL THERAPY | Facility: CLINIC | Age: 71
End: 2021-11-29
Payer: COMMERCIAL

## 2021-11-29 DIAGNOSIS — G89.29 CHRONIC RIGHT SHOULDER PAIN: ICD-10-CM

## 2021-11-29 DIAGNOSIS — E03.9 ACQUIRED HYPOTHYROIDISM: ICD-10-CM

## 2021-11-29 DIAGNOSIS — M25.511 CHRONIC RIGHT SHOULDER PAIN: ICD-10-CM

## 2021-11-29 DIAGNOSIS — G56.31 IRRITATION OF RADIAL NERVE, RIGHT: ICD-10-CM

## 2021-11-29 DIAGNOSIS — M54.12 CERVICAL RADICULOPATHY: Primary | ICD-10-CM

## 2021-11-29 PROCEDURE — 97112 NEUROMUSCULAR REEDUCATION: CPT | Performed by: PHYSICAL THERAPIST

## 2021-11-29 PROCEDURE — 97110 THERAPEUTIC EXERCISES: CPT | Performed by: PHYSICAL THERAPIST

## 2021-11-29 PROCEDURE — 97140 MANUAL THERAPY 1/> REGIONS: CPT | Performed by: PHYSICAL THERAPIST

## 2021-11-29 RX ORDER — LEVOTHYROXINE SODIUM 0.05 MG/1
50 TABLET ORAL DAILY
Qty: 90 TABLET | Refills: 3 | Status: SHIPPED | OUTPATIENT
Start: 2021-11-29

## 2021-12-01 ENCOUNTER — OFFICE VISIT (OUTPATIENT)
Dept: PHYSICAL THERAPY | Facility: CLINIC | Age: 71
End: 2021-12-01
Payer: COMMERCIAL

## 2021-12-01 DIAGNOSIS — M54.12 CERVICAL RADICULOPATHY: Primary | ICD-10-CM

## 2021-12-01 DIAGNOSIS — G56.31 IRRITATION OF RADIAL NERVE, RIGHT: ICD-10-CM

## 2021-12-01 DIAGNOSIS — G89.29 CHRONIC RIGHT SHOULDER PAIN: ICD-10-CM

## 2021-12-01 DIAGNOSIS — M25.511 CHRONIC RIGHT SHOULDER PAIN: ICD-10-CM

## 2021-12-01 PROCEDURE — 97112 NEUROMUSCULAR REEDUCATION: CPT

## 2021-12-01 PROCEDURE — 97110 THERAPEUTIC EXERCISES: CPT

## 2021-12-01 PROCEDURE — 97140 MANUAL THERAPY 1/> REGIONS: CPT

## 2021-12-06 ENCOUNTER — OFFICE VISIT (OUTPATIENT)
Dept: PHYSICAL THERAPY | Facility: CLINIC | Age: 71
End: 2021-12-06
Payer: COMMERCIAL

## 2021-12-06 DIAGNOSIS — G56.31 IRRITATION OF RADIAL NERVE, RIGHT: ICD-10-CM

## 2021-12-06 DIAGNOSIS — G89.29 CHRONIC RIGHT SHOULDER PAIN: ICD-10-CM

## 2021-12-06 DIAGNOSIS — M54.12 CERVICAL RADICULOPATHY: Primary | ICD-10-CM

## 2021-12-06 DIAGNOSIS — M25.511 CHRONIC RIGHT SHOULDER PAIN: ICD-10-CM

## 2021-12-06 PROCEDURE — 97110 THERAPEUTIC EXERCISES: CPT

## 2021-12-06 PROCEDURE — 97112 NEUROMUSCULAR REEDUCATION: CPT

## 2021-12-06 PROCEDURE — 97140 MANUAL THERAPY 1/> REGIONS: CPT

## 2021-12-08 ENCOUNTER — OFFICE VISIT (OUTPATIENT)
Dept: PHYSICAL THERAPY | Facility: CLINIC | Age: 71
End: 2021-12-08
Payer: COMMERCIAL

## 2021-12-08 DIAGNOSIS — M25.511 CHRONIC RIGHT SHOULDER PAIN: ICD-10-CM

## 2021-12-08 DIAGNOSIS — G89.29 CHRONIC RIGHT SHOULDER PAIN: ICD-10-CM

## 2021-12-08 DIAGNOSIS — M54.12 CERVICAL RADICULOPATHY: Primary | ICD-10-CM

## 2021-12-08 DIAGNOSIS — G56.31 IRRITATION OF RADIAL NERVE, RIGHT: ICD-10-CM

## 2021-12-08 PROCEDURE — 97140 MANUAL THERAPY 1/> REGIONS: CPT | Performed by: PHYSICAL THERAPIST

## 2021-12-08 PROCEDURE — 97110 THERAPEUTIC EXERCISES: CPT | Performed by: PHYSICAL THERAPIST

## 2021-12-08 PROCEDURE — 97112 NEUROMUSCULAR REEDUCATION: CPT | Performed by: PHYSICAL THERAPIST

## 2021-12-10 ENCOUNTER — APPOINTMENT (OUTPATIENT)
Dept: LAB | Facility: CLINIC | Age: 71
End: 2021-12-10
Payer: COMMERCIAL

## 2021-12-10 LAB
25(OH)D3 SERPL-MCNC: 42.4 NG/ML (ref 30–100)
ALBUMIN SERPL BCP-MCNC: 3.8 G/DL (ref 3.5–5)
ALP SERPL-CCNC: 59 U/L (ref 46–116)
ALT SERPL W P-5'-P-CCNC: 46 U/L (ref 12–78)
ANION GAP SERPL CALCULATED.3IONS-SCNC: 8 MMOL/L (ref 4–13)
AST SERPL W P-5'-P-CCNC: 35 U/L (ref 5–45)
BASOPHILS # BLD AUTO: 0.04 THOUSANDS/ΜL (ref 0–0.1)
BASOPHILS NFR BLD AUTO: 1 % (ref 0–1)
BILIRUB SERPL-MCNC: 0.84 MG/DL (ref 0.2–1)
BUN SERPL-MCNC: 17 MG/DL (ref 5–25)
CALCIUM SERPL-MCNC: 9.4 MG/DL (ref 8.3–10.1)
CHLORIDE SERPL-SCNC: 110 MMOL/L (ref 100–108)
CHOLEST SERPL-MCNC: 179 MG/DL
CO2 SERPL-SCNC: 25 MMOL/L (ref 21–32)
CREAT SERPL-MCNC: 1.01 MG/DL (ref 0.6–1.3)
EOSINOPHIL # BLD AUTO: 0.05 THOUSAND/ΜL (ref 0–0.61)
EOSINOPHIL NFR BLD AUTO: 1 % (ref 0–6)
ERYTHROCYTE [DISTWIDTH] IN BLOOD BY AUTOMATED COUNT: 13.5 % (ref 11.6–15.1)
GFR SERPL CREATININE-BSD FRML MDRD: 57 ML/MIN/1.73SQ M
GLUCOSE P FAST SERPL-MCNC: 89 MG/DL (ref 65–99)
HCT VFR BLD AUTO: 45.8 % (ref 34.8–46.1)
HDLC SERPL-MCNC: 65 MG/DL
HGB BLD-MCNC: 14.9 G/DL (ref 11.5–15.4)
IMM GRANULOCYTES # BLD AUTO: 0 THOUSAND/UL (ref 0–0.2)
IMM GRANULOCYTES NFR BLD AUTO: 0 % (ref 0–2)
LDLC SERPL CALC-MCNC: 89 MG/DL (ref 0–100)
LYMPHOCYTES # BLD AUTO: 1.48 THOUSANDS/ΜL (ref 0.6–4.47)
LYMPHOCYTES NFR BLD AUTO: 34 % (ref 14–44)
MCH RBC QN AUTO: 30.8 PG (ref 26.8–34.3)
MCHC RBC AUTO-ENTMCNC: 32.5 G/DL (ref 31.4–37.4)
MCV RBC AUTO: 95 FL (ref 82–98)
MONOCYTES # BLD AUTO: 0.27 THOUSAND/ΜL (ref 0.17–1.22)
MONOCYTES NFR BLD AUTO: 6 % (ref 4–12)
NEUTROPHILS # BLD AUTO: 2.48 THOUSANDS/ΜL (ref 1.85–7.62)
NEUTS SEG NFR BLD AUTO: 58 % (ref 43–75)
NONHDLC SERPL-MCNC: 114 MG/DL
NRBC BLD AUTO-RTO: 0 /100 WBCS
PLATELET # BLD AUTO: 337 THOUSANDS/UL (ref 149–390)
PMV BLD AUTO: 10.5 FL (ref 8.9–12.7)
POTASSIUM SERPL-SCNC: 4.3 MMOL/L (ref 3.5–5.3)
PROT SERPL-MCNC: 7.3 G/DL (ref 6.4–8.2)
RBC # BLD AUTO: 4.83 MILLION/UL (ref 3.81–5.12)
SODIUM SERPL-SCNC: 143 MMOL/L (ref 136–145)
TRIGL SERPL-MCNC: 123 MG/DL
TSH SERPL DL<=0.05 MIU/L-ACNC: 2.05 UIU/ML (ref 0.36–3.74)
WBC # BLD AUTO: 4.32 THOUSAND/UL (ref 4.31–10.16)

## 2021-12-10 PROCEDURE — 85025 COMPLETE CBC W/AUTO DIFF WBC: CPT | Performed by: FAMILY MEDICINE

## 2021-12-10 PROCEDURE — 80061 LIPID PANEL: CPT | Performed by: FAMILY MEDICINE

## 2021-12-10 PROCEDURE — 36415 COLL VENOUS BLD VENIPUNCTURE: CPT | Performed by: FAMILY MEDICINE

## 2021-12-10 PROCEDURE — 82306 VITAMIN D 25 HYDROXY: CPT | Performed by: FAMILY MEDICINE

## 2021-12-10 PROCEDURE — 80053 COMPREHEN METABOLIC PANEL: CPT | Performed by: FAMILY MEDICINE

## 2021-12-10 PROCEDURE — 84443 ASSAY THYROID STIM HORMONE: CPT | Performed by: FAMILY MEDICINE

## 2021-12-17 ENCOUNTER — RA CDI HCC (OUTPATIENT)
Dept: OTHER | Facility: HOSPITAL | Age: 71
End: 2021-12-17

## 2021-12-22 ENCOUNTER — EVALUATION (OUTPATIENT)
Dept: PHYSICAL THERAPY | Facility: CLINIC | Age: 71
End: 2021-12-22
Payer: COMMERCIAL

## 2021-12-22 DIAGNOSIS — G89.29 CHRONIC RIGHT SHOULDER PAIN: ICD-10-CM

## 2021-12-22 DIAGNOSIS — M54.12 CERVICAL RADICULOPATHY: Primary | ICD-10-CM

## 2021-12-22 DIAGNOSIS — M25.511 CHRONIC RIGHT SHOULDER PAIN: ICD-10-CM

## 2021-12-22 DIAGNOSIS — G56.31 IRRITATION OF RADIAL NERVE, RIGHT: ICD-10-CM

## 2021-12-22 PROCEDURE — 97112 NEUROMUSCULAR REEDUCATION: CPT | Performed by: PHYSICAL THERAPIST

## 2021-12-22 PROCEDURE — 97140 MANUAL THERAPY 1/> REGIONS: CPT | Performed by: PHYSICAL THERAPIST

## 2021-12-22 PROCEDURE — 97110 THERAPEUTIC EXERCISES: CPT | Performed by: PHYSICAL THERAPIST

## 2021-12-23 ENCOUNTER — OFFICE VISIT (OUTPATIENT)
Dept: FAMILY MEDICINE CLINIC | Facility: CLINIC | Age: 71
End: 2021-12-23
Payer: COMMERCIAL

## 2021-12-23 VITALS
SYSTOLIC BLOOD PRESSURE: 122 MMHG | RESPIRATION RATE: 16 BRPM | TEMPERATURE: 96.4 F | WEIGHT: 191 LBS | HEART RATE: 86 BPM | BODY MASS INDEX: 30.7 KG/M2 | DIASTOLIC BLOOD PRESSURE: 80 MMHG | HEIGHT: 66 IN

## 2021-12-23 DIAGNOSIS — J30.89 NON-SEASONAL ALLERGIC RHINITIS DUE TO FUNGAL SPORES: ICD-10-CM

## 2021-12-23 DIAGNOSIS — N18.31 STAGE 3A CHRONIC KIDNEY DISEASE (HCC): ICD-10-CM

## 2021-12-23 DIAGNOSIS — K58.0 IRRITABLE BOWEL SYNDROME WITH DIARRHEA: ICD-10-CM

## 2021-12-23 DIAGNOSIS — J45.30 MILD PERSISTENT ASTHMA WITHOUT COMPLICATION: ICD-10-CM

## 2021-12-23 DIAGNOSIS — E03.9 ACQUIRED HYPOTHYROIDISM: ICD-10-CM

## 2021-12-23 DIAGNOSIS — M81.0 AGE-RELATED OSTEOPOROSIS WITHOUT CURRENT PATHOLOGICAL FRACTURE: ICD-10-CM

## 2021-12-23 DIAGNOSIS — K76.0 FATTY LIVER: ICD-10-CM

## 2021-12-23 DIAGNOSIS — R79.89 ELEVATED HOMOCYSTEINE: ICD-10-CM

## 2021-12-23 DIAGNOSIS — E78.2 MIXED HYPERLIPIDEMIA: Primary | ICD-10-CM

## 2021-12-23 DIAGNOSIS — E55.9 VITAMIN D DEFICIENCY: ICD-10-CM

## 2021-12-23 PROBLEM — Z86.16 HISTORY OF COVID-19: Status: ACTIVE | Noted: 2021-10-06

## 2021-12-23 PROCEDURE — 3008F BODY MASS INDEX DOCD: CPT | Performed by: FAMILY MEDICINE

## 2021-12-23 PROCEDURE — 99214 OFFICE O/P EST MOD 30 MIN: CPT | Performed by: FAMILY MEDICINE

## 2021-12-23 PROCEDURE — 1036F TOBACCO NON-USER: CPT | Performed by: FAMILY MEDICINE

## 2021-12-23 PROCEDURE — 1160F RVW MEDS BY RX/DR IN RCRD: CPT | Performed by: FAMILY MEDICINE

## 2022-01-06 ENCOUNTER — ANNUAL EXAM (OUTPATIENT)
Dept: OBGYN CLINIC | Facility: MEDICAL CENTER | Age: 72
End: 2022-01-06
Payer: COMMERCIAL

## 2022-01-06 VITALS
BODY MASS INDEX: 30.37 KG/M2 | SYSTOLIC BLOOD PRESSURE: 130 MMHG | DIASTOLIC BLOOD PRESSURE: 84 MMHG | HEIGHT: 66 IN | WEIGHT: 189 LBS | TEMPERATURE: 97.9 F

## 2022-01-06 DIAGNOSIS — N84.1 CERVICAL POLYP: ICD-10-CM

## 2022-01-06 DIAGNOSIS — Z01.419 ENCOUNTER FOR GYNECOLOGICAL EXAMINATION: Primary | ICD-10-CM

## 2022-01-06 PROCEDURE — G0101 CA SCREEN;PELVIC/BREAST EXAM: HCPCS | Performed by: PHYSICIAN ASSISTANT

## 2022-01-06 PROCEDURE — 3008F BODY MASS INDEX DOCD: CPT | Performed by: FAMILY MEDICINE

## 2022-01-06 PROCEDURE — G0145 SCR C/V CYTO,THINLAYER,RESCR: HCPCS | Performed by: PHYSICIAN ASSISTANT

## 2022-01-06 PROCEDURE — G0476 HPV COMBO ASSAY CA SCREEN: HCPCS | Performed by: PHYSICIAN ASSISTANT

## 2022-01-06 NOTE — PROGRESS NOTES
Assessment   80 y o  postmenopausal female presenting for annual exam      Plan   Diagnoses and all orders for this visit:    Encounter for gynecological examination  -     Liquid-based pap, screening    Cervical polyp    This is new since last yearly in 2020, however, is not symptomatic or bleeding  Does not require removal at this time  Will continue to monitor and await pap results  Patient notified of findings and will report any bleeding or pain  [facility] is agreeable to plan  Pap -- done today   Mammo slip given -- has scheduled   Colonoscopy due 2022 -- is scheduled    DEXA due -- scheduled --ordered by PCP        Encouraged 150 min of exercise per week  Reviewed balanced diet  Vitamin D and Calcium supplement recommended  RTO one year for yearly exam or sooner as needed  __________________________________________________________________      Subjective     Alfredo Rufus is a 80 y o  postmenopausal female presenting for annual exam  She denies vaginal discharge, itching, odor, bleeding, or dryness  Sexual activity: She is not sexually active  She is without complaint  SCREENING  Last Pap: 9/8/2016 NILM  Last Mammo: 03/15/2021 BIRADS 1 negative  Last Colonoscopy: 07/20/2017 -- 5 year recall  Last DEXA: 3/12/2020 -- scheduled   GYN  Complaints: denies  Denies genital discharge, genital ulcers, pelvic pain and vulvar/vaginal symptoms  Menopausal symptoms none   Hx STI: denies   Hx Abnormal pap: denies  Last pap: As above  We reviewed ASCCP guidelines for Pap testing today  Patient is >65 thus eligible to d/c pap smear, however, she has only one documented negative at this time, thus we will collect today         OB  [id number]        Complaints: denies  Denies change in urinary stream, dysuria, hematuria, nocturia and urinary frequency/urgency    BREAST  Complaints: denies  Denies: breast lump, breast tenderness, dryness, nipple discharge, pruritus, skin color change and skin lesion(s)  Last mammogram: As above  Personal hx: none      GENERAL  PMH reviewed/updated and is as below  Patient does follow with a PCP      Pertinent Family Hx:   Family hx of breast cancer: no  Family hx of ovarian cancer: no  Family hx of colon cancer: no      Past Medical History:   Diagnosis Date    Acute asthma exacerbation     last assessed 8/15/17    Cataract     Disease of thyroid gland     Hypertriglyceridemia     Osteoporosis     Right lumbar radiculopathy 12/15/2016    Urinary tract infection     Vertigo        Past Surgical History:   Procedure Laterality Date    APPENDECTOMY  1979    CATARACT EXTRACTION      HAND SURGERY Right 2007    thumb    LAPAROSCOPY           Current Outpatient Medications:     albuterol (2 5 mg/3 mL) 0 083 % nebulizer solution, Take 1 vial (2 5 mg total) by nebulization every 6 (six) hours as needed for wheezing or shortness of breath (Patient taking differently: Take 2 5 mg by nebulization as needed for wheezing or shortness of breath ), Disp: 30 vial, Rfl: 0    albuterol (Ventolin HFA) 90 mcg/act inhaler, 2 puffs Q 6 hours prn cough/wheezing, Disp: 18 g, Rfl: 3    aspirin 81 MG tablet, Take 1 tablet by mouth daily, Disp: , Rfl:     BIOTIN PO, Take by mouth, Disp: , Rfl:     Breo Ellipta 200-25 MCG/INH inhaler, Inhale 1 puff daily, Disp: 60 blister, Rfl: 1    Cholecalciferol 4000 units CAPS, Take 1 capsule by mouth daily, Disp: , Rfl:     Coenzyme Q10 (CO Q-10) 30 MG CAPS, Take 30 mg by mouth daily, Disp: , Rfl:     cyclobenzaprine (FLEXERIL) 5 mg tablet, Take 1 tablet (5 mg total) by mouth daily at bedtime (Patient taking differently: Take 5 mg by mouth as needed ), Disp: 5 tablet, Rfl: 0    denosumab (Prolia) 60 mg/mL, Inject 60 mL under the skin every 6 (six) months, Disp: , Rfl:     fenofibrate (TRIGLIDE) 160 MG tablet, Take 1 tablet (160 mg total) by mouth daily, Disp: 90 tablet, Rfl: 3    L-Methylfolate-B6-B12 3-35-2 MG TABS, TAKE 1 TABLET BY MOUTH EVERY DAY, Disp: 90 tablet, Rfl: 3    levothyroxine 50 mcg tablet, Take 1 tablet (50 mcg total) by mouth daily, Disp: 90 tablet, Rfl: 3    nitrofurantoin (MACRODANTIN) 50 mg capsule, Take 1 capsule by mouth daily , Disp: , Rfl:     Allergies   Allergen Reactions    Penicillins      Shown on allergy testing   Codeine Anxiety    Dust Mite Extract     Molds & Smuts     Pseudoephedrine     Rabbit Epithelium     Sulfa Antibiotics Hives       Social History     Socioeconomic History    Marital status: /Civil Union     Spouse name: Not on file    Number of children: Not on file    Years of education: Not on file    Highest education level: Not on file   Occupational History    Not on file   Tobacco Use    Smoking status: Former Smoker     Packs/day: 0 25     Years: 4 00     Pack years: 1 00     Types: Cigarettes     Quit date: 1979     Years since quittin 0    Smokeless tobacco: Never Used    Tobacco comment:     Vaping Use    Vaping Use: Never used   Substance and Sexual Activity    Alcohol use: No    Drug use: No    Sexual activity: Not Currently   Other Topics Concern    Not on file   Social History Narrative    Not on file     Social Determinants of Health     Financial Resource Strain: Not on file   Food Insecurity: Not on file   Transportation Needs: Not on file   Physical Activity: Not on file   Stress: Not on file   Social Connections: Not on file   Intimate Partner Violence: Not on file   Housing Stability: Not on file         Review of Systems     ROS:  Constitutional: Negative for appetite change, fatigue and unexpected weight change  Respiratory: Negative  Cardiovascular: Negative  Gastrointestinal: Negative for abdominal pain and change in bowel habits/constipation/diarrhea  Breasts:  Negative for tenderness, pain or masses  Genitourinary: Negative for difficulty urinating, pelvic pain, vaginal bleeding, vaginal discharge, itching or odor    Psychiatric: Negative for mood difficulties  Objective      /84 (BP Location: Left arm, Patient Position: Sitting)   Temp 97 9 °F (36 6 °C)   Ht 5' 5 5" (1 664 m)   Wt 85 7 kg (189 lb)   BMI 30 97 kg/m²     Physical Examination:  Patient appears well and is not in distress  Neck is supple without masses  Breasts are symmetrical without mass, tenderness, nipple discharge, skin changes or adenopathy  Abdomen is soft and nontender without masses  External genitals are normal without lesions or rashes  Urethral meatus and urethra are normal  Bladder is normal to palpation  Vagina is normal without discharge or bleeding  Cervix: Lobular erythematous polyp that appears to be arising from or within the endocervical canal, ~1 cm in diameter  Otherwise normal without discharge or lesion  Uterus is normal, mobile, nontender without palpable mass  Adnexa are normal, nontender, without palpable mass  Atrophic changes noted on exam    Exam limited due to body habitus

## 2022-01-11 LAB
HPV HR 12 DNA CVX QL NAA+PROBE: NEGATIVE
HPV16 DNA CVX QL NAA+PROBE: NEGATIVE
HPV18 DNA CVX QL NAA+PROBE: NEGATIVE

## 2022-01-17 LAB
LAB AP GYN PRIMARY INTERPRETATION: NORMAL
Lab: NORMAL

## 2022-02-21 DIAGNOSIS — R79.89 ELEVATED HOMOCYSTEINE: ICD-10-CM

## 2022-02-22 RX ORDER — MECOBAL/LEVOMEFOLAT CA/B6 PHOS 2-3-35 MG
1 TABLET ORAL DAILY
Qty: 90 TABLET | Refills: 3 | Status: SHIPPED | OUTPATIENT
Start: 2022-02-22

## 2022-03-17 ENCOUNTER — HOSPITAL ENCOUNTER (OUTPATIENT)
Dept: RADIOLOGY | Age: 72
Discharge: HOME | End: 2022-03-17
Payer: COMMERCIAL

## 2022-03-17 VITALS — BODY MASS INDEX: 30.37 KG/M2 | WEIGHT: 189 LBS | HEIGHT: 66 IN

## 2022-03-17 DIAGNOSIS — Z12.31 ENCOUNTER FOR SCREENING MAMMOGRAM FOR MALIGNANT NEOPLASM OF BREAST: ICD-10-CM

## 2022-03-17 DIAGNOSIS — Z13.820 ENCOUNTER FOR SCREENING FOR OSTEOPOROSIS: ICD-10-CM

## 2022-03-17 PROCEDURE — 77067 SCR MAMMO BI INCL CAD: CPT

## 2022-03-17 PROCEDURE — 77063 BREAST TOMOSYNTHESIS BI: CPT

## 2022-03-17 PROCEDURE — 77080 DXA BONE DENSITY AXIAL: CPT

## 2022-03-21 ENCOUNTER — HOSPITAL ENCOUNTER (OUTPATIENT)
Dept: MAMMOGRAPHY | Facility: CLINIC | Age: 72
Discharge: HOME | End: 2022-03-21
Payer: COMMERCIAL

## 2022-03-21 DIAGNOSIS — R92.1 CALCIFICATION OF LEFT BREAST ON MAMMOGRAPHY: ICD-10-CM

## 2022-03-21 PROCEDURE — 77065 DX MAMMO INCL CAD UNI: CPT

## 2022-03-21 NOTE — PROGRESS NOTES
Met with patient and Fatemeh Trivedi   regarding recommendation for;    _____ RIGHT ____x__LEFT      _____Ultrasound guided  ___x___Stereotactic breast biopsy  __X___Verbalized understanding        Blood thinners:  No: _____ Yes: _x_____ What:     ASA 81 mg            Biopsy teaching sheet given:  Yes: ___X___ No: ________    Pt given contact information and adv to call with any questions/needs

## 2022-04-04 ENCOUNTER — APPOINTMENT (OUTPATIENT)
Dept: LAB | Facility: CLINIC | Age: 72
End: 2022-04-04
Payer: COMMERCIAL

## 2022-04-04 DIAGNOSIS — M81.0 SENILE OSTEOPOROSIS: ICD-10-CM

## 2022-04-04 LAB
25(OH)D3 SERPL-MCNC: 45.4 NG/ML (ref 30–100)
ALBUMIN SERPL BCP-MCNC: 3.7 G/DL (ref 3.5–5)
ALP SERPL-CCNC: 67 U/L (ref 46–116)
ALT SERPL W P-5'-P-CCNC: 47 U/L (ref 12–78)
ANION GAP SERPL CALCULATED.3IONS-SCNC: 3 MMOL/L (ref 4–13)
AST SERPL W P-5'-P-CCNC: 38 U/L (ref 5–45)
BASOPHILS # BLD AUTO: 0.05 THOUSANDS/ΜL (ref 0–0.1)
BASOPHILS NFR BLD AUTO: 1 % (ref 0–1)
BILIRUB SERPL-MCNC: 0.56 MG/DL (ref 0.2–1)
BUN SERPL-MCNC: 15 MG/DL (ref 5–25)
CALCIUM SERPL-MCNC: 9.3 MG/DL (ref 8.3–10.1)
CHLORIDE SERPL-SCNC: 109 MMOL/L (ref 100–108)
CHOLEST SERPL-MCNC: 170 MG/DL
CO2 SERPL-SCNC: 28 MMOL/L (ref 21–32)
CREAT SERPL-MCNC: 1.08 MG/DL (ref 0.6–1.3)
EOSINOPHIL # BLD AUTO: 0.06 THOUSAND/ΜL (ref 0–0.61)
EOSINOPHIL NFR BLD AUTO: 1 % (ref 0–6)
ERYTHROCYTE [DISTWIDTH] IN BLOOD BY AUTOMATED COUNT: 13.1 % (ref 11.6–15.1)
GFR SERPL CREATININE-BSD FRML MDRD: 52 ML/MIN/1.73SQ M
GLUCOSE P FAST SERPL-MCNC: 93 MG/DL (ref 65–99)
HCT VFR BLD AUTO: 44.3 % (ref 34.8–46.1)
HDLC SERPL-MCNC: 58 MG/DL
HGB BLD-MCNC: 14.2 G/DL (ref 11.5–15.4)
IMM GRANULOCYTES # BLD AUTO: 0.02 THOUSAND/UL (ref 0–0.2)
IMM GRANULOCYTES NFR BLD AUTO: 0 % (ref 0–2)
LDLC SERPL CALC-MCNC: 94 MG/DL (ref 0–100)
LYMPHOCYTES # BLD AUTO: 1.26 THOUSANDS/ΜL (ref 0.6–4.47)
LYMPHOCYTES NFR BLD AUTO: 26 % (ref 14–44)
MCH RBC QN AUTO: 30.8 PG (ref 26.8–34.3)
MCHC RBC AUTO-ENTMCNC: 32.1 G/DL (ref 31.4–37.4)
MCV RBC AUTO: 96 FL (ref 82–98)
MONOCYTES # BLD AUTO: 0.23 THOUSAND/ΜL (ref 0.17–1.22)
MONOCYTES NFR BLD AUTO: 5 % (ref 4–12)
NEUTROPHILS # BLD AUTO: 3.3 THOUSANDS/ΜL (ref 1.85–7.62)
NEUTS SEG NFR BLD AUTO: 67 % (ref 43–75)
NONHDLC SERPL-MCNC: 112 MG/DL
NRBC BLD AUTO-RTO: 0 /100 WBCS
PLATELET # BLD AUTO: 336 THOUSANDS/UL (ref 149–390)
PMV BLD AUTO: 10.7 FL (ref 8.9–12.7)
POTASSIUM SERPL-SCNC: 4.2 MMOL/L (ref 3.5–5.3)
PROT SERPL-MCNC: 7.2 G/DL (ref 6.4–8.2)
RBC # BLD AUTO: 4.61 MILLION/UL (ref 3.81–5.12)
SODIUM SERPL-SCNC: 140 MMOL/L (ref 136–145)
TRIGL SERPL-MCNC: 91 MG/DL
TSH SERPL DL<=0.05 MIU/L-ACNC: 2.57 UIU/ML (ref 0.45–4.5)
WBC # BLD AUTO: 4.92 THOUSAND/UL (ref 4.31–10.16)

## 2022-04-04 PROCEDURE — 80061 LIPID PANEL: CPT | Performed by: FAMILY MEDICINE

## 2022-04-04 PROCEDURE — 36415 COLL VENOUS BLD VENIPUNCTURE: CPT | Performed by: FAMILY MEDICINE

## 2022-04-04 PROCEDURE — 80053 COMPREHEN METABOLIC PANEL: CPT | Performed by: FAMILY MEDICINE

## 2022-04-04 PROCEDURE — 82306 VITAMIN D 25 HYDROXY: CPT | Performed by: FAMILY MEDICINE

## 2022-04-04 PROCEDURE — 85025 COMPLETE CBC W/AUTO DIFF WBC: CPT | Performed by: FAMILY MEDICINE

## 2022-04-04 PROCEDURE — 84443 ASSAY THYROID STIM HORMONE: CPT | Performed by: FAMILY MEDICINE

## 2022-04-05 ENCOUNTER — HOSPITAL ENCOUNTER (OUTPATIENT)
Dept: MAMMOGRAPHY | Facility: CLINIC | Age: 72
Discharge: HOME | End: 2022-04-05

## 2022-04-05 ENCOUNTER — HOSPITAL ENCOUNTER (OUTPATIENT)
Dept: MAMMOGRAPHY | Facility: CLINIC | Age: 72
Discharge: HOME | End: 2022-04-05
Admitting: RADIOLOGY
Payer: COMMERCIAL

## 2022-04-05 VITALS
SYSTOLIC BLOOD PRESSURE: 161 MMHG | HEART RATE: 67 BPM | BODY MASS INDEX: 30.37 KG/M2 | DIASTOLIC BLOOD PRESSURE: 71 MMHG | WEIGHT: 189 LBS | HEIGHT: 66 IN

## 2022-04-05 DIAGNOSIS — R92.8 ABNORMAL MAMMOGRAM: ICD-10-CM

## 2022-04-05 PROCEDURE — 88342 IMHCHEM/IMCYTCHM 1ST ANTB: CPT | Performed by: PATHOLOGY

## 2022-04-05 PROCEDURE — 88305 TISSUE EXAM BY PATHOLOGIST: CPT | Performed by: PATHOLOGY

## 2022-04-05 PROCEDURE — 88341 IMHCHEM/IMCYTCHM EA ADD ANTB: CPT | Performed by: PATHOLOGY

## 2022-04-05 PROCEDURE — A4648 IMPLANTABLE TISSUE MARKER: HCPCS

## 2022-04-05 PROCEDURE — 19081 BX BREAST 1ST LESION STRTCTC: CPT

## 2022-04-05 RX ORDER — LIDOCAINE HYDROCHLORIDE AND EPINEPHRINE 20; 5 MG/ML; UG/ML
10 INJECTION, SOLUTION EPIDURAL; INFILTRATION; INTRACAUDAL; PERINEURAL ONCE
Status: COMPLETED | OUTPATIENT
Start: 2022-04-05 | End: 2022-04-05

## 2022-04-05 RX ORDER — LIDOCAINE HYDROCHLORIDE 10 MG/ML
5 INJECTION, SOLUTION EPIDURAL; INFILTRATION; INTRACAUDAL; PERINEURAL ONCE
Status: COMPLETED | OUTPATIENT
Start: 2022-04-05 | End: 2022-04-05

## 2022-04-05 RX ADMIN — LIDOCAINE HYDROCHLORIDE AND EPINEPHRINE 10 ML: 20; 5 INJECTION, SOLUTION EPIDURAL; INFILTRATION; INTRACAUDAL; PERINEURAL at 09:15

## 2022-04-05 RX ADMIN — LIDOCAINE HYDROCHLORIDE 5 ML: 10 INJECTION, SOLUTION EPIDURAL; INFILTRATION; INTRACAUDAL; PERINEURAL at 09:15

## 2022-04-05 NOTE — PROGRESS NOTES
Procedure type:    _____ultrasound guided ___x__stereotactic    Breast:    __x___Left _____Right    Location: Retro    Needle: 8 gauge mammotome     # of passes: 3 ( 1 with calcifications, 2 without calcifications )    Clip: Mammotome cody perry    Performed by: Dr Tristian Pascual held for 5 minutes by: Sea Peterson     Steri Strips:    ___x__yes _____no    Band aid:    __x___yes_____no    Tape and guaze:    _____yes ___x__no    Tolerated procedure:    __x___yes _____no

## 2022-04-05 NOTE — DISCHARGE INSTR - OTHER ORDERS
POST LARGE CORE BREAST BIOPSY PATIENT INFORMATION      Place an ice pack inside your bra over the top of the dressing every hour for 20 minutes (20 minutes on, 60 minutes off)  Do this until bedtime  Do not shower or bathe until the following morning  After bathing, you may remove the bandaid  You may bathe your breast carefully with the steri-strips in place  Be careful not to loosen them  The steri-strips will fall off in 3-5 days  You may have mild discomfort, and you may have some bruising where the needle entered the skin  This should clear within 5-7 days  If you need medicine for discomfort, take acetaminophen products such as Tylenol  You may also take Advil or Motrin products  DO NOT use Aspirin for the first 24 hours  Do not participate in strenuous activities such as-tennis, aerobics, weight lifting, etc  for 24 hours  Refrain from swimming/soaking for 72 hours  Wearing a bra for sleeping may be more comfortable for the first 24-48 hours after your biopsy  Watch for continued bleeding, pain or fever over 101  If any of these symptoms occur, please contact our breast nurse navigator at the location where your biopsy was performed  During normal business hours (7:30am - 4:00pm) please call the nurse navigator at the site     where your procedure was performed:       [address]: [phone] or [phone] [address]: [phone] or [phone]     Ashley [address]: [address] [facility]: [address] [facility] [address]: [phone]        After 4pm - please call your physician or go to the nearest Emergency Department location  The final results of your biopsy are usually available within one week

## 2022-04-05 NOTE — PROGRESS NOTES
Ice pack given:    __x___yes _____no    Discharge instructions signed by patient:    __x___yes _____no    Discharge instructions given to patient:    __x___yes _____no    Discharged via:    ___x__ambulatory    _____wheelchair    _____stretcher    Stable on discharge:    ___x__yes ____no  Patient would like results over the phone  Ok to leave a message  Patient made aware of the potential of bloody nipple discharge

## 2022-04-06 NOTE — PROGRESS NOTES
Post procedure call completed    Bleeding: _____yes __X___no (pt denies)    Pain: _____yes ___X___no (pt with slight discomfort, used ice, took OTC pain meds yesterday with relief)    Redness/Swelling: ______yes ___X___no (pt states maybe slight swelling)    Band aid removed: __X___yes _____no    Steri-Strips intact: ___X___yes _____no (discussed with patient to remove steri strips on Sunday if they have not come off on their own)    Pt with no questions at this time, adv will call when results available, adv to call with any questions or concerns, has name/# for contact

## 2022-04-07 ENCOUNTER — TELEPHONE (OUTPATIENT)
Dept: MAMMOGRAPHY | Facility: CLINIC | Age: 72
End: 2022-04-07

## 2022-05-24 ENCOUNTER — RA CDI HCC (OUTPATIENT)
Dept: OTHER | Facility: HOSPITAL | Age: 72
End: 2022-05-24

## 2022-05-24 NOTE — PROGRESS NOTES
[facility]  coding opportunities       Chart reviewed, no opportunity found:   [address]        Patients Insurance     Medicare Insurance: [employer] Advantage

## 2022-05-31 ENCOUNTER — OFFICE VISIT (OUTPATIENT)
Dept: FAMILY MEDICINE CLINIC | Facility: CLINIC | Age: 72
End: 2022-05-31
Payer: COMMERCIAL

## 2022-05-31 VITALS
HEART RATE: 88 BPM | SYSTOLIC BLOOD PRESSURE: 134 MMHG | RESPIRATION RATE: 16 BRPM | TEMPERATURE: 97.2 F | HEIGHT: 66 IN | BODY MASS INDEX: 30.7 KG/M2 | DIASTOLIC BLOOD PRESSURE: 76 MMHG | WEIGHT: 191 LBS

## 2022-05-31 DIAGNOSIS — K76.0 FATTY LIVER: ICD-10-CM

## 2022-05-31 DIAGNOSIS — Z13.89 ENCOUNTER FOR SCREENING FOR OTHER DISORDER: ICD-10-CM

## 2022-05-31 DIAGNOSIS — E55.9 VITAMIN D DEFICIENCY: ICD-10-CM

## 2022-05-31 DIAGNOSIS — Z00.00 MEDICARE ANNUAL WELLNESS VISIT, SUBSEQUENT: ICD-10-CM

## 2022-05-31 DIAGNOSIS — R79.89 ELEVATED HOMOCYSTEINE: ICD-10-CM

## 2022-05-31 DIAGNOSIS — M81.0 AGE-RELATED OSTEOPOROSIS WITHOUT CURRENT PATHOLOGICAL FRACTURE: ICD-10-CM

## 2022-05-31 DIAGNOSIS — J45.30 MILD PERSISTENT ASTHMA WITHOUT COMPLICATION: ICD-10-CM

## 2022-05-31 DIAGNOSIS — E78.2 MIXED HYPERLIPIDEMIA: Primary | ICD-10-CM

## 2022-05-31 DIAGNOSIS — N18.31 STAGE 3A CHRONIC KIDNEY DISEASE (HCC): ICD-10-CM

## 2022-05-31 DIAGNOSIS — E03.9 ACQUIRED HYPOTHYROIDISM: ICD-10-CM

## 2022-05-31 DIAGNOSIS — M79.18 MYOFASCIAL PAIN SYNDROME: ICD-10-CM

## 2022-05-31 PROCEDURE — 3008F BODY MASS INDEX DOCD: CPT | Performed by: FAMILY MEDICINE

## 2022-05-31 PROCEDURE — 1090F PRES/ABSN URINE INCON ASSESS: CPT | Performed by: FAMILY MEDICINE

## 2022-05-31 PROCEDURE — G0444 DEPRESSION SCREEN ANNUAL: HCPCS | Performed by: FAMILY MEDICINE

## 2022-05-31 PROCEDURE — 1125F AMNT PAIN NOTED PAIN PRSNT: CPT | Performed by: FAMILY MEDICINE

## 2022-05-31 PROCEDURE — 1003F LEVEL OF ACTIVITY ASSESS: CPT | Performed by: FAMILY MEDICINE

## 2022-05-31 PROCEDURE — G0439 PPPS, SUBSEQ VISIT: HCPCS | Performed by: FAMILY MEDICINE

## 2022-05-31 PROCEDURE — 3725F SCREEN DEPRESSION PERFORMED: CPT | Performed by: FAMILY MEDICINE

## 2022-05-31 PROCEDURE — 1160F RVW MEDS BY RX/DR IN RCRD: CPT | Performed by: FAMILY MEDICINE

## 2022-05-31 PROCEDURE — 1101F PT FALLS ASSESS-DOCD LE1/YR: CPT | Performed by: FAMILY MEDICINE

## 2022-05-31 PROCEDURE — 20552 NJX 1/MLT TRIGGER POINT 1/2: CPT | Performed by: FAMILY MEDICINE

## 2022-05-31 PROCEDURE — 1036F TOBACCO NON-USER: CPT | Performed by: FAMILY MEDICINE

## 2022-05-31 PROCEDURE — 99214 OFFICE O/P EST MOD 30 MIN: CPT | Performed by: FAMILY MEDICINE

## 2022-05-31 PROCEDURE — 1170F FXNL STATUS ASSESSED: CPT | Performed by: FAMILY MEDICINE

## 2022-05-31 PROCEDURE — 3288F FALL RISK ASSESSMENT DOCD: CPT | Performed by: FAMILY MEDICINE

## 2022-05-31 RX ORDER — FLUTICASONE FUROATE AND VILANTEROL TRIFENATATE 100; 25 UG/1; UG/1
1 POWDER RESPIRATORY (INHALATION) DAILY
COMMUNITY
Start: 2022-05-26

## 2022-05-31 RX ORDER — FENOFIBRATE 160 MG/1
160 TABLET ORAL DAILY
Qty: 90 TABLET | Refills: 3 | Status: SHIPPED | OUTPATIENT
Start: 2022-05-31

## 2022-05-31 NOTE — PROGRESS NOTES
Assessment and Plan:     Problem List Items Addressed This Visit        Digestive    Fatty liver       Endocrine    Hypothyroidism    Relevant Orders    TSH, 3rd generation with Free T4 reflex       Respiratory    Mild persistent asthma without complication    Relevant Medications    Breo Ellipta 100-25 MCG/INH inhaler       Musculoskeletal and Integument    Osteoporosis       Genitourinary    Stage 3a chronic kidney disease ([facility] [address] )    Relevant Orders    CBC and differential    Comprehensive metabolic panel       Other    Elevated homocysteine    Mixed hyperlipidemia - Primary    Relevant Medications    fenofibrate 160 MG tablet    Other Relevant Orders    Lipid panel    Vitamin D deficiency      Other Visit Diagnoses     Myofascial pain syndrome        Relevant Medications    sarapin injection 2 mL (Completed)    Other Relevant Orders    Trigger Point Injection    Medicare annual wellness visit, subsequent        Encounter for screening for other disorder              Depression Screening and Follow-up Plan: Patient was screened for depression during today's encounter  They screened negative with a PHQ-2 score of 0  Preventive health issues were discussed with patient, and age appropriate screening tests were ordered as noted in patient's After Visit Summary  Personalized health advice and appropriate referrals for health education or preventive services given if needed, as noted in patient's After Visit Summary       History of Present Illness:     Patient presents for Medicare Annual Wellness visit    Patient Care Team:  Shaun Bhardwaj MD as PCP - General (Family Medicine)  MD Christo [facility] Res, MD (Urology)  Madalyn Long MD, [facility]"" (Rheumatology)     Problem List:     Patient Active Problem List   Diagnosis    Allergic rhinitis    Angiomyolipoma of kidney    Mild persistent asthma without complication    Elevated homocysteine    Fatty liver    Hypothyroidism    Irritable bowel syndrome  Lumbar spondylosis    Mixed hyperlipidemia    Osteoporosis    Vitamin D deficiency    History of COVID-19    Stage 3a chronic kidney disease (HCC)      Past Medical and Surgical History:     Past Medical History:   Diagnosis Date    Acute asthma exacerbation     last assessed 8/15/17    Cataract     Disease of thyroid gland     Hypertriglyceridemia     Osteoporosis     Right lumbar radiculopathy 12/15/2016    Urinary tract infection     Vertigo      Past Surgical History:   Procedure Laterality Date    APPENDECTOMY      CATARACT EXTRACTION      HAND SURGERY Right     thumb    LAPAROSCOPY      MAMMO STEREOTACTIC BREAST BIOPSY LEFT ([employer] [facility]) Left 2022      Family History:     Family History   Problem Relation Age of Onset    Cirrhosis Mother     Kidney nephrosis Mother     Hypertension Father     Other Father 80        extra mammary Padget's dx    Cancer Paternal Grandmother [de-identified]        unknown type    Heart disease Paternal Grandfather     Heart attack Paternal Grandfather     Kidney nephrosis Daughter     Osteoporosis Maternal Grandmother     Arthritis Maternal Grandmother     Heart attack Maternal Grandfather     Heart disease Maternal Grandfather     Kidney nephrosis Daughter     Lymphoma Maternal Aunt 79    No Known Problems Paternal Aunt     No Known Problems Paternal Aunt     No Known Problems Paternal Aunt       Social History:     Social History     Socioeconomic History    Marital status: /Civil Union     Spouse name: None    Number of children: None    Years of education: None    Highest education level: None   Occupational History    None   Tobacco Use    Smoking status: Former Smoker     Packs/day: 0 25     Years: 4 00     Pack years: 1 00     Types: Cigarettes     Quit date: 1979     Years since quittin 4    Smokeless tobacco: Never Used    Tobacco comment:     Vaping Use    Vaping Use: Never used   Substance and Sexual Activity  Alcohol use: No    Drug use: No    Sexual activity: Not Currently   Other Topics Concern    None   Social History Narrative    None     Social Determinants of Health     Financial Resource Strain: Not on file   Food Insecurity: Not on file   Transportation Needs: Not on file   Physical Activity: Not on file   Stress: Not on file   Social Connections: Not on file   Intimate Partner Violence: Not on file   Housing Stability: Not on file      Medications and Allergies:     Current Outpatient Medications   Medication Sig Dispense Refill    albuterol (2 5 mg/3 mL) 0 083 % nebulizer solution Take 1 vial (2 5 mg total) by nebulization every 6 (six) hours as needed for wheezing or shortness of breath (Patient taking differently: Take 2 5 mg by nebulization as needed for wheezing or shortness of breath) 30 vial 0    albuterol (Ventolin HFA) 90 mcg/act inhaler 2 puffs Q 6 hours prn cough/wheezing 18 g 3    aspirin 81 MG tablet Take 1 tablet by mouth daily      Breo Ellipta 100-25 MCG/INH inhaler Inhale 1 puff in the morning      Cholecalciferol 4000 units CAPS Take 1 capsule by mouth daily      Coenzyme Q10 (CO Q-10) 30 MG CAPS Take 30 mg by mouth daily      cyclobenzaprine (FLEXERIL) 5 mg tablet Take 1 tablet (5 mg total) by mouth daily at bedtime (Patient taking differently: Take 5 mg by mouth as needed) 5 tablet 0    denosumab (Prolia) 60 mg/mL Inject 60 mL under the skin every 6 (six) months      fenofibrate 160 MG tablet Take 1 tablet (160 mg total) by mouth daily 90 tablet 3    L-Methylfolate-B6-B12 3-35-2 MG TABS Take 1 tablet by mouth daily 90 tablet 3    levothyroxine 50 mcg tablet Take 1 tablet (50 mcg total) by mouth daily 90 tablet 3    nitrofurantoin (MACRODANTIN) 50 mg capsule Take 1 capsule by mouth daily        No current facility-administered medications for this visit  Allergies   Allergen Reactions    Penicillins      Shown on allergy testing      Codeine Anxiety    Dust Mite Extract     Molds & Smuts     Pseudoephedrine     Rabbit Epithelium     Sulfa Antibiotics Hives      Immunizations:     Immunization History   Administered Date(s) Administered    COVID-19 MODERNA VACC 0 5 ML IM 01/27/2021, 02/24/2021    INFLUENZA 12/10/2015, 10/19/2016, 10/26/2017, 10/20/2018, 10/20/2021    Influenza Quadrivalent, 6-35 Months IM 10/29/2014, 12/10/2015    Influenza Split High Dose Preservative Free IM 10/19/2016, 10/26/2017, 10/20/2018, 10/07/2019    Influenza, high dose seasonal 0 7 mL 09/30/2020    Influenza, seasonal, injectable 11/04/2013    Pneumococcal Conjugate 13-Valent 10/19/2016    Pneumococcal Polysaccharide PPV23 10/26/2017    Zoster 10/22/2020    Zoster Vaccine Recombinant 10/22/2020, 01/06/2021      Health Maintenance:         Topic Date Due    Colorectal Cancer Screening  07/20/2022    Hepatitis C Screening  12/23/2022 (Originally 1950)    Breast Cancer Screening: Mammogram  03/21/2023    DXA SCAN  03/17/2024         Topic Date Due    COVID-19 Vaccine (3 - Booster for Moderna series) 07/24/2021      Medicare Health Risk Assessment:     /76   Pulse 88   Temp (!) 97 2 °F (36 2 °C)   Resp 16   Ht 5' 5 5" (1 664 m)   Wt 86 6 kg (191 lb)   BMI 31 30 kg/m²      Beverly Rich is here for her Subsequent Wellness visit  Last Medicare Wellness visit information reviewed, patient interviewed and updates made to the record today  Health Risk Assessment:   Patient rates overall health as good  Patient feels that their physical health rating is same  Patient is satisfied with their life  Eyesight was rated as slightly worse  Hearing was rated as same  Patient feels that their emotional and mental health rating is slightly worse  Patients states they are never, rarely angry  Patient states they are sometimes unusually tired/fatigued  Pain experienced in the last 7 days has been some  Patient's pain rating has been 3/10   Patient states that she has experienced no weight loss or gain in last 6 months  Depression Screening:   PHQ-2 Score: 0      Fall Risk Screening: In the past year, patient has experienced: history of falling in past year    Number of falls: 1  Injured during fall?: No    Feels unsteady when standing or walking?: No    Worried about falling?: No      Urinary Incontinence Screening:   Patient has not leaked urine accidently in the last six months  Home Safety:  Patient does not have trouble with stairs inside or outside of their home  Patient has working smoke alarms and has working carbon monoxide detector  Home safety hazards include: loose rugs on the floor  Nutrition:   Current diet is Regular  Medications:   Patient is currently taking over-the-counter supplements  OTC medications include: see medication list  Patient is able to manage medications  Activities of Daily Living (ADLs)/Instrumental Activities of Daily Living (IADLs):   Walk and transfer into and out of bed and chair?: Yes  Dress and groom yourself?: Yes    Bathe or shower yourself?: Yes    Feed yourself?  Yes  Do your laundry/housekeeping?: Yes  Manage your money, pay your bills and track your expenses?: Yes  Make your own meals?: Yes    Do your own shopping?: Yes    Previous Hospitalizations:   Any hospitalizations or ED visits within the last 12 months?: No      Advance Care Planning:   Living will: No    Advanced directive: No      Cognitive Screening:   Provider or family/friend/caregiver concerned regarding cognition?: No    PREVENTIVE SCREENINGS      Cardiovascular Screening:    General: History Lipid Disorder and Screening Current      Diabetes Screening:     General: Screening Current      Colorectal Cancer Screening:     General: Screening Current      Breast Cancer Screening:     General: Screening Current      Cervical Cancer Screening:    General: Screening Not Indicated      Osteoporosis Screening:    General: History Osteoporosis and Screening Not Indicated      Abdominal Aortic Aneurysm (AAA) Screening:        General: Screening Not Indicated      Lung Cancer Screening:     General: Screening Not Indicated      Hepatitis C Screening:    General: Screening Current    Screening, Brief Intervention, and Referral to Treatment (SBIRT)    Screening  Typical number of drinks in a day: 0  Typical number of drinks in a week: 0  Interpretation: Low risk drinking behavior  Single Item Drug Screening:  How often have you used an illegal drug (including marijuana) or a prescription medication for non-medical reasons in the past year? never    Single Item Drug Screen Score: 0  Interpretation: Negative screen for possible drug use disorder    Brief Intervention  Alcohol & drug use screenings were reviewed  No concerns regarding substance use disorder identified  Other Counseling Topics:   Regular weightbearing exercise and calcium and vitamin D intake         Ochoa Javier MD

## 2022-05-31 NOTE — PATIENT INSTRUCTIONS
Medicare Preventive Visit Patient Instructions  Thank you for completing your Welcome to Medicare Visit or Medicare Annual Wellness Visit today  Your next wellness visit will be due in one year (6/1/2023)  The screening/preventive services that you may require over the next 5-10 years are detailed below  Some tests may not apply to you based off risk factors and/or age  Screening tests ordered at today's visit but not completed yet may show as past due  Also, please note that scanned in results may not display below  Preventive Screenings:  Service Recommendations Previous Testing/Comments   Colorectal Cancer Screening  * Colonoscopy    * Fecal Occult Blood Test (FOBT)/Fecal Immunochemical Test (FIT)  * Fecal DNA/Cologuard Test  * Flexible Sigmoidoscopy Age: 54-65 years old   Colonoscopy: every 10 years (may be performed more frequently if at higher risk)  OR  FOBT/FIT: every 1 year  OR  Cologuard: every 3 years  OR  Sigmoidoscopy: every 5 years  Screening may be recommended earlier than age 48 if at higher risk for colorectal cancer  Also, an individualized decision between you and your healthcare provider will decide whether screening between the ages of 74-80 would be appropriate  Colonoscopy: 07/20/2017  FOBT/FIT: Not on file  Cologuard: Not on file  Sigmoidoscopy: Not on file    Screening Current     Breast Cancer Screening Age: 36 years old  Frequency: every 1-2 years  Not required if history of left and right mastectomy Mammogram: 03/21/2022    Screening Current   Cervical Cancer Screening Between the ages of 21-29, pap smear recommended once every 3 years  Between the ages of 33-67, can perform pap smear with HPV co-testing every 5 years     Recommendations may differ for women with a history of total hysterectomy, cervical cancer, or abnormal pap smears in past  Pap Smear: 01/06/2022    Screening Not Indicated   Hepatitis C Screening Once for adults born between 1945 and 1965  More frequently in patients at high risk for Hepatitis C Hep C Antibody: Not on file    Screening Current   Diabetes Screening 1-2 times per year if you're at risk for diabetes or have pre-diabetes Fasting glucose: 93 mg/dL   A1C: No results in last 5 years    Screening Current   Cholesterol Screening Once every 5 years if you don't have a lipid disorder  May order more often based on risk factors  Lipid panel: 04/04/2022    Screening Not Indicated  History Lipid Disorder     Other Preventive Screenings Covered by Medicare:  1  Abdominal Aortic Aneurysm (AAA) Screening: covered once if your at risk  You're considered to be at risk if you have a family history of AAA  2  Lung Cancer Screening: covers low dose CT scan once per year if you meet all of the following conditions: (1) Age 50-69; (2) No signs or symptoms of lung cancer; (3) Current smoker or have quit smoking within the last 15 years; (4) You have a tobacco smoking history of at least 30 pack years (packs per day multiplied by number of years you smoked); (5) You get a written order from a healthcare provider  3  Glaucoma Screening: covered annually if you're considered high risk: (1) You have diabetes OR (2) Family history of glaucoma OR (3)  aged 48 and older OR (3)  American aged 72 and older  3  Osteoporosis Screening: covered every 2 years if you meet one of the following conditions: (1) You're estrogen deficient and at risk for osteoporosis based off medical history and other findings; (2) Have a vertebral abnormality; (3) On glucocorticoid therapy for more than 3 months; (4) Have primary hyperparathyroidism; (5) On osteoporosis medications and need to assess response to drug therapy  · Last bone density test (DXA Scan): 03/17/2022   5  HIV Screening: covered annually if you're between the age of 15-65  Also covered annually if you are younger than 13 and older than 72 with risk factors for HIV infection   For pregnant patients, it is covered up to 3 times per pregnancy  Immunizations:  Immunization Recommendations   Influenza Vaccine Annual influenza vaccination during flu season is recommended for all persons aged >= 6 months who do not have contraindications   Pneumococcal Vaccine (Prevnar and Pneumovax)  * Prevnar = PCV13  * Pneumovax = PPSV23   Adults 25-60 years old: 1-3 doses may be recommended based on certain risk factors  Adults 72 years old: Prevnar (PCV13) vaccine recommended followed by Pneumovax (PPSV23) vaccine  If already received PPSV23 since turning 65, then PCV13 recommended at least one year after PPSV23 dose  Hepatitis B Vaccine 3 dose series if at intermediate or high risk (ex: diabetes, end stage renal disease, liver disease)   Tetanus (Td) Vaccine - COST NOT COVERED BY MEDICARE PART B Following completion of primary series, a booster dose should be given every 10 years to maintain immunity against tetanus  Td may also be given as tetanus wound prophylaxis  Tdap Vaccine - COST NOT COVERED BY MEDICARE PART B Recommended at least once for all adults  For pregnant patients, recommended with each pregnancy  Shingles Vaccine (Shingrix) - COST NOT COVERED BY MEDICARE PART B  2 shot series recommended in those aged 48 and above     Health Maintenance Due:      Topic Date Due    Colorectal Cancer Screening  07/20/2022    Hepatitis C Screening  12/23/2022 (Originally 1950)    Breast Cancer Screening: Mammogram  03/21/2023    DXA SCAN  03/17/2024     Immunizations Due:      Topic Date Due    DTaP,Tdap,and Td Vaccines (1 - Tdap) Never done    COVID-19 Vaccine (3 - Booster for Moderna series) 07/24/2021     Advance Directives   What are advance directives? Advance directives are legal documents that state your wishes and plans for medical care  These plans are made ahead of time in case you lose your ability to make decisions for yourself   Advance directives can apply to any medical decision, such as the treatments you want, and if you want to donate organs  What are the types of advance directives? There are many types of advance directives, and each state has rules about how to use them  You may choose a combination of any of the following:  · Living will: This is a written record of the treatment you want  You can also choose which treatments you do not want, which to limit, and which to stop at a certain time  This includes surgery, medicine, IV fluid, and tube feedings  · Durable power of  for healthcare [facility]): This is a written record that states who you want to make healthcare choices for you when you are unable to make them for yourself  This person, called a proxy, is usually a family member or a friend  You may choose more than 1 proxy  · Do not resuscitate (DNR) order:  A DNR order is used in case your heart stops beating or you stop breathing  It is a request not to have certain forms of treatment, such as CPR  A DNR order may be included in other types of advance directives  · Medical directive: This covers the care that you want if you are in a coma, near death, or unable to make decisions for yourself  You can list the treatments you want for each condition  Treatment may include pain medicine, surgery, blood transfusions, dialysis, IV or tube feedings, and a ventilator (breathing machine)  · Values history: This document has questions about your views, beliefs, and how you feel and think about life  This information can help others choose the care that you would choose  Why are advance directives important? An advance directive helps you control your care  Although spoken wishes may be used, it is better to have your wishes written down  Spoken wishes can be misunderstood, or not followed  Treatments may be given even if you do not want them  An advance directive may make it easier for your family to make difficult choices about your care     Fall Prevention    Fall prevention  includes ways to make your home and other areas safer  It also includes ways you can move more carefully to prevent a fall  Health conditions that cause changes in your blood pressure, vision, or muscle strength and coordination may increase your risk for falls  Medicines may also increase your risk for falls if they make you dizzy, weak, or sleepy  Fall prevention tips:   · Stand or sit up slowly  · Use assistive devices as directed  · Wear shoes that fit well and have soles that   · Wear a personal alarm  · Stay active  · Manage your medical conditions  Home Safety Tips:  · Add items to prevent falls in the bathroom  · Keep paths clear  · Install bright lights in your home  · Keep items you use often on shelves within reach  · Paint or place reflective tape on the edges of your stairs  Weight Management   Why it is important to manage your weight:  Being overweight increases your risk of health conditions such as heart disease, high blood pressure, type 2 diabetes, and certain types of cancer  It can also increase your risk for osteoarthritis, sleep apnea, and other respiratory problems  Aim for a slow, steady weight loss  Even a small amount of weight loss can lower your risk of health problems  How to lose weight safely:  A safe and healthy way to lose weight is to eat fewer calories and get regular exercise  You can lose up about 1 pound a week by decreasing the number of calories you eat by 500 calories each day  Healthy meal plan for weight management:  A healthy meal plan includes a variety of foods, contains fewer calories, and helps you stay healthy  A healthy meal plan includes the following:  · Eat whole-grain foods more often  A healthy meal plan should contain fiber  Fiber is the part of grains, fruits, and vegetables that is not broken down by your body  Whole-grain foods are healthy and provide extra fiber in your diet   Some examples of whole-grain foods are whole-wheat breads and pastas, oatmeal, brown rice, and bulgur  · Eat a variety of vegetables every day  Include dark, leafy greens such as spinach, kale, liz greens, and mustard greens  Eat yellow and orange vegetables such as carrots, sweet potatoes, and winter squash  · Eat a variety of fruits every day  Choose fresh or canned fruit (canned in its own juice or light syrup) instead of juice  Fruit juice has very little or no fiber  · Eat low-fat dairy foods  Drink fat-free (skim) milk or 1% milk  Eat fat-free yogurt and low-fat cottage cheese  Try low-fat cheeses such as mozzarella and other reduced-fat cheeses  · Choose meat and other protein foods that are low in fat  Choose beans or other legumes such as split peas or lentils  Choose fish, skinless poultry (chicken or turkey), or lean cuts of red meat (beef or pork)  Before you cook meat or poultry, cut off any visible fat  · Use less fat and oil  Try baking foods instead of frying them  Add less fat, such as margarine, sour cream, regular salad dressing and mayonnaise to foods  Eat fewer high-fat foods  Some examples of high-fat foods include french fries, doughnuts, ice cream, and cakes  · Eat fewer sweets  Limit foods and drinks that are high in sugar  This includes candy, cookies, regular soda, and sweetened drinks  Exercise:  Exercise at least 30 minutes per day on most days of the week  Some examples of exercise include walking, biking, dancing, and swimming  You can also fit in more physical activity by taking the stairs instead of the elevator or parking farther away from stores  Ask your healthcare provider about the best exercise plan for you  © Copyright [employer] 2018 Information is for End User's use only and may not be sold, redistributed or otherwise used for commercial purposes   All illustrations and images included in CareNotes® are the copyrighted property of [employer]  or [address] [place] [address]

## 2022-05-31 NOTE — PROGRESS NOTES
Assessment/Plan:         Diagnoses and all orders for this visit:    Mixed hyperlipidemia  -     fenofibrate 160 MG tablet; Take 1 tablet (160 mg total) by mouth daily  -     Lipid panel    Fatty liver    Stage 3a chronic kidney disease (HCC)  -     CBC and differential  -     Comprehensive metabolic panel    Acquired hypothyroidism  -     TSH, 3rd generation with Free T4 reflex    Mild persistent asthma without complication    Myofascial pain syndrome  -     sarapin injection 2 mL  -     Trigger Point Injection    Age-related osteoporosis without current pathological fracture    Vitamin D deficiency    Elevated homocysteine    Medicare annual wellness visit, subsequent    Encounter for screening for other disorder    Other orders  -     Breo Ellipta 100-25 MCG/INH inhaler; Inhale 1 puff in the morning          Continue with current medications  Stay hydrated  Avoid NSAIDs  OV 6 months with labs  As a separate procedure I performed a trigger point injection-see procedure note  BMI Counseling: Body mass index is 31 3 kg/m²  The BMI is above normal  Nutrition recommendations include reducing portion sizes, decreasing overall calorie intake, consuming healthier snacks, moderation in carbohydrate intake, reducing intake of saturated fat and trans fat and reducing intake of cholesterol  Exercise recommendations include exercising 3-5 times per week  Patient ID: Matheus Saba is a 80 y o  female  Follow up visit  Medications reviewed  Hyperlipidemia mixed type on Fenofibrate 160 mg daily  Lipid profile 04/2022 cholesterol 170  Triglycerides 91  HDL 58  LDL 94  LFTs normal   FBS 93  Creatinine 1 08  GFR 52  Electrolytes normal except for chloride 109  Hgb 14 2     Mammogram 03/2022 with subsequent L breast biopsy-benign      Recent Results (from the past 2016 hour(s))   CBC and differential    Collection Time: 04/04/22  9:33 AM   Result Value Ref Range    WBC 4 92 4 31 - 10 16 Thousand/uL    RBC 4 61 3 81 - 5 12 Million/uL    Hemoglobin 14 2 11 5 - 15 4 g/dL    Hematocrit 44 3 34 8 - 46 1 %    MCV 96 82 - 98 fL    MCH 30 8 26 8 - 34 3 pg    MCHC 32 1 31 4 - 37 4 g/dL    RDW 13 1 11 6 - 15 1 %    MPV 10 7 8 9 - 12 7 fL    Platelets 436 775 - 518 Thousands/uL    nRBC 0 /100 WBCs    Neutrophils Relative 67 43 - 75 %    Immat GRANS % 0 0 - 2 %    Lymphocytes Relative 26 14 - 44 %    Monocytes Relative 5 4 - 12 %    Eosinophils Relative 1 0 - 6 %    Basophils Relative 1 0 - 1 %    Neutrophils Absolute 3 30 1 85 - 7 62 Thousands/µL    Immature Grans Absolute 0 02 0 00 - 0 20 Thousand/uL    Lymphocytes Absolute 1 26 0 60 - 4 47 Thousands/µL    Monocytes Absolute 0 23 0 17 - 1 22 Thousand/µL    Eosinophils Absolute 0 06 0 00 - 0 61 Thousand/µL    Basophils Absolute 0 05 0 00 - 0 10 Thousands/µL   Comprehensive metabolic panel    Collection Time: 04/04/22  9:33 AM   Result Value Ref Range    Sodium 140 136 - 145 mmol/L    Potassium 4 2 3 5 - 5 3 mmol/L    Chloride 109 (H) 100 - 108 mmol/L    CO2 28 21 - 32 mmol/L    ANION GAP 3 (L) 4 - 13 mmol/L    BUN 15 5 - 25 mg/dL    Creatinine 1 08 0 60 - 1 30 mg/dL    Glucose, Fasting 93 65 - 99 mg/dL    Calcium 9 3 8 3 - 10 1 mg/dL    AST 38 5 - 45 U/L    ALT 47 12 - 78 U/L    Alkaline Phosphatase 67 46 - 116 U/L    Total Protein 7 2 6 4 - 8 2 g/dL    Albumin 3 7 3 5 - 5 0 g/dL    Total Bilirubin 0 56 0 20 - 1 00 mg/dL    eGFR 52 ml/min/1 73sq m   Lipid panel    Collection Time: 04/04/22  9:33 AM   Result Value Ref Range    Cholesterol 170 See Comment mg/dL    Triglycerides 91 See Comment mg/dL    HDL, Direct 58 >=50 mg/dL    LDL Calculated 94 0 - 100 mg/dL    Non-HDL-Chol (CHOL-HDL) 112 mg/dl   Vitamin D 25 hydroxy    Collection Time: 04/04/22  9:33 AM   Result Value Ref Range    Vit D, 25-Hydroxy 45 4 30 0 - 100 0 ng/mL   TSH, 3rd generation with Free T4 reflex    Collection Time: 04/04/22  9:33 AM   Result Value Ref Range    TSH 3RD GENERATON 2 570 0 450 - 4 500 uIU/mL   Tissue Exam Collection Time: 04/05/22  9:37 AM   Result Value Ref Range    Case Report       Surgical Pathology Report                         Case: [id number]                                   Authorizing Provider:  Ulysses Grice, CRNP     Collected:           04/05/2022 1212              Ordering Location:     [address] [place] [address] Breast Received:            04/05/2022 [address] [place] [address]                                                              Pathologist:           Macrina Buck MD                                                                           Specimens:   A) - Breast, Left, Stereo bx left breast, retroarealor, 1 core with calcs, 8g 9cm                   B) - Breast, Left, Stereo bx left breast, retroarealor, 2 cores without calcs, 8g 9cm      Final Diagnosis       A  Left breast, retroareolar, "calcs", stereotactic core biopsy (1 pass, 8 G):       Fibroglandular breast tissue, with focal stromal fibrosis containing pigment laden histiocytes, lymphocytes        and calcification; findings consistent with fat necrosis in the appropriate clinical/imaging context (see comment)       Negative for atypical epithelial hyperplasia, in situ and invasive carcinoma    B  Left breast, retroareolar, "without calcs", stereotactic core biopsy (2 passes, 8 G):       Fibroglandular breast tissue, with focal stromal fibrosis containing pigment laden histiocytes and       lymphocytes; findings consistent with fat necrosis in the appropriate clinical/imaging context (see comment)       Negative for atypical epithelial hyperplasia, in situ and invasive carcinoma       No microcalcifications identifed    Comment: Ancillary staining results with Ae1/Ae3 (negative in cells of interest) and Cd68 (positive in cells of interest, are consistent with the above interpretations   Findings discussed by Dr Vandana Salas with Dr Claudia Casas  Additional Information       All reported additional testing was performed with appropriately reactive controls  These tests were developed and their performance characteristics determined by [facility] or appropriate performing facility, though some tests may be performed on tissues which have not been validated for performance characteristics (such as staining performed on alcohol exposed cell blocks and decalcified tissues)  Results should be interpreted with caution and in the context of the patients clinical condition  These tests may not be cleared or approved by the [place]  Food and Drug Administration, though the FDA has determined that such clearance or approval is not necessary  These tests are used for clinical purposes and they should not be regarded as investigational or for research  This laboratory has been approved by CLIA 88, designated as a high-complexity laboratory and is qualified to perform these tests     Interpretation performed at [facility] [address] [address]        Gross Description          A  The specimen is received in formalin, labeled with the patient's name and hospital number, and is designated "left breast stereo biopsy, retroareolar, 1 core with calcs, 8 gauge, 9 cm"  The specimen consists of a single tan-yellow fibromembranous and fibrofatty tissue core measuring 0 5-0 6 cm in diameter and 3 0 cm in length  The specimen is entirely submitted between sponges, 1 cassette  B  The specimen is received in formalin, labeled with the patient's name and hospital number, and is designated "left breast stereo biopsy retroareolar, 2 cores without calcs, 8 gauge, 9 cm"  The specimen consists of 2 tan-yellow fibromembranous and mainly fibrofatty, friable tissue cores measuring 0 1-0 6 cm in diameter and ranging from 2 2-3 0 cm in length    The specimen is entirely submitted between sponges, 1 cassette  Note: The estimated total formalin fixation time based upon information provided by the submitting clinician and the standard processing schedule is 14 hours  Desiree Casillas           Clinical Information       MAMMO FINDINGS:   LEFT  B) CALCIFICATIONS: There are coarse heterogeneous calcifications in a grouped distribution seen in the retroareolar region of the left breast at 5 o'clock in the anterior depth  These are increased from previous examinations and indeterminate in appearance  Stereotactic biopsy is recommend                 The following portions of the patient's history were reviewed and updated as appropriate: allergies, current medications, past family history, past medical history, past social history, past surgical history and problem list     Review of Systems   Constitutional: Negative for appetite change, chills, fatigue, fever and unexpected weight change  HENT: Negative for congestion, ear pain, postnasal drip, rhinorrhea, sore throat and trouble swallowing  Eyes: Negative for visual disturbance  Cataract surgery 12/2020 OD   Respiratory: Negative for cough, shortness of breath and wheezing  10/2021 COV 19 breakthrough infection  She received IV Monoclonal Ab Rx  Asthma stable on Breo 100/25 daily  infrequent Albuterol use  02/2020 PFTs no large airway obstruction or bronchodilator responsiveness  Decreased forced vital capacity and total lung capacity consistent with mild restrictive lung disease  Mild diffuse impairment  02/2018 CXR normal except for mildly elevated right hemidiaphragm  CT scan chest 01/2016 normal except for small hiatal hernia  Cardiovascular: Negative for chest pain, palpitations and leg swelling  Gastrointestinal: Negative for abdominal pain, blood in stool, constipation, diarrhea, nausea and vomiting  Fatty liver 04/2022 LFTs normal  Small hiatal hernia on CT scan  Infrequent reflux symptoms  I BS diarrhea  Stable on probiotics  Last colonoscopy 07/2017   Endocrine:        Hypothyroidism on Levothyroxine 50 mcg daily  Osteoporosis on Prolia  03/2022  DEXA scan + osteoporosis based on the left femoral neck When compared to the prior examination, there has been a  9 % INCREASE in the total bone mineral density of the lumbar spine  There has been NO SIGNIFICANT CHANGE in the total bone mineral density of the left hip  Prior intolerance to Actonel  04/2022 Vitamin-D level 45 4  On vitamin D 5,000 IU daily  Lab Results       Component                Value               Date                       [id number]             2 570               04/04/2022                                 Genitourinary: Negative for difficulty urinating  History of recurrent UTIs on Macrodantin 50 mg every other day at [facility]  She is followed by Urology  07/2020 renal ultrasound 1 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 with increase in size of 1 of the measured components  No hydronephrosis  Bladder postvoid residual 247 mL  CT scan abdomen pelvis 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  Pap smear 09/2016   Musculoskeletal: Positive for neck pain  Negative for arthralgias and myalgias  Recurrent pain L trapezius    Skin: Negative for rash  Allergic/Immunologic: Positive for environmental allergies  02/2020 allergy testing + mold allergy  Neurological: Negative for dizziness and headaches  Hematological: Negative for adenopathy  Does not bruise/bleed easily  History of elevated homocystine level on vitamin supplement  No history of venous thrombosis   11/2020 homocysteine mildly elevated at 11 4    Lab Results       Component                Value               Date                       WBC                      4 92                04/04/2022                 HGB                      14 2                04/04/2022                 HCT                      44 3                04/04/2022 MCV                      96                  04/04/2022                 PLT                      336                 04/04/2022                      Psychiatric/Behavioral: Negative for dysphoric mood and sleep disturbance  The patient is not hyperactive  Objective:    /76   Pulse 88   Temp (!) 97 2 °F (36 2 °C)   Resp 16   Ht 5' 5 5" (1 664 m)   Wt 86 6 kg (191 lb)   BMI 31 30 kg/m²     BP Readings from Last 3 Encounters:   05/31/22 134/76   04/05/22 161/71   01/06/22 130/84     Wt Readings from Last 3 Encounters:   05/31/22 86 6 kg (191 lb)   04/05/22 85 7 kg (189 lb)   03/17/22 85 7 kg (189 lb)        Physical Exam  Vitals and nursing note reviewed  Constitutional:       General: She is not in acute distress  Appearance: She is well-developed  HENT:      Right Ear: Tympanic membrane and ear canal normal       Left Ear: Tympanic membrane and ear canal normal    Eyes:      General: No scleral icterus  Extraocular Movements: Extraocular movements intact  Conjunctiva/sclera: Conjunctivae normal       Pupils: Pupils are equal, round, and reactive to light  Neck:      Thyroid: No thyroid mass or thyromegaly  Vascular: No carotid bruit or JVD  Trachea: No tracheal deviation  Cardiovascular:      Rate and Rhythm: Normal rate and regular rhythm  Heart sounds: Normal heart sounds  No murmur heard  No gallop  Pulmonary:      Effort: Pulmonary effort is normal  No respiratory distress  Breath sounds: Normal breath sounds  No wheezing or rales  Chest:   Breasts:      Right: No supraclavicular adenopathy  Left: No supraclavicular adenopathy  Abdominal:      General: There is no abdominal bruit  Palpations: There is no hepatomegaly or splenomegaly  Musculoskeletal:         General: Tenderness present  Right lower leg: No edema  Left lower leg: No edema        Comments: + trigger point tenderness L trapezius Lymphadenopathy:      Cervical: No cervical adenopathy  Upper Body:      Right upper body: No supraclavicular adenopathy  Left upper body: No supraclavicular adenopathy  Skin:     Findings: No rash  Nails: There is no clubbing  Neurological:      General: No focal deficit present  Mental Status: She is alert and oriented to person, place, and time  Psychiatric:         Mood and Affect: Mood normal          Behavior: Behavior normal          Cognition and Memory: Cognition normal           Universal Protocol:  Consent: Verbal consent obtained  Risks and benefits: risks, benefits and alternatives were discussed  Consent given by: patient    Supporting Documentation  Indications: pain (Myofascial pain syndrome  )   Trigger Point Injections: single/multiple trigger point(s): 1-2 muscle groups    Injection site identified by: palpation    Procedure Details  Location(s):    Neck/Upper Back: L upper trapezius     Prep: patient was prepped and draped in usual sterile fashion  Needle size: 27 G  Patient tolerance: patient tolerated the procedure well with no immediate complications  Additional procedure details: Sarapin 2 ML and Lidocaine 1% 1 ML

## 2022-08-20 ENCOUNTER — OFFICE VISIT (OUTPATIENT)
Dept: URGENT CARE | Facility: CLINIC | Age: 72
End: 2022-08-20
Payer: COMMERCIAL

## 2022-08-20 VITALS
RESPIRATION RATE: 16 BRPM | TEMPERATURE: 97.7 F | WEIGHT: 191 LBS | HEART RATE: 82 BPM | HEIGHT: 66 IN | OXYGEN SATURATION: 96 % | BODY MASS INDEX: 30.7 KG/M2

## 2022-08-20 DIAGNOSIS — J30.2 SEASONAL ALLERGIES: Primary | ICD-10-CM

## 2022-08-20 PROCEDURE — 99213 OFFICE O/P EST LOW 20 MIN: CPT | Performed by: PHYSICIAN ASSISTANT

## 2022-08-20 PROCEDURE — S9083 URGENT CARE CENTER GLOBAL: HCPCS | Performed by: PHYSICIAN ASSISTANT

## 2022-08-20 RX ORDER — CLINDAMYCIN HYDROCHLORIDE 300 MG/1
300 CAPSULE ORAL 3 TIMES DAILY
COMMUNITY
End: 2022-08-22 | Stop reason: ALTCHOICE

## 2022-08-20 NOTE — PROGRESS NOTES
[facility]        NAME: Krishna Lucas is a 71 y o  female  : 1950    MRN: [id number]  DATE: 2022  TIME: 9:54 AM    Assessment and Plan   Seasonal allergies [J30 2]  1  Seasonal allergies           Patient Instructions       Follow up with PCP in 3-5 days  Proceed to  ER if symptoms worsen  Chief Complaint     Chief Complaint   Patient presents with    Eye Problem     Started with eyes itching, then nose and ears  Now feels it all over  No hives  No other signs of illness  History of Present Illness       Patient is here for evaluation of itching and eye irritation  Patient's symptoms started about 4 days ago  Patient started with some itchy eyes and has had some itchy ears as well as some sneezing and postnasal drip  She denies any fevers or chills  Patient also getting itching all over off and on  She denies any shortness of breath difficulty breathing  She has not taken anything yet for her symptoms      Review of Systems   Review of Systems   Constitutional: Negative  HENT: Positive for postnasal drip, rhinorrhea and sneezing  Negative for congestion, ear discharge, ear pain, facial swelling, mouth sores, sinus pressure, sinus pain, sore throat and trouble swallowing  Eyes: Positive for itching  Negative for photophobia, pain, discharge, redness and visual disturbance  Respiratory: Negative  Cardiovascular: Negative  Gastrointestinal: Negative  Skin: Negative for color change and rash  Neurological: Negative            Current Medications       Current Outpatient Medications:     albuterol (2 5 mg/3 mL) 0 083 % nebulizer solution, Take 1 vial (2 5 mg total) by nebulization every 6 (six) hours as needed for wheezing or shortness of breath (Patient taking differently: Take 2 5 mg by nebulization as needed for wheezing or shortness of breath), Disp: 30 vial, Rfl: 0    albuterol (Ventolin HFA) 90 mcg/act inhaler, 2 puffs Q 6 hours prn cough/wheezing, Disp: 18 g, Rfl: 3    aspirin 81 MG tablet, Take 1 tablet by mouth daily, Disp: , Rfl:     Breo Ellipta 100-25 MCG/INH inhaler, INHALE 1 PUFF BY MOUTH EVERY DAY AT THE SAME TIME EACH DAY   8 Ayaka Lyons, GARGLE MOUTH WITH WATER AND SPIT AFTER USE, Disp: 60 blister, Rfl: 3    Cholecalciferol 4000 units CAPS, Take 1 capsule by mouth daily, Disp: , Rfl:     clindamycin (CLEOCIN) 300 MG capsule, Take 300 mg by mouth 3 (three) times a day, Disp: , Rfl:     Coenzyme Q10 (CO Q-10) 30 MG CAPS, Take 30 mg by mouth daily, Disp: , Rfl:     cyclobenzaprine (FLEXERIL) 5 mg tablet, Take 1 tablet (5 mg total) by mouth daily at bedtime (Patient taking differently: Take 5 mg by mouth as needed), Disp: 5 tablet, Rfl: 0    denosumab (Prolia) 60 mg/mL, Inject 60 mL under the skin every 6 (six) months, Disp: , Rfl:     fenofibrate 160 MG tablet, Take 1 tablet (160 mg total) by mouth daily, Disp: 90 tablet, Rfl: 3    L-Methylfolate-B6-B12 3-35-2 MG TABS, Take 1 tablet by mouth daily, Disp: 90 tablet, Rfl: 3    levothyroxine 50 mcg tablet, Take 1 tablet (50 mcg total) by mouth daily, Disp: 90 tablet, Rfl: 3    nitrofurantoin (MACRODANTIN) 50 mg capsule, Take 1 capsule by mouth daily , Disp: , Rfl:     Current Allergies     Allergies as of 08/20/2022 - Reviewed 08/20/2022   Allergen Reaction Noted    Penicillins  06/11/2017    Codeine Anxiety 06/11/2017    Dust mite extract  07/24/2013    Molds & smuts  07/24/2013    Pseudoephedrine  06/11/2017    Rabbit epithelium  07/24/2013    Sulfa antibiotics Hives 06/11/2017            The following portions of the patient's history were reviewed and updated as appropriate: allergies, current medications, past family history, past medical history, past social history, past surgical history and problem list      Past Medical History:   Diagnosis Date    Acute asthma exacerbation     last assessed 8/15/17    Cataract     Disease of thyroid gland     Hypertriglyceridemia     Osteoporosis  Right lumbar radiculopathy 12/15/2016    Urinary tract infection     Vertigo        Past Surgical History:   Procedure Laterality Date    APPENDECTOMY  1979    CATARACT EXTRACTION      HAND SURGERY Right 2007    thumb    LAPAROSCOPY      MAMMO STEREOTACTIC BREAST BIOPSY LEFT (ALL INC) Left 4/5/2022       Family History   Problem Relation Age of Onset    Cirrhosis Mother     Kidney nephrosis Mother     Hypertension Father     Other Father 80        extra mammary Padget's dx    Cancer Paternal Grandmother [de-identified]        unknown type    Heart disease Paternal Grandfather     Heart attack Paternal Grandfather     Kidney nephrosis Daughter     Osteoporosis Maternal Grandmother     Arthritis Maternal Grandmother     Heart attack Maternal Grandfather     Heart disease Maternal Grandfather     Kidney nephrosis Daughter     Lymphoma Maternal Aunt 79    No Known Problems Paternal Aunt     No Known Problems Paternal Aunt     No Known Problems Paternal Aunt          Medications have been verified  Objective   Pulse 82   Temp 97 7 °F (36 5 °C) (Temporal)   Resp 16   Ht 5' 5 5" (1 664 m)   Wt 86 6 kg (191 lb)   SpO2 (!) 16%   BMI 31 30 kg/m²   No LMP recorded  Patient is postmenopausal        Physical Exam     Physical Exam  Vitals and nursing note reviewed  Constitutional:       General: She is not in acute distress  Appearance: Normal appearance  She is well-developed  She is not ill-appearing, toxic-appearing or diaphoretic  HENT:      Head: Normocephalic and atraumatic  Right Ear: Tympanic membrane and ear canal normal       Left Ear: Tympanic membrane and ear canal normal       Nose: Nose normal  No congestion or rhinorrhea  Comments: Pale mucosa     Mouth/Throat:      Mouth: Mucous membranes are moist       Pharynx: No oropharyngeal exudate or posterior oropharyngeal erythema  Eyes:      General:         Right eye: No discharge  Left eye: No discharge  Extraocular Movements: Extraocular movements intact  Pupils: Pupils are equal, round, and reactive to light  Comments: Slightly pale conjunctiva   Cardiovascular:      Rate and Rhythm: Normal rate and regular rhythm  Heart sounds: Normal heart sounds  No murmur heard  Pulmonary:      Effort: Pulmonary effort is normal  No respiratory distress  Breath sounds: Normal breath sounds  No stridor  No wheezing, rhonchi or rales  Lymphadenopathy:      Cervical: No cervical adenopathy  Skin:     General: Skin is warm and dry  Findings: No rash  Neurological:      General: No focal deficit present  Mental Status: She is alert and oriented to person, place, and time  Psychiatric:         Mood and Affect: Mood normal          Behavior: Behavior normal          Thought Content:  Thought content normal          Judgment: Judgment normal

## 2022-08-20 NOTE — PATIENT INSTRUCTIONS
May take over-the-counter Benadryl or Claritin or Zyrtec as directed    Follow-up with your primary care physician in 3-4 days if symptoms persist    Go to emergency room if your symptoms are worsening

## 2022-08-22 ENCOUNTER — OFFICE VISIT (OUTPATIENT)
Dept: FAMILY MEDICINE CLINIC | Facility: CLINIC | Age: 72
End: 2022-08-22
Payer: COMMERCIAL

## 2022-08-22 VITALS
RESPIRATION RATE: 16 BRPM | HEART RATE: 92 BPM | BODY MASS INDEX: 31.02 KG/M2 | HEIGHT: 66 IN | TEMPERATURE: 96.5 F | DIASTOLIC BLOOD PRESSURE: 80 MMHG | WEIGHT: 193 LBS | SYSTOLIC BLOOD PRESSURE: 126 MMHG

## 2022-08-22 DIAGNOSIS — L29.9 PRURITUS: Primary | ICD-10-CM

## 2022-08-22 DIAGNOSIS — E03.9 ACQUIRED HYPOTHYROIDISM: ICD-10-CM

## 2022-08-22 DIAGNOSIS — M54.6 ACUTE MIDLINE THORACIC BACK PAIN: ICD-10-CM

## 2022-08-22 PROCEDURE — 1160F RVW MEDS BY RX/DR IN RCRD: CPT | Performed by: FAMILY MEDICINE

## 2022-08-22 PROCEDURE — 99214 OFFICE O/P EST MOD 30 MIN: CPT | Performed by: FAMILY MEDICINE

## 2022-08-22 RX ORDER — PREDNISONE 20 MG/1
20 TABLET ORAL 2 TIMES DAILY WITH MEALS
Qty: 10 TABLET | Refills: 0 | Status: SHIPPED | OUTPATIENT
Start: 2022-08-22 | End: 2022-08-27

## 2022-08-22 NOTE — PROGRESS NOTES
Assessment/Plan:         Diagnoses and all orders for this visit:    Pruritus  -     predniSONE 20 mg tablet; Take 1 tablet (20 mg total) by mouth 2 (two) times a day with meals for 5 days    Acute midline thoracic back pain    Acquired hypothyroidism          Suspected allergic reaction Zyrtec 10 mg daily in AM with prn Benadryl 25 mg at HS  Trial of oral steroids  Re thoracic back pain consider x rays/trigger point injections for persistent symptoms  Recheck as needed  Patient ID: Jermaine Hall is a 71 y o  female  Patient presents with 2 new problems  Itching hands, feet, upper back and around eyes  No rash  No changes in soaps or detergents  She was on her 3rd round of Clindamycin for dental infection  She stopped taking this yesterday  She is scheduled to see an Endodontist She has been using prn Benadryl along w/ topical Hydrocortisone 1% and Calamine lotion  Several day history mid thoracic pain  No trauma or injury  At times pain worse with bending or twisting  1 episode of pain with taking a deep breath-not having this symptom today  No cough or SOB  Medications reviewed  Labs 04/2022 reviewed see note  Hyperlipidemia mixed type on Fenofibrate 160 mg daily  Lipid profile 04/2022 cholesterol 170  Triglycerides 91  HDL 58  LDL 94  LFTs normal   FBS 93  Creatinine 1 08  GFR 52  Electrolytes normal except for chloride 109  Hgb 14 2     Mammogram 03/2022 with subsequent L breast biopsy-benign        Recent Results (from the past 4032 hour(s))   CBC and differential    Collection Time: 04/04/22  9:33 AM   Result Value Ref Range    WBC 4 92 4 31 - 10 16 Thousand/uL    RBC 4 61 3 81 - 5 12 Million/uL    Hemoglobin 14 2 11 5 - 15 4 g/dL    Hematocrit 44 3 34 8 - 46 1 %    MCV 96 82 - 98 fL    MCH 30 8 26 8 - 34 3 pg    MCHC 32 1 31 4 - 37 4 g/dL    RDW 13 1 11 6 - 15 1 %    MPV 10 7 8 9 - 12 7 fL    Platelets 315 647 - 476 Thousands/uL    nRBC 0 /100 WBCs    Neutrophils Relative 67 43 - 75 %    Immat GRANS % 0 0 - 2 %    Lymphocytes Relative 26 14 - 44 %    Monocytes Relative 5 4 - 12 %    Eosinophils Relative 1 0 - 6 %    Basophils Relative 1 0 - 1 %    Neutrophils Absolute 3 30 1 85 - 7 62 Thousands/µL    Immature Grans Absolute 0 02 0 00 - 0 20 Thousand/uL    Lymphocytes Absolute 1 26 0 60 - 4 47 Thousands/µL    Monocytes Absolute 0 23 0 17 - 1 22 Thousand/µL    Eosinophils Absolute 0 06 0 00 - 0 61 Thousand/µL    Basophils Absolute 0 05 0 00 - 0 10 Thousands/µL   Comprehensive metabolic panel    Collection Time: 04/04/22  9:33 AM   Result Value Ref Range    Sodium 140 136 - 145 mmol/L    Potassium 4 2 3 5 - 5 3 mmol/L    Chloride 109 (H) 100 - 108 mmol/L    CO2 28 21 - 32 mmol/L    ANION GAP 3 (L) 4 - 13 mmol/L    BUN 15 5 - 25 mg/dL    Creatinine 1 08 0 60 - 1 30 mg/dL    Glucose, Fasting 93 65 - 99 mg/dL    Calcium 9 3 8 3 - 10 1 mg/dL    AST 38 5 - 45 U/L    ALT 47 12 - 78 U/L    Alkaline Phosphatase 67 46 - 116 U/L    Total Protein 7 2 6 4 - 8 2 g/dL    Albumin 3 7 3 5 - 5 0 g/dL    Total Bilirubin 0 56 0 20 - 1 00 mg/dL    eGFR 52 ml/min/1 73sq m   Lipid panel    Collection Time: 04/04/22  9:33 AM   Result Value Ref Range    Cholesterol 170 See Comment mg/dL    Triglycerides 91 See Comment mg/dL    HDL, Direct 58 >=50 mg/dL    LDL Calculated 94 0 - 100 mg/dL    Non-HDL-Chol (CHOL-HDL) 112 mg/dl   Vitamin D 25 hydroxy    Collection Time: 04/04/22  9:33 AM   Result Value Ref Range    Vit D, 25-Hydroxy 45 4 30 0 - 100 0 ng/mL   TSH, 3rd generation with Free T4 reflex    Collection Time: 04/04/22  9:33 AM   Result Value Ref Range    TSH 3RD GENERATON 2 570 0 450 - 4 500 uIU/mL   Tissue Exam    Collection Time: 04/05/22  9:37 AM   Result Value Ref Range    Case Report       Surgical Pathology Report                         Case: [id number]                                   Authorizing Provider:  JESUS Swann     Collected:           04/05/2022 [id number]              Ordering Location:     [facility] Breast Received:            04/05/2022 [address] [place] [address]                                                              Pathologist:           Delores Alves MD                                                                           Specimens:   A) - Breast, Left, Stereo bx left breast, retroarealor, 1 core with calcs, 8g 9cm                   B) - Breast, Left, Stereo bx left breast, retroarealor, 2 cores without calcs, 8g 9cm      Final Diagnosis       A  Left breast, retroareolar, "calcs", stereotactic core biopsy (1 pass, 8 G):       Fibroglandular breast tissue, with focal stromal fibrosis containing pigment laden histiocytes, lymphocytes        and calcification; findings consistent with fat necrosis in the appropriate clinical/imaging context (see comment)       Negative for atypical epithelial hyperplasia, in situ and invasive carcinoma    B  Left breast, retroareolar, "without calcs", stereotactic core biopsy (2 passes, 8 G):       Fibroglandular breast tissue, with focal stromal fibrosis containing pigment laden histiocytes and       lymphocytes; findings consistent with fat necrosis in the appropriate clinical/imaging context (see comment)       Negative for atypical epithelial hyperplasia, in situ and invasive carcinoma       No microcalcifications identifed    Comment: Ancillary staining results with Ae1/Ae3 (negative in cells of interest) and Cd68 (positive in cells of interest, are consistent with the above interpretations  Findings discussed by Dr Catrachita Webb with Dr Sheron Valencia  Additional Information       All reported additional testing was performed with appropriately reactive controls    These tests were developed and their performance characteristics determined by [address] [place] [address] or appropriate performing facility, though some tests may be performed on tissues which have not been validated for performance characteristics (such as staining performed on alcohol exposed cell blocks and decalcified tissues)  Results should be interpreted with caution and in the context of the patients clinical condition  These tests may not be cleared or approved by the [place]  Food and Drug Administration, though the FDA has determined that such clearance or approval is not necessary  These tests are used for clinical purposes and they should not be regarded as investigational or for research  This laboratory has been approved by [facility], designated as a high-complexity laboratory and is qualified to perform these tests     Interpretation performed at [facility] [address]  [facility] [address]        Gross Description          A  The specimen is received in formalin, labeled with the patient's name and hospital number, and is designated "left breast stereo biopsy, retroareolar, 1 core with calcs, 8 gauge, 9 cm"  The specimen consists of a single tan-yellow fibromembranous and fibrofatty tissue core measuring 0 5-0 6 cm in diameter and 3 0 cm in length  The specimen is entirely submitted between sponges, 1 cassette  B  The specimen is received in formalin, labeled with the patient's name and hospital number, and is designated "left breast stereo biopsy retroareolar, 2 cores without calcs, 8 gauge, 9 cm"  The specimen consists of 2 tan-yellow fibromembranous and mainly fibrofatty, friable tissue cores measuring 0 1-0 6 cm in diameter and ranging from 2 2-3 0 cm in length  The specimen is entirely submitted between sponges, 1 cassette  Note: The estimated total formalin fixation time based upon information provided by the submitting clinician and the standard processing schedule is 14 hours    Desiree Casillas           Clinical Information       MAMMO FINDINGS:   LEFT  B) CALCIFICATIONS: There are coarse heterogeneous calcifications in a grouped distribution seen in the retroareolar region of the left breast at 5 o'clock in the anterior depth  These are increased from previous examinations and indeterminate in appearance  Stereotactic biopsy is recommend         The following portions of the patient's history were reviewed and updated as appropriate: allergies, current medications, past family history, past medical history, past social history, past surgical history and problem list     Review of Systems   Constitutional: Negative for appetite change, chills, fever and unexpected weight change  HENT: Negative for congestion, rhinorrhea and sore throat  Eyes: Negative for visual disturbance  Respiratory: Negative for cough, shortness of breath and wheezing  See HPI    Cardiovascular: Negative for chest pain and palpitations  Gastrointestinal: Negative for diarrhea, nausea and vomiting  Genitourinary: Negative for dysuria  Musculoskeletal: Positive for back pain (see HPI )  Skin: Negative for rash  Neurological: Negative for dizziness and headaches  Hematological: Negative for adenopathy  Does not bruise/bleed easily  Psychiatric/Behavioral: Negative for sleep disturbance  Objective:      /80   Pulse 92   Temp (!) 96 5 °F (35 8 °C)   Resp 16   Ht 5' 5 5" (1 664 m)   Wt 87 5 kg (193 lb)   BMI 31 63 kg/m²     Wt Readings from Last 3 Encounters:   08/22/22 87 5 kg (193 lb)   08/20/22 86 6 kg (191 lb)   05/31/22 86 6 kg (191 lb)        Physical Exam  Constitutional:       General: She is not in acute distress  HENT:      Right Ear: Tympanic membrane and ear canal normal       Left Ear: Tympanic membrane and ear canal normal       Mouth/Throat:      Mouth: No oral lesions  Dentition: Normal dentition  Gum lesions present  Tongue: No lesions  Palate: No mass  Pharynx: Oropharynx is clear  Comments: Periapical abscess? Right upper molar   Eyes:      General: No scleral icterus  Extraocular Movements: Extraocular movements intact  Conjunctiva/sclera: Conjunctivae normal       Pupils: Pupils are equal, round, and reactive to light  Cardiovascular:      Rate and Rhythm: Normal rate and regular rhythm  Heart sounds: No murmur heard  No friction rub  No gallop  Pulmonary:      Effort: Pulmonary effort is normal  No respiratory distress  Breath sounds: Normal breath sounds  No wheezing or rales  Chest:   Breasts:      Right: No supraclavicular adenopathy  Left: No supraclavicular adenopathy  Musculoskeletal:         General: No tenderness  Thoracic back: No spasms, tenderness or bony tenderness  Lymphadenopathy:      Cervical: No cervical adenopathy  Upper Body:      Right upper body: No supraclavicular adenopathy  Left upper body: No supraclavicular adenopathy  Skin:     Findings: No lesion or rash  Neurological:      Mental Status: She is alert and oriented to person, place, and time     Psychiatric:         Mood and Affect: Mood normal          Behavior: Behavior normal

## 2022-08-25 ENCOUNTER — TELEPHONE (OUTPATIENT)
Dept: FAMILY MEDICINE CLINIC | Facility: CLINIC | Age: 72
End: 2022-08-25

## 2022-08-25 DIAGNOSIS — L29.9 PRURITUS: Primary | ICD-10-CM

## 2022-08-25 RX ORDER — HYDROXYZINE HYDROCHLORIDE 10 MG/1
10 TABLET, FILM COATED ORAL EVERY 6 HOURS PRN
Qty: 30 TABLET | Refills: 0 | Status: SHIPPED | OUTPATIENT
Start: 2022-08-25 | End: 2022-09-14

## 2022-08-25 NOTE — TELEPHONE ENCOUNTER
Patient was seen on 8/22/2022 for itching through the body  She is not any better and would like to know if there is something else she can take  She was given prednisone since monday    She states it is not raised but has red spots and itches    please advise if she needs a appointment or something can be called into [address] [place] [address]

## 2022-08-25 NOTE — TELEPHONE ENCOUNTER
Call no clear cause for her itching   I sent in a script for prn Hydroxyzine for itching watch for sedation  consider Derm evaluation for persistent symptoms

## 2022-09-14 ENCOUNTER — NURSE TRIAGE (OUTPATIENT)
Dept: OTHER | Facility: OTHER | Age: 72
End: 2022-09-14

## 2022-09-14 ENCOUNTER — OFFICE VISIT (OUTPATIENT)
Dept: FAMILY MEDICINE CLINIC | Facility: CLINIC | Age: 72
End: 2022-09-14
Payer: COMMERCIAL

## 2022-09-14 VITALS
RESPIRATION RATE: 16 BRPM | SYSTOLIC BLOOD PRESSURE: 132 MMHG | OXYGEN SATURATION: 96 % | DIASTOLIC BLOOD PRESSURE: 74 MMHG | HEIGHT: 66 IN | BODY MASS INDEX: 30.37 KG/M2 | WEIGHT: 189 LBS | TEMPERATURE: 96.2 F | HEART RATE: 110 BPM

## 2022-09-14 DIAGNOSIS — J45.31 MILD PERSISTENT ASTHMA WITH EXACERBATION: Primary | ICD-10-CM

## 2022-09-14 DIAGNOSIS — D17.71 ANGIOMYOLIPOMA OF KIDNEY: ICD-10-CM

## 2022-09-14 DIAGNOSIS — R10.11 RUQ ABDOMINAL PAIN: ICD-10-CM

## 2022-09-14 PROCEDURE — 1160F RVW MEDS BY RX/DR IN RCRD: CPT | Performed by: FAMILY MEDICINE

## 2022-09-14 PROCEDURE — 99214 OFFICE O/P EST MOD 30 MIN: CPT | Performed by: FAMILY MEDICINE

## 2022-09-14 RX ORDER — ALBUTEROL SULFATE 90 UG/1
AEROSOL, METERED RESPIRATORY (INHALATION)
Qty: 18 G | Refills: 3 | Status: SHIPPED | OUTPATIENT
Start: 2022-09-14

## 2022-09-14 RX ORDER — TRIAMCINOLONE ACETONIDE 1 MG/G
1 CREAM TOPICAL 2 TIMES DAILY
COMMUNITY
Start: 2022-09-09

## 2022-09-14 NOTE — TELEPHONE ENCOUNTER
Patient was advised to use albuterol inhaler every 6 hours if needed, not just once  Reason for Disposition   Patient wants to be seen    Answer Assessment - Initial Assessment Questions  1  RESPIRATORY STATUS: "Describe your breathing?" (e g , wheezing, shortness of breath, unable to speak, severe coughing)       "It's choppy  It doesn't feel like is flowing  Sometimes I can get a breath and sometimes I can't  I am sitting now  It is worse when I walk and move "   2  ONSET: "When did this asthma attack begin?"       Symptoms started on Monday   3  TRIGGER: "What do you think triggered this attack?" (e g , URI, exposure to pollen or other allergen, tobacco smoke)        "It's been a long thing for months  I had a rash  I was on heavy steroids  I finished the steroids Monday morning  Ever since I have been having trouble "   4  PEAK EXPIRATORY FLOW RATE (PEFR): "Do you use a peak flow meter?" If Yes, ask: "What's the current peak flow? What's your personal best peak flow?"       "I have one  I haven't used it for 9 months " not available   5  SEVERITY: "How bad is this attack?"     - MILD: No SOB at rest, mild SOB with walking, speaks normally in sentences, can lay down, no retractions, pulse < 100  (GREEN Zone: PEFR %)    - MODERATE: SOB at rest, SOB with minimal exertion and prefers to sit, cannot lie down flat, speaks in phrases, mild retractions, audible wheezing, pulse 100-120  (YELLOW Zone: PEFR 50-80%)     - SEVERE: Very SOB at rest, speaks in single words, struggling to breathe, sitting hunched forward, retractions, usually loud wheezing, sometimes minimal wheezing because of decreased air movement, pulse > 120  (RED Zone: PEFR < 50%)  Moderate   6   MEDICATIONS (Inhaler or nebs): "What are your asthma medications?" and "What treatments have you given so far?"     - Quick-relief: albuterol, metaproterenol, salbutamol, or other inhaled or nebulized beta-agonist medicines    - Long-term-control: steroids, cromolyn, or other anti-inflammatory medicines  Albuterol "I started 2 days with one  Now I am doing it morning and night ", Breo daily, "I'm not sure if I should take the nebulizer since I was on the prednisone "   7   OTHER SYMPTOMS: "Do you have any other symptoms? (e g , runny nose, chest pain, fever)      Rash "When I had the rash they had me taking allegra too "    Protocols used: ASTHMA ATTACK-ADULT-OH

## 2022-09-14 NOTE — PROGRESS NOTES
Name: Alfonso Rahman      : 1950      MRN: [id number]  Encounter Provider: Gail Suarez MD  Encounter Date: 2022   Encounter department: [address] [place] [address] was seen today for asthma  Diagnoses and all orders for this visit:    Mild persistent asthma with exacerbation  -     albuterol (Ventolin HFA) 90 mcg/act inhaler; 2 puffs Q 6 hours prn cough/wheezing    RUQ abdominal pain  -     US abdomen complete; Future    Angiomyolipoma of kidney  -     US abdomen complete; Future      PRN Mucinex DM for cough  Sample of Breo 200/25 one puff daily x 14 days-if stable resume Breo 100/25 daily  Continue with as needed Albuterol MDI  Schedule abd u/s  Advised to call if any changes  Subjective      History of asthma on Breo 100/25 daily  Several day history of persistent non productive cough  + chest tightness  No wheezing  No nasal congestion or post nasal drainage  No T  She has been using prn Albuterol MDI  Recurrent dermatitis/itching evaluated by Jodi Castaneda  She completed a course of Prednisone 2 days ago  Medications reviewed  Labs 2022 reviewed see note  Hyperlipidemia mixed type on Fenofibrate 160 mg daily  Lipid profile 2022 cholesterol 170  Triglycerides 91  HDL 58  LDL 94  LFTs normal   FBS 93  Creatinine 1 08  GFR 52  Electrolytes normal except for chloride 109  Hgb 14 2  Mammogram 2022 with subsequent L breast biopsy-benign      Review of Systems   Constitutional: Negative for appetite change, chills and fever  HENT: Negative for congestion, ear pain, postnasal drip, rhinorrhea, sinus pain and sore throat  Eyes:        Cataract surgery 2020 OD   Respiratory: Positive for cough  Negative for shortness of breath and wheezing  See HPI  10/2021 COV 19 breakthrough infection  She received IV Monoclonal Ab Rx   2020 PFTs no large airway obstruction or bronchodilator responsiveness    Decreased forced vital capacity and total lung capacity consistent with mild restrictive lung disease  Mild diffuse impairment  02/2018 CXR normal except for mildly elevated right hemidiaphragm  CT scan chest 01/2016 normal except for small hiatal hernia  Cardiovascular: Negative for chest pain, palpitations and leg swelling  Gastrointestinal: Positive for abdominal pain  Negative for blood in stool, constipation, diarrhea, nausea and vomiting  Intermittent upper abdominal pain at times radiating into back  Fatty liver 04/2022 LFTs normal  Small hiatal hernia on CT scan  Infrequent reflux symptoms  I BS diarrhea  Stable on probiotics  Last colonoscopy 07/2017   Endocrine:        Hypothyroidism on Levothyroxine 50 mcg daily  Osteoporosis on Prolia  03/2022  DEXA scan + osteoporosis based on the left femoral neck When compared to the prior examination, there has been a  9 % INCREASE in the total bone mineral density of the lumbar spine  There has been NO SIGNIFICANT CHANGE in the total bone mineral density of the left hip  Prior intolerance to Actonel  04/2022 Vitamin-D level 45 4  On vitamin D 5,000 IU daily  Lab Results       Component                Value               Date                       [id number]             2 570               04/04/2022                                 Genitourinary: Negative for difficulty urinating  History of recurrent UTIs on Macrodantin 50 mg every other day at [facility]  She is followed by Urology  07/2020 renal ultrasound 1 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 with increase in size of 1 of the measured components  No hydronephrosis  Bladder postvoid residual 247 mL  CT scan abdomen pelvis 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  Pap smear 09/2016   Musculoskeletal: Negative for myalgias  Skin: Negative for rash  Allergic/Immunologic: Positive for environmental allergies  See HPI 02/2020 allergy testing + mold allergy     Neurological: Negative for dizziness and headaches  Hematological: Negative for adenopathy  Does not bruise/bleed easily  History of elevated homocystine level on vitamin supplement  No history of venous thrombosis  11/2020 homocysteine mildly elevated at 11 4    Lab Results       Component                Value               Date                       WBC                      4 92                04/04/2022                 HGB                      14 2                04/04/2022                 HCT                      44 3                04/04/2022                 MCV                      96                  04/04/2022                 PLT                      336                 04/04/2022                      Psychiatric/Behavioral: Negative for dysphoric mood and sleep disturbance  The patient is not hyperactive  Current Outpatient Medications on File Prior to Visit   Medication Sig    albuterol (2 5 mg/3 mL) 0 083 % nebulizer solution Take 1 vial (2 5 mg total) by nebulization every 6 (six) hours as needed for wheezing or shortness of breath (Patient taking differently: Take 2 5 mg by nebulization as needed for wheezing or shortness of breath)    aspirin 81 MG tablet Take 1 tablet by mouth daily    Breo Ellipta 100-25 MCG/INH inhaler INHALE 1 PUFF BY MOUTH EVERY DAY AT THE SAME TIME EACH DAY   [address], GARGLE MOUTH WITH WATER AND SPIT AFTER USE    Cholecalciferol 4000 units CAPS Take 1 capsule by mouth daily    Coenzyme Q10 (CO Q-10) 30 MG CAPS Take 30 mg by mouth daily    cyclobenzaprine (FLEXERIL) 5 mg tablet Take 1 tablet (5 mg total) by mouth daily at bedtime (Patient taking differently: Take 5 mg by mouth as needed)    denosumab (Prolia) 60 mg/mL Inject 60 mL under the skin every 6 (six) months    fenofibrate 160 MG tablet Take 1 tablet (160 mg total) by mouth daily    L-Methylfolate-B6-B12 3-35-2 MG TABS Take 1 tablet by mouth daily    levothyroxine 50 mcg tablet Take 1 tablet (50 mcg total) by mouth daily    nitrofurantoin (MACRODANTIN) 50 mg capsule Take 1 capsule by mouth daily     triamcinolone (KENALOG) 0 1 % cream Apply 1 application topically 2 (two) times a day    [DISCONTINUED] albuterol (Ventolin HFA) 90 mcg/act inhaler 2 puffs Q 6 hours prn cough/wheezing    [DISCONTINUED] hydrOXYzine HCL (ATARAX) 10 mg tablet Take 1 tablet (10 mg total) by mouth every 6 (six) hours as needed for itching (Patient not taking: No sig reported)       Objective     /74   Pulse (!) 110   Temp (!) 96 2 °F (35 7 °C)   Resp 16   Ht 5' 5 5" (1 664 m)   Wt 85 7 kg (189 lb)   SpO2 96%   BMI 30 97 kg/m²     Physical Exam  Vitals and nursing note reviewed  Constitutional:       General: She is not in acute distress  Appearance: She is well-developed  HENT:      Right Ear: Tympanic membrane and ear canal normal       Left Ear: Tympanic membrane and ear canal normal       Nose:      Right Sinus: No maxillary sinus tenderness or frontal sinus tenderness  Left Sinus: No maxillary sinus tenderness or frontal sinus tenderness  Mouth/Throat:      Mouth: No oral lesions  Eyes:      Conjunctiva/sclera: Conjunctivae normal       Pupils: Pupils are equal, round, and reactive to light  Cardiovascular:      Rate and Rhythm: Normal rate and regular rhythm  Heart sounds: Normal heart sounds  No murmur heard  No gallop  Pulmonary:      Effort: Pulmonary effort is normal  No respiratory distress  Breath sounds: Normal breath sounds  No wheezing, rhonchi or rales  Abdominal:      General: Bowel sounds are normal  There is no distension  Palpations: Abdomen is soft  There is no hepatomegaly, splenomegaly or mass  Tenderness: There is no abdominal tenderness  There is no guarding or rebound  Negative signs include [place]'s sign  Lymphadenopathy:      Cervical: No cervical adenopathy  Skin:     Findings: No rash  Neurological:      Mental Status: She is alert and oriented to person, place, and time  Psychiatric:         Mood and Affect: Mood normal        Amanda Clarke MD

## 2022-11-21 DIAGNOSIS — E03.9 ACQUIRED HYPOTHYROIDISM: ICD-10-CM

## 2022-11-21 RX ORDER — LEVOTHYROXINE SODIUM 0.05 MG/1
50 TABLET ORAL DAILY
Qty: 90 TABLET | Refills: 3 | Status: SHIPPED | OUTPATIENT
Start: 2022-11-21

## 2022-12-05 ENCOUNTER — RA CDI HCC (OUTPATIENT)
Dept: OTHER | Facility: HOSPITAL | Age: 72
End: 2022-12-05

## 2022-12-05 NOTE — PROGRESS NOTES
[facility]  coding opportunities       Chart reviewed, no opportunity found:   [address]        Patients Insurance     Medicare Insurance: [employer]age

## 2022-12-05 NOTE — PROGRESS NOTES
Name: Fidencio Newby      : 1950      MRN: [id number]  Encounter Provider: Jason Hammer MD  Encounter Date: 2022   Encounter department: [address] [place] [address] St     1  Mixed hyperlipidemia    2  Acquired hypothyroidism    3  Mild persistent asthma without complication  -     predniSONE 20 mg tablet; Take 1 tablet (20 mg total) by mouth 2 (two) times a day with meals    4  Elevated homocysteine  -     Homocysteine, serum; Future    5  Irritable bowel syndrome with diarrhea    6  Fatty liver    7  Age-related osteoporosis without current pathological fracture    8  Vitamin D deficiency    9  Facet arthritis of lumbar region    Continue with current medications  PRN Albuterol  Script for Prednisone if symptoms persistent  Repeat labs  OV 6 months  She is due for a colonoscopy  Consider trial of PT/additional testing for LBP  Subjective     Follow up OV  Medications reviewed  Last labs 2022 reviewed see note  Hyperlipidemia mixed type on Fenofibrate 160 mg daily  Lipid profile 2022 cholesterol 170  Triglycerides 91  HDL 58  LDL 94  LFTs normal   FBS 93  Creatinine 1 08  GFR 52  Electrolytes normal except for chloride 109  Hgb 14 2  Mammogram 2022 with subsequent L breast biopsy-benign      Review of Systems   Constitutional: Negative for appetite change, chills, fatigue, fever and unexpected weight change  HENT: Negative for congestion, ear pain, postnasal drip, rhinorrhea, sinus pain, sore throat and trouble swallowing  Eyes: Negative for visual disturbance  Cataract surgery 2020 OD   Respiratory: Positive for cough  Negative for shortness of breath and wheezing  Several week history of intermittent non productive cough-started after removing [employer] decorations from the her attic  Known mold allergy  Home COV 19 test negative  Asthma on Breo 100/25 daily  10/2021 COV 19 breakthrough infection   She received IV Monoclonal Ab Rx   2020 PFTs no large airway obstruction or bronchodilator responsiveness  Decreased forced vital capacity and total lung capacity consistent with mild restrictive lung disease  Mild diffuse impairment  02/2018 CXR normal except for mildly elevated right hemidiaphragm  CT scan chest 01/2016 normal except for small hiatal hernia  Cardiovascular: Negative for chest pain, palpitations and leg swelling  Gastrointestinal: Negative for abdominal pain, blood in stool, constipation, diarrhea, nausea and vomiting  Fatty liver 04/2022 LFTs normal  Small hiatal hernia on CT scan  Infrequent reflux symptoms  I BS diarrhea  Stable on probiotics  Last colonoscopy 07/2017   Endocrine:        Hypothyroidism on Levothyroxine 50 mcg daily  Osteoporosis she has not been on Prolia since she had a dental extraction  [facility] 03/2022  DEXA scan + osteoporosis based on the left femoral neck When compared to the prior examination, there has been a  9 % INCREASE in the total bone mineral density of the lumbar spine  There has been NO SIGNIFICANT CHANGE in the total bone mineral density of the left hip  Prior intolerance to Actonel  04/2022 Vitamin-D level 45 4  On vitamin D 5,000 IU daily  Lab Results       Component                Value               Date                       [id number]             2 570               04/04/2022                                 Genitourinary: Negative for difficulty urinating  History of recurrent UTIs on Macrodantin 50 mg every other day at [facility]  She is followed by Urology  07/2020 renal ultrasound 1 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 with increase in size of 1 of the measured components  No hydronephrosis  Bladder postvoid residual 247 mL  CT scan abdomen pelvis 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  Pap smear 09/2016   Musculoskeletal: Positive for back pain  Negative for arthralgias and myalgias          Several episodes of lower back pain/bilateral-lateral hip pain walking the grocery store  No leg numbness  08/2018 X rays lumbar spine facet arthritis  12/2016 X rays hips normal     Skin: Negative for rash  Allergic/Immunologic: Positive for environmental allergies  See HPI 02/2020 allergy testing + mold allergy  Neurological: Negative for dizziness and headaches  Hematological: Negative for adenopathy  Does not bruise/bleed easily  History of elevated homocystine level on vitamin supplement  No history of venous thrombosis  11/2020 homocysteine mildly elevated at 11 4    Lab Results       Component                Value               Date                       WBC                      4 92                04/04/2022                 HGB                      14 2                04/04/2022                 HCT                      44 3                04/04/2022                 MCV                      96                  04/04/2022                 PLT                      336                 04/04/2022                      Psychiatric/Behavioral: Negative for dysphoric mood and sleep disturbance  The patient is not hyperactive          Past Medical History:   Diagnosis Date   • Acute asthma exacerbation     last assessed 8/15/17   • Cataract    • Disease of thyroid gland    • Hypertriglyceridemia    • Osteoporosis    • Right lumbar radiculopathy 12/15/2016   • Urinary tract infection    • Vertigo      Past Surgical History:   Procedure Laterality Date   • APPENDECTOMY  1979   • CATARACT EXTRACTION     • HAND SURGERY Right 2007    thumb   • LAPAROSCOPY     • MAMMO STEREOTACTIC BREAST BIOPSY LEFT (ALL INC) Left 4/5/2022     Family History   Problem Relation Age of Onset   • Cirrhosis Mother    • Kidney nephrosis Mother    • Hypertension Father    • Other Father 80        extra mammary Padget's dx   • Cancer Paternal Grandmother [de-identified]        unknown type   • Heart disease Paternal Grandfather    • Heart attack Paternal Grandfather    • Kidney nephrosis Daughter    • Osteoporosis Maternal Grandmother    • Arthritis Maternal Grandmother    • Heart attack Maternal Grandfather    • Heart disease Maternal Grandfather    • Kidney nephrosis Daughter    • Lymphoma Maternal Aunt 79   • No Known Problems Paternal Aunt    • No Known Problems Paternal Aunt    • No Known Problems Paternal Aunt      Social History     Socioeconomic History   • Marital status: /Civil Union     Spouse name: None   • Number of children: None   • Years of education: None   • Highest education level: None   Occupational History   • None   Tobacco Use   • Smoking status: Former     Packs/day: 0 25     Years: 4 00     Pack years: 1 00     Types: Cigarettes     Quit date: 1979     Years since quittin 9   • Smokeless tobacco: Never   • Tobacco comments:         Vaping Use   • Vaping Use: Never used   Substance and Sexual Activity   • Alcohol use: No   • Drug use: No   • Sexual activity: Not Currently   Other Topics Concern   • None   Social History Narrative   • None     Social Determinants of Health     Financial Resource Strain: Not on file   Food Insecurity: Not on file   Transportation Needs: Not on file   Physical Activity: Not on file   Stress: Not on file   Social Connections: Not on file   Intimate Partner Violence: Not on file   Housing Stability: Not on file     Current Outpatient Medications on File Prior to Visit   Medication Sig   • albuterol (2 5 mg/3 mL) 0 083 % nebulizer solution Take 1 vial (2 5 mg total) by nebulization every 6 (six) hours as needed for wheezing or shortness of breath (Patient taking differently: Take 2 5 mg by nebulization as needed for wheezing or shortness of breath)   • albuterol (Ventolin HFA) 90 mcg/act inhaler 2 puffs Q 6 hours prn cough/wheezing   • aspirin 81 MG tablet Take 1 tablet by mouth daily   • Breo Ellipta 100-25 MCG/INH inhaler INHALE 1 PUFF BY MOUTH EVERY DAY AT THE SAME TIME EACH DAY   [address], GARGLE MOUTH WITH WATER AND SPIT AFTER USE   • Cholecalciferol 4000 units CAPS Take 1 capsule by mouth daily   • Coenzyme Q10 (CO Q-10) 30 MG CAPS Take 100 mg by mouth daily 2capsules daily   • cyclobenzaprine (FLEXERIL) 5 mg tablet Take 1 tablet (5 mg total) by mouth daily at bedtime (Patient taking differently: Take 10 mg by mouth as needed)   • fenofibrate 160 MG tablet Take 1 tablet (160 mg total) by mouth daily   • L-Methylfolate-B6-B12 3-35-2 MG TABS Take 1 tablet by mouth daily   • levothyroxine 50 mcg tablet Take 1 tablet (50 mcg total) by mouth daily   • nitrofurantoin (MACRODANTIN) 50 mg capsule Take 1 capsule by mouth daily    • triamcinolone (KENALOG) 0 1 % cream Apply 1 application topically 2 (two) times a day   • denosumab (Prolia) 60 mg/mL Inject 60 mL under the skin every 6 (six) months (Patient not taking: Reported on 12/6/2022)     Allergies   Allergen Reactions   • Penicillins      Shown on allergy testing     • Codeine Anxiety   • Dust Mite Extract    • Molds & Smuts    • Pseudoephedrine    • Rabbit Epithelium    • Sulfa Antibiotics Hives     Immunization History   Administered Date(s) Administered   • COVID-19 MODERNA VACC 0 5 ML IM 01/27/2021, 02/24/2021, 04/14/2022   • COVID-19 Moderna Vac BIVALENT 18 Yr+ Im (BOOSTER ONLY) 10/20/2022   • INFLUENZA 12/10/2015, 10/19/2016, 10/26/2017, 10/20/2018, 10/20/2021, 10/17/2022   • Influenza Quadrivalent, 6-35 Months IM 10/29/2014, 12/10/2015   • Influenza Split High Dose Preservative Free IM 10/19/2016, 10/26/2017, 10/20/2018, 10/07/2019   • Influenza, high dose seasonal 0 7 mL 09/30/2020   • Influenza, seasonal, injectable 11/04/2013   • Pneumococcal Conjugate 13-Valent 10/19/2016   • Pneumococcal Polysaccharide PPV23 10/26/2017   • Zoster 10/22/2020   • Zoster Vaccine Recombinant 10/22/2020, 01/06/2021       Objective     /80 (BP Location: Left arm, Patient Position: Sitting, Cuff Size: Standard)   Pulse 80   Temp 97 7 °F (36 5 °C)   Resp 18   Ht 5' 5 5" (1 664 m) Wt 85 kg (187 lb 8 oz)   SpO2 98%   BMI 30 73 kg/m²     BP Readings from Last 3 Encounters:   12/06/22 136/80   10/04/22 145/65   09/14/22 132/74     Wt Readings from Last 3 Encounters:   12/06/22 85 kg (187 lb 8 oz)   10/04/22 85 7 kg (189 lb)   09/14/22 85 7 kg (189 lb)       Physical Exam  Vitals and nursing note reviewed  Constitutional:       General: She is not in acute distress  Appearance: She is well-developed  HENT:      Right Ear: Tympanic membrane and ear canal normal       Left Ear: Tympanic membrane and ear canal normal    Eyes:      General: No scleral icterus  Extraocular Movements: Extraocular movements intact  Conjunctiva/sclera: Conjunctivae normal       Pupils: Pupils are equal, round, and reactive to light  Neck:      Thyroid: No thyroid mass or thyromegaly  Vascular: No carotid bruit or JVD  Trachea: No tracheal deviation  Cardiovascular:      Rate and Rhythm: Normal rate and regular rhythm  Heart sounds: Normal heart sounds  No murmur heard  No gallop  Pulmonary:      Effort: Pulmonary effort is normal  No respiratory distress  Breath sounds: Normal breath sounds  No wheezing or rales  Abdominal:      General: Bowel sounds are normal  There is no distension or abdominal bruit  Palpations: Abdomen is soft  There is no hepatomegaly, splenomegaly or mass  Tenderness: There is no abdominal tenderness  There is no guarding or rebound  Musculoskeletal:         General: Tenderness (mild tenderness trochanteric areas  ) present  Lumbar back: No tenderness or bony tenderness  Negative right straight leg raise test and negative left straight leg raise test       Right hip: Normal range of motion  Left hip: Normal range of motion  Right lower leg: No edema  Left lower leg: No edema  Lymphadenopathy:      Cervical: No cervical adenopathy  Upper Body:      Right upper body: No supraclavicular adenopathy        Left upper body: No supraclavicular adenopathy  Skin:     Findings: No rash  Nails: There is no clubbing  Neurological:      General: No focal deficit present  Mental Status: She is alert and oriented to person, place, and time     Psychiatric:         Mood and Affect: Mood normal        Mei Chang MD

## 2022-12-06 ENCOUNTER — OFFICE VISIT (OUTPATIENT)
Dept: FAMILY MEDICINE CLINIC | Facility: CLINIC | Age: 72
End: 2022-12-06

## 2022-12-06 VITALS
SYSTOLIC BLOOD PRESSURE: 136 MMHG | OXYGEN SATURATION: 98 % | HEART RATE: 80 BPM | BODY MASS INDEX: 30.13 KG/M2 | HEIGHT: 66 IN | WEIGHT: 187.5 LBS | TEMPERATURE: 97.7 F | DIASTOLIC BLOOD PRESSURE: 80 MMHG | RESPIRATION RATE: 18 BRPM

## 2022-12-06 DIAGNOSIS — E78.2 MIXED HYPERLIPIDEMIA: Primary | ICD-10-CM

## 2022-12-06 DIAGNOSIS — R79.89 ELEVATED HOMOCYSTEINE: ICD-10-CM

## 2022-12-06 DIAGNOSIS — K58.0 IRRITABLE BOWEL SYNDROME WITH DIARRHEA: ICD-10-CM

## 2022-12-06 DIAGNOSIS — K76.0 FATTY LIVER: ICD-10-CM

## 2022-12-06 DIAGNOSIS — E03.9 ACQUIRED HYPOTHYROIDISM: ICD-10-CM

## 2022-12-06 DIAGNOSIS — J45.30 MILD PERSISTENT ASTHMA WITHOUT COMPLICATION: ICD-10-CM

## 2022-12-06 DIAGNOSIS — E55.9 VITAMIN D DEFICIENCY: ICD-10-CM

## 2022-12-06 DIAGNOSIS — M47.816 FACET ARTHRITIS OF LUMBAR REGION: ICD-10-CM

## 2022-12-06 DIAGNOSIS — M81.0 AGE-RELATED OSTEOPOROSIS WITHOUT CURRENT PATHOLOGICAL FRACTURE: ICD-10-CM

## 2022-12-06 RX ORDER — PREDNISONE 20 MG/1
20 TABLET ORAL 2 TIMES DAILY WITH MEALS
Qty: 10 TABLET | Refills: 0 | Status: SHIPPED | OUTPATIENT
Start: 2022-12-06

## 2022-12-20 ENCOUNTER — APPOINTMENT (OUTPATIENT)
Dept: LAB | Facility: CLINIC | Age: 72
End: 2022-12-20

## 2022-12-20 DIAGNOSIS — R79.89 ELEVATED HOMOCYSTEINE: ICD-10-CM

## 2022-12-20 LAB
ALBUMIN SERPL BCP-MCNC: 3.7 G/DL (ref 3.5–5)
ALP SERPL-CCNC: 107 U/L (ref 46–116)
ALT SERPL W P-5'-P-CCNC: 61 U/L (ref 12–78)
ANION GAP SERPL CALCULATED.3IONS-SCNC: 5 MMOL/L (ref 4–13)
AST SERPL W P-5'-P-CCNC: 48 U/L (ref 5–45)
BASOPHILS # BLD AUTO: 0.04 THOUSANDS/ÂΜL (ref 0–0.1)
BASOPHILS NFR BLD AUTO: 1 % (ref 0–1)
BILIRUB SERPL-MCNC: 0.68 MG/DL (ref 0.2–1)
BUN SERPL-MCNC: 14 MG/DL (ref 5–25)
CALCIUM SERPL-MCNC: 9.9 MG/DL (ref 8.3–10.1)
CHLORIDE SERPL-SCNC: 110 MMOL/L (ref 96–108)
CHOLEST SERPL-MCNC: 197 MG/DL
CO2 SERPL-SCNC: 27 MMOL/L (ref 21–32)
CREAT SERPL-MCNC: 1.1 MG/DL (ref 0.6–1.3)
EOSINOPHIL # BLD AUTO: 0.11 THOUSAND/ÂΜL (ref 0–0.61)
EOSINOPHIL NFR BLD AUTO: 2 % (ref 0–6)
ERYTHROCYTE [DISTWIDTH] IN BLOOD BY AUTOMATED COUNT: 12.2 % (ref 11.6–15.1)
GFR SERPL CREATININE-BSD FRML MDRD: 50 ML/MIN/1.73SQ M
GLUCOSE P FAST SERPL-MCNC: 96 MG/DL (ref 65–99)
HCT VFR BLD AUTO: 45.7 % (ref 34.8–46.1)
HCYS SERPL-SCNC: 11.8 UMOL/L (ref 3.7–11.2)
HDLC SERPL-MCNC: 64 MG/DL
HGB BLD-MCNC: 14.8 G/DL (ref 11.5–15.4)
IMM GRANULOCYTES # BLD AUTO: 0.01 THOUSAND/UL (ref 0–0.2)
IMM GRANULOCYTES NFR BLD AUTO: 0 % (ref 0–2)
LDLC SERPL CALC-MCNC: 98 MG/DL (ref 0–100)
LYMPHOCYTES # BLD AUTO: 1.47 THOUSANDS/ÂΜL (ref 0.6–4.47)
LYMPHOCYTES NFR BLD AUTO: 31 % (ref 14–44)
MCH RBC QN AUTO: 31.1 PG (ref 26.8–34.3)
MCHC RBC AUTO-ENTMCNC: 32.4 G/DL (ref 31.4–37.4)
MCV RBC AUTO: 96 FL (ref 82–98)
MONOCYTES # BLD AUTO: 0.25 THOUSAND/ÂΜL (ref 0.17–1.22)
MONOCYTES NFR BLD AUTO: 5 % (ref 4–12)
NEUTROPHILS # BLD AUTO: 2.84 THOUSANDS/ÂΜL (ref 1.85–7.62)
NEUTS SEG NFR BLD AUTO: 61 % (ref 43–75)
NONHDLC SERPL-MCNC: 133 MG/DL
NRBC BLD AUTO-RTO: 0 /100 WBCS
PLATELET # BLD AUTO: 330 THOUSANDS/UL (ref 149–390)
PMV BLD AUTO: 10.8 FL (ref 8.9–12.7)
POTASSIUM SERPL-SCNC: 4 MMOL/L (ref 3.5–5.3)
PROT SERPL-MCNC: 6.9 G/DL (ref 6.4–8.4)
RBC # BLD AUTO: 4.76 MILLION/UL (ref 3.81–5.12)
SODIUM SERPL-SCNC: 142 MMOL/L (ref 135–147)
TRIGL SERPL-MCNC: 175 MG/DL
TSH SERPL DL<=0.05 MIU/L-ACNC: 2.66 UIU/ML (ref 0.45–4.5)
WBC # BLD AUTO: 4.72 THOUSAND/UL (ref 4.31–10.16)

## 2023-01-27 ENCOUNTER — TELEPHONE (OUTPATIENT)
Dept: FAMILY MEDICINE CLINIC | Facility: CLINIC | Age: 73
End: 2023-01-27

## 2023-01-27 NOTE — TELEPHONE ENCOUNTER
Patient had a colonoscopy yesterday with Dr Isaias Harden & had a bradychardia during procedure  I scheduled patient on 2/1/23 with Dr Kenneth Sarmiento  Should she be seen sooner?

## 2023-01-30 ENCOUNTER — RA CDI HCC (OUTPATIENT)
Dept: OTHER | Facility: HOSPITAL | Age: 73
End: 2023-01-30

## 2023-01-30 NOTE — PROGRESS NOTES
[facility]  coding opportunities       Chart reviewed, no opportunity found:   [address]        Patients Insurance     Medicare Insurance: [employer]

## 2023-02-01 ENCOUNTER — OFFICE VISIT (OUTPATIENT)
Dept: FAMILY MEDICINE CLINIC | Facility: CLINIC | Age: 73
End: 2023-02-01

## 2023-02-01 VITALS
SYSTOLIC BLOOD PRESSURE: 132 MMHG | TEMPERATURE: 97.4 F | DIASTOLIC BLOOD PRESSURE: 80 MMHG | HEIGHT: 66 IN | HEART RATE: 82 BPM | OXYGEN SATURATION: 98 % | WEIGHT: 188.5 LBS | BODY MASS INDEX: 30.29 KG/M2

## 2023-02-01 DIAGNOSIS — N18.31 STAGE 3A CHRONIC KIDNEY DISEASE (HCC): ICD-10-CM

## 2023-02-01 DIAGNOSIS — R00.1 SINUS BRADYCARDIA: Primary | ICD-10-CM

## 2023-02-01 DIAGNOSIS — E03.9 ACQUIRED HYPOTHYROIDISM: ICD-10-CM

## 2023-02-01 NOTE — PROGRESS NOTES
Name: Tino Haro      : 1950      MRN: [id number]  Encounter Provider: Ebonie Betancourt MD  Encounter Date: 2023   Encounter department: [address] [place] [address]  Sinus bradycardia  -     Holter monitor; Future; Expected date: 2023    2  Stage 3a chronic kidney disease ([address] )    3  Acquired hypothyroidism    EKG NSR HR 76  No acute changes  [place] 0 148  non specific T wave changes  No EKG for comparison  Schedule 48 hour Holter  Follow up once results are back  Subjective     Follow up OV  Patient was told she had episodes of bradycardia during her recent colonoscopy  Otherwise she has been asymptomatic  No chest pain or palpitations  No dizziness  Medications reviewed  Last labs 2022 reviewed see note  Hyperlipidemia mixed type on Fenofibrate 160 mg daily  Lipid profile 2022 cholesterol 170  See note  CKD 2022 creatinine 1 10  GFR 50  Electrolytes normal except for chloride 110       Lab Results   Component Value Date    [id number] 2 660 2022     Lab Results   Component Value Date    WBC 4 72 2022    HGB 14 8 2022    HCT 45 7 2022    MCV 96 2022     2022     Lab Results   Component Value Date     2018    SODIUM 142 2022    K 4 0 2022     (H) 2022    CO2 27 2022    AGAP 5 2022    BUN 14 2022    CREATININE 1 10 2022    GLUC 97 2021    GLUF 96 2022    CALCIUM 9 9 2022    AST 48 (H) 2022    ALT 61 2022    ALKPHOS 107 2022    PROT 6 9 2018    TP 6 9 2022    BILITOT 0 6 2018    TBILI 0 68 2022    EGFR 50 2022     Lab Results   Component Value Date    CHOLESTEROL 197 2022    CHOLESTEROL 170 2022    CHOLESTEROL 179 12/10/2021     Lab Results   Component Value Date    HDL 64 2022    HDL 58 2022    HDL 65 12/10/2021     Lab Results   Component Value Date    TRIG 175 (H) 12/20/2022    TRIG 91 04/04/2022    TRIG 123 12/10/2021     Lab Results   Component Value Date    [id number] 2 660 12/20/2022           Review of Systems   Constitutional: Negative for appetite change, chills, fatigue, fever and unexpected weight change  HENT: Negative for congestion, ear pain, postnasal drip, rhinorrhea, sinus pain, sore throat and trouble swallowing  Eyes: Negative for visual disturbance  Cataract surgery 12/2020 OD   Respiratory: Negative for cough, shortness of breath and wheezing  Asthma on Breo 100/25 daily  10/2021 COV 19 breakthrough infection  She received IV Monoclonal Ab Rx   02/2020 PFTs no large airway obstruction or bronchodilator responsiveness  Decreased forced vital capacity and total lung capacity consistent with mild restrictive lung disease  Mild diffuse impairment  02/2018 CXR normal except for mildly elevated right hemidiaphragm  CT scan chest 01/2016 normal except for small hiatal hernia  Cardiovascular: Negative for chest pain, palpitations and leg swelling  Gastrointestinal: Negative for abdominal pain, blood in stool, constipation, diarrhea, nausea and vomiting  Fatty liver 04/2022 LFTs normal  Small hiatal hernia on CT scan  Infrequent reflux symptoms  I BS diarrhea  Stable on probiotics  Endocrine:        Hypothyroidism on Levothyroxine 50 mcg daily  Osteoporosis she has not been on Prolia since she had a dental extraction  Kaia Louie 03/2022  DEXA scan + osteoporosis based on the left femoral neck When compared to the prior examination, there has been a  9 % INCREASE in the total bone mineral density of the lumbar spine  There has been NO SIGNIFICANT CHANGE in the total bone mineral density of the left hip  Prior intolerance to Actonel  04/2022 Vitamin-D level 45 4  On vitamin D 5,000 IU daily            Lab Results       Component                Value               Date                       [id number]             2 660 12/20/2022                                            Genitourinary: Negative for difficulty urinating  History of recurrent UTIs on Macrodantin 50 mg every other day at [facility]  She is followed by Urology  07/2020 renal ultrasound 1 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 with increase in size of 1 of the measured components  No hydronephrosis  Bladder postvoid residual 247 mL  CT scan abdomen pelvis 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  Pap smear 09/2016   Musculoskeletal: Negative for arthralgias and myalgias  08/2018 X rays lumbar spine facet arthritis  12/2016 X rays hips normal     Skin: Negative for rash  Allergic/Immunologic: Positive for environmental allergies  See HPI 02/2020 allergy testing + mold allergy  Neurological: Negative for dizziness and headaches  Hematological: Negative for adenopathy  Does not bruise/bleed easily  History of elevated homocystine level on vitamin supplement  No history of venous thrombosis  11/2020 homocysteine mildly elevated at 11 4    Lab Results       Component                Value               Date                       WBC                      4 72                12/20/2022                 HGB                      14 8                12/20/2022                 HCT                      45 7                12/20/2022                 MCV                      96                  12/20/2022                 PLT                      330                 12/20/2022                             Psychiatric/Behavioral: Negative for dysphoric mood and sleep disturbance  The patient is not hyperactive          Past Medical History:   Diagnosis Date   • Acute asthma exacerbation     last assessed 8/15/17   • Cataract    • Disease of thyroid gland    • Hypertriglyceridemia    • Osteoporosis    • Right lumbar radiculopathy 12/15/2016   • Urinary tract infection    • Vertigo      Past Surgical History:   Procedure Laterality Date   • APPENDECTOMY     • CATARACT EXTRACTION     • HAND SURGERY Right     thumb   • LAPAROSCOPY     • MAMMO STEREOTACTIC BREAST BIOPSY LEFT (ALL INC) Left 2022     Family History   Problem Relation Age of Onset   • Cirrhosis Mother    • Kidney nephrosis Mother    • Hypertension Father    • Other Father 80        extra mammary Padget's dx   • Cancer Paternal Grandmother [de-identified]        unknown type   • Heart disease Paternal Grandfather    • Heart attack Paternal Grandfather    • Kidney nephrosis Daughter    • Osteoporosis Maternal Grandmother    • Arthritis Maternal Grandmother    • Heart attack Maternal Grandfather    • Heart disease Maternal Grandfather    • Kidney nephrosis Daughter    • Lymphoma Maternal Aunt 79   • No Known Problems Paternal Aunt    • No Known Problems Paternal Aunt    • No Known Problems Paternal Aunt      Social History     Socioeconomic History   • Marital status: /Civil Union     Spouse name: None   • Number of children: None   • Years of education: None   • Highest education level: None   Occupational History   • None   Tobacco Use   • Smoking status: Former     Packs/day: 0 25     Years: 4 00     Pack years: 1 00     Types: Cigarettes     Quit date: 1979     Years since quittin 1   • Smokeless tobacco: Never   • Tobacco comments:         Vaping Use   • Vaping Use: Never used   Substance and Sexual Activity   • Alcohol use: No   • Drug use: No   • Sexual activity: Not Currently   Other Topics Concern   • None   Social History Narrative   • None     Social Determinants of Health     Financial Resource Strain: Not on file   Food Insecurity: Not on file   Transportation Needs: Not on file   Physical Activity: Not on file   Stress: Not on file   Social Connections: Not on file   Intimate Partner Violence: Not on file   Housing Stability: Not on file     Current Outpatient Medications on File Prior to Visit   Medication Sig   • albuterol (2 5 mg/3 mL) 0 083 % nebulizer solution Take 1 vial (2 5 mg total) by nebulization every 6 (six) hours as needed for wheezing or shortness of breath (Patient taking differently: Take 2 5 mg by nebulization as needed for wheezing or shortness of breath)   • albuterol (Ventolin HFA) 90 mcg/act inhaler 2 puffs Q 6 hours prn cough/wheezing   • aspirin 81 MG tablet Take 1 tablet by mouth daily   • Breo Ellipta 100-25 MCG/INH inhaler INHALE 1 PUFF BY MOUTH EVERY DAY AT THE SAME TIME EACH DAY  [address], GARGLE MOUTH WITH WATER AND SPIT AFTER USE   • Cholecalciferol 4000 units CAPS Take 1 capsule by mouth daily   • Coenzyme Q10 (CO Q-10) 30 MG CAPS Take 100 mg by mouth daily 2capsules daily   • cyclobenzaprine (FLEXERIL) 5 mg tablet Take 1 tablet (5 mg total) by mouth daily at bedtime (Patient taking differently: Take 10 mg by mouth as needed)   • denosumab (Prolia) 60 mg/mL Inject 60 mL under the skin every 6 (six) months (Patient not taking: Reported on 12/6/2022)   • fenofibrate 160 MG tablet Take 1 tablet (160 mg total) by mouth daily   • L-Methylfolate-B6-B12 3-35-2 MG TABS Take 1 tablet by mouth daily   • levothyroxine 50 mcg tablet Take 1 tablet (50 mcg total) by mouth daily   • nitrofurantoin (MACRODANTIN) 50 mg capsule Take 1 capsule by mouth daily    • triamcinolone (KENALOG) 0 1 % cream Apply 1 application topically 2 (two) times a day   • [DISCONTINUED] predniSONE 20 mg tablet Take 1 tablet (20 mg total) by mouth 2 (two) times a day with meals     Allergies   Allergen Reactions   • Penicillins      Shown on allergy testing     • Codeine Anxiety   • Dust Mite Extract    • Molds & Smuts    • Pseudoephedrine    • Rabbit Epithelium    • Sulfa Antibiotics Hives     Immunization History   Administered Date(s) Administered   • COVID-19 MODERNA VACC 0 5 ML IM 01/27/2021, 02/24/2021, 04/14/2022   • COVID-19 Moderna Vac BIVALENT 12 Yr+ IM (BOOSTER ONLY) 0 5 ML 10/20/2022   • INFLUENZA 12/10/2015, 10/19/2016, 10/26/2017, 10/20/2018, 10/20/2021, 10/17/2022   • Influenza Quadrivalent, 6-35 Months IM 10/29/2014, 12/10/2015   • Influenza Split High Dose Preservative Free IM 10/19/2016, 10/26/2017, 10/20/2018, 10/07/2019   • Influenza, high dose seasonal 0 7 mL 09/30/2020   • Influenza, seasonal, injectable 11/04/2013   • Pneumococcal Conjugate 13-Valent 10/19/2016   • Pneumococcal Polysaccharide PPV23 10/26/2017   • Zoster 10/22/2020   • Zoster Vaccine Recombinant 10/22/2020, 01/06/2021       Objective     /80 (BP Location: Left arm, Patient Position: Sitting, Cuff Size: Large)   Pulse 82   Temp (!) 97 4 °F (36 3 °C)   Ht 5' 5 5" (1 664 m)   Wt 85 5 kg (188 lb 8 oz)   SpO2 98%   BMI 30 89 kg/m²      BP Readings from Last 3 Encounters:   02/01/23 132/80   12/06/22 136/80   10/04/22 145/65     Wt Readings from Last 3 Encounters:   02/01/23 85 5 kg (188 lb 8 oz)   12/06/22 85 kg (187 lb 8 oz)   10/04/22 85 7 kg (189 lb)       Physical Exam  Constitutional:       General: She is not in acute distress  HENT:      Right Ear: Tympanic membrane and ear canal normal       Left Ear: Tympanic membrane and ear canal normal    Eyes:      Extraocular Movements: Extraocular movements intact  Pupils: Pupils are equal, round, and reactive to light  Neck:      Vascular: No carotid bruit  Cardiovascular:      Rate and Rhythm: Normal rate and regular rhythm  Heart sounds: No murmur heard  No gallop  Comments: Apical HR 72   Pulmonary:      Effort: Pulmonary effort is normal  No respiratory distress  Breath sounds: Normal breath sounds  No wheezing or rales  Musculoskeletal:      Right lower leg: No edema  Left lower leg: No edema  Lymphadenopathy:      Cervical: No cervical adenopathy  Skin:     Findings: No lesion or rash  Neurological:      General: No focal deficit present  Mental Status: She is alert and oriented to person, place, and time     Psychiatric:         Mood and Affect: Mood normal          Behavior: Behavior normal  Hector Espinoza MD

## 2023-02-08 ENCOUNTER — HOSPITAL ENCOUNTER (OUTPATIENT)
Dept: NON INVASIVE DIAGNOSTICS | Facility: CLINIC | Age: 73
Discharge: HOME | End: 2023-02-08

## 2023-02-08 DIAGNOSIS — R00.1 SINUS BRADYCARDIA: ICD-10-CM

## 2023-02-14 ENCOUNTER — TRANSCRIBE ORDERS (OUTPATIENT)
Dept: LAB | Facility: CLINIC | Age: 73
End: 2023-02-14

## 2023-02-14 ENCOUNTER — APPOINTMENT (OUTPATIENT)
Dept: LAB | Facility: CLINIC | Age: 73
End: 2023-02-14

## 2023-02-14 DIAGNOSIS — M81.0 AGE-RELATED OSTEOPOROSIS WITHOUT CURRENT PATHOLOGICAL FRACTURE: ICD-10-CM

## 2023-02-14 DIAGNOSIS — M81.0 AGE-RELATED OSTEOPOROSIS WITHOUT CURRENT PATHOLOGICAL FRACTURE: Primary | ICD-10-CM

## 2023-02-14 LAB — CALCIUM SERPL-MCNC: 9.2 MG/DL (ref 8.3–10.1)

## 2023-03-21 ENCOUNTER — HOSPITAL ENCOUNTER (OUTPATIENT)
Dept: RADIOLOGY | Age: 73
Discharge: HOME | End: 2023-03-21

## 2023-03-21 VITALS — HEIGHT: 66 IN | WEIGHT: 185 LBS | BODY MASS INDEX: 29.73 KG/M2

## 2023-03-21 DIAGNOSIS — Z12.31 VISIT FOR SCREENING MAMMOGRAM: ICD-10-CM

## 2023-05-02 ENCOUNTER — OFFICE VISIT (OUTPATIENT)
Dept: FAMILY MEDICINE CLINIC | Facility: CLINIC | Age: 73
End: 2023-05-02

## 2023-05-02 VITALS
HEIGHT: 66 IN | SYSTOLIC BLOOD PRESSURE: 134 MMHG | OXYGEN SATURATION: 99 % | HEART RATE: 87 BPM | WEIGHT: 190.5 LBS | TEMPERATURE: 97.7 F | RESPIRATION RATE: 17 BRPM | DIASTOLIC BLOOD PRESSURE: 78 MMHG | BODY MASS INDEX: 30.62 KG/M2

## 2023-05-02 DIAGNOSIS — E78.2 MIXED HYPERLIPIDEMIA: ICD-10-CM

## 2023-05-02 DIAGNOSIS — J06.9 ACUTE URI: Primary | ICD-10-CM

## 2023-05-02 DIAGNOSIS — R79.89 ELEVATED HOMOCYSTEINE: ICD-10-CM

## 2023-05-02 DIAGNOSIS — N18.31 STAGE 3A CHRONIC KIDNEY DISEASE (HCC): ICD-10-CM

## 2023-05-02 DIAGNOSIS — E03.9 ACQUIRED HYPOTHYROIDISM: ICD-10-CM

## 2023-05-02 RX ORDER — AZITHROMYCIN 250 MG/1
TABLET, FILM COATED ORAL
Qty: 6 TABLET | Refills: 0 | Status: SHIPPED | OUTPATIENT
Start: 2023-05-02 | End: 2023-05-06

## 2023-05-02 NOTE — PROGRESS NOTES
Name: Tiffany Holland      : 1950      MRN: [id number]  Encounter Provider: Mohan Hills MD  Encounter Date: 2023   Encounter department: [address] [place] [address] St     1  Acute URI  -     azithromycin (ZITHROMAX) 250 mg tablet; Take 2 tablets today then 1 tablet daily x 4 days    2  Acquired hypothyroidism  -     TSH, 3rd generation with Free T4 reflex    3  Stage 3a chronic kidney disease (HCC)  -     Comprehensive metabolic panel  -     CBC and differential  -     PTH, intact    4  Mixed hyperlipidemia  -     Lipid panel    5  Elevated homocysteine  -     Homocysteine, serum; Future    Continue with symptom treatment for cough and nasal congestion  Continue with daily Breo with PRN albuterol inhaler  Recheck as needed  Advised to call if any changes  Follow-up office visit next month with labs  Subjective     1 week history of persistent nonproductive cough with nasal congestion  No fevers  Home COVID-19 test yesterday negative  Asthmatic on daily Breo 100/25  She used her rescue inhaler once since her symptoms started  She has been using over-the-counter medications for symptom relief for cough and nasal congestion  Review of Systems   Constitutional: Positive for fatigue  Negative for appetite change, chills and fever  HENT: Positive for congestion, postnasal drip and rhinorrhea  Negative for ear pain, sinus pain and sore throat  Respiratory: Positive for cough  Negative for shortness of breath and wheezing  Cardiovascular: Negative for chest pain and palpitations  Gastrointestinal: Negative for diarrhea, nausea and vomiting  Musculoskeletal: Negative for myalgias  Neurological: Negative for headaches  Hematological: Negative for adenopathy         Past Medical History:   Diagnosis Date    Acute asthma exacerbation     last assessed 8/15/17    Cataract     Disease of thyroid gland     Hypertriglyceridemia     Osteoporosis     Right lumbar radiculopathy 12/15/2016    Urinary tract infection     Vertigo      Past Surgical History:   Procedure Laterality Date    APPENDECTOMY  1979    BREAST BIOPSY      CATARACT EXTRACTION      HAND SURGERY Right 2007    thumb    LAPAROSCOPY      MAMMO STEREOTACTIC BREAST BIOPSY LEFT (ALL INC) Left 2022     Family History   Problem Relation Age of Onset    Cirrhosis Mother     Kidney nephrosis Mother     Hypertension Father     Other Father 80        extra mammary Padget's dx    Cancer Paternal Grandmother [de-identified]        unknown type    Heart disease Paternal Grandfather     Heart attack Paternal Grandfather     Kidney nephrosis Daughter     Osteoporosis Maternal Grandmother     Arthritis Maternal Grandmother     Heart attack Maternal Grandfather     Heart disease Maternal Grandfather     Kidney nephrosis Daughter     Lymphoma Maternal Aunt 79    No Known Problems Paternal Aunt     No Known Problems Paternal Aunt     No Known Problems Paternal Aunt      Social History     Socioeconomic History    Marital status: /Civil Union     Spouse name: None    Number of children: None    Years of education: None    Highest education level: None   Occupational History    None   Tobacco Use    Smoking status: Former     Packs/day: 0 25     Years: 4 00     Pack years: 1 00     Types: Cigarettes     Quit date: 1979     Years since quittin 3    Smokeless tobacco: Never    Tobacco comments:         Vaping Use    Vaping Use: Never used   Substance and Sexual Activity    Alcohol use: No    Drug use: No    Sexual activity: Not Currently   Other Topics Concern    None   Social History Narrative    None     Social Determinants of Health     Financial Resource Strain: Not on file   Food Insecurity: Not on file   Transportation Needs: Not on file   Physical Activity: Not on file   Stress: Not on file   Social Connections: Not on file   Intimate Partner Violence: Not on file   Housing Stability: Not on file     Current Outpatient Medications on File Prior to Visit   Medication Sig    albuterol (2 5 mg/3 mL) 0 083 % nebulizer solution Take 1 vial (2 5 mg total) by nebulization every 6 (six) hours as needed for wheezing or shortness of breath    albuterol (Ventolin HFA) 90 mcg/act inhaler 2 puffs Q 6 hours prn cough/wheezing    aspirin 81 MG tablet Take 1 tablet by mouth daily    Breo Ellipta 100-25 MCG/ACT inhaler Inhale 1 puff every morning Rinse mouth after using inhaler   Cholecalciferol 4000 units CAPS Take 1 capsule by mouth daily    Coenzyme Q10 (CO Q-10) 30 MG CAPS Take 100 mg by mouth daily 2capsules daily    cyclobenzaprine (FLEXERIL) 5 mg tablet Take 1 tablet (5 mg total) by mouth daily at bedtime    denosumab (Prolia) 60 mg/mL Inject 60 mL under the skin every 6 (six) months    fenofibrate 160 MG tablet Take 1 tablet (160 mg total) by mouth daily    L-Methylfolate-B6-B12 3-35-2 MG TABS Take 1 tablet by mouth daily    levothyroxine 50 mcg tablet Take 1 tablet (50 mcg total) by mouth daily    nitrofurantoin (MACRODANTIN) 50 mg capsule Take 1 capsule by mouth daily     [DISCONTINUED] triamcinolone (KENALOG) 0 1 % cream Apply 1 application topically 2 (two) times a day     Allergies   Allergen Reactions    Penicillins      Shown on allergy testing      Codeine Anxiety    Dust Mite Extract     Molds & Smuts     Pseudoephedrine     Rabbit Epithelium     Sulfa Antibiotics Hives     Immunization History   Administered Date(s) Administered    COVID-19 MODERNA VACC 0 5 ML IM 01/27/2021, 02/24/2021, 04/14/2022    COVID-19 Moderna Vac BIVALENT 12 Yr+ IM (BOOSTER ONLY) 0 5 ML 10/20/2022    INFLUENZA 12/10/2015, 10/19/2016, 10/26/2017, 10/20/2018, 10/20/2021, 10/17/2022    Influenza Quadrivalent, 6-35 Months IM 10/29/2014, 12/10/2015    Influenza Split High Dose Preservative Free IM 10/19/2016, 10/26/2017, 10/20/2018, 10/07/2019    Influenza, high dose seasonal 0 7 mL "09/30/2020    Influenza, seasonal, injectable 11/04/2013    Pneumococcal Conjugate 13-Valent 10/19/2016    Pneumococcal Polysaccharide PPV23 10/26/2017    Zoster 10/22/2020    Zoster Vaccine Recombinant 10/22/2020, 01/06/2021       Objective     /78 (BP Location: Left arm, Patient Position: Sitting, Cuff Size: Standard)   Pulse 87   Temp 97 7 °F (36 5 °C) (Tympanic)   Resp 17   Ht 5' 5 5\" (1 664 m)   Wt 86 4 kg (190 lb 8 oz)   SpO2 99%   BMI 31 22 kg/m²     Physical Exam  Vitals and nursing note reviewed  Constitutional:       General: She is not in acute distress  Appearance: She is well-developed  HENT:      Right Ear: Tympanic membrane and ear canal normal       Left Ear: Tympanic membrane and ear canal normal       Nose:      Right Sinus: No maxillary sinus tenderness or frontal sinus tenderness  Left Sinus: No maxillary sinus tenderness or frontal sinus tenderness  Mouth/Throat:      Mouth: No oral lesions  Pharynx: Oropharynx is clear  Eyes:      Conjunctiva/sclera: Conjunctivae normal    Cardiovascular:      Rate and Rhythm: Normal rate and regular rhythm  Heart sounds: Normal heart sounds  No murmur heard  No gallop  Pulmonary:      Effort: Pulmonary effort is normal  No respiratory distress  Breath sounds: Normal breath sounds  No wheezing or rales  Lymphadenopathy:      Cervical: No cervical adenopathy  Skin:     Findings: No rash  Neurological:      Mental Status: She is alert and oriented to person, place, and time         Amirah Wiley MD  "

## 2023-05-09 ENCOUNTER — APPOINTMENT (OUTPATIENT)
Dept: RADIOLOGY | Facility: CLINIC | Age: 73
End: 2023-05-09

## 2023-05-09 ENCOUNTER — TELEPHONE (OUTPATIENT)
Dept: FAMILY MEDICINE CLINIC | Facility: CLINIC | Age: 73
End: 2023-05-09

## 2023-05-09 ENCOUNTER — OFFICE VISIT (OUTPATIENT)
Dept: FAMILY MEDICINE CLINIC | Facility: CLINIC | Age: 73
End: 2023-05-09

## 2023-05-09 VITALS
BODY MASS INDEX: 30.62 KG/M2 | HEIGHT: 66 IN | SYSTOLIC BLOOD PRESSURE: 144 MMHG | WEIGHT: 190.5 LBS | TEMPERATURE: 97.7 F | OXYGEN SATURATION: 97 % | RESPIRATION RATE: 17 BRPM | HEART RATE: 70 BPM | DIASTOLIC BLOOD PRESSURE: 85 MMHG

## 2023-05-09 DIAGNOSIS — J45.31 MILD PERSISTENT ASTHMA WITH ACUTE EXACERBATION: Primary | ICD-10-CM

## 2023-05-09 DIAGNOSIS — R05.1 ACUTE COUGH: ICD-10-CM

## 2023-05-09 DIAGNOSIS — J45.21 MILD INTERMITTENT ASTHMA WITH ACUTE EXACERBATION: ICD-10-CM

## 2023-05-09 RX ORDER — ALBUTEROL SULFATE 2.5 MG/3ML
2.5 SOLUTION RESPIRATORY (INHALATION) EVERY 6 HOURS PRN
Qty: 30 ML | Refills: 0 | Status: SHIPPED | OUTPATIENT
Start: 2023-05-09

## 2023-05-09 NOTE — PROGRESS NOTES
FAMILY MEDICINE PROGRESS NOTE    Date of Service: 23  Primary Care Provider:   Janel Horne MD       Name: Gisell Kirk       : 1950       Age:72 y o  Sex: female      MRN: [id number]      Chief Complaint:Cough (Patient complained of cough, she was seen by Dr Sonia Sepulveda last week, she  was prescribed Z Pack which she is done taking and is still comlaining about cough  Tested negative for covid last night )       ASSESSMENT and PLAN:  Gisell Kirk is a 71 y o  female with:     Problem List Items Addressed This Visit        Respiratory    Mild persistent asthma without complication - Primary   Other Visit Diagnoses     Acute cough        Relevant Orders    XR chest pa & lateral        Recommended increasing Breo to twice daily  Will obtain chest xray to evaluate for consolidation/pneumonia  Return precautions reviewed  SUBJECTIVE:  Gisell Kirk is a 71 y o  female who presents today with a chief complaint of Cough (Patient complained of cough, she was seen by Dr Sonia Sepulveda last week, she  was prescribed Z Pack which she is done taking and is still comlaining about cough  Tested negative for covid last night )  HPI     She was seen last week for cough  She was given prescription for Z-pack which she finished on   She has been using OTC cough medication which has not been helpful  Mucinex has not been helpful  She has been using Breo in the morning and using albuterol one to two times daily  Her Breo was decreased from 200 mg of fluticasone to 100 mg in the winter  Other symptoms have improved such as post nasal drip and congestion  Review of Systems   Constitutional: Negative for chills and fever  HENT: Negative for congestion, postnasal drip and rhinorrhea  Respiratory: Positive for cough and wheezing  I have reviewed the patient's Past Medical History      Current Outpatient Medications:   •  albuterol (2 5 mg/3 mL) 0 083 % nebulizer solution, Take 1 vial (2 5 mg total) by nebulization "every 6 (six) hours as needed for wheezing or shortness of breath, Disp: 30 vial, Rfl: 0  •  albuterol (Ventolin HFA) 90 mcg/act inhaler, 2 puffs Q 6 hours prn cough/wheezing, Disp: 18 g, Rfl: 3  •  aspirin 81 MG tablet, Take 1 tablet by mouth daily, Disp: , Rfl:   •  Breo Ellipta 100-25 MCG/ACT inhaler, Inhale 1 puff every morning Rinse mouth after using inhaler  , Disp: 60 blister, Rfl: 3  •  Cholecalciferol 4000 units CAPS, Take 1 capsule by mouth daily, Disp: , Rfl:   •  Coenzyme Q10 (CO Q-10) 30 MG CAPS, Take 100 mg by mouth daily 2capsules daily, Disp: , Rfl:   •  cyclobenzaprine (FLEXERIL) 5 mg tablet, Take 1 tablet (5 mg total) by mouth daily at bedtime, Disp: 5 tablet, Rfl: 0  •  denosumab (Prolia) 60 mg/mL, Inject 60 mL under the skin every 6 (six) months, Disp: , Rfl:   •  fenofibrate 160 MG tablet, Take 1 tablet (160 mg total) by mouth daily, Disp: 90 tablet, Rfl: 3  •  L-Methylfolate-B6-B12 3-35-2 MG TABS, Take 1 tablet by mouth daily, Disp: 90 tablet, Rfl: 3  •  levothyroxine 50 mcg tablet, Take 1 tablet (50 mcg total) by mouth daily, Disp: 90 tablet, Rfl: 3  •  nitrofurantoin (MACRODANTIN) 50 mg capsule, Take 1 capsule by mouth daily , Disp: , Rfl:     OBJECTIVE:  /85 (BP Location: Left arm, Patient Position: Sitting, Cuff Size: Standard)   Pulse 70   Temp 97 7 °F (36 5 °C) (Tympanic)   Resp 17   Ht 5' 5 5\" (1 664 m)   Wt 86 4 kg (190 lb 8 oz)   SpO2 97%   BMI 31 22 kg/m²    BP Readings from Last 3 Encounters:   05/09/23 144/85   05/02/23 134/78   04/04/23 130/85      Wt Readings from Last 3 Encounters:   05/09/23 86 4 kg (190 lb 8 oz)   05/02/23 86 4 kg (190 lb 8 oz)   04/04/23 83 9 kg (185 lb)      Physical Exam  Constitutional:       General: She is not in acute distress  Appearance: Normal appearance  She is normal weight  She is not ill-appearing or toxic-appearing  HENT:      Head: Normocephalic and atraumatic        Right Ear: External ear normal       Left Ear: External ear " "normal    Eyes:      Extraocular Movements: Extraocular movements intact  Conjunctiva/sclera: Conjunctivae normal    Pulmonary:      Effort: Pulmonary effort is normal  No respiratory distress  Breath sounds: No wheezing  Comments: Coarse inspiratory breath sounds bilaterally, no focal consolidation   Musculoskeletal:         General: Normal range of motion  Cervical back: Normal range of motion and neck supple  Skin:     Findings: No erythema or rash  Neurological:      General: No focal deficit present  Mental Status: She is alert and oriented to person, place, and time  Psychiatric:         Mood and Affect: Mood normal          Behavior: Behavior normal                 Return if symptoms worsen or fail to improve  Morelia Rivera MD    Note: Portions of the record have been created with voice recognition software  Occasional wrong word or \"sound a like\" substitutions may have occurred due to the inherent limitations of voice recognition software  Read the chart carefully and recognize, using context, where substitutions have occurred    "

## 2023-06-02 ENCOUNTER — RA CDI HCC (OUTPATIENT)
Dept: OTHER | Facility: HOSPITAL | Age: 73
End: 2023-06-02

## 2023-06-02 ENCOUNTER — APPOINTMENT (OUTPATIENT)
Dept: LAB | Facility: CLINIC | Age: 73
End: 2023-06-02
Payer: COMMERCIAL

## 2023-06-02 DIAGNOSIS — R79.89 ELEVATED HOMOCYSTEINE: ICD-10-CM

## 2023-06-02 LAB
ALBUMIN SERPL BCP-MCNC: 3.6 G/DL (ref 3.5–5)
ALP SERPL-CCNC: 54 U/L (ref 46–116)
ALT SERPL W P-5'-P-CCNC: 38 U/L (ref 12–78)
ANION GAP SERPL CALCULATED.3IONS-SCNC: 1 MMOL/L (ref 4–13)
AST SERPL W P-5'-P-CCNC: 27 U/L (ref 5–45)
BASOPHILS # BLD AUTO: 0.03 THOUSANDS/ÂΜL (ref 0–0.1)
BASOPHILS NFR BLD AUTO: 1 % (ref 0–1)
BILIRUB SERPL-MCNC: 0.58 MG/DL (ref 0.2–1)
BUN SERPL-MCNC: 17 MG/DL (ref 5–25)
CALCIUM SERPL-MCNC: 9.6 MG/DL (ref 8.3–10.1)
CHLORIDE SERPL-SCNC: 110 MMOL/L (ref 96–108)
CHOLEST SERPL-MCNC: 186 MG/DL
CO2 SERPL-SCNC: 28 MMOL/L (ref 21–32)
CREAT SERPL-MCNC: 1.09 MG/DL (ref 0.6–1.3)
EOSINOPHIL # BLD AUTO: 0.08 THOUSAND/ÂΜL (ref 0–0.61)
EOSINOPHIL NFR BLD AUTO: 2 % (ref 0–6)
ERYTHROCYTE [DISTWIDTH] IN BLOOD BY AUTOMATED COUNT: 12.9 % (ref 11.6–15.1)
GFR SERPL CREATININE-BSD FRML MDRD: 51 ML/MIN/1.73SQ M
GLUCOSE P FAST SERPL-MCNC: 85 MG/DL (ref 65–99)
HCT VFR BLD AUTO: 42.7 % (ref 34.8–46.1)
HCYS SERPL-SCNC: 12 UMOL/L (ref 3.7–11.2)
HDLC SERPL-MCNC: 51 MG/DL
HGB BLD-MCNC: 14.1 G/DL (ref 11.5–15.4)
IMM GRANULOCYTES # BLD AUTO: 0.01 THOUSAND/UL (ref 0–0.2)
IMM GRANULOCYTES NFR BLD AUTO: 0 % (ref 0–2)
LDLC SERPL CALC-MCNC: 107 MG/DL (ref 0–100)
LYMPHOCYTES # BLD AUTO: 1.65 THOUSANDS/ÂΜL (ref 0.6–4.47)
LYMPHOCYTES NFR BLD AUTO: 36 % (ref 14–44)
MCH RBC QN AUTO: 30.7 PG (ref 26.8–34.3)
MCHC RBC AUTO-ENTMCNC: 33 G/DL (ref 31.4–37.4)
MCV RBC AUTO: 93 FL (ref 82–98)
MONOCYTES # BLD AUTO: 0.27 THOUSAND/ÂΜL (ref 0.17–1.22)
MONOCYTES NFR BLD AUTO: 6 % (ref 4–12)
NEUTROPHILS # BLD AUTO: 2.56 THOUSANDS/ÂΜL (ref 1.85–7.62)
NEUTS SEG NFR BLD AUTO: 55 % (ref 43–75)
NONHDLC SERPL-MCNC: 135 MG/DL
NRBC BLD AUTO-RTO: 0 /100 WBCS
PLATELET # BLD AUTO: 326 THOUSANDS/UL (ref 149–390)
PMV BLD AUTO: 10.7 FL (ref 8.9–12.7)
POTASSIUM SERPL-SCNC: 4.1 MMOL/L (ref 3.5–5.3)
PROT SERPL-MCNC: 6.8 G/DL (ref 6.4–8.4)
PTH-INTACT SERPL-MCNC: 52.5 PG/ML (ref 12–88)
RBC # BLD AUTO: 4.59 MILLION/UL (ref 3.81–5.12)
SODIUM SERPL-SCNC: 139 MMOL/L (ref 135–147)
TRIGL SERPL-MCNC: 141 MG/DL
TSH SERPL DL<=0.05 MIU/L-ACNC: 2.52 UIU/ML (ref 0.45–4.5)
WBC # BLD AUTO: 4.6 THOUSAND/UL (ref 4.31–10.16)

## 2023-06-02 PROCEDURE — 83090 ASSAY OF HOMOCYSTEINE: CPT

## 2023-06-02 NOTE — PROGRESS NOTES
[facility]  coding opportunities       Chart reviewed, no opportunity found:   [address]        Patients Insurance     Medicare Insurance: [employer]age

## 2023-06-07 ENCOUNTER — OFFICE VISIT (OUTPATIENT)
Dept: FAMILY MEDICINE CLINIC | Facility: CLINIC | Age: 73
End: 2023-06-07
Payer: COMMERCIAL

## 2023-06-07 VITALS
TEMPERATURE: 97.2 F | HEART RATE: 86 BPM | WEIGHT: 192 LBS | SYSTOLIC BLOOD PRESSURE: 110 MMHG | HEIGHT: 66 IN | DIASTOLIC BLOOD PRESSURE: 72 MMHG | OXYGEN SATURATION: 96 % | BODY MASS INDEX: 30.86 KG/M2

## 2023-06-07 DIAGNOSIS — M75.41 IMPINGEMENT SYNDROME OF RIGHT SHOULDER: ICD-10-CM

## 2023-06-07 DIAGNOSIS — K76.0 FATTY LIVER: ICD-10-CM

## 2023-06-07 DIAGNOSIS — I49.1 PAC (PREMATURE ATRIAL CONTRACTION): ICD-10-CM

## 2023-06-07 DIAGNOSIS — D17.71 ANGIOMYOLIPOMA OF KIDNEY: ICD-10-CM

## 2023-06-07 DIAGNOSIS — E72.11 HYPERHOMOCYSTEINEMIA (HCC): ICD-10-CM

## 2023-06-07 DIAGNOSIS — E78.2 MIXED HYPERLIPIDEMIA: ICD-10-CM

## 2023-06-07 DIAGNOSIS — J45.30 MILD PERSISTENT ASTHMA WITHOUT COMPLICATION: ICD-10-CM

## 2023-06-07 DIAGNOSIS — M81.0 AGE-RELATED OSTEOPOROSIS WITHOUT CURRENT PATHOLOGICAL FRACTURE: ICD-10-CM

## 2023-06-07 DIAGNOSIS — E03.9 ACQUIRED HYPOTHYROIDISM: ICD-10-CM

## 2023-06-07 DIAGNOSIS — N18.31 STAGE 3A CHRONIC KIDNEY DISEASE (HCC): Primary | ICD-10-CM

## 2023-06-07 PROCEDURE — 20610 DRAIN/INJ JOINT/BURSA W/O US: CPT | Performed by: FAMILY MEDICINE

## 2023-06-07 PROCEDURE — 99214 OFFICE O/P EST MOD 30 MIN: CPT | Performed by: FAMILY MEDICINE

## 2023-06-07 RX ORDER — MECOBAL/LEVOMEFOLAT CA/B6 PHOS 2-3-35 MG
2 TABLET ORAL DAILY
Qty: 180 TABLET | Refills: 3 | Status: SHIPPED | OUTPATIENT
Start: 2023-06-07

## 2023-06-07 RX ORDER — FENOFIBRATE 160 MG/1
160 TABLET ORAL DAILY
Qty: 90 TABLET | Refills: 3 | Status: SHIPPED | OUTPATIENT
Start: 2023-06-07

## 2023-06-07 RX ORDER — METHYLPREDNISOLONE ACETATE 80 MG/ML
80 INJECTION, SUSPENSION INTRA-ARTICULAR; INTRALESIONAL; INTRAMUSCULAR; SOFT TISSUE ONCE
Status: COMPLETED | OUTPATIENT
Start: 2023-06-07 | End: 2023-06-07

## 2023-06-07 RX ADMIN — METHYLPREDNISOLONE ACETATE 80 MG: 80 INJECTION, SUSPENSION INTRA-ARTICULAR; INTRALESIONAL; INTRAMUSCULAR; SOFT TISSUE at 15:19

## 2023-06-07 NOTE — PROGRESS NOTES
Name: Tracee Lozada      : 1950      MRN: [id number]  Encounter Provider: Sameer Amanda MD  Encounter Date: 2023   Encounter department: [address] [place] [address]     1  Stage 3a chronic kidney disease (HCC)  -     Magnesium  -     Phosphorus  -     Protein / creatinine ratio, urine  -     PTH, intact  -     Vitamin D 25 hydroxy  -     Comprehensive metabolic panel  -     CBC and differential    2  Angiomyolipoma of kidney    3  Hyperhomocysteinemia (HCC)  -     L-Methylfolate-B6-B12 3-35-2 MG TABS; Take 2 tablets by mouth daily  -     Homocysteine, serum; Future    4  PAC (premature atrial contraction)  -     Echo complete w/ contrast if indicated; Future; Expected date: 2023    5  Mixed hyperlipidemia  -     fenofibrate 160 MG tablet; Take 1 tablet (160 mg total) by mouth daily  -     Lipid panel    6  Impingement syndrome of right shoulder  -     Ambulatory Referral to Physical Therapy; Future  -     Large joint arthrocentesis: R subacromial bursa  -     methylPREDNISolone acetate (DEPO-MEDROL) injection 80 mg    7  Mild persistent asthma without complication    8  Acquired hypothyroidism  -     TSH, 3rd generation with Free T4 reflex    9  Fatty liver    10  Age-related osteoporosis without current pathological fracture    Continue with current medications  Increase dose of vitamin supplement to 2 a day for elevated homocysteine level  Stay hydrated  Avoid NSAIDs  Schedule echocardiogram    PT evaluation for right shoulder  Office visit 6 months with labs  A significant but separately identifiable additional service rendered during the encounter- steroid injection R shoulder see procedure note  Subjective     Follow up OV  Medications reviewed  Labs 2023 reviewed see note  Hyperlipidemia mixed type on Fenofibrate 160 mg daily  Lipid profile cholesterol 186  TGs 141  HDL 51    LFTs normal  FBS 85  CKD stage 3 creatinine 1 09  GFR 51  Electrolytes normal except for chloride  Hgb 14 1  PTH 52 5  12/2022 creatinine 1 10  GFR 50  Electrolytes normal  07/2020 renal u/s 1 large or multiple confluent angiomyolipomas of the right kidney with an increase in size of one of the measured components  No development of hydronephrosis, calculus or fluid collection    Left kidney is unremarkable    Recent Results (from the past 336 hour(s))   Comprehensive metabolic panel    Collection Time: 06/02/23  9:16 AM   Result Value Ref Range    Sodium 139 135 - 147 mmol/L    Potassium 4 1 3 5 - 5 3 mmol/L    Chloride 110 (H) 96 - 108 mmol/L    CO2 28 21 - 32 mmol/L    ANION GAP 1 (L) 4 - 13 mmol/L    BUN 17 5 - 25 mg/dL    Creatinine 1 09 0 60 - 1 30 mg/dL    Glucose, Fasting 85 65 - 99 mg/dL    Calcium 9 6 8 3 - 10 1 mg/dL    AST 27 5 - 45 U/L    ALT 38 12 - 78 U/L    Alkaline Phosphatase 54 46 - 116 U/L    Total Protein 6 8 6 4 - 8 4 g/dL    Albumin 3 6 3 5 - 5 0 g/dL    Total Bilirubin 0 58 0 20 - 1 00 mg/dL    eGFR 51 ml/min/1 73sq m   CBC and differential    Collection Time: 06/02/23  9:16 AM   Result Value Ref Range    WBC 4 60 4 31 - 10 16 Thousand/uL    RBC 4 59 3 81 - 5 12 Million/uL    Hemoglobin 14 1 11 5 - 15 4 g/dL    Hematocrit 42 7 34 8 - 46 1 %    MCV 93 82 - 98 fL    MCH 30 7 26 8 - 34 3 pg    MCHC 33 0 31 4 - 37 4 g/dL    RDW 12 9 11 6 - 15 1 %    MPV 10 7 8 9 - 12 7 fL    Platelets 939 251 - 048 Thousands/uL    nRBC 0 /100 WBCs    Neutrophils Relative 55 43 - 75 %    Immat GRANS % 0 0 - 2 %    Lymphocytes Relative 36 14 - 44 %    Monocytes Relative 6 4 - 12 %    Eosinophils Relative 2 0 - 6 %    Basophils Relative 1 0 - 1 %    Neutrophils Absolute 2 56 1 85 - 7 62 Thousands/µL    Immature Grans Absolute 0 01 0 00 - 0 20 Thousand/uL    Lymphocytes Absolute 1 65 0 60 - 4 47 Thousands/µL    Monocytes Absolute 0 27 0 17 - 1 22 Thousand/µL    Eosinophils Absolute 0 08 0 00 - 0 61 Thousand/µL    Basophils Absolute 0 03 0 00 - 0 10 Thousands/µL   Lipid panel    Collection Time: 06/02/23  9:16 AM   Result Value Ref Range    Cholesterol 186 See Comment mg/dL    Triglycerides 141 See Comment mg/dL    HDL, Direct 51 >=50 mg/dL    LDL Calculated 107 (H) 0 - 100 mg/dL    Non-HDL-Chol (CHOL-HDL) 135 mg/dl   TSH, 3rd generation with Free T4 reflex    Collection Time: 06/02/23  9:16 AM   Result Value Ref Range    TSH 3RD GENERATON 2 519 0 450 - 4 500 uIU/mL   PTH, intact    Collection Time: 06/02/23  9:16 AM   Result Value Ref Range    PTH 52 5 12 0 - 88 0 pg/mL   Homocysteine, serum    Collection Time: 06/02/23  9:16 AM   Result Value Ref Range    Homocyst(e)ine, P/S 12 0 (H) 3 7 - 11 2 umol/L           Review of Systems   Constitutional: Negative for appetite change, chills, fatigue, fever and unexpected weight change  HENT: Negative for congestion, ear pain, postnasal drip, rhinorrhea, sinus pain, sore throat and trouble swallowing  Eyes: Negative for visual disturbance  Cataract surgery 12/2020 OD   Respiratory: Negative for cough, shortness of breath and wheezing  Asthma currently not on Breo 100/25 daily ( until November)   10/2021 COV 19 breakthrough infection  She received IV Monoclonal Ab Rx   02/2020 PFTs no large airway obstruction or bronchodilator responsiveness  Decreased forced vital capacity and total lung capacity consistent with mild restrictive lung disease  Mild diffuse impairment  02/2018 CXR normal except for mildly elevated right hemidiaphragm  CT scan chest 01/2016 normal except for small hiatal hernia  Cardiovascular: Negative for chest pain, palpitations and leg swelling  Episodes of bradycardia during  Colonoscopy 01/2023  EKG 02/2023 NSR  T wave inversions V1-V3  48 hour Holter The patient was in sinus rhythm throughout the duration of the study with an average heart rate 77 bpm   No ventricular and rare supraventricular ectopic activity  No atrial fibrillation or atrial flutter   There was approximately 1 hour 36 minutes of bradycardia  The slowest single episode occurred at 12:20 AM with a minimum heart rate 52 bpm  There was no evidence of heart block and no significant pauses were seen  No symptoms reported  Gastrointestinal: Negative for abdominal pain, blood in stool, constipation, diarrhea, nausea and vomiting  Fatty liver 06/2023 LFTs normal  Small hiatal hernia on CT scan  Infrequent reflux symptoms  I BS diarrhea  Stable on probiotics  Colonoscopy 01/2023    Endocrine:        Hypothyroidism on Levothyroxine 50 mcg daily  Osteoporosis on Prolia   03/2022  DEXA scan + osteoporosis based on the left femoral neck When compared to the prior examination, there has been a  9 % INCREASE in the total bone mineral density of the lumbar spine  There has been NO SIGNIFICANT CHANGE in the total bone mineral density of the left hip  Prior intolerance to Actonel  04/2022 Vitamin-D level 45 4  On vitamin D 5,000 IU daily  Lab Results       Component                Value               Date                       [id number]             2 519               06/02/2023                                                       Genitourinary: Negative for difficulty urinating  History of recurrent UTIs on Macrodantin 50 mg every other day at [facility]  She is followed by Urology  07/2020 renal ultrasound 1 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 with increase in size of 1 of the measured components  No hydronephrosis  Bladder postvoid residual 247 mL  CT scan abdomen pelvis 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  Pap smear 09/2016   Musculoskeletal: Positive for arthralgias  Negative for myalgias  Several week history of recurrent right shoulder pain right-handed  No specific injury or trauma  Patient's symptoms started when she tried to reach for something behind her  Skin: Negative for rash  Allergic/Immunologic: Positive for environmental allergies          See HPI 02/2020 allergy testing + mold allergy  Neurological: Negative for dizziness and headaches  Hematological: Negative for adenopathy  Does not bruise/bleed easily  History of elevated homocystine level on vitamin supplement  No history of venous thrombosis  06/2023 homocysteine mildly elevated at 12 0    Lab Results       Component                Value               Date                       WBC                      4 72                12/20/2022                 HGB                      14 8                12/20/2022                 HCT                      45 7                12/20/2022                 MCV                      96                  12/20/2022                 PLT                      330                 12/20/2022                             Psychiatric/Behavioral: Negative for dysphoric mood and sleep disturbance  The patient is not hyperactive          Past Medical History:   Diagnosis Date   • Acute asthma exacerbation     last assessed 8/15/17   • Cataract    • Disease of thyroid gland    • Hypertriglyceridemia    • Osteoporosis    • Right lumbar radiculopathy 12/15/2016   • Urinary tract infection    • Vertigo      Past Surgical History:   Procedure Laterality Date   • APPENDECTOMY  1979   • BREAST BIOPSY     • CATARACT EXTRACTION     • HAND SURGERY Right 2007    thumb   • LAPAROSCOPY     • MAMMO STEREOTACTIC BREAST BIOPSY LEFT (ALL INC) Left 04/05/2022     Family History   Problem Relation Age of Onset   • Cirrhosis Mother    • Kidney nephrosis Mother    • Hypertension Father    • Other Father 80        extra mammary Padget's dx   • Cancer Paternal Grandmother [de-identified]        unknown type   • Heart disease Paternal Grandfather    • Heart attack Paternal Grandfather    • Kidney nephrosis Daughter    • Osteoporosis Maternal Grandmother    • Arthritis Maternal Grandmother    • Heart attack Maternal Grandfather    • Heart disease Maternal Grandfather    • Kidney nephrosis Daughter    • Lymphoma Maternal Aunt 79   • No Known Problems Paternal Aunt    • No Known Problems Paternal Aunt    • No Known Problems Paternal Aunt      Social History     Socioeconomic History   • Marital status: /Civil Union     Spouse name: None   • Number of children: None   • Years of education: None   • Highest education level: None   Occupational History   • None   Tobacco Use   • Smoking status: Former     Packs/day: 0 25     Years: 4 00     Total pack years: 1 00     Types: Cigarettes     Quit date: 1979     Years since quittin 4   • Smokeless tobacco: Never   • Tobacco comments:         Vaping Use   • Vaping Use: Never used   Substance and Sexual Activity   • Alcohol use: No   • Drug use: No   • Sexual activity: Not Currently   Other Topics Concern   • None   Social History Narrative   • None     Social Determinants of Health     Financial Resource Strain: Not on file   Food Insecurity: Not on file   Transportation Needs: Not on file   Physical Activity: Not on file   Stress: Not on file   Social Connections: Not on file   Intimate Partner Violence: Not on file   Housing Stability: Not on file     Current Outpatient Medications on File Prior to Visit   Medication Sig   • albuterol (2 5 mg/3 mL) 0 083 % nebulizer solution Take 3 mL (2 5 mg total) by nebulization every 6 (six) hours as needed for wheezing or shortness of breath   • albuterol (Ventolin HFA) 90 mcg/act inhaler 2 puffs Q 6 hours prn cough/wheezing   • aspirin 81 MG tablet Take 1 tablet by mouth daily   • Cholecalciferol 4000 units CAPS Take 1 capsule by mouth daily   • Coenzyme Q10 (CO Q-10) 30 MG CAPS Take 100 mg by mouth daily 2capsules daily   • cyclobenzaprine (FLEXERIL) 5 mg tablet Take 1 tablet (5 mg total) by mouth daily at bedtime   • denosumab (Prolia) 60 mg/mL Inject 60 mL under the skin every 6 (six) months   • levothyroxine 50 mcg tablet Take 1 tablet (50 mcg total) by mouth daily   • nitrofurantoin (MACRODANTIN) 50 mg capsule Take 1 capsule by mouth "daily    • [DISCONTINUED] fenofibrate 160 MG tablet Take 1 tablet (160 mg total) by mouth daily   • [DISCONTINUED] L-Methylfolate-B6-B12 3-35-2 MG TABS Take 1 tablet by mouth daily   • Breo Ellipta 100-25 MCG/ACT inhaler Inhale 1 puff every morning Rinse mouth after using inhaler  (Patient not taking: Reported on 6/7/2023)     Allergies   Allergen Reactions   • Penicillins      Shown on allergy testing  • Codeine Anxiety   • Dust Mite Extract    • Molds & Smuts    • Pseudoephedrine    • Rabbit Epithelium    • Sulfa Antibiotics Hives     Immunization History   Administered Date(s) Administered   • COVID-19 MODERNA VACC 0 5 ML IM 01/27/2021, 02/24/2021, 04/14/2022   • COVID-19 Moderna Vac BIVALENT 12 Yr+ IM (BOOSTER ONLY) 0 5 ML 10/20/2022   • INFLUENZA 12/10/2015, 10/19/2016, 10/26/2017, 10/20/2018, 10/20/2021, 10/17/2022   • Influenza Quadrivalent, 6-35 Months IM 10/29/2014, 12/10/2015   • Influenza Split High Dose Preservative Free IM 10/19/2016, 10/26/2017, 10/20/2018, 10/07/2019   • Influenza, high dose seasonal 0 7 mL 09/30/2020   • Influenza, seasonal, injectable 11/04/2013   • Pneumococcal Conjugate 13-Valent 10/19/2016   • Pneumococcal Polysaccharide PPV23 10/26/2017   • Zoster 10/22/2020   • Zoster Vaccine Recombinant 10/22/2020, 01/06/2021       Objective     /72 (BP Location: Left arm, Patient Position: Sitting, Cuff Size: Large)   Pulse 86   Temp (!) 97 2 °F (36 2 °C)   Ht 5' 5 5\" (1 664 m)   Wt 87 1 kg (192 lb)   SpO2 96%   BMI 31 46 kg/m²     BP Readings from Last 3 Encounters:   06/07/23 110/72   05/09/23 144/85   05/02/23 134/78     Wt Readings from Last 3 Encounters:   06/07/23 87 1 kg (192 lb)   05/09/23 86 4 kg (190 lb 8 oz)   05/02/23 86 4 kg (190 lb 8 oz)       Physical Exam  Vitals and nursing note reviewed  Constitutional:       General: She is not in acute distress  Appearance: She is well-developed  Eyes:      General: No scleral icterus       Conjunctiva/sclera: " Conjunctivae normal    Neck:      Thyroid: No thyroid mass or thyromegaly  Vascular: No carotid bruit or JVD  Trachea: No tracheal deviation  Cardiovascular:      Rate and Rhythm: Normal rate and regular rhythm  Heart sounds: Normal heart sounds  No murmur heard  No gallop  Pulmonary:      Effort: Pulmonary effort is normal  No respiratory distress  Breath sounds: Normal breath sounds  No wheezing or rales  Musculoskeletal:      Right lower leg: No edema  Left lower leg: No edema  Comments: Inspection R shoulder normal  No effusion  No warmth or redness  + R subacromial  tenderness  Painful ROM with abduction, internal and external rotation  Negative cross arm test  Negative Speed test  Negative Yergason sign  + impingement sign  + empty can sign  Negative sulcus sign  Negative [place]   Lymphadenopathy:      Cervical: No cervical adenopathy  Upper Body:      Right upper body: No supraclavicular adenopathy  Left upper body: No supraclavicular adenopathy  Skin:     Findings: No rash  Nails: There is no clubbing  Neurological:      General: No focal deficit present  Mental Status: She is alert and oriented to person, place, and time  Motor: No weakness  Psychiatric:         Mood and Affect: Mood normal          Behavior: Behavior normal        Large joint arthrocentesis: R subacromial bursa  Universal Protocol:  Consent: Verbal consent obtained    Risks and benefits: risks, benefits and alternatives were discussed  Consent given by: patient  Site marked: the operative site was marked  Supporting Documentation  Indications: pain (Impingement R shoulder )   Procedure Details  Location: shoulder - R subacromial bursa  Preparation: Betadine   Needle size: 22 G  Ultrasound guidance: no  Approach: posterior    Patient tolerance: patient tolerated the procedure well with no immediate complications  Dressing:  Sterile dressing applied    DepoMedrol 80 mg/ML and Lidocaine 1% 4 ML           Carlin Greenberg MD

## 2023-06-09 ENCOUNTER — EVALUATION (OUTPATIENT)
Dept: PHYSICAL THERAPY | Facility: CLINIC | Age: 73
End: 2023-06-09
Payer: COMMERCIAL

## 2023-06-09 DIAGNOSIS — M54.12 CERVICAL RADICULOPATHY: Primary | ICD-10-CM

## 2023-06-09 DIAGNOSIS — M75.41 IMPINGEMENT SYNDROME OF RIGHT SHOULDER: ICD-10-CM

## 2023-06-09 PROCEDURE — 97162 PT EVAL MOD COMPLEX 30 MIN: CPT | Performed by: PHYSICAL THERAPIST

## 2023-06-09 PROCEDURE — 97112 NEUROMUSCULAR REEDUCATION: CPT | Performed by: PHYSICAL THERAPIST

## 2023-06-09 NOTE — PROGRESS NOTES
PT Evaluation     Today's date: 2023  Patient name: Debbie Green  : 1950  MRN: [id number]  Referring provider: Pop Mckoy MD  Dx:   Encounter Diagnosis     ICD-10-CM    1  Impingement syndrome of right shoulder  M75 41 Ambulatory Referral to Physical Therapy                     Assessment  Assessment details: Debbie Green is a 71 y o  female who presents with subacute right shoulder pain in addition to possible cervical component with radicular symptoms  Patient's current impairments include decreased A/PROM, reproduction of symptoms with cervical motions, and   Functionally, patient is limited in her ability to complete ADLs that involve reaching or use of her UE  Patient would benefit from skilled physical therapy to address the impairments, improve their level of function, and to improve their overall quality of life  Impairments: abnormal or restricted ROM, activity intolerance, impaired physical strength and pain with function    Symptom irritability: moderate  Goals  STG to be achieved in 4 weeks   1) Painfree cervical AROM   2) No radicular symptoms with supine traction   3) Shoulder flexion elevation > 90 degrees     LTG to be achieved in 8 weeks   1) FOTO to be greater than expected   2) [facility]   3) Pt to report 75% improvement in symptoms   4) 5/5 shoulder MMT strength     Plan  Patient would benefit from: PT eval  Planned therapy interventions: manual therapy, joint mobilization, strengthening, stretching, therapeutic activities, therapeutic exercise, neuromuscular re-education, home exercise program, graded exercise and functional ROM exercises  Frequency: 2x week  Treatment plan discussed with: patient        Subjective Evaluation    History of Present Illness  Mechanism of injury: History: Pt reports that about three weeks ago, she went to put her purse in the back of the car from the front seat and felt a lot of shoulder pain (R)   She has pain at some point every day, but not the same motions and not constant  The pain starts in the shoulder and goes down through her fingertips and then goes away; process lasts ~ 2-3 minutes  She received a cortisone shot two days ago in her right shoulder which has helped  Does not wake her up at night but sometimes it hurts to lay on that shoulder  Aggravating: putting on her bra, reaching  Alleviating: waits it out until it goes away   Social support: lives with family   Hobbies/occupation: retired teacher   Imaging: none  Red flags: Marcell Booth denies a new onset of Red Flags  Pain  Current pain ratin  At best pain ratin  At worst pain rating: 10  Location: lateral shoulder to elbow and into the fingers   Quality: sharp and dull ache (tingling)    Hand dominance: right          Objective     Neurological Testing     Sensation   Cervical/Thoracic   Left   Intact: light touch    Right   Intact: light touch    Active Range of Motion   Cervical/Thoracic Spine       Cervical    Flexion:  Restriction level: minimal  Extension:  Restriction level: minimal  Left lateral flexion:  Restriction level: minimal  Right lateral flexion:  Restriction level minimal  Left rotation:  Restriction level: minimal  Right rotation:  Restriction level: minimal  Left Shoulder   Normal active range of motion    Right Shoulder   Flexion: 100 degrees with pain  Abduction: 80 degrees with pain  External rotation BTH: Active external rotation behind the head: occiput   with pain    Additional Active Range of Motion Details  L lateral flexion and extension reproduce minor symptoms in right lateral shoulder         Passive Range of Motion     Right Shoulder   Flexion: 130 degrees with pain  Abduction: 90 degrees with pain    Strength/Myotome Testing     Left Shoulder     Planes of Motion   Abduction: 5   External rotation at 0°: 5   Internal rotation at 0°: 5     Left Elbow   Flexion: 5  Extension: 5    Right Elbow   Flexion: 5  Extension: 5    Left Wrist/Hand Wrist extension: 5  Wrist flexion: 5    Right Wrist/Hand   Wrist extension: 5  Wrist flexion: 5    Additional Strength Details  Right unable to test due to pain     Tests   Cervical   Positive cervical distraction test (increases symptoms)      General Comments:      Cervical/Thoracic Comments  Cervical traction increases symptoms   Cervical hypomobility noted upper Cspine B              Precautions: none      Manuals 6/9            Intermittent cervical traction                                                    Neuro Re-Ed             Scap squeeze HEP            Supine cervical retraction             Seated cervical retraction               Shoulder isometrics                                                     Ther Ex             Shoulder pulley AAROM                                                                                                        Ther Activity                                       Gait Training                                       Modalities

## 2023-06-13 ENCOUNTER — OFFICE VISIT (OUTPATIENT)
Dept: PHYSICAL THERAPY | Facility: CLINIC | Age: 73
End: 2023-06-13
Payer: COMMERCIAL

## 2023-06-13 DIAGNOSIS — M75.41 IMPINGEMENT SYNDROME OF RIGHT SHOULDER: ICD-10-CM

## 2023-06-13 DIAGNOSIS — M54.12 CERVICAL RADICULOPATHY: Primary | ICD-10-CM

## 2023-06-13 PROCEDURE — 97112 NEUROMUSCULAR REEDUCATION: CPT | Performed by: PHYSICAL THERAPIST

## 2023-06-13 PROCEDURE — 97110 THERAPEUTIC EXERCISES: CPT | Performed by: PHYSICAL THERAPIST

## 2023-06-13 NOTE — PROGRESS NOTES
"Daily Note     Today's date: 2023  Patient name: Joselin Rosenbaum  : 1950  MRN: [id number]  Referring provider: Carol Ann Castellanos MD  Dx:   Encounter Diagnosis     ICD-10-CM    1  Cervical radiculopathy  M54 12       2  Impingement syndrome of right shoulder  M75 41                      Subjective: Patient reports feeling less sharp pains overall       Objective: See treatment diary below      Assessment: Tolerated treatment fairly today  Radicular symptoms were provoked with supine cervical retractions but not in seated  Shoulder isometrics also inconsistently provoked distal forearm/wrist symptoms so did not prescribe for HEP  Pt to only focus on scap squeezes as well as seated cervical retractions  Tolerated TB rows well  Plan: Continue per plan of care        Precautions: none    1:1 With -515  Manuals            Intermittent cervical traction  D/c inc sx           Isometric cervical retraction   10x3\"                                     Neuro Re-Ed             Scap squeeze HEP 2x10           Supine cervical retraction             Seated cervical retraction    2x10           Shoulder isometrics   Flex, ER, IR, ext 10x ea           TB Rows   YTB 2x10                                     Ther Ex             Shoulder pulley AAROM  2' flexion           Table slides   flexion 1'                                                                                          Ther Activity                                       Gait Training                                       Modalities                                            "

## 2023-06-16 ENCOUNTER — OFFICE VISIT (OUTPATIENT)
Dept: PHYSICAL THERAPY | Facility: CLINIC | Age: 73
End: 2023-06-16
Payer: COMMERCIAL

## 2023-06-16 DIAGNOSIS — M75.41 IMPINGEMENT SYNDROME OF RIGHT SHOULDER: Primary | ICD-10-CM

## 2023-06-16 DIAGNOSIS — M54.12 CERVICAL RADICULOPATHY: ICD-10-CM

## 2023-06-16 PROCEDURE — 97110 THERAPEUTIC EXERCISES: CPT | Performed by: PHYSICAL THERAPIST

## 2023-06-16 PROCEDURE — 97112 NEUROMUSCULAR REEDUCATION: CPT | Performed by: PHYSICAL THERAPIST

## 2023-06-16 PROCEDURE — 97140 MANUAL THERAPY 1/> REGIONS: CPT | Performed by: PHYSICAL THERAPIST

## 2023-06-16 NOTE — PROGRESS NOTES
"Daily Note     Today's date: 2023  Patient name: Domi Pérez  : 1950  MRN: [id number]  Referring provider: Carla Ybarra MD  Dx:   Encounter Diagnosis     ICD-10-CM    1  Impingement syndrome of right shoulder  M75 41       2  Cervical radiculopathy  M54 12                      Subjective: Pt reports feeling good Tuesday after therapy but had bad days Wednesday and Thursday; not sure why       Objective: See treatment diary below      Assessment: Supine DNF provoked symptoms so d/c for the day  Tolerated manual isometrics well with no increase in symptoms  Also able to tolerate half range supine cervical retractions without increased symptoms but full range provoked lateral shoulder discomfort  Ended session with ice to see if that helps decrease symptoms  Plan: Continue per plan of care        Precautions: none    1:1 With PT [id number]  Manuals           Intermittent cervical traction  D/c inc sx           Isometric cervical retraction   10x3\" 10x3\"          Manual shoulder isometrics    IR, ER 2x10          Suboccipital STM    BRANDO           Sideglides cervical   GI-II                       Neuro Re-Ed             Scap squeeze HEP 2x10 2x10          Supine DNF   2x5          Seated cervical retraction    2x10 2x10 half range           Shoulder isometrics   Flex, ER, IR, ext 10x ea Flex, ER, IR, ext 10x ea          TB Rows   YTB 2x10                                     Ther Ex             Shoulder pulley AAROM  2' flexion 2' flexion          Table slides   flexion 1'                                                                                          Ther Activity                                       Gait Training                                       Modalities             Ice   5'                             "

## 2023-06-20 ENCOUNTER — OFFICE VISIT (OUTPATIENT)
Dept: PHYSICAL THERAPY | Facility: CLINIC | Age: 73
End: 2023-06-20
Payer: COMMERCIAL

## 2023-06-20 DIAGNOSIS — M75.41 IMPINGEMENT SYNDROME OF RIGHT SHOULDER: ICD-10-CM

## 2023-06-20 DIAGNOSIS — M54.12 CERVICAL RADICULOPATHY: Primary | ICD-10-CM

## 2023-06-20 PROCEDURE — 97112 NEUROMUSCULAR REEDUCATION: CPT | Performed by: PHYSICAL THERAPIST

## 2023-06-20 PROCEDURE — 97535 SELF CARE MNGMENT TRAINING: CPT | Performed by: PHYSICAL THERAPIST

## 2023-06-20 PROCEDURE — 97140 MANUAL THERAPY 1/> REGIONS: CPT | Performed by: PHYSICAL THERAPIST

## 2023-06-20 NOTE — PROGRESS NOTES
"Daily Note     Today's date: 2023  Patient name: Janet Maciel  : 1950  MRN: [id number]  Referring provider: Mar Zamora MD  Dx:   Encounter Diagnosis     ICD-10-CM    1  Cervical radiculopathy  M54 12       2  Impingement syndrome of right shoulder  M75 41                      Subjective: Pt reports feeling better compared to last week       Objective: See treatment diary below      Assessment: Patient tolerated treatment fairly well today compared to previous sessions  Had small increase in radicular symptoms during supine exercises but resolved quickly and was able to tolerate standing exercises without an increase in symptoms  Patient to continue with modifications at home and progress exercises on Friday as tolerated  Plan: Continue per plan of care        Precautions: none    1:1 With PT 8-840  Manuals          Intermittent cervical traction  D/c inc sx           Isometric cervical retraction   10x3\" 10x3\"          Manual shoulder isometrics    IR, ER 2x10 IR, ER 2x10         Suboccipital STM    BRANDO  BRANDO          Sideglides cervical   GI-II GI-II                      Neuro Re-Ed             Scap squeeze HEP 2x10 2x10 3x10         Supine DNF   2x5 2x5 with manual resistance          Seated cervical retraction    2x10 2x10 half range           Shoulder isometrics   Flex, ER, IR, ext 10x ea Flex, ER, IR, ext 10x ea          TB Rows   YTB 2x10  YTB 3x10         Iso TB ER stepouts    YTB 2x10                       Ther Ex             Shoulder pulley AAROM  2' flexion 2' flexion          Table slides   flexion 1'            Cervical AROM     5x ea dir                                                                          Ther Activity                                       Pt Edu             Reviewed sleeping positioning    5'                      Modalities             Ice   5'                             "

## 2023-06-23 ENCOUNTER — OFFICE VISIT (OUTPATIENT)
Dept: PHYSICAL THERAPY | Facility: CLINIC | Age: 73
End: 2023-06-23
Payer: COMMERCIAL

## 2023-06-23 DIAGNOSIS — M75.41 IMPINGEMENT SYNDROME OF RIGHT SHOULDER: Primary | ICD-10-CM

## 2023-06-23 DIAGNOSIS — M54.12 CERVICAL RADICULOPATHY: ICD-10-CM

## 2023-06-23 PROCEDURE — 97110 THERAPEUTIC EXERCISES: CPT | Performed by: PHYSICAL THERAPIST

## 2023-06-23 PROCEDURE — 97112 NEUROMUSCULAR REEDUCATION: CPT | Performed by: PHYSICAL THERAPIST

## 2023-06-23 PROCEDURE — 97140 MANUAL THERAPY 1/> REGIONS: CPT | Performed by: PHYSICAL THERAPIST

## 2023-06-23 NOTE — PROGRESS NOTES
"Daily Note     Today's date: 2023  Patient name: Belinda Hopkins  : 1950  MRN: [id number]  Referring provider: Luis Alonzo MD  Dx:   Encounter Diagnosis     ICD-10-CM    1  Impingement syndrome of right shoulder  M75 41       2  Cervical radiculopathy  M54 12                      Subjective: Pt reports improvements in the shoulder but has been having some pre-dizzy spells over the last few days and requests not laying down today       Objective: See treatment diary below      Assessment: Did not progress treatment today as pt was still having some dizziness throughout and felt \"off\"  Her shoulder did well and was not exacerbated throughout treatment  Hope to progress next visit  Pt to reach out to PCP if needed due to symptoms  Plan: Continue per plan of care        Precautions: none    1:1 With -925  Manuals         Intermittent cervical traction  D/c inc sx           Isometric cervical retraction   10x3\" 10x3\"          Manual shoulder isometrics    IR, ER 2x10 IR, ER 2x10         Suboccipital STM    BRANDO  BRANDO          Sideglides cervical   GI-II GI-II         STM R shoulder + UT      10' JMB                      Neuro Re-Ed             Scap squeeze HEP 2x10 2x10 3x10 3x10        Supine DNF   2x5 2x5 with manual resistance          Seated cervical retraction    2x10 2x10 half range   2x5 half range        Shoulder isometrics   Flex, ER, IR, ext 10x ea Flex, ER, IR, ext 10x ea  Flex, ER, IR, ext 10x ea        TB Rows   YTB 2x10  YTB 3x10 RTB 4x10        Iso TB ER stepouts    YTB 2x10  RTB 3x10                      Ther Ex             Shoulder pulley AAROM  2' flexion 2' flexion  2' flexion         Table slides   flexion 1'            Cervical AROM     5x ea dir 5x        Thoracic extension mobility                                                                  Ther Activity                                       Pt Edu             Reviewed sleeping positioning    5'       " Modalities             Ice   5'

## 2023-06-26 ENCOUNTER — OFFICE VISIT (OUTPATIENT)
Dept: PHYSICAL THERAPY | Facility: CLINIC | Age: 73
End: 2023-06-26
Payer: COMMERCIAL

## 2023-06-26 DIAGNOSIS — M54.12 CERVICAL RADICULOPATHY: Primary | ICD-10-CM

## 2023-06-26 DIAGNOSIS — M75.41 IMPINGEMENT SYNDROME OF RIGHT SHOULDER: ICD-10-CM

## 2023-06-26 PROCEDURE — 97112 NEUROMUSCULAR REEDUCATION: CPT | Performed by: PHYSICAL THERAPIST

## 2023-06-26 PROCEDURE — 97110 THERAPEUTIC EXERCISES: CPT | Performed by: PHYSICAL THERAPIST

## 2023-06-26 NOTE — PROGRESS NOTES
PT EVALUATION    Today's date: 23  Patient name: Nury Salvador  : 1950  MRN: [id number]  Referring provider: Marva Gabriel MD  Dx:   1  Vertigo        ASSESSMENT:  Nury Salvador is a 71 y o  female who presents with signs and symptoms consistent of acute on chronic dizziness  Upon evaluation this date, clinical examination demonstrated intact cranial nerves, negative neurological testing, and asymptomatic VBI testing  [place] Ours was performed and demonstrated positive R canalithiasis BPPV with up-beating torsional nystagmus  CRT was performed 1x with improvements in symptoms  Discussed and educated patient on pathophysiology of BPPV and expectations post treatment  Due to these impairments, patient has difficulty performing work activities, a/iadls and recreational activities  Patient would benefit from skilled physical therapy to address the impairments, improve their level of function, and to improve their overall quality of life  Impairments:    imbalance   Positional dizziness  VOR impairment   Dizziness with head turns     Prognosis:  Good  Positive and negative prognostic indicator(s):  pain >3 months and multiple comorbidities    Goals:    Short Term Goals: to be achieved by 4 weeks  1) Patient to be independent with basic HEP  2) Decrease dizziness to 1/10 at its worst     Long Term Goals: to be achieved by discharge  1) FOTO equal to or greater than target score indicating improvements with overall function  2) Patient will demonstrate normalized BPPV testing in order to return to normal daily activities  3) Patient will have little to no dizziness with positional changes and fast head movements in order to participate in daily activities        Planned interventions:  patient education, canalith repositioning techniques and vestibulo-ocular motor training    Duration in visits:  4  Frequency: 1 visits per week  Duration in weeks:  4    History of Current Injury: Patient notes that the dizziness started last Tuesday  Patient nots that she has had about 4 episodes of dizziness since then  Patient notes that its either the spinning dizziness or she will feel like she is swaying  Patient notes that she gets the most dizziness when she is laying in bed flat or when she rolls over  Patient notes that if she rolls on her R side she feels a spinning dizziness and if she stares at something it will start to calm down within a few seconds  Patient notes that when she lays on the L she feels like she is swaying on a boat and then goes away  Patient reports that she had multiple episodes like this in the past and has received treatment  Patient notes that she saw someone here for a few sessions and then reffered her to [facility]  Patient notes that she has one session there and after she was done she felt even worse and could barely walk  Since then patient notes that she is nervous about this treatment but knows its necessary  Patient notes that she did not have any treatment after that because of the pandemic  No recent recent head of neck trauma  Pain location: chronic neck pain (seeing PT currently)       Aggravating factors: rolling of in be and laying flat   Easing factors: changes positions     Imaging: n/a  Special Questions:   Dizziness: Yes   Dysphagia: No  Dysarthria: No  Diploplia: No  Drop Attacks: No  Numbness: No  Nausea: No  Nystagmus: No      Hobbies/Interests: n/a  Occupation: retired teacher (K-6)   Patient goals: Patient reports goals for physical therapy would be decrease dizziness  Objective     Concurrent Complaints  Positive for tinnitus (chronic issue)   Negative for headaches, nausea/motion sickness, visual change, hearing loss, memory loss, aural fullness, poor concentration and peripheral neuropathy    Active Range of Motion   Cervical/Thoracic Spine       Cervical    Left lateral flexion:  Restriction level: minimal  Right lateral flexion:  Restriction level minimal  Left rotation:  Restriction level: minimal  Right rotation:  Restriction level: minimal    Thoracic    Flexion:  Restriction level: minimal  Extension:  Restriction level: minimal  Neuro Exam:     Dizziness  Positive for vertigo and rocking or swaying  Negative for oscillopsia, motion sickness, light-headedness, diplopia and floating or swimming  Exacerbating factors  Positive for rolling in bed, looking up and supine to/from sitting  Negative for bending over, walking, turning head, optokinetic movement and walking in busy environment  Symptoms   Duration: 30 seconds   Frequency: 4 x a week   Intensity at best: 0/10  Intensity at worst: 5/10  Average intensity: 3/10, 4/10 and 2/10    Headaches   Patient reports headaches: No      Oculomotor exam   Oculomotor ROM: WNL  Resting nystagmus: not present   Gaze holding nystagmus: not present left  and not present right  Smooth pursuits: within normal limits  Vertical saccades: normal  Horizontal saccades: normal  Convergence: normal (symetrical convergence )  Cover test: normal    Positional testing   Willcox-Hallpike   Left posterior canal: WNL  Right posterior canal: symptomatic, torsional and upbeating  Positional testing comment: Roll test not completed secondary to positive posterior canal testing  Will test at a later date if needed             Precautions: cervical spine pain, osteoporosis, thyroid disorder, lumbar pain       Manuals 6/27                                                                Neuro Re-Ed             CRT 1x Li maneuver for posterior canal                                                                                          Ther Ex                                                                                                                     Ther Activity                                       Gait Training                                       Modalities

## 2023-06-27 ENCOUNTER — OFFICE VISIT (OUTPATIENT)
Dept: PHYSICAL THERAPY | Facility: CLINIC | Age: 73
End: 2023-06-27
Payer: COMMERCIAL

## 2023-06-27 DIAGNOSIS — R42 VERTIGO: Primary | ICD-10-CM

## 2023-06-27 PROCEDURE — 97112 NEUROMUSCULAR REEDUCATION: CPT | Performed by: PHYSICAL THERAPIST

## 2023-06-27 PROCEDURE — 97162 PT EVAL MOD COMPLEX 30 MIN: CPT | Performed by: PHYSICAL THERAPIST

## 2023-06-30 ENCOUNTER — OFFICE VISIT (OUTPATIENT)
Dept: PHYSICAL THERAPY | Facility: CLINIC | Age: 73
End: 2023-06-30
Payer: COMMERCIAL

## 2023-06-30 DIAGNOSIS — M54.12 CERVICAL RADICULOPATHY: Primary | ICD-10-CM

## 2023-06-30 DIAGNOSIS — M75.41 IMPINGEMENT SYNDROME OF RIGHT SHOULDER: ICD-10-CM

## 2023-06-30 PROCEDURE — 97112 NEUROMUSCULAR REEDUCATION: CPT

## 2023-06-30 PROCEDURE — 97110 THERAPEUTIC EXERCISES: CPT

## 2023-06-30 NOTE — PROGRESS NOTES
"Daily Note     Today's date: 2023  Patient name: Nury Salvador  : 1950  MRN: [id number]  Referring provider: Marva Gabriel MD  Dx:   Encounter Diagnosis     ICD-10-CM    1  Cervical radiculopathy  M54 12       2  Impingement syndrome of right shoulder  M75 41                      Subjective: Two bad episodes of radicular pain when attempting to hug friends over the last several days  Required to be very mindful of every movement to prevent symptoms  Continues with dizziness- will be treated again on Monday  Objective: See treatment diary below      Assessment: Avoided supine positioning do to vestibular issues  OH motion continues to be limited by radicular symptoms  ( approximately 90* )  Plan: Continue per plan of care        Precautions: none      Manuals       Intermittent cervical traction  D/c inc sx           Isometric cervical retraction   10x3\" 10x3\"          Manual shoulder isometrics    IR, ER 2x10 IR, ER 2x10         Suboccipital STM    BRANDO  BRANDO          Sideglides cervical   GI-II GI-II         STM R shoulder + UT      10' JMB                      Neuro Re-Ed             Scap squeeze HEP 2x10 2x10 3x10 3x10 3x10 3x10      Supine DNF   2x5 2x5 with manual resistance          Seated cervical retraction    2x10 2x10 half range   2x5 half range 2x5 half range 2x5  Half range      Shoulder isometrics   Flex, ER, IR, ext 10x ea Flex, ER, IR, ext 10x ea  Flex, ER, IR, ext 10x ea Flex, ER, IR, ext 10x ea, 2x thru Flex, ER, IR, ext 10x ea, 2x thru      TB Rows   YTB 2x10  YTB 3x10 RTB 4x10 GTB 3x10  GTB  3x10      Iso TB ER stepouts    YTB 2x10  RTB 3x10  RTB 3x10 RTB  3x10      Straight arm pull down       YTB 3x10  YTB  3x10                                Ther Ex             Shoulder pulley AAROM  2' flexion 2' flexion  2' flexion   2' flexion      Table slides   flexion 1'            Cervical AROM     5x ea dir 5x        Thoracic extension " mobility              Standing shoulder flexion       2x5 2x5      Bicep curls       2# 3x10  2#  3x10                                Ther Activity                                       Pt Edu             Reviewed sleeping positioning    5'                      Modalities             Ice   5'

## 2023-07-03 ENCOUNTER — OFFICE VISIT (OUTPATIENT)
Dept: PHYSICAL THERAPY | Facility: CLINIC | Age: 73
End: 2023-07-03
Payer: COMMERCIAL

## 2023-07-03 DIAGNOSIS — R42 VERTIGO: Primary | ICD-10-CM

## 2023-07-03 PROCEDURE — 97112 NEUROMUSCULAR REEDUCATION: CPT | Performed by: PHYSICAL THERAPIST

## 2023-07-03 NOTE — PROGRESS NOTES
Daily Note     Today's date: 7/3/2023  Patient name: Zachary Jackson  : 1950  MRN: [id number]  Referring provider: Yoan Pascual MD  Dx:   Encounter Diagnosis     ICD-10-CM    1. Vertigo  R42           Start Time: 945  Stop Time:   Total time in clinic (min): 45 minutes    Subjective: Patient notes that the symptoms cleared up pretty well after last time. Patient notes that she has been getting restful sleep and hasn't been having an dizziness episodes. Patient notes that she is a little anxious to be treated today because she has been feeling better and is afraid to get that same dizziness feeling as last time       Objective: See treatment diary below    Positional testing   Sherman-Hallpike   Left posterior canal: WNL  Right posterior canal: symptomatic, torsional and up-beating; fatiguing   Positional testing comment: Roll test not completed secondary to positive posterior canal testing. Will test at a later date if needed. Assessment: Patient tolerated treatment well. [facility] Freeze was performed and demonstrated positive R canalithiasis BPPV with fatiguing up-beating torsional nystagmus. CRT was performed 2x with improvement in symptoms. Mild nystagmus present on last treatment however very minimal symptoms and fatigued after 10 seconds. Held on [employer] HEP. Will see patient for follow up next week and sooner if needed. Patient would benefit from continued PT      Plan: Continue per plan of care.       Precautions: none      Manuals 6/27 7/3                                                               Neuro Re-Ed             CRT 1x Li maneuver for posterior canal 2x Li maneuver for posterior canal                                                                                         Ther Ex                                                                                                                     Ther Activity                                       Gait Training Modalities

## 2023-07-05 ENCOUNTER — OFFICE VISIT (OUTPATIENT)
Dept: RHEUMATOLOGY | Facility: CLINIC | Age: 73
End: 2023-07-05
Payer: COMMERCIAL

## 2023-07-05 VITALS
DIASTOLIC BLOOD PRESSURE: 68 MMHG | BODY MASS INDEX: 30.53 KG/M2 | WEIGHT: 190 LBS | HEIGHT: 66 IN | SYSTOLIC BLOOD PRESSURE: 122 MMHG

## 2023-07-05 DIAGNOSIS — M81.0 AGE-RELATED OSTEOPOROSIS WITHOUT CURRENT PATHOLOGICAL FRACTURE: Primary | ICD-10-CM

## 2023-07-05 DIAGNOSIS — M19.90 OSTEOARTHRITIS, UNSPECIFIED OSTEOARTHRITIS TYPE, UNSPECIFIED SITE: ICD-10-CM

## 2023-07-05 PROCEDURE — 99204 OFFICE O/P NEW MOD 45 MIN: CPT | Performed by: INTERNAL MEDICINE

## 2023-07-05 RX ORDER — ALENDRONATE SODIUM 70 MG/1
70 TABLET ORAL
Qty: 12 TABLET | Refills: 3 | Status: SHIPPED | OUTPATIENT
Start: 2023-07-05

## 2023-07-05 NOTE — PATIENT INSTRUCTIONS
Follow up in about 4 weeks (around 10/29/2020), or if symptoms worsen or fail to improve, for MD FOLLOW UP.     If no improvement in symptoms or symptoms worsen, please be advised to call MD, follow-up at clinic and/or go to ER if becomes severe.    Oneal Acosta M.D.        We Offer TELEHEALTH & Same Day Appointments!   Book your Telehealth appointment with me through my nurse or   Clinic appointments on [facility]!    [address] [place] [address]    Office: [phone]   FAX: [phone]    Check out my Facebook Page and Follow Me at: [email or web address]    Check out my website at [employer] by clicking on: [email or web address]    To Schedule appointments online, go to [email or web address]: [email or web address]    Schedule DEXA scan for 3/2024  Start alendronate (Fosamax) once a week, take on an empty stomach with a full glass of water and do not lie down for 30 minutes after  Continue Vit.  D 4,000 units daily  Continue calcium through diet  Try diclofenac gel as needed for joint pain    Return to clinic in 4/2024

## 2023-07-05 NOTE — PROGRESS NOTES
Assessment and Plan:   71-year-old female presents to establish care for osteoporosis. Has been on Prolia injections, and last one was in January 2023 after she had a tooth extraction in September 2022. Latest DEXA scan from March 2022 only shows osteoporosis at left femoral neck with T score of -2.5. At this time, it is reasonable to transition patient to Fosamax from Prolia. She also has slight joint pain from osteoarthritis in her hands. Schedule DEXA scan for 3/2024  Start alendronate (Fosamax) once a week, take on an empty stomach with a full glass of water and do not lie down for 30 minutes after  Continue Vit. D 4,000 units daily  Continue calcium through diet  Try diclofenac gel as needed for joint pain    Plan:  Diagnoses and all orders for this visit:    Age-related osteoporosis without current pathological fracture  -     alendronate (FOSAMAX) 70 mg tablet; Take 1 tablet (70 mg total) by mouth every 7 days Take on an empty stomach with a full glass of water, and do not lie down for 30 minutes after  -     DXA bone density spine hip and pelvis; Future    Osteoarthritis, unspecified osteoarthritis type, unspecified site  -     Diclofenac Sodium (VOLTAREN) 1 %; Apply 2 g topically 4 (four) times a day As needed for joint pain    Follow-up plan: Return to clinic in 4/2024        MINH Arellano is a 71 y.o.  female who presents as a Rheumatology consult referred by Brenda Coffey MD to establish care for osteoporosis. Is transferring care from [address] [place] [address] Dr. Melvin Blount. Was first diagnosed with osteoporosis 7 years ago. Gynecologist had initially started her on Actonel, was on it a year, but stopped it since it was causing GI upset. Started Prolia every 6 month injections 6 years ago. Last 2 Prolia injections were in 3/2022 and 1/30/23. Had tooth extraction of left upper tooth on 9/27/22 and after full healing, had next Prolia injection in January 2023. Is due for next Prolia injection at the end of July. Patient had a toe and finger fracture. Review of Systems  Review of Systems   Constitutional: Negative for fatigue. HENT: Negative for mouth sores. Respiratory: Negative for cough and shortness of breath. Cardiovascular: Negative for chest pain and leg swelling. Gastrointestinal: Negative for abdominal pain, constipation and diarrhea. Musculoskeletal: Positive for back pain and neck pain. Negative for arthralgias, joint swelling and myalgias. Skin: Negative for color change and rash. Neurological: Negative for weakness. Hematological: Negative for adenopathy. Psychiatric/Behavioral: Negative for sleep disturbance. Reviewed and agree. Allergies  Allergies   Allergen Reactions   • Penicillins      Shown on allergy testing. • Codeine Anxiety   • Dust Mite Extract    • Molds & Smuts    • Pseudoephedrine    • Rabbit Epithelium    • Sulfa Antibiotics Hives       Home Medications    Current Outpatient Medications:   •  alendronate (FOSAMAX) 70 mg tablet, Take 1 tablet (70 mg total) by mouth every 7 days Take on an empty stomach with a full glass of water, and do not lie down for 30 minutes after, Disp: 12 tablet, Rfl: 3  •  Diclofenac Sodium (VOLTAREN) 1 %, Apply 2 g topically 4 (four) times a day As needed for joint pain, Disp: 100 g, Rfl: 6  •  L-Methylfolate-B6-B12 3-35-2 MG TABS, Take 2 tablets by mouth daily, Disp: 180 tablet, Rfl: 3  •  albuterol (2.5 mg/3 mL) 0.083 % nebulizer solution, Take 3 mL (2.5 mg total) by nebulization every 6 (six) hours as needed for wheezing or shortness of breath, Disp: 30 mL, Rfl: 0  •  albuterol (Ventolin HFA) 90 mcg/act inhaler, 2 puffs Q 6 hours prn cough/wheezing, Disp: 18 g, Rfl: 3  •  aspirin 81 MG tablet, Take 1 tablet by mouth daily, Disp: , Rfl:   •  Breo Ellipta 100-25 MCG/ACT inhaler, Inhale 1 puff every morning Rinse mouth after using inhaler.  (Patient not taking: Reported on 6/7/2023), Disp: 60 blister, Rfl: 3  •  Cholecalciferol 4000 units CAPS, Take 1 capsule by mouth daily, Disp: , Rfl:   •  Coenzyme Q10 (CO Q-10) 30 MG CAPS, Take 100 mg by mouth daily 2capsules daily, Disp: , Rfl:   •  cyclobenzaprine (FLEXERIL) 5 mg tablet, Take 1 tablet (5 mg total) by mouth daily at bedtime, Disp: 5 tablet, Rfl: 0  •  fenofibrate 160 MG tablet, Take 1 tablet (160 mg total) by mouth daily, Disp: 90 tablet, Rfl: 3  •  levothyroxine 50 mcg tablet, Take 1 tablet (50 mcg total) by mouth daily, Disp: 90 tablet, Rfl: 3  •  nitrofurantoin (MACRODANTIN) 50 mg capsule, Take 1 capsule by mouth daily , Disp: , Rfl:     Past Medical History  Past Medical History:   Diagnosis Date   • Acute asthma exacerbation     last assessed 8/15/17   • Cataract    • Disease of thyroid gland    • Hypertriglyceridemia    • Osteoporosis    • Right lumbar radiculopathy 12/15/2016   • Urinary tract infection    • Vertigo        Past Surgical History   Past Surgical History:   Procedure Laterality Date   • APPENDECTOMY  1979   • BREAST BIOPSY     • CATARACT EXTRACTION     • HAND SURGERY Right 2007    thumb   • LAPAROSCOPY     • MAMMO STEREOTACTIC BREAST BIOPSY LEFT (ALL INC) Left 04/05/2022       Family History    Family History   Problem Relation Age of Onset   • Cirrhosis Mother    • Kidney nephrosis Mother    • Hypertension Father    • Other Father 80        extra mammary Padget's dx   • Cancer Paternal Grandmother 80        unknown type   • Heart disease Paternal Grandfather    • Heart attack Paternal Grandfather    • Kidney nephrosis Daughter    • Osteoporosis Maternal Grandmother    • Arthritis Maternal Grandmother    • Heart attack Maternal Grandfather    • Heart disease Maternal Grandfather    • Kidney nephrosis Daughter    • Lymphoma Maternal Aunt 79   • No Known Problems Paternal Aunt    • No Known Problems Paternal Aunt    • No Known Problems Paternal Aunt      Maternal grandmother - hip fracture    Social History  Occupation: was an  until 2008  Social History Substance and Sexual Activity   Alcohol Use No     Social History     Substance and Sexual Activity   Drug Use No     Social History     Tobacco Use   Smoking Status Former   • Packs/day: 0.25   • Years: 4.00   • Total pack years: 1.00   • Types: Cigarettes   • Quit date: 1979   • Years since quittin.5   Smokeless Tobacco Never   Tobacco Comments            Objective:  Vitals:    23 0906   BP: 122/68   Weight: 86.2 kg (190 lb)   Height: 5' 5.5" (1.664 m)       Physical Exam  Constitutional:       General: She is not in acute distress. HENT:      Head: Normocephalic and atraumatic. Eyes:      Conjunctiva/sclera: Conjunctivae normal.   Cardiovascular:      Rate and Rhythm: Normal rate and regular rhythm. Heart sounds: S1 normal and S2 normal.      No friction rub. Pulmonary:      Effort: Pulmonary effort is normal. No respiratory distress. Breath sounds: Normal breath sounds. No wheezing, rhonchi or rales. Musculoskeletal:         General: Deformity present. Cervical back: Neck supple. Comments: Right second finger Heberden's nodes   Skin:     General: Skin is warm and dry. Coloration: Skin is not pale. Findings: No rash. Neurological:      Mental Status: She is alert. Mental status is at baseline. Psychiatric:         Mood and Affect: Mood normal.         Behavior: Behavior normal.       Reviewed labs and imaging. Imaging:   DEXA scan 3/17/22  RESULTS:   LUMBAR SPINE L1-L4 :   BMD  1.080  gm/cm2   T-score 0.3    These values are artifactually elevated due to the presence of scoliosis with spondylosis.     LEFT  TOTAL HIP:   BMD:  0.796  gm/cm2   T-score:  -1.2     LEFT  FEMORAL NECK:   BMD:  0.572  gm/cm2   T score: -2.5         IMPRESSION:  1.  OSTEOPOROSIS. [Based on the left femoral neck]     2. When compared to the prior examination, there has been a  9 % INCREASE in the total bone mineral density of the lumbar spine.     Labs:   Appointment on 06/02/2023   Component Date Value Ref Range Status   • Homocyst(e)ine, P/S 06/02/2023 12.0 (H)  3.7 - 11.2 umol/L Final   Office Visit on 05/02/2023   Component Date Value Ref Range Status   • Sodium 06/02/2023 139  135 - 147 mmol/L Final   • Potassium 06/02/2023 4.1  3.5 - 5.3 mmol/L Final   • Chloride 06/02/2023 110 (H)  96 - 108 mmol/L Final   • CO2 06/02/2023 28  21 - 32 mmol/L Final   • ANION GAP 06/02/2023 1 (L)  4 - 13 mmol/L Final   • BUN 06/02/2023 17  5 - 25 mg/dL Final   • Creatinine 06/02/2023 1.09  0.60 - 1.30 mg/dL Final    Standardized to IDMS reference method   • Glucose, Fasting 06/02/2023 85  65 - 99 mg/dL Final    Specimen collection should occur prior to Sulfasalazine administration due to the potential for falsely depressed results. Specimen collection should occur prior to Sulfapyridine administration due to the potential for falsely elevated results. • Calcium 06/02/2023 9.6  8.3 - 10.1 mg/dL Final   • AST 06/02/2023 27  5 - 45 U/L Final    Specimen collection should occur prior to Sulfasalazine administration due to the potential for falsely depressed results. • ALT 06/02/2023 38  12 - 78 U/L Final    Specimen collection should occur prior to Sulfasalazine and/or Sulfapyridine administration due to the potential for falsely depressed results. • Alkaline Phosphatase 06/02/2023 54  46 - 116 U/L Final   • Total Protein 06/02/2023 6.8  6.4 - 8.4 g/dL Final   • Albumin 06/02/2023 3.6  3.5 - 5.0 g/dL Final   • Total Bilirubin 06/02/2023 0.58  0.20 - 1.00 mg/dL Final    Use of this assay is not recommended for patients undergoing treatment with eltrombopag due to the potential for falsely elevated results.    • eGFR 06/02/2023 51  ml/min/1.73sq m Final   • WBC 06/02/2023 4.60  4.31 - 10.16 Thousand/uL Final   • RBC 06/02/2023 4.59  3.81 - 5.12 Million/uL Final   • Hemoglobin 06/02/2023 14.1  11.5 - 15.4 g/dL Final   • Hematocrit 06/02/2023 42.7  34.8 - 46.1 % Final   • MCV 06/02/2023 93  82 - 98 fL Final   • MCH 06/02/2023 30.7  26.8 - 34.3 pg Final   • MCHC 06/02/2023 33.0  31.4 - 37.4 g/dL Final   • RDW 06/02/2023 12.9  11.6 - 15.1 % Final   • MPV 06/02/2023 10.7  8.9 - 12.7 fL Final   • Platelets 54/73/8793 326  149 - 390 Thousands/uL Final   • nRBC 06/02/2023 0  /100 WBCs Final   • Neutrophils Relative 06/02/2023 55  43 - 75 % Final   • Immat GRANS % 06/02/2023 0  0 - 2 % Final   • Lymphocytes Relative 06/02/2023 36  14 - 44 % Final   • Monocytes Relative 06/02/2023 6  4 - 12 % Final   • Eosinophils Relative 06/02/2023 2  0 - 6 % Final   • Basophils Relative 06/02/2023 1  0 - 1 % Final   • Neutrophils Absolute 06/02/2023 2.56  1.85 - 7.62 Thousands/µL Final   • Immature Grans Absolute 06/02/2023 0.01  0.00 - 0.20 Thousand/uL Final   • Lymphocytes Absolute 06/02/2023 1.65  0.60 - 4.47 Thousands/µL Final   • Monocytes Absolute 06/02/2023 0.27  0.17 - 1.22 Thousand/µL Final   • Eosinophils Absolute 06/02/2023 0.08  0.00 - 0.61 Thousand/µL Final   • Basophils Absolute 06/02/2023 0.03  0.00 - 0.10 Thousands/µL Final   • Cholesterol 06/02/2023 186  See Comment mg/dL Final    Cholesterol:         Pediatric <18 Years        Desirable          <170 mg/dL      Borderline High    170-199 mg/dL      High               >=200 mg/dL        Adult >=18 Years            Desirable         <200 mg/dL      Borderline High   200-239 mg/dL      High              >239 mg/dL     • Triglycerides 06/02/2023 141  See Comment mg/dL Final    Triglyceride:     0-9Y            <75mg/dL     10Y-17Y         <90 mg/dL       >=18Y     Normal          <150 mg/dL     Borderline High 150-199 mg/dL     High            200-499 mg/dL        Very High       >499 mg/dL    Specimen collection should occur prior to N-Acetylcysteine or Metamizole administration due to the potential for falsely depressed results.    • HDL, Direct 06/02/2023 51  >=50 mg/dL Final    Specimen collection should occur prior to Metamizole administration due to the potential for falsley depressed results. • LDL Calculated 06/02/2023 107 (H)  0 - 100 mg/dL Final    LDL Cholesterol:     Optimal           <100 mg/dl     Near Optimal      100-129 mg/dl     Above Optimal       Borderline High 130-159 mg/dl       High            160-189 mg/dl       Very High       >189 mg/dl         This screening LDL is a calculated result. It does not have the accuracy of the Direct Measured LDL in the monitoring of patients with hyperlipidemia and/or statin therapy. Direct Measure LDL (WHX375) must be ordered separately in these patients. • Non-HDL-Chol (CHOL-HDL) 06/02/2023 135  mg/dl Final   • TSH 3RD GENERATON 06/02/2023 2.519  0.450 - 4.500 uIU/mL Final    The recommended reference ranges for TSH during pregnancy are as follows:   First trimester 0.1 to 2.5 uIU/mL   Second trimester  0.2 to 3.0 uIU/mL   Third trimester 0.3 to 3.0 uIU/m    Note: Normal ranges may not apply to patients who are transgender, non-binary, or whose legal sex, sex at birth, and gender identity differ. Adult TSH (3rd generation) reference range follows the recommended guidelines of the [employer], January, 2020.    • PTH 06/02/2023 52.5  12.0 - 88.0 pg/mL Final   Appointment on 02/14/2023   Component Date Value Ref Range Status   • Calcium 02/14/2023 9.2  8.3 - 10.1 mg/dL Final

## 2023-07-06 ENCOUNTER — OFFICE VISIT (OUTPATIENT)
Dept: PHYSICAL THERAPY | Facility: CLINIC | Age: 73
End: 2023-07-06
Payer: COMMERCIAL

## 2023-07-06 DIAGNOSIS — M54.12 CERVICAL RADICULOPATHY: Primary | ICD-10-CM

## 2023-07-06 DIAGNOSIS — M75.41 IMPINGEMENT SYNDROME OF RIGHT SHOULDER: ICD-10-CM

## 2023-07-06 PROCEDURE — 97110 THERAPEUTIC EXERCISES: CPT

## 2023-07-06 PROCEDURE — 97112 NEUROMUSCULAR REEDUCATION: CPT

## 2023-07-06 NOTE — PROGRESS NOTES
Daily Note     Today's date: 2023  Patient name: Leatha Villatoro  : 1950  MRN: [id number]  Referring provider: Aurelio Diop MD  Dx:   Encounter Diagnosis     ICD-10-CM    1. Cervical radiculopathy  M54.12       2. Impingement syndrome of right shoulder  M75.41                      Subjective: Patient reports her dizziness is improved since her last session. Her neck and shoulder are improving as well, states she notices it more when reaching overhead or out to the side. Objective: See treatment diary below      Assessment: Tolerated treatment well. No increased dizziness or radicular sx during session. She does exhibit decreased thoracic mobility. Patient achieving increased ROM bilaterally with AROM L>R, able to perform additional rep this date. Additional set with cervical retraction as well. Added bilateral ER to progress postural strengthening, no resistance and good tolerance. Patient demonstrated fatigue post treatment and would benefit from continued PT      Plan: Progress treatment as tolerated.        Precautions: none      Manuals      Intermittent cervical traction  D/c inc sx           Isometric cervical retraction   10x3" 10x3"          Manual shoulder isometrics    IR, ER 2x10 IR, ER 2x10         Suboccipital STM    BRANDO  BRANDO          Sideglides cervical   GI-II GI-II         STM R shoulder + UT      10' JMB                      Neuro Re-Ed             Scap squeeze HEP 2x10 2x10 3x10 3x10 3x10 3x10 3x10      Supine DNF   2x5 2x5 with manual resistance          Seated cervical retraction    2x10 2x10 half range   2x5 half range 2x5 half range 2x5  Half range 3x5   half range     Shoulder isometrics   Flex, ER, IR, ext 10x ea Flex, ER, IR, ext 10x ea  Flex, ER, IR, ext 10x ea Flex, ER, IR, ext 10x ea, 2x thru Flex, ER, IR, ext 10x ea, 2x thru Flex, ER, IR, ext 10x ea, 2x thru     TB Rows   YTB 2x10  YTB 3x10 RTB 4x10 GTB 3x10  GTB  3x10 GTB 3x10 Iso TB ER stepouts    YTB 2x10  RTB 3x10  RTB 3x10 RTB  3x10 RTB 3x10 B     Straight arm pull down       YTB 3x10  YTB  3x10 YTB 3x10     Bilateral ER        At wall 2x10 AROM                  Ther Ex       6/30      Shoulder pulley AAROM  2' flexion 2' flexion  2' flexion   2' flexion 2' flexion   Standing     Table slides   flexion 1'            Cervical AROM     5x ea dir 5x        Thoracic extension mobility              Standing shoulder flexion       2x5 2x5 2x6      Bicep curls       2# 3x10  2#  3x10 2# 3x10                               Ther Activity                                       Pt Edu             Reviewed sleeping positioning    5'                      Modalities             Ice   5'

## 2023-07-10 ENCOUNTER — OFFICE VISIT (OUTPATIENT)
Dept: PHYSICAL THERAPY | Facility: CLINIC | Age: 73
End: 2023-07-10
Payer: COMMERCIAL

## 2023-07-10 DIAGNOSIS — M54.12 CERVICAL RADICULOPATHY: Primary | ICD-10-CM

## 2023-07-10 DIAGNOSIS — M75.41 IMPINGEMENT SYNDROME OF RIGHT SHOULDER: ICD-10-CM

## 2023-07-10 PROCEDURE — 97110 THERAPEUTIC EXERCISES: CPT

## 2023-07-10 PROCEDURE — 97112 NEUROMUSCULAR REEDUCATION: CPT

## 2023-07-10 NOTE — PROGRESS NOTES
Daily Note     Today's date: 7/10/2023  Patient name: Grady Hayden  : 1950  MRN: [id number]  Referring provider: Melvin Hernandez MD  Dx:   Encounter Diagnosis     ICD-10-CM    1. Cervical radiculopathy  M54.12       2. Impingement syndrome of right shoulder  M75.41         Start Time: 1447  Stop Time: 1525  Total time in clinic (min): 38 minutes  Subjective: Pt reports yesterday her shoulder felt great and she was able to do the garage door and reach [place], etc. However today her shoulder has really been bothering her and hurt a lot when trying to push a door open. Objective: See treatment diary below      Assessment: Tolerated treatment well. Upon arrival, pt noted pain with shoulder flexion beyond 90 deg. That sig. Improved following scap squeezes and chin tucks. Patient demonstrated fatigue post treatment, exhibited good technique with therapeutic exercises and would benefit from continued PT. Pt was able to tolerate progressions in TB as well as weight for biceps curls. Plan: Continue per plan of care. Progress treatment as tolerated.        Precautions: none      Manuals  7 7/10    Intermittent cervical traction  D/c inc sx           Isometric cervical retraction   10x3" 10x3"          Manual shoulder isometrics    IR, ER 2x10 IR, ER 2x10         Suboccipital STM    BRANDO  BRANDO          Sideglides cervical   GI-II GI-II         STM R shoulder + UT      10' JMB                      Neuro Re-Ed             Scap squeeze HEP 2x10 2x10 3x10 3x10 3x10 3x10 3x10  3x10    Supine DNF   2x5 2x5 with manual resistance          Seated cervical retraction    2x10 2x10 half range   2x5 half range 2x5 half range 2x5  Half range 3x5   half range 3x5 half range    Shoulder isometrics   Flex, ER, IR, ext 10x ea Flex, ER, IR, ext 10x ea  Flex, ER, IR, ext 10x ea Flex, ER, IR, ext 10x ea, 2x thru Flex, ER, IR, ext 10x ea, 2x thru Flex, ER, IR, ext 10x ea, 2x thru Flex., ER/IR, ext 2x10 ea    TB Rows   YTB 2x10  YTB 3x10 RTB 4x10 GTB 3x10  GTB  3x10 GTB 3x10 GN 30x    Iso TB ER stepouts    YTB 2x10  RTB 3x10  RTB 3x10 RTB  3x10 RTB 3x10 B RTB 30x    Straight arm pull down       YTB 3x10  YTB  3x10 YTB 3x10 RTB 20x    Bilateral ER        At wall 2x10 AROM 2x10 AROM @ wall                 Ther Ex       6/30      Shoulder pulley AAROM  2' flexion 2' flexion  2' flexion   2' flexion 2' flexion   Standing 2' flex stand    Table slides   flexion 1'            Cervical AROM     5x ea dir 5x        Thoracic extension mobility              Standing shoulder flexion       2x5 2x5 2x6  2x5    Bicep curls       2# 3x10  2#  3x10 2# 3x10 3# 2x10                              Ther Activity                                       Pt Edu             Reviewed sleeping positioning    5'                      Modalities             Ice   5'

## 2023-07-13 ENCOUNTER — OFFICE VISIT (OUTPATIENT)
Dept: PHYSICAL THERAPY | Facility: CLINIC | Age: 73
End: 2023-07-13
Payer: COMMERCIAL

## 2023-07-13 DIAGNOSIS — R42 VERTIGO: Primary | ICD-10-CM

## 2023-07-13 PROCEDURE — 97112 NEUROMUSCULAR REEDUCATION: CPT | Performed by: PHYSICAL THERAPIST

## 2023-07-13 NOTE — PROGRESS NOTES
Daily Note     Today's date: 2023  Patient name: Angie Delgado  : 1950  MRN: [id number]  Referring provider: Bob Waller MD  Dx:   Encounter Diagnosis     ICD-10-CM    1. Vertigo  R42           Start Time: 945  Stop Time:   Total time in clinic (min): 45 minutes    Subjective: Patient notes that overall she has been doing better. On Tuesday she notes slight dizziness in when she was getting out of bed but it wasn't super intense. Patient notes that it was more of a swaying instead of spinning. Patient notes that she feels a little off this morning she has some nausea for some reason. Patient notes that her shoulder started bothering her a lot yesterday too so she's not sure what's going on. Objective: See treatment diary below      Positional testing   Sherman-Hallpike   Left posterior canal: WNL  R Charlotte Hallpike: symptomatic, torsional and up-beating; fatiguing        Assessment: Patient tolerated treatment well. Xochilt Oar was performed and demonstrated positive R canalithiasis BPPV with fatiguing up-beating torsional nystagmus. CRT was performed 3x with improvement in symptoms. Mild nystagmus present on last treatment however very minimal symptoms and fatigued after 5 seconds. Nausea improved post session. Held on [facility]. Will see patient for follow up next week and sooner if needed. Patient would benefit from continued PT      Plan: Continue per plan of care.       Precautions: none      Manuals 6/27 7/3 7/13                                                              Neuro Re-Ed             CRT 1x Li maneuver for posterior canal 2x Li maneuver for posterior canal 3x Li maneuver for posterior canal                                                                                        Ther Ex                                                                                                                     Ther Activity                                       Gait Training Modalities

## 2023-07-14 ENCOUNTER — HOSPITAL ENCOUNTER (OUTPATIENT)
Dept: RADIOLOGY | Age: 73
Discharge: HOME | End: 2023-07-14
Payer: COMMERCIAL

## 2023-07-14 DIAGNOSIS — N28.1 CYST OF KIDNEY, ACQUIRED: ICD-10-CM

## 2023-07-14 PROCEDURE — 76770 US EXAM ABDO BACK WALL COMP: CPT

## 2023-07-17 ENCOUNTER — OFFICE VISIT (OUTPATIENT)
Dept: PHYSICAL THERAPY | Facility: CLINIC | Age: 73
End: 2023-07-17
Payer: COMMERCIAL

## 2023-07-17 DIAGNOSIS — M75.41 IMPINGEMENT SYNDROME OF RIGHT SHOULDER: ICD-10-CM

## 2023-07-17 DIAGNOSIS — M54.12 CERVICAL RADICULOPATHY: Primary | ICD-10-CM

## 2023-07-17 PROCEDURE — 97110 THERAPEUTIC EXERCISES: CPT

## 2023-07-17 PROCEDURE — 97112 NEUROMUSCULAR REEDUCATION: CPT

## 2023-07-17 NOTE — PROGRESS NOTES
Daily Note     Today's date: 2023  Patient name: Keven Oconnell  : 1950  MRN: [id number]  Referring provider: Ashia Villeda MD  Dx:   Encounter Diagnosis     ICD-10-CM    1. Cervical radiculopathy  M54.12       2. Impingement syndrome of right shoulder  M75.41         Start Time: 1130  Stop Time: 1216  Total time in clinic (min): 46 minutes  Subjective: Pt reports she must have slept with her RUE away from her causing an increase in discomfort and soreness in her shoulder. Objective: See treatment diary below      Assessment: Tolerated treatment fair. Patient demonstrated fatigue post treatment, exhibited good technique with therapeutic exercises and would benefit from continued PT. Pt reports a reduction in lat. Shoulder pain following chin tucks. Pt noted no improvement with sleeper stretch however did struggle to obtain s/l position secondary to issues with vestibular; trial standing next session. Plan: Continue per plan of care. Progress treatment as tolerated. Precautions: none      Manuals   6/16 6/20 6/23 6/26 6/30 7/6 7/10 7/17   Intermittent cervical traction             Isometric cervical retraction    10x3"          Manual shoulder isometrics    IR, ER 2x10 IR, ER 2x10         Suboccipital STM    BRANDO  BRANDO          Sideglides cervical   GI-II GI-II         STM R shoulder + UT      10' JMB                      Neuro Re-Ed             Scap squeeze   2x10 3x10 3x10 3x10 3x10 3x10  3x10 30x   Supine DNF   2x5 2x5 with manual resistance          Seated cervical retraction     2x10 half range   2x5 half range 2x5 half range 2x5  Half range 3x5   half range 3x5 half range 6x5 half range   Shoulder isometrics    Flex, ER, IR, ext 10x ea  Flex, ER, IR, ext 10x ea Flex, ER, IR, ext 10x ea, 2x thru Flex, ER, IR, ext 10x ea, 2x thru Flex, ER, IR, ext 10x ea, 2x thru Flex., ER/IR, ext 2x10 ea Flex, IR/ER, ext.  2x10 ea   TB Rows     YTB 3x10 RTB 4x10 GTB 3x10  GTB  3x10 GTB 3x10 GN 30x Iso TB ER stepouts    YTB 2x10  RTB 3x10  RTB 3x10 RTB  3x10 RTB 3x10 B RTB 30x    Straight arm pull down       YTB 3x10  YTB  3x10 YTB 3x10 RTB 20x    Bilateral ER        At wall 2x10 AROM 2x10 AROM @ wall 2x10 at wall                Ther Ex       6/30      Shoulder pulley AAROM   2' flexion  2' flexion   2' flexion 2' flexion   Standing 2' flex stand Seated 3'   Sleeper S          10"x10   Cervical AROM     5x ea dir 5x        Thoracic extension mobility              Standing shoulder flexion       2x5 2x5 2x6  2x5 2x5   Bicep curls       2# 3x10  2#  3x10 2# 3x10 3# 2x10 3# 2x10                             Ther Activity                                       Pt Edu             Reviewed sleeping positioning    5'                      Modalities             Ice   5'

## 2023-07-21 ENCOUNTER — OFFICE VISIT (OUTPATIENT)
Dept: PHYSICAL THERAPY | Facility: CLINIC | Age: 73
End: 2023-07-21
Payer: COMMERCIAL

## 2023-07-21 DIAGNOSIS — R42 VERTIGO: Primary | ICD-10-CM

## 2023-07-21 PROCEDURE — 97140 MANUAL THERAPY 1/> REGIONS: CPT | Performed by: PHYSICAL THERAPIST

## 2023-07-21 NOTE — PROGRESS NOTES
Daily Note     Today's date: 2023  Patient name: Karla Schaumann  : 1950  MRN: [id number]  Referring provider: Nolan Lopez MD  Dx:   Encounter Diagnosis     ICD-10-CM    1. Vertigo  R42           Start Time: 1400  Stop Time: 7590  Total time in clinic (min): 45 minutes    Subjective: Patient reports that she is not having a good day. Patient notes she had an eye appointment this morning and they dilated her eyes and ever since then she has been unstable. Patient notes that it's taking a while for them to get to normal. Patient notes that she had one quick episode of spinning dizziness since last time. Patient notes that she is feeling a lot better with that aspect. Objective: See treatment diary below      Positional testing   [place]-Hallpike   Left posterior canal: WNL  R posterior canal: symptomatic, torsional and up-beating; fatiguing <10 seconds         Assessment: Patient tolerated treatment well. Car[place] Person was performed and demonstrated positive R canalithiasis BPPV with fatiguing up-beating torsional nystagmus. CRT was performed 2x with improvement in symptoms and no presence of nystagmus. Patient noted feeling more stable with ambulation and less foggy post treatment. Mild nausea throughout treatment but resolved with ice pack on neck. Held on [facility] HEP. Will see patient for follow up next week and sooner if needed. Patient would benefit from continued PT      Plan: Continue per plan of care.       Precautions: none      Manuals 6/27 7/3 7/13 7/21                                                             Neuro Re-Ed             CRT 1x Li maneuver for posterior canal 2x Li maneuver for posterior canal 3x Li maneuver for posterior canal 1 R eply    1x Li maneuver for posterior canal                                                                                       Ther Ex Ther Activity                                       Gait Training                                       Modalities

## 2023-07-24 ENCOUNTER — APPOINTMENT (OUTPATIENT)
Dept: PHYSICAL THERAPY | Facility: CLINIC | Age: 73
End: 2023-07-24
Payer: COMMERCIAL

## 2023-07-25 ENCOUNTER — OFFICE VISIT (OUTPATIENT)
Dept: PHYSICAL THERAPY | Facility: CLINIC | Age: 73
End: 2023-07-25
Payer: COMMERCIAL

## 2023-07-25 DIAGNOSIS — R42 VERTIGO: Primary | ICD-10-CM

## 2023-07-25 PROCEDURE — 97140 MANUAL THERAPY 1/> REGIONS: CPT | Performed by: PHYSICAL THERAPIST

## 2023-07-25 NOTE — PROGRESS NOTES
Daily Note     Today's date: 2023  Patient name: Samson Rich  : 1950  MRN: [id number]  Referring provider: Earl Lerner MD  Dx:   Encounter Diagnosis     ICD-10-CM    1. Vertigo  R42           Start Time: 4545  Stop Time: 4490  Total time in clinic (min): 45 minutes    Subjective: Patient reports that she felt a lot after last time. Patient notes that she even rolled over to her R side quickly over the weekend and she had no sense of dizziness. Patient notes that when she got out of bed this morning she had a quick bout of a slight swaying and then it went away. Patient notes no nausea this date. Objective: See treatment diary below       Positional testing   [place]-Hallpike   Left posterior canal: symptomatic, torsional and up-beating; fatiguing <10 seconds    R posterior canal: symptomatic, torsional and up-beating; fatiguing <10 seconds         Assessment: Patient tolerated treatment well. Herber Hazy was performed and demonstrated positive R and L canalithiasis BPPV with fatiguing up-beating torsional nystagmus. R symptoms > L symptoms which may indicate a cross over affect. After testing patient had increased nausea that resolved with rest and ice pack. Continue treatment on the R side 1x. Patient had no nystagmus with R side-lying but when transitions to left side-lying patient had symptom provocation and severe torsional nystagmus that fatigued <30 seconds. Post treatment patient had no symptoms. Held on [place] HEP. Will see patient for follow up next week and sooner if needed. Patient would benefit from continued PT      Plan: Continue per plan of care.       Precautions: none      Manuals 6/27 7/3 7/13 7/21 7/25                                                            Neuro Re-Ed             CRT 1x Li maneuver for posterior canal 2x Li maneuver for posterior canal 3x Li maneuver for posterior canal 1 R eply    1x Li maneuver for posterior canal   1x Li maneuver for R posterior canal                                                                                      Ther Ex                                                                                                                     Ther Activity                                       Gait Training                                       Modalities

## 2023-07-27 ENCOUNTER — HOSPITAL ENCOUNTER (OUTPATIENT)
Dept: NON INVASIVE DIAGNOSTICS | Facility: CLINIC | Age: 73
Discharge: HOME | End: 2023-07-27
Payer: COMMERCIAL

## 2023-07-27 ENCOUNTER — EVALUATION (OUTPATIENT)
Dept: PHYSICAL THERAPY | Facility: CLINIC | Age: 73
End: 2023-07-27
Payer: COMMERCIAL

## 2023-07-27 VITALS
SYSTOLIC BLOOD PRESSURE: 132 MMHG | DIASTOLIC BLOOD PRESSURE: 66 MMHG | HEIGHT: 65 IN | HEART RATE: 80 BPM | BODY MASS INDEX: 31.65 KG/M2 | WEIGHT: 190 LBS

## 2023-07-27 DIAGNOSIS — M75.41 IMPINGEMENT SYNDROME OF RIGHT SHOULDER: ICD-10-CM

## 2023-07-27 DIAGNOSIS — M54.12 CERVICAL RADICULOPATHY: Primary | ICD-10-CM

## 2023-07-27 DIAGNOSIS — I49.1 PAC (PREMATURE ATRIAL CONTRACTION): ICD-10-CM

## 2023-07-27 LAB
AORTIC ROOT: 2.9 CM
APICAL FOUR CHAMBER EJECTION FRACTION: 63 %
ASCENDING AORTA: 2.6 CM
E WAVE DECELERATION TIME: 195 MS
FRACTIONAL SHORTENING: 35 (ref 28–44)
INTERVENTRICULAR SEPTUM IN DIASTOLE (PARASTERNAL SHORT AXIS VIEW): 0.9 CM
INTERVENTRICULAR SEPTUM: 0.9 CM (ref 0.6–1.1)
LAAS-AP2: 16.7 CM2
LAAS-AP4: 19.5 CM2
LEFT ATRIUM SIZE: 3.6 CM
LEFT ATRIUM VOLUME (MOD BIPLANE): 52 ML
LEFT INTERNAL DIMENSION IN SYSTOLE: 2.8 CM (ref 2.1–4)
LEFT VENTRICULAR INTERNAL DIMENSION IN DIASTOLE: 4.3 CM (ref 3.5–6)
LEFT VENTRICULAR POSTERIOR WALL IN END DIASTOLE: 0.9 CM
LEFT VENTRICULAR STROKE VOLUME: 52 ML
LVSV (TEICH): 52 ML
MV E'TISSUE VEL-SEP: 7 CM/S
MV PEAK A VEL: 0.84 M/S
MV PEAK E VEL: 67 CM/S
MV STENOSIS PRESSURE HALF TIME: 56 MS
MV VALVE AREA P 1/2 METHOD: 3.93
RIGHT ATRIUM AREA SYSTOLE A4C: 12.7 CM2
RIGHT VENTRICLE ID DIMENSION: 3.3 CM
SL CV LEFT ATRIUM LENGTH A2C: 4.9 CM
SL CV LV EF: 55
SL CV PED ECHO LEFT VENTRICLE DIASTOLIC VOLUME (MOD BIPLANE) 2D: 81 ML
SL CV PED ECHO LEFT VENTRICLE SYSTOLIC VOLUME (MOD BIPLANE) 2D: 29 ML
TR MAX PG: 17 MMHG
TR PEAK VELOCITY: 2.1 M/S
TRICUSPID ANNULAR PLANE SYSTOLIC EXCURSION: 2.1 CM
TRICUSPID VALVE PEAK REGURGITATION VELOCITY: 2.05 M/S

## 2023-07-27 PROCEDURE — 93306 TTE W/DOPPLER COMPLETE: CPT | Performed by: INTERNAL MEDICINE

## 2023-07-27 PROCEDURE — 97110 THERAPEUTIC EXERCISES: CPT

## 2023-07-27 PROCEDURE — 93306 TTE W/DOPPLER COMPLETE: CPT

## 2023-07-27 PROCEDURE — 97530 THERAPEUTIC ACTIVITIES: CPT

## 2023-07-27 PROCEDURE — 97112 NEUROMUSCULAR REEDUCATION: CPT

## 2023-07-27 NOTE — PROGRESS NOTES
Re-Evaluation     Today's date: 2023  Patient name: Stef Spencer  : 1950  MRN: [id number]  Referring provider: Michael Ortiz MD  Dx:   Encounter Diagnosis     ICD-10-CM    1. Cervical radiculopathy  M54.12       2. Impingement syndrome of right shoulder  M75.41         Start Time: 1655  Stop Time: 1752  Total time in clinic (min): 57 minutes  Subjective: The patient presents for re-evaluation today. The patient reports improvement in symptoms since beginning skilled physical therapy. The patient reports 6/10 pain at it's worst over the past 24 hours, and reports 60% improvement in overall condition since beginning formal PT care. The patient's chief remaining concern is reaching laterally away from her body. Objective: See treatment diary below     Active Range of Motion   Cervical/Thoracic Spine     Cervical  *pain free cervical AROM  Flexion:  Restriction level: minimal  Extension:  Restriction level: minimal  Left lateral flexion:  Restriction level: minimal  Right lateral flexion:  Restriction level minimal  Left rotation:  Restriction level: minimal  Right rotation:  Restriction level: minimal  Left Shoulder   Normal active range of motion    Right Shoulder   Flexion: 135 degrees without pain  Abduction: 86 degrees with pain  External rotation BTH: Active external rotation behind the head: occiput.  with pain    Additional Active Range of Motion Details  L lateral flexion and extension reproduce minor symptoms in right lateral shoulder       Passive Range of Motion     Right Shoulder   Flexion: 148 degrees without pain  Abduction: 97 degrees with pain     Strength/Myotome Testing     Left Shoulder     Planes of Motion   Abduction: 5   External rotation at 0°: 5   Internal rotation at 0°: 5     Left Elbow   Flexion: 5  Extension: 5    Right Elbow   Flexion: 5  Extension: 5    Left Wrist/Hand   Wrist extension: 5  Wrist flexion: 5    Right Wrist/Hand   Wrist extension: 5  Wrist flexion: 5    Right shoulder flexion: 4-  Right abd: 3    Right elbow flexion: 4  Elbow extension: 3+    Tests   Cervical   Neg. cervical distraction test     General Comments:  Also currently being treated for vertigo limiting cerv. ROM at times as well as tolerance to some exercises    Assessment: Taisha Farmer is a pleasant 68 y.o. female who has been receiving PT intervention for R shoulder pain as well as cervical radiculopathy. The patient has demonstrated decreased pain, increased strength, increased ROM and improved posture  since beginning treatment. Pt has responded well to IR/ER stretching as well a working on t/s ROM. Pt would benefit from continued work on the above deficits in order to reduce pain and improve tolerance to functional activities.        Primary remaining impairments include:    1)  Limited ROM in R shoulder, specifically with shoulder abd and ER    2)  Weakness of R shoulder    Goals  STG to be achieved in 4 weeks   1) Painfree cervical AROM- MET  2) No radicular symptoms with supine traction- UNABLE TO ASSESS IN SUPINE TODAY (vertigo)  3) Shoulder flexion elevation > 90 degrees- MET    LTG to be achieved in 8 weeks   1) FOTO to be greater than expected- PROGRESSING  2) Shoulder ROM to be WFL- PROGRESSING  3) Pt to report 75% improvement in symptoms- PROGRESSING  4) 5/5 shoulder MMT strength- PROGRESSING    Plan: Plan  Patient would benefit from: PT eval  Planned therapy interventions: manual therapy, joint mobilization, strengthening, stretching, therapeutic activities, therapeutic exercise, neuromuscular re-education, home exercise program, graded exercise and functional ROM exercises  Frequency: 2x week  Treatment plan discussed with: patient     Precautions: none    Manuals 7/27-RE  6/16 6/20 6/23 6/26 6/30 7/6 7/10 7/17   Intermittent cervical traction             Isometric cervical retraction    10x3"          Manual shoulder isometrics    IR, ER 2x10 IR, ER 2x10         Suboccipital STM Sandra Steven          Sideglides cervical   GI-II GI-II         STM R shoulder + UT      10' JMB                      Neuro Re-Ed       6/30      Scap squeeze   2x10 3x10 3x10 3x10 3x10 3x10  3x10 30x   Supine DNF   2x5 2x5 with manual resistance          Seated cervical retraction     2x10 half range   2x5 half range 2x5 half range 2x5  Half range 3x5   half range 3x5 half range 6x5 half range   Shoulder isometrics    Flex, ER, IR, ext 10x ea  Flex, ER, IR, ext 10x ea Flex, ER, IR, ext 10x ea, 2x thru Flex, ER, IR, ext 10x ea, 2x thru Flex, ER, IR, ext 10x ea, 2x thru Flex., ER/IR, ext 2x10 ea Flex, IR/ER, ext. 2x10 ea   TB Rows  GN 30x   YTB 3x10 RTB 4x10 GTB 3x10  GTB  3x10 GTB 3x10 GN 30x    Iso TB ER stepouts    YTB 2x10  RTB 3x10  RTB 3x10 RTB  3x10 RTB 3x10 B RTB 30x    Straight arm pull down  RTB 20x     YTB 3x10  YTB  3x10 YTB 3x10 RTB 20x    Bilateral ER 3"x20       At wall 2x10 AROM 2x10 AROM @ wall 2x10 at wall                Ther Ex       6/30      Shoulder pulley AAROM 3' flexion  2' flexion  2' flexion   2' flexion 2' flexion   Standing 2' flex stand Seated 3'   Sleeper S          10"x10   Doorway S 15"x4 (low)            Cervical AROM  2x   5x ea dir 5x        T/s rot.  seated 5"x10 ea            Standing shoulder flexion       2x5 2x5 2x6  2x5 2x5   Bicep curls       2# 3x10  2#  3x10 2# 3x10 3# 2x10 3# 2x10   pec stretch 15"x4            IR S behind back 10"x10            Ther Activity                                       Pt Edu             Reviewed sleeping positioning Updated HEP, goals, progress   5'                      Modalities             Ice   5'

## 2023-07-28 ENCOUNTER — APPOINTMENT (OUTPATIENT)
Dept: PHYSICAL THERAPY | Facility: CLINIC | Age: 73
End: 2023-07-28
Payer: COMMERCIAL

## 2023-08-01 ENCOUNTER — APPOINTMENT (OUTPATIENT)
Dept: PHYSICAL THERAPY | Facility: CLINIC | Age: 73
End: 2023-08-01
Payer: COMMERCIAL

## 2023-08-02 ENCOUNTER — OFFICE VISIT (OUTPATIENT)
Dept: PHYSICAL THERAPY | Facility: CLINIC | Age: 73
End: 2023-08-02
Payer: COMMERCIAL

## 2023-08-02 DIAGNOSIS — M54.12 CERVICAL RADICULOPATHY: Primary | ICD-10-CM

## 2023-08-02 DIAGNOSIS — M75.41 IMPINGEMENT SYNDROME OF RIGHT SHOULDER: ICD-10-CM

## 2023-08-02 PROCEDURE — 97112 NEUROMUSCULAR REEDUCATION: CPT

## 2023-08-02 PROCEDURE — 97110 THERAPEUTIC EXERCISES: CPT

## 2023-08-02 NOTE — PROGRESS NOTES
Daily Note     Today's date: 2023  Patient name: Harry Martinez  : 1950  MRN: [id number]  Referring provider: Kinjal Simpson MD  Dx:   Encounter Diagnosis     ICD-10-CM    1. Cervical radiculopathy  M54.12       2. Impingement syndrome of right shoulder  M75.41         Start Time: 1503  Stop Time: 1543  Total time in clinic (min): 40 minutes  Subjective: Pt reports she has been doing her HEP and feeling good; will get the discomfort but overall is less frequent. Objective: See treatment diary below      Assessment: Tolerated treatment well. Patient demonstrated fatigue post treatment, exhibited good technique with therapeutic exercises and would benefit from continued PT. Pt noted a reduction in pain level after exercises and with shoulder abd. Plan: Continue per plan of care. Progress treatment as tolerated. Precautions: none    Manuals -RE 8/2 6/16 6/20 6/23 6/26 6/30 7/6 7/10 7/17   Intermittent cervical traction             Isometric cervical retraction    10x3"          Manual shoulder isometrics    IR, ER 2x10 IR, ER 2x10         Suboccipital STM    BRANDO  BRANDO          Sideglides cervical   GI-II GI-II         STM R shoulder + UT      10' JMB                      Neuro Re-Ed             Scap squeeze   2x10 3x10 3x10 3x10 3x10 3x10  3x10 30x   Supine DNF   2x5 2x5 with manual resistance          Seated cervical retraction     2x10 half range   2x5 half range 2x5 half range 2x5  Half range 3x5   half range 3x5 half range 6x5 half range   Shoulder isometrics    Flex, ER, IR, ext 10x ea  Flex, ER, IR, ext 10x ea Flex, ER, IR, ext 10x ea, 2x thru Flex, ER, IR, ext 10x ea, 2x thru Flex, ER, IR, ext 10x ea, 2x thru Flex., ER/IR, ext 2x10 ea Flex, IR/ER, ext.  2x10 ea   TB Rows  GN 30x 9# 20x  YTB 3x10 RTB 4x10 GTB 3x10  GTB  3x10 GTB 3x10 GN 30x    Iso TB ER stepouts  REd  YTB 2x10  RTB 3x10  RTB 3x10 RTB  3x10 RTB 3x10 B RTB 30x    Straight arm pull down  RTB 20x 9# 20x    YTB 3x10 YTB  3x10 YTB 3x10 RTB 20x    IR  GN 20x           Bilateral ER 3"x20       At wall 2x10 AROM 2x10 AROM @ wall 2x10 at wall                Ther Ex       6/30      Shoulder pulley AAROM 3' flexion 3' flexion 2' flexion  2' flexion   2' flexion 2' flexion   Standing 2' flex stand Seated 3'   Sleeper S          10"x10   Doorway S 15"x4 (low) 15"x4           Cervical AROM  2x   5x ea dir 5x        T/s rot.  seated 5"x10 ea 5"x10 ea           Standing shoulder flexion       2x5 2x5 2x6  2x5 2x5   Bicep curls       2# 3x10  2#  3x10 2# 3x10 3# 2x10 3# 2x10   pec stretch 15"x4            IR S behind back 10"x10 10"x10           Ther Activity                                       Pt Edu             Reviewed sleeping positioning Updated HEP, goals, progress   5'                      Modalities             Ice   5'

## 2023-08-03 ENCOUNTER — OFFICE VISIT (OUTPATIENT)
Dept: PHYSICAL THERAPY | Facility: CLINIC | Age: 73
End: 2023-08-03
Payer: COMMERCIAL

## 2023-08-03 DIAGNOSIS — R42 VERTIGO: Primary | ICD-10-CM

## 2023-08-03 PROCEDURE — 97112 NEUROMUSCULAR REEDUCATION: CPT | Performed by: PHYSICAL THERAPIST

## 2023-08-03 NOTE — PROGRESS NOTES
Daily Note     Today's date: 8/3/2023  Patient name: Leonidas Butterfield  : 1950  MRN: [id number]  Referring provider: Young Marin MD  Dx:   Encounter Diagnosis     ICD-10-CM    1. Vertigo  R42           Start Time:   Stop Time: 922  Total time in clinic (min): 45 minutes    Subjective: Patient reports that she hasn't had any dizziness episodes since last session. Patient notes that she has been doing all normal activities and hasn't had any episodes or even no feelings of imbalance. Objective: See treatment diary below       Positional testing   [place]-Hallpike   Left posterior canal: symptomatic, torsional and up-beating; fatiguing <5 seconds    R posterior canal: symptomatic, torsional and up-beating; fatiguing <20 seconds         Assessment: Patient tolerated treatment well. Despite subjective reports of no dizziness episodes since last session patient continues to demonstrate positive testing. [place] S[place] was performed and demonstrated positive R and L testing with up-beating fatiguing torsional nystagmus. Symptoms were stronger and more prominent on the R which caused the focus of treatment to be on the R. Completed 3 rounds of Elpy with improvements in severity of nystagmus and symptoms. At end of session patient did have moderate nausea which was resolved with rest and ice pack to the cervical spine. Held on [facility]. Will see patient for follow up next week and sooner if needed. Patient would benefit from continued PT      Plan: Continue per plan of care.       Precautions: none      Manuals 6/27 7/3 7/13 7/21 7/25 8/3                                                           Neuro Re-Ed             CRT 1x Li maneuver for posterior canal 2x Li maneuver for posterior canal 3x Li maneuver for posterior canal 1 R eply    1x Li maneuver for posterior canal   1x Li maneuver for R posterior canal 3x R eply posterior canal Ther Ex                                                                                                                     Ther Activity                                       Gait Training                                       Modalities

## 2023-08-07 ENCOUNTER — OFFICE VISIT (OUTPATIENT)
Dept: PHYSICAL THERAPY | Facility: CLINIC | Age: 73
End: 2023-08-07
Payer: COMMERCIAL

## 2023-08-07 DIAGNOSIS — R42 VERTIGO: Primary | ICD-10-CM

## 2023-08-07 PROCEDURE — 97112 NEUROMUSCULAR REEDUCATION: CPT | Performed by: PHYSICAL THERAPIST

## 2023-08-07 NOTE — PROGRESS NOTES
Daily Note     Today's date: 2023  Patient name: Samson Rich  : 1950  MRN: [id number]  Referring provider: Earl Lerner MD  Dx:   Encounter Diagnosis     ICD-10-CM    1. Vertigo  R42           Start Time: 3648  Stop Time: 1700  Total time in clinic (min): 45 minutes    Subjective: Patient reports that it took her until about 7 pm after last time to start to feel better. Patient notes that she hasn't had any spinning episodes since last time but continues to feel off especially this morning. Objective: See treatment diary below       Positional testing   [place]-Hallpike   Left posterior canal: no sx or nystagmus   R posterior canal: symptomatic, torsional and up-beating; fatiguing <10 seconds      Roll Test:   R: Negative; no sx or nystagmus   L: Negative; no sx or nystagmus       Assessment: Patient tolerated treatment well. Re-tested horizontal canal to rule out involvement this date. No positive testing with sx provocation or nystagmus noted. Patient also demonstrated negative testing on the L this date which has been positive the last two session which is an improvement. Herber Leach was positive R with up-beating fatiguing torsional nystagmus. Completed 2 rounds of Elpy with improvements in severity of nystagmus and symptoms each treatment. No nausea at the end or throughout session this date. Will follow up with patient next week. Held on [facility]. Discussed with patient we will continue to treat however if within the next few sessions she continues to have positive testing it will be appropriate for her to be referred out for further testing and evaluations from other disciplines. Patient would benefit from continued PT      Plan: Continue per plan of care.       Precautions: none      Manuals 6/27 7/3 7/13 7/21 7/25 8/3 8/7                                                          Neuro Re-Ed             CRT 1x Li maneuver for posterior canal 2x Li maneuver for posterior canal 3x [place] maneuver for posterior canal 1 R eply    1x Li maneuver for posterior canal   1x Li maneuver for R posterior canal 3x R eply posterior canal  2x R eply posterior canal                                                                                     Ther Ex                                                                                                                     Ther Activity                                       Gait Training                                       Modalities

## 2023-08-10 ENCOUNTER — OFFICE VISIT (OUTPATIENT)
Dept: PHYSICAL THERAPY | Facility: CLINIC | Age: 73
End: 2023-08-10
Payer: COMMERCIAL

## 2023-08-10 DIAGNOSIS — M54.12 CERVICAL RADICULOPATHY: Primary | ICD-10-CM

## 2023-08-10 DIAGNOSIS — M75.41 IMPINGEMENT SYNDROME OF RIGHT SHOULDER: ICD-10-CM

## 2023-08-10 PROCEDURE — 97112 NEUROMUSCULAR REEDUCATION: CPT

## 2023-08-10 PROCEDURE — 97110 THERAPEUTIC EXERCISES: CPT

## 2023-08-10 NOTE — PROGRESS NOTES
Daily Note     Today's date: 8/10/2023  Patient name: Alison Sanders  : 1950  MRN: [id number]  Referring provider: Debi Pandey MD  Dx:   Encounter Diagnosis     ICD-10-CM    1. Cervical radiculopathy  M54.12       2. Impingement syndrome of right shoulder  M75.41           Start Time: 0802  Stop Time: 5609  Total time in clinic (min): 53 minutes    Subjective: Pt reports her arm was a little "pulley" this week and unsure why since she says she didn't do anything different. Objective: See treatment diary below      Assessment: Tolerated treatment well with improvement from approx. 90 deg. AROM flexion upon arrival to full AROM by conclusion of session. Pt does continue to require frequent verbal cuing for proper exercise performance throughout session. Patient demonstrated fatigue post treatment, exhibited good technique with therapeutic exercises and would benefit from continued PT. Plan: Continue per plan of care. Progress treatment as tolerated. Precautions: none       Manuals -RE 8/2 8/10  6/23 6/26 6/30 7/6 7/10 7/17   Intermittent cervical traction             Isometric cervical retraction              Manual shoulder isometrics              Suboccipital STM              Sideglides cervical             STM R shoulder + UT      10' JMB                      Neuro Re-Ed             Scap squeeze     3x10 3x10 3x10 3x10  3x10 30x   Supine DNF             Seated cervical retraction       2x5 half range 2x5 half range 2x5  Half range 3x5   half range 3x5 half range 6x5 half range   Shoulder isometrics      Flex, ER, IR, ext 10x ea Flex, ER, IR, ext 10x ea, 2x thru Flex, ER, IR, ext 10x ea, 2x thru Flex, ER, IR, ext 10x ea, 2x thru Flex., ER/IR, ext 2x10 ea Flex, IR/ER, ext.  2x10 ea   TB Rows  GN 30x 9# 20x 9# 2x10  RTB 4x10 GTB 3x10  GTB  3x10 GTB 3x10 GN 30x    Iso TB ER stepouts  REd 3.5# 5x ea  RTB 3x10  RTB 3x10 RTB  3x10 RTB 3x10 B RTB 30x    Straight arm pull down  RTB 20x 9# 20x 9# 2x10   YTB 3x10  YTB  3x10 YTB 3x10 RTB 20x    OH punch, ecc. lower   5x          IR  GN 20x 6# 20x ea          Bilateral ER 3"x20       At wall 2x10 AROM 2x10 AROM @ wall 2x10 at wall   Standing I, Y, T   2# 10x ea          Ther Ex       6/30      Shoulder pulley AAROM 3' flexion 3' flexion 3' flexion  2' flexion   2' flexion 2' flexion   Standing 2' flex stand Seated 3'   Sleeper S          10"x10   Doorway S 15"x4 (low) 15"x4 15"x4 B          Cervical AROM  2x    5x        T/s rot.  seated 5"x10 ea 5"x10 ea 5"X10 ea          Standing shoulder flexion       2x5 2x5 2x6  2x5 2x5   Bicep curls    3# 2x10   2# 3x10  2#  3x10 2# 3x10 3# 2x10 3# 2x10   Plantigrade push up plus   3"X20          pec stretch 15"x4            IR S behind back 10"x10 10"x10 10"x10          Ther Activity                                       Pt Edu             Reviewed sleeping positioning Updated HEP, goals, progress                         Modalities             Ice

## 2023-08-11 ENCOUNTER — APPOINTMENT (OUTPATIENT)
Dept: PHYSICAL THERAPY | Facility: CLINIC | Age: 73
End: 2023-08-11
Payer: COMMERCIAL

## 2023-08-15 ENCOUNTER — APPOINTMENT (OUTPATIENT)
Dept: PHYSICAL THERAPY | Facility: CLINIC | Age: 73
End: 2023-08-15
Payer: COMMERCIAL

## 2023-08-17 ENCOUNTER — OFFICE VISIT (OUTPATIENT)
Dept: PHYSICAL THERAPY | Facility: CLINIC | Age: 73
End: 2023-08-17
Payer: COMMERCIAL

## 2023-08-17 DIAGNOSIS — M75.41 IMPINGEMENT SYNDROME OF RIGHT SHOULDER: ICD-10-CM

## 2023-08-17 DIAGNOSIS — M54.12 CERVICAL RADICULOPATHY: Primary | ICD-10-CM

## 2023-08-17 PROCEDURE — 97110 THERAPEUTIC EXERCISES: CPT

## 2023-08-17 PROCEDURE — 97140 MANUAL THERAPY 1/> REGIONS: CPT

## 2023-08-17 NOTE — PROGRESS NOTES
Daily Note     Today's date: 2023  Patient name: Parker Ramos  : 1950  MRN: [id number]  Referring provider: Mario Rueda MD  Dx:   Encounter Diagnosis     ICD-10-CM    1. Cervical radiculopathy  M54.12       2. Impingement syndrome of right shoulder  M75.41           Start Time: 0800  Stop Time: 08  Total time in clinic (min): 31 minutes    Subjective: Pt reported she has had her grandson over and he wanted to sleep in bed with her causing her to sleep in different positions increasing her neck discomfort. She states she felt very sore all weekend long. Objective: See treatment diary below      Assessment: Tolerated treatment fair. Patient demonstrated fatigue post treatment, exhibited good technique with therapeutic exercises and would benefit from continued PT. Pt continues to require frequent cuing for proper form. Also kept tx relatively focused on stretching and ROM due to pts stiffness and soreness. Plan: Continue per plan of care. Progress treatment as tolerated. Precautions: none       Manuals -RE 8/2 8/10 8/17   6/30 7/6 7/10 7/17   Intermittent cervical traction             Isometric cervical retraction              Manual shoulder isometrics              Suboccipital STM              Sideglides cervical             STM R shoulder + UT     Seated RE 9'                      Neuro Re-Ed             Scap squeeze    3"X30   3x10 3x10  3x10 30x   Supine DNF             Seated cervical retraction         2x5  Half range 3x5   half range 3x5 half range 6x5 half range   Shoulder isometrics        Flex, ER, IR, ext 10x ea, 2x thru Flex, ER, IR, ext 10x ea, 2x thru Flex., ER/IR, ext 2x10 ea Flex, IR/ER, ext.  2x10 ea   TB Rows  GN 30x 9# 20x 9# 2x10    GTB  3x10 GTB 3x10 GN 30x    Iso TB ER stepouts  REd 3.5# 5x ea    RTB  3x10 RTB 3x10 B RTB 30x    Straight arm pull down  RTB 20x 9# 20x 9# 2x10    YTB  3x10 YTB 3x10 RTB 20x    OH punch, ecc. lower   5x 5x ea         IR  GN 20x 6# 20x ea          Bilateral ER 3"x20       At wall 2x10 AROM 2x10 AROM @ wall 2x10 at wall   Standing I, Y, T   2# 10x ea          Ther Ex       6/30      Shoulder pulley AAROM 3' flexion 3' flexion 3' flexion 3' flexion   2' flexion 2' flexion   Standing 2' flex stand Seated 3'   Sleeper S          10"x10   Doorway S 15"x4 (low) 15"x4 15"x4 B 15"X4 B         Cervical AROM  2x            T/s rot.  seated 5"x10 ea 5"x10 ea 5"X10 ea 5"x10 ea         Standing shoulder flexion        2x5 2x6  2x5 2x5   Bicep curls    3# 2x10    2#  3x10 2# 3x10 3# 2x10 3# 2x10   Plantigrade push up plus   3"X20          pec stretch 15"x4            IR S behind back 10"x10 10"x10 10"x10          Ther Activity                                       Pt Edu             Reviewed sleeping positioning Updated HEP, goals, progress                         Modalities             Ice

## 2023-08-21 ENCOUNTER — OFFICE VISIT (OUTPATIENT)
Dept: PHYSICAL THERAPY | Facility: CLINIC | Age: 73
End: 2023-08-21
Payer: COMMERCIAL

## 2023-08-21 DIAGNOSIS — R42 VERTIGO: Primary | ICD-10-CM

## 2023-08-21 PROCEDURE — 97112 NEUROMUSCULAR REEDUCATION: CPT | Performed by: PHYSICAL THERAPIST

## 2023-08-21 NOTE — PROGRESS NOTES
Daily Note     Today's date: 2023  Patient name: Deepak Cotter  : 1950  MRN: [id number]  Referring provider: Na Holguin MD  Dx:   Encounter Diagnosis     ICD-10-CM    1. Vertigo  R42           Start Time: 544  Stop Time:   Total time in clinic (min): 38 minutes    Subjective: Patient reports that over the last 2 weeks since last treatment she has been feeling a lot better. Patient notes that she had one little episode early on 2 weeks ago but since then she has had no issues. Patient notes that she has even been laying flat in bed with no problems. Patient reports that she is having no issues with her balance or walking either. Objective: See treatment diary below       Positional testing   [place]   Left posterior canal: no sx or nystagmus   R posterior canal: very minimal symptoms, torsional and up-beating; fatiguing <5 seconds (4-5 beats)      Roll Test:   R: Negative; no sx or nystagmus   L: Negative; no sx or nystagmus       Assessment: Patient tolerated treatment well. Patient demonstrated very minimal positive testing with R Tita Kayser. Performed CRT 1x with no provocation of symptoms or nystagmus. Patient had no dizziness or feelings of being off balance post treatment. Will see patient next week for follow up. Discussed with patient if she is feeling better she could always cancel. Patient would benefit from continued PT      Plan: Continue per plan of care.       Precautions: none      Manuals 6/27 7/3 7/13 7/21 7/25 8/3 8/7 8/21                                                         Neuro Re-Ed             CRT 1x Li maneuver for posterior canal 2x Li maneuver for posterior canal 3x Li maneuver for posterior canal 1 R eply    1x Li maneuver for posterior canal   1x Li maneuver for R posterior canal 3x R eply posterior canal  2x R eply posterior canal  1x Li maneuver                                                                                    Ther Ex Ther Activity                                       Gait Training                                       Modalities

## 2023-08-24 ENCOUNTER — OFFICE VISIT (OUTPATIENT)
Dept: PHYSICAL THERAPY | Facility: CLINIC | Age: 73
End: 2023-08-24
Payer: COMMERCIAL

## 2023-08-24 DIAGNOSIS — M54.12 CERVICAL RADICULOPATHY: Primary | ICD-10-CM

## 2023-08-24 DIAGNOSIS — M75.41 IMPINGEMENT SYNDROME OF RIGHT SHOULDER: ICD-10-CM

## 2023-08-24 PROCEDURE — 97110 THERAPEUTIC EXERCISES: CPT

## 2023-08-24 PROCEDURE — 97112 NEUROMUSCULAR REEDUCATION: CPT

## 2023-08-24 NOTE — PROGRESS NOTES
Daily Note     Today's date: 2023  Patient name: Nathan Koenig  : 1950  MRN: [id number]  Referring provider: Sarah Wiley MD  Dx:   Encounter Diagnosis     ICD-10-CM    1. Cervical radiculopathy  M54.12       2. Impingement syndrome of right shoulder  M75.41           Start Time: 0802  Stop Time: 0840  Total time in clinic (min): 38 minutes    Subjective: Pt reports her shoulder has been feeling really good with the exception of end range activities such as reaching OH. Objective: See treatment diary below      Assessment: Tolerated treatment well with only c/o discomfort occasionally while reaching end range OH. Patient demonstrated fatigue post treatment, exhibited good technique with therapeutic exercises and would benefit from continued PT. Discussed c pt possible d/c next session as she gains confidence, strength and overall motion without pain. Plan: Continue per plan of care. Progress treatment as tolerated. Precautions: none      Manuals -RE 8/2 8/10 8/17 8/24  6/30 7/6 7/10 7/17   Intermittent cervical traction             Isometric cervical retraction              Manual shoulder isometrics              Suboccipital STM              Sideglides cervical             STM R shoulder + UT     Seated RE 9'                      Neuro Re-Ed             Scap squeeze    3"X30   3x10 3x10  3x10 30x   Supine DNF             Seated cervical retraction         2x5  Half range 3x5   half range 3x5 half range 6x5 half range   Shoulder isometrics        Flex, ER, IR, ext 10x ea, 2x thru Flex, ER, IR, ext 10x ea, 2x thru Flex., ER/IR, ext 2x10 ea Flex, IR/ER, ext.  2x10 ea   TB Rows  GN 30x 9# 20x 9# 2x10  9# 3x10  GTB  3x10 GTB 3x10 GN 30x    Iso TB ER stepouts  REd 3.5# 5x ea  3.5# 3x ea  RTB  3x10 RTB 3x10 B RTB 30x    Straight arm pull down  RTB 20x 9# 20x 9# 2x10  9# 3x10  YTB  3x10 YTB 3x10 RTB 20x    OH punch, ecc. lower   5x 5x ea 2x5        IR  GN 20x 6# 20x ea  6# 20x ea Bilateral ER 3"x20       At wall 2x10 AROM 2x10 AROM @ wall 2x10 at wall   Standing I, Y, T   2# 10x ea  2# 10x ea        Ther Ex       6/30      Shoulder pulley AAROM 3' flexion 3' flexion 3' flexion 3' flexion   2' flexion 2' flexion   Standing 2' flex stand Seated 3'   UBE     L1 3'/3'        Sleeper S          10"x10   Doorway S 15"x4 (low) 15"x4 15"x4 B 15"X4 B 15"x4 B        Cervical AROM  2x            T/s rot.  seated 5"x10 ea 5"x10 ea 5"X10 ea 5"x10 ea 5"x10 ea        Standing shoulder flexion        2x5 2x6  2x5 2x5   Bicep curls    3# 2x10  7# 18x  2#  3x10 2# 3x10 3# 2x10 3# 2x10   Plantigrade push up plus   3"X20  2"x20        pec stretch 15"x4            IR S behind back 10"x10 10"x10 10"x10  10"x10        Ther Activity                                       Pt Edu             Reviewed sleeping positioning Updated HEP, goals, progress                         Modalities             Ice

## 2023-08-29 ENCOUNTER — OFFICE VISIT (OUTPATIENT)
Dept: PHYSICAL THERAPY | Facility: CLINIC | Age: 73
End: 2023-08-29
Payer: COMMERCIAL

## 2023-08-29 DIAGNOSIS — R42 VERTIGO: Primary | ICD-10-CM

## 2023-08-29 PROCEDURE — 97112 NEUROMUSCULAR REEDUCATION: CPT | Performed by: PHYSICAL THERAPIST

## 2023-08-29 NOTE — PROGRESS NOTES
Daily Note/Discharge     Today's date: 2023  Patient name: Maddie Ruiz  : 1950  MRN: [id number]  Referring provider: Claudeen Ahmadi, MD  Dx:   Encounter Diagnosis     ICD-10-CM    1. Vertigo  R42           Start Time:   Stop Time: 60  Total time in clinic (min): 38 minutes    Subjective: Patient reports that she has had no episodes of dizziness since last session. Patient notes that she has been able to roll over, bend over, and look up with no dizziness. Patient notes that she feels pretty much back to normal.       Objective: See treatment diary below       Positional testing   [place]   Left posterior canal: no sx or nystagmus   R posterior canal: no sx or nystgamus    Roll Test:   R: Negative; no sx or nystagmus   L: Negative; no sx or nystagmus       Assessment: Patient tolerated treatment well. All testing negative this date. Discussed with patient that she is appropriate for discharge this date secondary to being asymptomatic and no positive testing. Educated patient that if symptoms should return she can call to reschedule an appointment. At this point, patient is discharged from vestibular therapy.     Plan: Discharged     Precautions: none      Manuals 6/27 7/3 7/13 7/21 7/25 8/3 8/7 8/21 8/29                                                        Neuro Re-Ed             CRT 1x Li maneuver for posterior canal 2x Li maneuver for posterior canal 3x Li maneuver for posterior canal 1 R eply    1x Li maneuver for posterior canal   1x Li maneuver for R posterior canal 3x R eply posterior canal  2x R eply posterior canal  1x Li maneuver  treatment not indicated this date                                                                                   Ther Ex                                                                                                                     Ther Activity                                       Gait Training                                       Modalities

## 2023-08-30 NOTE — PROGRESS NOTES
Daily Note     Today's date: 2023  Patient name: Gabriela Solis  : 1950  MRN: [id number]  Referring provider: Betty Lopez MD  Dx:   Encounter Diagnosis     ICD-10-CM    1. Cervical radiculopathy  M54.12       2. Impingement syndrome of right shoulder  M75.41           Start Time: 931  Stop Time: 1009  Total time in clinic (min): 38 minutes    Subjective: Pt reports she was feeling great however was lying prone and when she went to push herself up she felt some discomfort in her arm and therefore would like to try HEP for 2 wks prior to d/c. Objective: See treatment diary below    Assessment: Pt would like to trial 2 wk HEP prior to d/c however is able flex and abd shoulder to functional heights. Plan: Potential discharge next visit. Precautions: none    Manuals -RE 8/2 8/10 8/17 8/24 8/31  7/6 7/10 7/17   Intermittent cervical traction             Isometric cervical retraction              Manual shoulder isometrics              Suboccipital STM              Sideglides cervical             STM R shoulder + UT     Seated RE 9'                      Neuro Re-Ed             Scap squeeze    3"X30    3x10  3x10 30x   Supine DNF             Seated cervical retraction          3x5   half range 3x5 half range 6x5 half range   Shoulder isometrics         Flex, ER, IR, ext 10x ea, 2x thru Flex., ER/IR, ext 2x10 ea Flex, IR/ER, ext.  2x10 ea   TB Rows  GN 30x 9# 20x 9# 2x10  9# 3x10 10# 3x10  GTB 3x10 GN 30x    Iso TB ER stepouts  REd 3.5# 5x ea  3.5# 3x ea 3.5# 3x ea  RTB 3x10 B RTB 30x    Straight arm pull down  RTB 20x 9# 20x 9# 2x10  9# 3x10 10# 3x10  YTB 3x10 RTB 20x    OH punch, ecc. lower   5x 5x ea 2x5        IR  GN 20x 6# 20x ea  6# 20x ea 6.5# 30x       Bilateral ER 3"x20       At wall 2x10 AROM 2x10 AROM @ wall 2x10 at wall   Standing I, Y, T   2# 10x ea  2# 10x ea        Ther Ex             Shoulder pulley AAROM 3' flexion 3' flexion 3' flexion 3' flexion    2' flexion   Standing 2' flex stand Seated 3'   UBE     L1 3'/3' L1 3'/3'       Sleeper S          10"x10   Doorway S 15"x4 (low) 15"x4 15"x4 B 15"X4 B 15"x4 B        Cervical AROM  2x            T/s rot.  seated 5"x10 ea 5"x10 ea 5"X10 ea 5"x10 ea 5"x10 ea 5"x10 B       Standing shoulder flexion         2x6  2x5 2x5   Bicep curls    3# 2x10  7# 18x   2# 3x10 3# 2x10 3# 2x10   Plantigrade push up plus   3"X20  2"x20 3"x20       pec stretch 15"x4            IR S behind back 10"x10 10"x10 10"x10  10"x10        Ther Activity                                       Pt Edu             Reviewed sleeping positioning Updated HEP, goals, progress                         Modalities             Ice

## 2023-08-31 ENCOUNTER — OFFICE VISIT (OUTPATIENT)
Dept: PHYSICAL THERAPY | Facility: CLINIC | Age: 73
End: 2023-08-31
Payer: COMMERCIAL

## 2023-08-31 DIAGNOSIS — M54.12 CERVICAL RADICULOPATHY: Primary | ICD-10-CM

## 2023-08-31 DIAGNOSIS — M75.41 IMPINGEMENT SYNDROME OF RIGHT SHOULDER: ICD-10-CM

## 2023-08-31 PROCEDURE — 97110 THERAPEUTIC EXERCISES: CPT

## 2023-08-31 PROCEDURE — 97112 NEUROMUSCULAR REEDUCATION: CPT

## 2023-09-14 ENCOUNTER — OFFICE VISIT (OUTPATIENT)
Dept: PHYSICAL THERAPY | Facility: CLINIC | Age: 73
End: 2023-09-14
Payer: COMMERCIAL

## 2023-09-14 DIAGNOSIS — M54.12 CERVICAL RADICULOPATHY: Primary | ICD-10-CM

## 2023-09-14 DIAGNOSIS — M75.41 IMPINGEMENT SYNDROME OF RIGHT SHOULDER: ICD-10-CM

## 2023-09-14 PROCEDURE — 97110 THERAPEUTIC EXERCISES: CPT

## 2023-09-14 PROCEDURE — 97112 NEUROMUSCULAR REEDUCATION: CPT

## 2023-09-14 NOTE — PROGRESS NOTES
Discharge    Today's date: 2023  Patient name: Nathan Koenig  : 1950  MRN: [id number]  Referring provider: Sarah Wiley MD  Dx:   Encounter Diagnosis     ICD-10-CM    1. Cervical radiculopathy  M54.12       2. Impingement syndrome of right shoulder  M75.41           Start Time: 930  Stop Time: 1000  Total time in clinic (min): 30 minutes    Subjective: Pt reports she is not having any limitations with any activity at home. She is able to to turn on/off her lamp that has a difficult turn on as well as put dishes away with no pain. She notes she has also been able to reach behind her back to a functional level for the last week without pain or difficulty. Objective: See treatment diary below      Assessment: Tolerated treatment well. Pt presents to PT for d/c with improvements in ROM, strength, pain levels and overall functional activity tolerance. Pt has returned to activities pain free and is able to manage symptoms on own with HEP. HEP has been provided and reviewed with pt with verbalized understanding. Pt is in agreement with above POC.     Goals  STG to be achieved in 4 weeks   1) Painfree cervical AROM- MET  2) No radicular symptoms with supine traction- MET  3) Shoulder flexion elevation > 90 degrees- MET    LTG to be achieved in 8 weeks   1) FOTO to be greater than expected- MET  2) [facility]  3) Pt to report 75% improvement in symptoms- MET  4) 5/5 shoulder MMT strength- HEP CONTINUATION    Plan: discharge from physcial therapy     Precautions: none    Manuals -RE 8/2 8/10 8/17 8/24 8/31 9/14- d/c  7/10 7/17   Intermittent cervical traction             Isometric cervical retraction              Manual shoulder isometrics              Suboccipital STM              Sideglides cervical             STM R shoulder + UT     Seated RE 9'                      Neuro Re-Ed             Scap squeeze    3"X30     3x10 30x   Supine DNF             Seated cervical retraction 3x5 half range 6x5 half range   Shoulder isometrics          Flex., ER/IR, ext 2x10 ea Flex, IR/ER, ext. 2x10 ea   TB Rows  GN 30x 9# 20x 9# 2x10  9# 3x10 10# 3x10 10# 3x10  GN 30x    Iso TB ER stepouts  REd 3.5# 5x ea  3.5# 3x ea 3.5# 3x ea 3.5# 3x ea  RTB 30x    Straight arm pull down  RTB 20x 9# 20x 9# 2x10  9# 3x10 10# 3x10 10# 3x10  RTB 20x    OH punch, ecc. lower   5x 5x ea 2x5        IR  GN 20x 6# 20x ea  6# 20x ea 6.5# 30x 7# 20x      Bilateral ER 3"x20        2x10 AROM @ wall 2x10 at wall   Standing I, Y, T   2# 10x ea  2# 10x ea        Ther Ex             Shoulder pulley AAROM 3' flexion 3' flexion 3' flexion 3' flexion     2' flex stand Seated 3'   UBE     L1 3'/3' L1 3'/3' L2 3'/3'      Sleeper S          10"x10   Doorway S 15"x4 (low) 15"x4 15"x4 B 15"X4 B 15"x4 B        Cervical AROM  2x            T/s rot.  seated 5"x10 ea 5"x10 ea 5"X10 ea 5"x10 ea 5"x10 ea 5"x10 B       Standing shoulder flexion          2x5 2x5   Bicep curls    3# 2x10  7# 18x    3# 2x10 3# 2x10   Plantigrade push up plus   3"X20  2"x20 3"x20 3"x20      pec stretch 15"x4            IR S behind back 10"x10 10"x10 10"x10  10"x10        Ther Activity                                       Pt Edu             Reviewed sleeping positioning Updated HEP, goals, progress      RE                   Modalities             Ice

## 2023-10-29 DIAGNOSIS — E03.9 ACQUIRED HYPOTHYROIDISM: ICD-10-CM

## 2023-10-30 RX ORDER — LEVOTHYROXINE SODIUM 0.05 MG/1
50 TABLET ORAL DAILY
Qty: 90 TABLET | Refills: 1 | Status: SHIPPED | OUTPATIENT
Start: 2023-10-30

## 2023-12-13 ENCOUNTER — RA CDI HCC (OUTPATIENT)
Dept: OTHER | Facility: HOSPITAL | Age: 73
End: 2023-12-13

## 2023-12-13 NOTE — PROGRESS NOTES
[address] [facility] coding opportunities       Chart reviewed, no opportunity found: [address]        Patients Insurance     Medicare Insurance: [employer]

## 2024-01-10 ENCOUNTER — RA CDI HCC (OUTPATIENT)
Dept: OTHER | Facility: HOSPITAL | Age: 74
End: 2024-01-10

## 2024-01-15 ENCOUNTER — APPOINTMENT (OUTPATIENT)
Dept: LAB | Facility: CLINIC | Age: 74
End: 2024-01-15
Payer: COMMERCIAL

## 2024-01-15 DIAGNOSIS — E72.11 HYPERHOMOCYSTEINEMIA (HCC): ICD-10-CM

## 2024-01-15 LAB
25(OH)D3 SERPL-MCNC: 62.5 NG/ML (ref 30–100)
ALBUMIN SERPL BCP-MCNC: 4.3 G/DL (ref 3.5–5)
ALP SERPL-CCNC: 54 U/L (ref 34–104)
ALT SERPL W P-5'-P-CCNC: 31 U/L (ref 7–52)
ANION GAP SERPL CALCULATED.3IONS-SCNC: 9 MMOL/L
AST SERPL W P-5'-P-CCNC: 32 U/L (ref 13–39)
BASOPHILS # BLD AUTO: 0.05 THOUSANDS/ÂΜL (ref 0–0.1)
BASOPHILS NFR BLD AUTO: 1 % (ref 0–1)
BILIRUB SERPL-MCNC: 0.55 MG/DL (ref 0.2–1)
BUN SERPL-MCNC: 16 MG/DL (ref 5–25)
CALCIUM SERPL-MCNC: 10.2 MG/DL (ref 8.4–10.2)
CHLORIDE SERPL-SCNC: 105 MMOL/L (ref 96–108)
CHOLEST SERPL-MCNC: 177 MG/DL
CO2 SERPL-SCNC: 29 MMOL/L (ref 21–32)
CREAT SERPL-MCNC: 0.99 MG/DL (ref 0.6–1.3)
CREAT UR-MCNC: 135.9 MG/DL
EOSINOPHIL # BLD AUTO: 0.1 THOUSAND/ÂΜL (ref 0–0.61)
EOSINOPHIL NFR BLD AUTO: 2 % (ref 0–6)
ERYTHROCYTE [DISTWIDTH] IN BLOOD BY AUTOMATED COUNT: 12.8 % (ref 11.6–15.1)
GFR SERPL CREATININE-BSD FRML MDRD: 57 ML/MIN/1.73SQ M
GLUCOSE P FAST SERPL-MCNC: 98 MG/DL (ref 65–99)
HCT VFR BLD AUTO: 45.3 % (ref 34.8–46.1)
HCYS SERPL-SCNC: 11.7 UMOL/L (ref 5–20)
HDLC SERPL-MCNC: 61 MG/DL
HGB BLD-MCNC: 14.5 G/DL (ref 11.5–15.4)
IMM GRANULOCYTES # BLD AUTO: 0.01 THOUSAND/UL (ref 0–0.2)
IMM GRANULOCYTES NFR BLD AUTO: 0 % (ref 0–2)
LDLC SERPL CALC-MCNC: 89 MG/DL (ref 0–100)
LYMPHOCYTES # BLD AUTO: 1.57 THOUSANDS/ÂΜL (ref 0.6–4.47)
LYMPHOCYTES NFR BLD AUTO: 31 % (ref 14–44)
MAGNESIUM SERPL-MCNC: 2.1 MG/DL (ref 1.9–2.7)
MCH RBC QN AUTO: 30.9 PG (ref 26.8–34.3)
MCHC RBC AUTO-ENTMCNC: 32 G/DL (ref 31.4–37.4)
MCV RBC AUTO: 96 FL (ref 82–98)
MONOCYTES # BLD AUTO: 0.27 THOUSAND/ÂΜL (ref 0.17–1.22)
MONOCYTES NFR BLD AUTO: 5 % (ref 4–12)
NEUTROPHILS # BLD AUTO: 3.15 THOUSANDS/ÂΜL (ref 1.85–7.62)
NEUTS SEG NFR BLD AUTO: 61 % (ref 43–75)
NONHDLC SERPL-MCNC: 116 MG/DL
NRBC BLD AUTO-RTO: 0 /100 WBCS
PHOSPHATE SERPL-MCNC: 2.5 MG/DL (ref 2.3–4.1)
PLATELET # BLD AUTO: 342 THOUSANDS/UL (ref 149–390)
PMV BLD AUTO: 10.5 FL (ref 8.9–12.7)
POTASSIUM SERPL-SCNC: 4.7 MMOL/L (ref 3.5–5.3)
PROT SERPL-MCNC: 7.1 G/DL (ref 6.4–8.4)
PROT UR-MCNC: 8 MG/DL
PROT/CREAT UR: 0.06 MG/G{CREAT} (ref 0–0.1)
PTH-INTACT SERPL-MCNC: 51 PG/ML (ref 12–88)
RBC # BLD AUTO: 4.7 MILLION/UL (ref 3.81–5.12)
SODIUM SERPL-SCNC: 143 MMOL/L (ref 135–147)
TRIGL SERPL-MCNC: 135 MG/DL
TSH SERPL DL<=0.05 MIU/L-ACNC: 3.64 UIU/ML (ref 0.45–4.5)
WBC # BLD AUTO: 5.15 THOUSAND/UL (ref 4.31–10.16)

## 2024-01-15 PROCEDURE — 36415 COLL VENOUS BLD VENIPUNCTURE: CPT

## 2024-01-15 PROCEDURE — 83090 ASSAY OF HOMOCYSTEINE: CPT

## 2024-01-22 ENCOUNTER — TELEPHONE (OUTPATIENT)
Age: 74
End: 2024-01-22

## 2024-01-22 NOTE — TELEPHONE ENCOUNTER
Patient is requesting that a new prescription for L-Methylfolate-B6-B12 3-35-2 MG TABS be written and sent to [facility] due to the cost doubling.    Cecilia also requested that her PCP office call them to provide further information at  [phone]

## 2024-01-23 DIAGNOSIS — E72.11 HYPERHOMOCYSTEINEMIA (HCC): ICD-10-CM

## 2024-01-23 RX ORDER — MECOBAL/LEVOMEFOLAT CA/B6 PHOS 2-3-35 MG
2 TABLET ORAL DAILY
Qty: 180 TABLET | Refills: 3 | Status: SHIPPED | OUTPATIENT
Start: 2024-01-23

## 2024-02-05 DIAGNOSIS — E72.11 HYPERHOMOCYSTEINEMIA (HCC): ICD-10-CM

## 2024-02-05 RX ORDER — MECOBAL/LEVOMEFOLAT CA/B6 PHOS 2-3-35 MG
2 TABLET ORAL DAILY
Qty: 60 TABLET | Refills: 0 | Status: SHIPPED | OUTPATIENT
Start: 2024-02-05 | End: 2024-03-06

## 2024-02-22 DIAGNOSIS — E72.11 HYPERHOMOCYSTEINEMIA (HCC): ICD-10-CM

## 2024-02-22 RX ORDER — MECOBAL/LEVOMEFOLAT CA/B6 PHOS 2-3-35 MG
2 TABLET ORAL DAILY
Qty: 60 TABLET | Refills: 5 | Status: SHIPPED | OUTPATIENT
Start: 2024-02-22

## 2024-02-29 ENCOUNTER — RA CDI HCC (OUTPATIENT)
Dept: OTHER | Facility: HOSPITAL | Age: 74
End: 2024-02-29

## 2024-03-07 NOTE — PROGRESS NOTES
Name: Ginger Chicas      : 1950      MRN: [id number]  Encounter Provider: Keegan Ruff MD  Encounter Date: 3/8/2024   Encounter department: [facility]    Assessment & Plan     1. Stage 3a chronic kidney disease (HCC)    2. Mixed hyperlipidemia  -     fenofibrate 160 MG tablet; Take 1 tablet (160 mg total) by mouth daily  -     Comprehensive metabolic panel  -     Lipid panel    3. Hyperhomocysteinemia (HCC)  -     L-Methylfolate-B6-B12 (Elfolate Plus) 3-35-2 MG TABS; Take 2 tablets by mouth daily    4. Acquired hypothyroidism  -     levothyroxine 50 mcg tablet; Take 1 tablet (50 mcg total) by mouth daily  -     TSH, 3rd generation with Free T4 reflex    5. Fatty liver  -     CBC and differential    6. Irritable bowel syndrome with diarrhea    7. Mild persistent asthma without complication    8. Age-related osteoporosis without current pathological fracture    9. Vitamin D deficiency    10. Medicare annual wellness visit, subsequent    Continue with current medications.  Stay hydrated.  Avoid NSAIDs.  Office visit 6 months with labs .  She is scheduled for a follow-up renal ultrasound.       Subjective     Follow up OV.   Medications reviewed. Labs 2024 reviewed see note.      Hyperlipidemia mixed type on Fenofibrate 160 mg daily. Lipid profile cholesterol 186. TGs 141. HDL 51. . LFTs normal. FBS 85.     CKD stage. 2024 creatinine 0.99. GFR 57. Electrolytes normal. Mg++ 2.1. Phosphorus 2.5. PTH 51. 0. Urine protein creatinine ratio normal at 0.06. Hgb 14.5.  2023 creatinine 1.09. GFR 51. 2022 creatinine 1.10. GFR 50.     2023 Echogenic lesions in the right kidney for which the midpole superior portion lesion has increased in size. Mild post void urinary bladder volume. 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  No development of hydronephrosis, calculus or  fluid collection.  Left kidney is unremarkable    Recent Results (from the past 1344 hour(s))   Magnesium    Collection Time: 01/15/24  8:45 AM   Result Value Ref Range    Magnesium 2.1 1.9 - 2.7 mg/dL   Phosphorus    Collection Time: 01/15/24  8:45 AM   Result Value Ref Range    Phosphorus 2.5 2.3 - 4.1 mg/dL   Protein / creatinine ratio, urine    Collection Time: 01/15/24  8:45 AM   Result Value Ref Range    Creatinine, Ur 135.9 Reference range not established. mg/dL    Protein Urine Random 8 Reference range not established. mg/dL    Prot/Creat Ratio, Ur 0.06 0.00 - 0.10   PTH, intact    Collection Time: 01/15/24  8:45 AM   Result Value Ref Range    PTH 51.0 12.0 - 88.0 pg/mL   Vitamin D 25 hydroxy    Collection Time: 01/15/24  8:45 AM   Result Value Ref Range    Vit D, 25-Hydroxy 62.5 30.0 - 100.0 ng/mL   Comprehensive metabolic panel    Collection Time: 01/15/24  8:45 AM   Result Value Ref Range    Sodium 143 135 - 147 mmol/L    Potassium 4.7 3.5 - 5.3 mmol/L    Chloride 105 96 - 108 mmol/L    CO2 29 21 - 32 mmol/L    ANION GAP 9 mmol/L    BUN 16 5 - 25 mg/dL    Creatinine 0.99 0.60 - 1.30 mg/dL    Glucose, Fasting 98 65 - 99 mg/dL    Calcium 10.2 8.4 - 10.2 mg/dL    AST 32 13 - 39 U/L    ALT 31 7 - 52 U/L    Alkaline Phosphatase 54 34 - 104 U/L    Total Protein 7.1 6.4 - 8.4 g/dL    Albumin 4.3 3.5 - 5.0 g/dL    Total Bilirubin 0.55 0.20 - 1.00 mg/dL    eGFR 57 ml/min/1.73sq m   CBC and differential    Collection Time: 01/15/24  8:45 AM   Result Value Ref Range    WBC 5.15 4.31 - 10.16 Thousand/uL    RBC 4.70 3.81 - 5.12 Million/uL    Hemoglobin 14.5 11.5 - 15.4 g/dL    Hematocrit 45.3 34.8 - 46.1 %    MCV 96 82 - 98 fL    MCH 30.9 26.8 - 34.3 pg    MCHC 32.0 31.4 - 37.4 g/dL    RDW 12.8 11.6 - 15.1 %    MPV 10.5 8.9 - 12.7 fL    Platelets 342 149 - 390 Thousands/uL    nRBC 0 /100 WBCs    Neutrophils Relative 61 43 - 75 %    Immat GRANS % 0 0 - 2 %    Lymphocytes Relative 31 14 - 44 %    Monocytes Relative 5 4  - 12 %    Eosinophils Relative 2 0 - 6 %    Basophils Relative 1 0 - 1 %    Neutrophils Absolute 3.15 1.85 - 7.62 Thousands/µL    Immature Grans Absolute 0.01 0.00 - 0.20 Thousand/uL    Lymphocytes Absolute 1.57 0.60 - 4.47 Thousands/µL    Monocytes Absolute 0.27 0.17 - 1.22 Thousand/µL    Eosinophils Absolute 0.10 0.00 - 0.61 Thousand/µL    Basophils Absolute 0.05 0.00 - 0.10 Thousands/µL   TSH, 3rd generation with Free T4 reflex    Collection Time: 01/15/24  8:45 AM   Result Value Ref Range    TSH 3RD GENERATON 3.639 0.450 - 4.500 uIU/mL   Lipid panel    Collection Time: 01/15/24  8:45 AM   Result Value Ref Range    Cholesterol 177 See Comment mg/dL    Triglycerides 135 See Comment mg/dL    HDL, Direct 61 >=50 mg/dL    LDL Calculated 89 0 - 100 mg/dL    Non-HDL-Chol (CHOL-HDL) 116 mg/dl   Homocysteine, serum    Collection Time: 01/15/24  8:45 AM   Result Value Ref Range    Homocyst(e)ine, P/S 11.7 5.0 - 20.0 umol/L           Review of Systems   Constitutional:  Negative for appetite change, chills, fatigue, fever and unexpected weight change.   HENT:  Negative for congestion, ear pain, postnasal drip, rhinorrhea, sinus pain, sore throat and trouble swallowing.    Eyes:  Negative for visual disturbance.        Cataract surgery 12/2020 OD   Respiratory:  Negative for cough, shortness of breath and wheezing.         Asthma currently stable on Breo 100/25 daily. Symptoms improved since removing carpeting in her house. S/p COV 19 infection 12/2023 02/2020 PFTs no large airway obstruction or bronchodilator responsiveness.  Decreased forced vital capacity and total lung capacity consistent with mild restrictive lung disease.  Mild diffuse impairment.      02/2018 CXR normal except for mildly elevated right hemidiaphragm.  CT scan chest     01/2016 normal except for small hiatal hernia.   Cardiovascular:  Negative for chest pain, palpitations and leg swelling.        Episodes of bradycardia during  Colonoscopy  01/2023. EKG 02/2023 NSR. T wave inversions V1-V3. 48 hour Holter The patient was in sinus rhythm throughout the duration of the study with an average heart rate 77 bpm.  No ventricular and rare supraventricular ectopic activity. No atrial fibrillation or atrial flutter. There was approximately 1 hour 36 minutes of bradycardia.  The slowest single episode occurred at 12:20 AM with a minimum heart rate 52 bpm. There was no evidence of heart block and no significant pauses were seen. No symptoms reported.   Gastrointestinal:  Negative for abdominal pain, blood in stool, constipation, diarrhea, nausea and vomiting.         Fatty liver 01/2024  LFTs normal. Small hiatal hernia on CT scan. Infrequent reflux symptoms.   I BS diarrhea.  Stable on probiotics. Colonoscopy 01/2023    Endocrine:        Hypothyroidism on Levothyroxine 50 mcg daily.     Osteoporosis on Fosamax 70 mg weekly  no longer on Prolia . 03/2022  DEXA scan + osteoporosis based on the left femoral neck When compared to the prior examination, there has been a  9 % INCREASE in the total bone mineral density of the lumbar spine. There has been NO SIGNIFICANT CHANGE in the total bone mineral density of the left hip.  Prior intolerance to Actonel. 04/2022 Vitamin-D level 45.4.  On vitamin D 5,000 IU daily.          Lab Results       Component                Value               Date                       [id number]             3.639               01/15/2024                                                          Genitourinary:  Negative for difficulty urinating.        See HPI History of recurrent UTIs on Macrodantin 50 mg every other day at [facility]. She is followed by Urology.  07/2020 renal ultrasound 1 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 with increase in size of 1 of the measured components.  No hydronephrosis.  Bladder postvoid residual 247 mL.   CT scan abdomen pelvis 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. Pap smear 09/2016    Musculoskeletal:  Negative for arthralgias and myalgias.        Several week history of recurrent right shoulder pain right-handed.  No specific injury or trauma.  Patient's symptoms started when she tried to reach for something behind her.   Skin:  Negative for rash.   Allergic/Immunologic: Positive for environmental allergies.        See HPI 02/2020 allergy testing + mold allergy.   Neurological:  Negative for dizziness and headaches.   Hematological:  Negative for adenopathy. Does not bruise/bleed easily.        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  No history of venous thrombosis. 06/2023 homocysteine mildly elevated at 12.0  .  Lab Results       Component                Value               Date                       WBC                      4.72                12/20/2022                 HGB                      14.8                12/20/2022                 HCT                      45.7                12/20/2022                 MCV                      96                  12/20/2022                 PLT                      330                 12/20/2022                             Psychiatric/Behavioral:  Negative for dysphoric mood and sleep disturbance. The patient is not hyperactive.        Past Medical History:   Diagnosis Date    Acute asthma exacerbation     last assessed 8/15/17    Asthma     Cataract     Disease of thyroid gland     Hypertriglyceridemia     Osteoporosis     Right lumbar radiculopathy 12/15/2016    Urinary tract infection     Vertigo      Past Surgical History:   Procedure Laterality Date    APPENDECTOMY  1979    BREAST BIOPSY      CATARACT EXTRACTION      HAND SURGERY Right 2007    thumb    LAPAROSCOPY      MAMMO STEREOTACTIC BREAST BIOPSY LEFT (ALL INC) Left 04/05/2022     Family History   Problem Relation Age of Onset    Cirrhosis Mother     Kidney nephrosis Mother     Hypertension Father     Other Father 91        extra mammary Padget's dx    Cancer Paternal  Grandmother 80        unknown type    Heart disease Paternal Grandfather     Heart attack Paternal Grandfather     Kidney nephrosis Daughter     Osteoporosis Maternal Grandmother     Arthritis Maternal Grandmother     Heart attack Maternal Grandfather     Heart disease Maternal Grandfather     Kidney nephrosis Daughter     Lymphoma Maternal Aunt 70    No Known Problems Paternal Aunt     No Known Problems Paternal Aunt     No Known Problems Paternal Aunt      Social History     Socioeconomic History    Marital status: /Civil Union     Spouse name: None    Number of children: None    Years of education: None    Highest education level: None   Occupational History    None   Tobacco Use    Smoking status: Former     Current packs/day: 0.00     Average packs/day: 0.3 packs/day for 4.0 years (1.0 ttl pk-yrs)     Types: Cigarettes     Start date: 1975     Quit date: 1979     Years since quittin.2    Smokeless tobacco: Never    Tobacco comments:         Vaping Use    Vaping status: Never Used   Substance and Sexual Activity    Alcohol use: No    Drug use: No    Sexual activity: Not Currently   Other Topics Concern    None   Social History Narrative    None     Social Determinants of Health     Financial Resource Strain: Low Risk  (3/8/2024)    Overall Financial Resource Strain (CARDIA)     Difficulty of Paying Living Expenses: Not hard at all   Food Insecurity: Not on file   Transportation Needs: No Transportation Needs (3/8/2024)    PRAPARE - Transportation     Lack of Transportation (Medical): No     Lack of Transportation (Non-Medical): No   Physical Activity: Not on file   Stress: Not on file   Social Connections: Not on file   Intimate Partner Violence: Not on file   Housing Stability: Not on file     Current Outpatient Medications on File Prior to Visit   Medication Sig    alendronate (FOSAMAX) 70 mg tablet Take 1 tablet (70 mg total) by mouth every 7 days Take on an empty stomach with a full glass  of water, and do not lie down for 30 minutes after    aspirin 81 MG tablet Take 1 tablet by mouth daily    Breo Ellipta 100-25 MCG/ACT inhaler Inhale 1 puff every morning Rinse mouth after using inhaler.    Cholecalciferol 4000 units CAPS Take 1 capsule by mouth daily    Coenzyme Q10 (CO Q-10) 30 MG CAPS Take 100 mg by mouth daily 2capsules daily    cyclobenzaprine (FLEXERIL) 5 mg tablet Take 1 tablet (5 mg total) by mouth daily at bedtime (Patient taking differently: Take 5 mg by mouth if needed prn)    Diclofenac Sodium (VOLTAREN) 1 % Apply 2 g topically 4 (four) times a day As needed for joint pain    Fluticasone-Salmeterol (Advair Diskus) 100-50 mcg/dose inhaler Inhale 1 puff daily in the early morning Rinse mouth after use.    triamcinolone (KENALOG) 0.1 % cream APPLY TO AFFECTED AREA(S) ON BODY TWO TIMES DAILY AS NEEDED    [DISCONTINUED] fenofibrate 160 MG tablet Take 1 tablet (160 mg total) by mouth daily    [DISCONTINUED] L-Methylfolate-B6-B12 (Elfolate Plus) 3-35-2 MG TABS TAKE 2 TABLETS BY MOUTH EVERY DAY    [DISCONTINUED] levothyroxine 50 mcg tablet TAKE 1 TABLET BY MOUTH EVERY DAY    albuterol (2.5 mg/3 mL) 0.083 % nebulizer solution Take 3 mL (2.5 mg total) by nebulization every 6 (six) hours as needed for wheezing or shortness of breath (Patient not taking: Reported on 7/10/2023)    albuterol (Ventolin HFA) 90 mcg/act inhaler 2 puffs Q 6 hours prn cough/wheezing (Patient not taking: Reported on 7/10/2023)    nitrofurantoin (MACRODANTIN) 50 mg capsule Take 1 capsule by mouth daily      Allergies   Allergen Reactions    Penicillins      Shown on allergy testing.    Codeine Anxiety    Dust Mite Extract     Molds & Smuts     Pseudoephedrine     Rabbit Epithelium     Sulfa Antibiotics Hives     Immunization History   Administered Date(s) Administered    COVID-19 MODERNA VACC 0.5 ML IM 01/27/2021, 02/24/2021, 04/14/2022    COVID-19 Moderna Vac BIVALENT 12 Yr+ IM 0.5 ML 10/20/2022    COVID-19 Moderna mRNA  "Vaccine 12 Yr+ 50 mcg/0.5 mL (Spikevax) 11/03/2023    INFLUENZA 12/10/2015, 10/19/2016, 10/26/2017, 10/20/2018, 10/20/2021, 10/17/2022, 10/04/2023    Influenza Quadrivalent, 6-35 Months IM 10/29/2014, 12/10/2015    Influenza Split High Dose Preservative Free IM 10/19/2016, 10/26/2017, 10/20/2018, 10/07/2019    Influenza, high dose seasonal 0.7 mL 09/30/2020    Influenza, seasonal, injectable 11/04/2013    Pneumococcal Conjugate 13-Valent 10/19/2016    Pneumococcal Polysaccharide PPV23 10/26/2017    Zoster 10/22/2020    Zoster Vaccine Recombinant 10/22/2020, 01/06/2021       Objective     /76 (BP Location: Left arm, Patient Position: Sitting, Cuff Size: Large)   Pulse 73   Temp 97.6 °F (36.4 °C)   Resp 18   Ht 5' 5\" (1.651 m)   Wt 87.1 kg (192 lb)   SpO2 97%   BMI 31.95 kg/m²      Physical Exam  Vitals and nursing note reviewed.   Constitutional:       General: She is not in acute distress.     Appearance: She is well-developed.   HENT:      Right Ear: Tympanic membrane and ear canal normal.      Left Ear: Tympanic membrane and ear canal normal.      Mouth/Throat:      Mouth: No oral lesions.      Pharynx: Oropharynx is clear.   Eyes:      General: No scleral icterus.     Extraocular Movements: Extraocular movements intact.      Conjunctiva/sclera: Conjunctivae normal.      Pupils: Pupils are equal, round, and reactive to light.   Neck:      Thyroid: No thyroid mass or thyromegaly.      Vascular: Normal carotid pulses. No carotid bruit or JVD.      Trachea: No tracheal deviation.   Cardiovascular:      Rate and Rhythm: Normal rate and regular rhythm.      Heart sounds: Normal heart sounds. No murmur heard.     No gallop.   Pulmonary:      Effort: Pulmonary effort is normal. No respiratory distress.      Breath sounds: Normal breath sounds. No wheezing or rales.   Musculoskeletal:      Right lower leg: No edema.      Left lower leg: No edema.   Lymphadenopathy:      Cervical: No cervical adenopathy.      " Upper Body:      Right upper body: No supraclavicular adenopathy.      Left upper body: No supraclavicular adenopathy.   Skin:     Findings: No rash.      Nails: There is no clubbing.   Neurological:      General: No focal deficit present.      Mental Status: She is alert and oriented to person, place, and time.   Psychiatric:         Mood and Affect: Mood normal.         Cognition and Memory: Cognition normal.       Keegan Ruff MD

## 2024-03-07 NOTE — PROGRESS NOTES
Name: Ginger Chicas      : 1950      MRN: [id number]  Encounter Provider: Keegan Ruff MD  Encounter Date: 3/8/2024   Encounter department: [facility]    Assessment & Plan     {There are no diagnoses linked to this encounter. (Refresh or delete this SmartLink)}       Subjective     HPI  Review of Systems    Past Medical History:   Diagnosis Date    Acute asthma exacerbation     last assessed 8/15/17    Asthma     Cataract     Disease of thyroid gland     Hypertriglyceridemia     Osteoporosis     Right lumbar radiculopathy 12/15/2016    Urinary tract infection     Vertigo      Past Surgical History:   Procedure Laterality Date    APPENDECTOMY  1979    BREAST BIOPSY      CATARACT EXTRACTION      HAND SURGERY Right 2007    thumb    LAPAROSCOPY      MAMMO STEREOTACTIC BREAST BIOPSY LEFT (ALL INC) Left 2022     Family History   Problem Relation Age of Onset    Cirrhosis Mother     Kidney nephrosis Mother     Hypertension Father     Other Father 91        extra mammary Padget's dx    Cancer Paternal Grandmother 80        unknown type    Heart disease Paternal Grandfather     Heart attack Paternal Grandfather     Kidney nephrosis Daughter     Osteoporosis Maternal Grandmother     Arthritis Maternal Grandmother     Heart attack Maternal Grandfather     Heart disease Maternal Grandfather     Kidney nephrosis Daughter     Lymphoma Maternal Aunt 70    No Known Problems Paternal Aunt     No Known Problems Paternal Aunt     No Known Problems Paternal Aunt      Social History     Socioeconomic History    Marital status: /Civil Union     Spouse name: Not on file    Number of children: Not on file    Years of education: Not on file    Highest education level: Not on file   Occupational History    Not on file   Tobacco Use    Smoking status: Former     Current packs/day: 0.00     Average packs/day: 0.3 packs/day for 4.0 years (1.0 ttl pk-yrs)     Types: Cigarettes     Start date: 1975      Quit date: 1979     Years since quittin.2    Smokeless tobacco: Never    Tobacco comments:         Vaping Use    Vaping status: Never Used   Substance and Sexual Activity    Alcohol use: No    Drug use: No    Sexual activity: Not Currently   Other Topics Concern    Not on file   Social History Narrative    Not on file     Social Determinants of Health     Financial Resource Strain: Not on file   Food Insecurity: Not on file   Transportation Needs: Not on file   Physical Activity: Not on file   Stress: Not on file   Social Connections: Not on file   Intimate Partner Violence: Not on file   Housing Stability: Not on file     Current Outpatient Medications on File Prior to Visit   Medication Sig    albuterol (2.5 mg/3 mL) 0.083 % nebulizer solution Take 3 mL (2.5 mg total) by nebulization every 6 (six) hours as needed for wheezing or shortness of breath (Patient not taking: Reported on 7/10/2023)    albuterol (Ventolin HFA) 90 mcg/act inhaler 2 puffs Q 6 hours prn cough/wheezing (Patient not taking: Reported on 7/10/2023)    alendronate (FOSAMAX) 70 mg tablet Take 1 tablet (70 mg total) by mouth every 7 days Take on an empty stomach with a full glass of water, and do not lie down for 30 minutes after    aspirin 81 MG tablet Take 1 tablet by mouth daily    Breo Ellipta 100-25 MCG/ACT inhaler Inhale 1 puff every morning Rinse mouth after using inhaler.    Cholecalciferol 4000 units CAPS Take 1 capsule by mouth daily    Coenzyme Q10 (CO Q-10) 30 MG CAPS Take 100 mg by mouth daily 2capsules daily    cyclobenzaprine (FLEXERIL) 5 mg tablet Take 1 tablet (5 mg total) by mouth daily at bedtime (Patient taking differently: Take 5 mg by mouth if needed prn)    Diclofenac Sodium (VOLTAREN) 1 % Apply 2 g topically 4 (four) times a day As needed for joint pain    fenofibrate 160 MG tablet Take 1 tablet (160 mg total) by mouth daily    Fluticasone-Salmeterol (Advair Diskus) 100-50 mcg/dose inhaler Inhale 1 puff daily in the  early morning Rinse mouth after use.    L-Methylfolate-B6-B12 (Elfolate Plus) 3-35-2 MG TABS TAKE 2 TABLETS BY MOUTH EVERY DAY    levothyroxine 50 mcg tablet TAKE 1 TABLET BY MOUTH EVERY DAY    nitrofurantoin (MACRODANTIN) 50 mg capsule Take 1 capsule by mouth daily      Allergies   Allergen Reactions    Penicillins      Shown on allergy testing.    Codeine Anxiety    Dust Mite Extract     Molds & Smuts     Pseudoephedrine     Rabbit Epithelium     Sulfa Antibiotics Hives     Immunization History   Administered Date(s) Administered    COVID-19 MODERNA VACC 0.5 ML IM 01/27/2021, 02/24/2021, 04/14/2022    COVID-19 Moderna Vac BIVALENT 12 Yr+ IM 0.5 ML 10/20/2022    COVID-19 Moderna mRNA Vaccine 12 Yr+ 50 mcg/0.5 mL (Spikevax) 11/03/2023    INFLUENZA 12/10/2015, 10/19/2016, 10/26/2017, 10/20/2018, 10/20/2021, 10/17/2022    Influenza Quadrivalent, 6-35 Months IM 10/29/2014, 12/10/2015    Influenza Split High Dose Preservative Free IM 10/19/2016, 10/26/2017, 10/20/2018, 10/07/2019    Influenza, high dose seasonal 0.7 mL 09/30/2020    Influenza, seasonal, injectable 11/04/2013    Pneumococcal Conjugate 13-Valent 10/19/2016    Pneumococcal Polysaccharide PPV23 10/26/2017    Zoster 10/22/2020    Zoster Vaccine Recombinant 10/22/2020, 01/06/2021       Objective     There were no vitals taken for this visit.    Physical Exam  Keegan Ruff MD

## 2024-03-08 ENCOUNTER — OFFICE VISIT (OUTPATIENT)
Dept: FAMILY MEDICINE CLINIC | Facility: CLINIC | Age: 74
End: 2024-03-08
Payer: COMMERCIAL

## 2024-03-08 VITALS
WEIGHT: 192 LBS | RESPIRATION RATE: 18 BRPM | DIASTOLIC BLOOD PRESSURE: 76 MMHG | OXYGEN SATURATION: 97 % | HEIGHT: 65 IN | SYSTOLIC BLOOD PRESSURE: 130 MMHG | TEMPERATURE: 97.6 F | BODY MASS INDEX: 31.99 KG/M2 | HEART RATE: 73 BPM

## 2024-03-08 DIAGNOSIS — J45.30 MILD PERSISTENT ASTHMA WITHOUT COMPLICATION: ICD-10-CM

## 2024-03-08 DIAGNOSIS — E55.9 VITAMIN D DEFICIENCY: ICD-10-CM

## 2024-03-08 DIAGNOSIS — E03.9 ACQUIRED HYPOTHYROIDISM: ICD-10-CM

## 2024-03-08 DIAGNOSIS — M81.0 AGE-RELATED OSTEOPOROSIS WITHOUT CURRENT PATHOLOGICAL FRACTURE: ICD-10-CM

## 2024-03-08 DIAGNOSIS — E78.2 MIXED HYPERLIPIDEMIA: ICD-10-CM

## 2024-03-08 DIAGNOSIS — Z00.00 MEDICARE ANNUAL WELLNESS VISIT, SUBSEQUENT: ICD-10-CM

## 2024-03-08 DIAGNOSIS — E72.11 HYPERHOMOCYSTEINEMIA (HCC): ICD-10-CM

## 2024-03-08 DIAGNOSIS — K58.0 IRRITABLE BOWEL SYNDROME WITH DIARRHEA: ICD-10-CM

## 2024-03-08 DIAGNOSIS — N18.31 STAGE 3A CHRONIC KIDNEY DISEASE (HCC): Primary | ICD-10-CM

## 2024-03-08 DIAGNOSIS — K76.0 FATTY LIVER: ICD-10-CM

## 2024-03-08 PROCEDURE — 99214 OFFICE O/P EST MOD 30 MIN: CPT | Performed by: FAMILY MEDICINE

## 2024-03-08 PROCEDURE — G0439 PPPS, SUBSEQ VISIT: HCPCS | Performed by: FAMILY MEDICINE

## 2024-03-08 RX ORDER — TRIAMCINOLONE ACETONIDE 1 MG/G
CREAM TOPICAL
COMMUNITY

## 2024-03-08 RX ORDER — LEVOTHYROXINE SODIUM 0.05 MG/1
50 TABLET ORAL DAILY
Qty: 90 TABLET | Refills: 3 | Status: SHIPPED | OUTPATIENT
Start: 2024-03-08

## 2024-03-08 RX ORDER — MECOBAL/LEVOMEFOLAT CA/B6 PHOS 2-3-35 MG
2 TABLET ORAL DAILY
Qty: 180 TABLET | Refills: 3 | Status: SHIPPED | OUTPATIENT
Start: 2024-03-08 | End: 2024-06-06

## 2024-03-08 RX ORDER — FENOFIBRATE 160 MG/1
160 TABLET ORAL DAILY
Qty: 90 TABLET | Refills: 3 | Status: SHIPPED | OUTPATIENT
Start: 2024-03-08

## 2024-03-08 NOTE — PATIENT INSTRUCTIONS
Medicare Preventive Visit Patient Instructions  Thank you for completing your Welcome to Medicare Visit or Medicare Annual Wellness Visit today. Your next wellness visit will be due in one year (3/9/2025).  The screening/preventive services that you may require over the next 5-10 years are detailed below. Some tests may not apply to you based off risk factors and/or age. Screening tests ordered at today's visit but not completed yet may show as past due. Also, please note that scanned in results may not display below.  Preventive Screenings:  Service Recommendations Previous Testing/Comments   Colorectal Cancer Screening  * Colonoscopy    * Fecal Occult Blood Test (FOBT)/Fecal Immunochemical Test (FIT)  * Fecal DNA/Cologuard Test  * Flexible Sigmoidoscopy Age: 45-75 years old   Colonoscopy: every 10 years (may be performed more frequently if at higher risk)  OR  FOBT/FIT: every 1 year  OR  Cologuard: every 3 years  OR  Sigmoidoscopy: every 5 years  Screening may be recommended earlier than age 45 if at higher risk for colorectal cancer. Also, an individualized decision between you and your healthcare provider will decide whether screening between the ages of 76-85 would be appropriate. Colonoscopy: 01/26/2023  FOBT/FIT: Not on file  Cologuard: Not on file  Sigmoidoscopy: Not on file    Screening Current     Breast Cancer Screening Age: 40+ years old  Frequency: every 1-2 years  Not required if history of left and right mastectomy Mammogram: 03/21/2023    Screening Current   Cervical Cancer Screening Between the ages of 21-29, pap smear recommended once every 3 years.   Between the ages of 30-65, can perform pap smear with HPV co-testing every 5 years.   Recommendations may differ for women with a history of total hysterectomy, cervical cancer, or abnormal pap smears in past. Pap Smear: 01/06/2022    Screening Not Indicated   Hepatitis C Screening Once for adults born between 1945 and 1965  More frequently in  patients at high risk for Hepatitis C Hep C Antibody: Not on file    Screening Current   Diabetes Screening 1-2 times per year if you're at risk for diabetes or have pre-diabetes Fasting glucose: 98 mg/dL (1/15/2024)  A1C: No results in last 5 years (No results in last 5 years)  Screening Current   Cholesterol Screening Once every 5 years if you don't have a lipid disorder. May order more often based on risk factors. Lipid panel: 01/15/2024    Screening Not Indicated  History Lipid Disorder     Other Preventive Screenings Covered by Medicare:  Abdominal Aortic Aneurysm (AAA) Screening: covered once if your at risk. You're considered to be at risk if you have a family history of AAA.  Lung Cancer Screening: covers low dose CT scan once per year if you meet all of the following conditions: (1) Age 55-77; (2) No signs or symptoms of lung cancer; (3) Current smoker or have quit smoking within the last 15 years; (4) You have a tobacco smoking history of at least 20 pack years (packs per day multiplied by number of years you smoked); (5) You get a written order from a healthcare provider.  Glaucoma Screening: covered annually if you're considered high risk: (1) You have diabetes OR (2) Family history of glaucoma OR (3)  aged 50 and older OR (4)  American aged 65 and older  Osteoporosis Screening: covered every 2 years if you meet one of the following conditions: (1) You're estrogen deficient and at risk for osteoporosis based off medical history and other findings; (2) Have a vertebral abnormality; (3) On glucocorticoid therapy for more than 3 months; (4) Have primary hyperparathyroidism; (5) On osteoporosis medications and need to assess response to drug therapy.   Last bone density test (DXA Scan): 03/17/2022.  HIV Screening: covered annually if you're between the age of 15-65. Also covered annually if you are younger than 15 and older than 65 with risk factors for HIV infection. For pregnant  patients, it is covered up to 3 times per pregnancy.    Immunizations:  Immunization Recommendations   Influenza Vaccine Annual influenza vaccination during flu season is recommended for all persons aged >= 6 months who do not have contraindications   Pneumococcal Vaccine   * Pneumococcal conjugate vaccine = PCV13 (Prevnar 13), PCV15 (Vaxneuvance), PCV20 (Prevnar 20)  * Pneumococcal polysaccharide vaccine = PPSV23 (Pneumovax) Adults 19-63 yo with certain risk factors or if 65+ yo  If never received any pneumonia vaccine: recommend Prevnar 20 (PCV20)  Give PCV20 if previously received 1 dose of PCV13 or PPSV23   Hepatitis B Vaccine 3 dose series if at intermediate or high risk (ex: diabetes, end stage renal disease, liver disease)   Respiratory syncytial virus (RSV) Vaccine - COVERED BY MEDICARE PART D  * RSVPreF3 (Arexvy) CDC recommends that adults 60 years of age and older may receive a single dose of RSV vaccine using shared clinical decision-making (SCDM)   Tetanus (Td) Vaccine - COST NOT COVERED BY MEDICARE PART B Following completion of primary series, a booster dose should be given every 10 years to maintain immunity against tetanus. Td may also be given as tetanus wound prophylaxis.   Tdap Vaccine - COST NOT COVERED BY MEDICARE PART B Recommended at least once for all adults. For pregnant patients, recommended with each pregnancy.   Shingles Vaccine (Shingrix) - COST NOT COVERED BY MEDICARE PART B  2 shot series recommended in those 19 years and older who have or will have weakened immune systems or those 50 years and older     Health Maintenance Due:      Topic Date Due   • DXA SCAN  03/17/2024   • Breast Cancer Screening: Mammogram  03/21/2024   • Hepatitis C Screening  04/08/2026 (Originally 1950)   • Colorectal Cancer Screening  01/26/2033     Immunizations Due:      Topic Date Due   • COVID-19 Vaccine (6 - 2023-24 season) 12/29/2023     Advance Directives   What are advance directives?  Advance  directives are legal documents that state your wishes and plans for medical care. These plans are made ahead of time in case you lose your ability to make decisions for yourself. Advance directives can apply to any medical decision, such as the treatments you want, and if you want to donate organs.   What are the types of advance directives?  There are many types of advance directives, and each state has rules about how to use them. You may choose a combination of any of the following:  Living will:  This is a written record of the treatment you want. You can also choose which treatments you do not want, which to limit, and which to stop at a certain time. This includes surgery, medicine, IV fluid, and tube feedings.   Durable power of  for healthcare (DPAHC):  This is a written record that states who you want to make healthcare choices for you when you are unable to make them for yourself. This person, called a proxy, is usually a family member or a friend. You may choose more than 1 proxy.  Do not resuscitate (DNR) order:  A DNR order is used in case your heart stops beating or you stop breathing. It is a request not to have certain forms of treatment, such as CPR. A DNR order may be included in other types of advance directives.  Medical directive:  This covers the care that you want if you are in a coma, near death, or unable to make decisions for yourself. You can list the treatments you want for each condition. Treatment may include pain medicine, surgery, blood transfusions, dialysis, IV or tube feedings, and a ventilator (breathing machine).  Values history:  This document has questions about your views, beliefs, and how you feel and think about life. This information can help others choose the care that you would choose.  Why are advance directives important?  An advance directive helps you control your care. Although spoken wishes may be used, it is better to have your wishes written down. Spoken  wishes can be misunderstood, or not followed. Treatments may be given even if you do not want them. An advance directive may make it easier for your family to make difficult choices about your care.   Weight Management   Why it is important to manage your weight:  Being overweight increases your risk of health conditions such as heart disease, high blood pressure, type 2 diabetes, and certain types of cancer. It can also increase your risk for osteoarthritis, sleep apnea, and other respiratory problems. Aim for a slow, steady weight loss. Even a small amount of weight loss can lower your risk of health problems.  How to lose weight safely:  A safe and healthy way to lose weight is to eat fewer calories and get regular exercise. You can lose up about 1 pound a week by decreasing the number of calories you eat by 500 calories each day.   Healthy meal plan for weight management:  A healthy meal plan includes a variety of foods, contains fewer calories, and helps you stay healthy. A healthy meal plan includes the following:  Eat whole-grain foods more often.  A healthy meal plan should contain fiber. Fiber is the part of grains, fruits, and vegetables that is not broken down by your body. Whole-grain foods are healthy and provide extra fiber in your diet. Some examples of whole-grain foods are whole-wheat breads and pastas, oatmeal, brown rice, and bulgur.  Eat a variety of vegetables every day.  Include dark, leafy greens such as spinach, kale, liz greens, and mustard greens. Eat yellow and orange vegetables such as carrots, sweet potatoes, and winter squash.   Eat a variety of fruits every day.  Choose fresh or canned fruit (canned in its own juice or light syrup) instead of juice. Fruit juice has very little or no fiber.  Eat low-fat dairy foods.  Drink fat-free (skim) milk or 1% milk. Eat fat-free yogurt and low-fat cottage cheese. Try low-fat cheeses such as mozzarella and other reduced-fat cheeses.  Choose  meat and other protein foods that are low in fat.  Choose beans or other legumes such as split peas or lentils. Choose fish, skinless poultry (chicken or turkey), or lean cuts of red meat (beef or pork). Before you cook meat or poultry, cut off any visible fat.   Use less fat and oil.  Try baking foods instead of frying them. Add less fat, such as margarine, sour cream, regular salad dressing and mayonnaise to foods. Eat fewer high-fat foods. Some examples of high-fat foods include french fries, doughnuts, ice cream, and cakes.  Eat fewer sweets.  Limit foods and drinks that are high in sugar. This includes candy, cookies, regular soda, and sweetened drinks.  Exercise:  Exercise at least 30 minutes per day on most days of the week. Some examples of exercise include walking, biking, dancing, and swimming. You can also fit in more physical activity by taking the stairs instead of the elevator or parking farther away from stores. Ask your healthcare provider about the best exercise plan for you.      © Copyright [employer] 2018 Information is for End User's use only and may not be sold, redistributed or otherwise used for commercial purposes. All illustrations and images included in CareNotes® are the copyrighted property of [employer] or [employer]

## 2024-03-08 NOTE — PROGRESS NOTES
Assessment and Plan:     Problem List Items Addressed This Visit          Digestive    Fatty liver    Relevant Orders    CBC and differential    Irritable bowel syndrome       Endocrine    Hypothyroidism    Relevant Medications    levothyroxine 50 mcg tablet    Other Relevant Orders    TSH, 3rd generation with Free T4 reflex       Respiratory    Mild persistent asthma without complication       Musculoskeletal and Integument    Osteoporosis       Genitourinary    Stage 3a chronic kidney disease (HCC) - Primary       Other    Hyperhomocysteinemia (HCC)    Relevant Medications    L-Methylfolate-B6-B12 (Elfolate Plus) 3-35-2 MG TABS    Mixed hyperlipidemia    Relevant Medications    fenofibrate 160 MG tablet    Other Relevant Orders    Comprehensive metabolic panel    Lipid panel    Vitamin D deficiency     Other Visit Diagnoses       Medicare annual wellness visit, subsequent                Depression Screening and Follow-up Plan: Patient was screened for depression during today's encounter. They screened negative with a PHQ-2 score of 0.      Preventive health issues were discussed with patient, and age appropriate screening tests were ordered as noted in patient's After Visit Summary.  Personalized health advice and appropriate referrals for health education or preventive services given if needed, as noted in patient's After Visit Summary.     History of Present Illness:     Patient presents for a Medicare Wellness Visit    HPI   Patient Care Team:  Keegan Ruff MD as PCP - General (Family Medicine)  MD Lorenzo Baez MD (Urology)  [facility] (Inactive) (Rheumatology)     Review of Systems:     Review of Systems     Problem List:     Patient Active Problem List   Diagnosis    Allergic rhinitis    Angiomyolipoma of kidney    Mild persistent asthma without complication    Hyperhomocysteinemia (HCC)    Fatty liver    Hypothyroidism    Irritable bowel syndrome    Lumbar spondylosis     Mixed hyperlipidemia    Osteoporosis    Vitamin D deficiency    History of COVID-19    Stage 3a chronic kidney disease (HCC)    Facet arthritis of lumbar region      Past Medical and Surgical History:     Past Medical History:   Diagnosis Date    Acute asthma exacerbation     last assessed 8/15/17    Asthma     Cataract     Disease of thyroid gland     Hypertriglyceridemia     Osteoporosis     Right lumbar radiculopathy 12/15/2016    Urinary tract infection     Vertigo      Past Surgical History:   Procedure Laterality Date    APPENDECTOMY  1979    BREAST BIOPSY      CATARACT EXTRACTION      HAND SURGERY Right 2007    thumb    LAPAROSCOPY      MAMMO STEREOTACTIC BREAST BIOPSY LEFT (ALL INC) Left 2022      Family History:     Family History   Problem Relation Age of Onset    Cirrhosis Mother     Kidney nephrosis Mother     Hypertension Father     Other Father 91        extra mammary Padget's dx    Cancer Paternal Grandmother 80        unknown type    Heart disease Paternal Grandfather     Heart attack Paternal Grandfather     Kidney nephrosis Daughter     Osteoporosis Maternal Grandmother     Arthritis Maternal Grandmother     Heart attack Maternal Grandfather     Heart disease Maternal Grandfather     Kidney nephrosis Daughter     Lymphoma Maternal Aunt 70    No Known Problems Paternal Aunt     No Known Problems Paternal Aunt     No Known Problems Paternal Aunt       Social History:     Social History     Socioeconomic History    Marital status: /Civil Union     Spouse name: None    Number of children: None    Years of education: None    Highest education level: None   Occupational History    None   Tobacco Use    Smoking status: Former     Current packs/day: 0.00     Average packs/day: 0.3 packs/day for 4.0 years (1.0 ttl pk-yrs)     Types: Cigarettes     Start date: 1975     Quit date: 1979     Years since quittin.2    Smokeless tobacco: Never    Tobacco comments:         Vaping Use     Vaping status: Never Used   Substance and Sexual Activity    Alcohol use: No    Drug use: No    Sexual activity: Not Currently   Other Topics Concern    None   Social History Narrative    None     Social Determinants of Health     Financial Resource Strain: Not on file   Food Insecurity: Not on file   Transportation Needs: Not on file   Physical Activity: Not on file   Stress: Not on file   Social Connections: Not on file   Intimate Partner Violence: Not on file   Housing Stability: Not on file      Medications and Allergies:     Current Outpatient Medications   Medication Sig Dispense Refill    alendronate (FOSAMAX) 70 mg tablet Take 1 tablet (70 mg total) by mouth every 7 days Take on an empty stomach with a full glass of water, and do not lie down for 30 minutes after 12 tablet 3    aspirin 81 MG tablet Take 1 tablet by mouth daily      Breo Ellipta 100-25 MCG/ACT inhaler Inhale 1 puff every morning Rinse mouth after using inhaler. 60 blister 3    Cholecalciferol 4000 units CAPS Take 1 capsule by mouth daily      Coenzyme Q10 (CO Q-10) 30 MG CAPS Take 100 mg by mouth daily 2capsules daily      cyclobenzaprine (FLEXERIL) 5 mg tablet Take 1 tablet (5 mg total) by mouth daily at bedtime (Patient taking differently: Take 5 mg by mouth if needed prn) 5 tablet 0    Diclofenac Sodium (VOLTAREN) 1 % Apply 2 g topically 4 (four) times a day As needed for joint pain 100 g 6    fenofibrate 160 MG tablet Take 1 tablet (160 mg total) by mouth daily 90 tablet 3    Fluticasone-Salmeterol (Advair Diskus) 100-50 mcg/dose inhaler Inhale 1 puff daily in the early morning Rinse mouth after use. 60 blister 5    L-Methylfolate-B6-B12 (Elfolate Plus) 3-35-2 MG TABS Take 2 tablets by mouth daily 180 tablet 3    levothyroxine 50 mcg tablet Take 1 tablet (50 mcg total) by mouth daily 90 tablet 3    triamcinolone (KENALOG) 0.1 % cream APPLY TO AFFECTED AREA(S) ON BODY TWO TIMES DAILY AS NEEDED      albuterol (2.5 mg/3 mL) 0.083 % nebulizer  solution Take 3 mL (2.5 mg total) by nebulization every 6 (six) hours as needed for wheezing or shortness of breath (Patient not taking: Reported on 7/10/2023) 30 mL 0    albuterol (Ventolin HFA) 90 mcg/act inhaler 2 puffs Q 6 hours prn cough/wheezing (Patient not taking: Reported on 7/10/2023) 18 g 3    nitrofurantoin (MACRODANTIN) 50 mg capsule Take 1 capsule by mouth daily        No current facility-administered medications for this visit.     Allergies   Allergen Reactions    Penicillins      Shown on allergy testing.    Codeine Anxiety    Dust Mite Extract     Molds & Smuts     Pseudoephedrine     Rabbit Epithelium     Sulfa Antibiotics Hives      Immunizations:     Immunization History   Administered Date(s) Administered    COVID-19 MODERNA VACC 0.5 ML IM 01/27/2021, 02/24/2021, 04/14/2022    COVID-19 Moderna Vac BIVALENT 12 Yr+ IM 0.5 ML 10/20/2022    COVID-19 Moderna mRNA Vaccine 12 Yr+ 50 mcg/0.5 mL (Spikevax) 11/03/2023    INFLUENZA 12/10/2015, 10/19/2016, 10/26/2017, 10/20/2018, 10/20/2021, 10/17/2022, 10/04/2023    Influenza Quadrivalent, 6-35 Months IM 10/29/2014, 12/10/2015    Influenza Split High Dose Preservative Free IM 10/19/2016, 10/26/2017, 10/20/2018, 10/07/2019    Influenza, high dose seasonal 0.7 mL 09/30/2020    Influenza, seasonal, injectable 11/04/2013    Pneumococcal Conjugate 13-Valent 10/19/2016    Pneumococcal Polysaccharide PPV23 10/26/2017    Zoster 10/22/2020    Zoster Vaccine Recombinant 10/22/2020, 01/06/2021      Health Maintenance:         Topic Date Due    DXA SCAN  03/17/2024    Breast Cancer Screening: Mammogram  03/21/2024    Hepatitis C Screening  04/08/2026 (Originally 1950)    Colorectal Cancer Screening  01/26/2033         Topic Date Due    COVID-19 Vaccine (6 - 2023-24 season) 12/29/2023      Medicare Screening Tests and Risk Assessments:     Ginger is here for her Subsequent Wellness visit. Last Medicare Wellness visit information reviewed, patient interviewed and  updates made to the record today.      Health Risk Assessment:   Patient rates overall health as very good. Patient feels that their physical health rating is same. Patient is very satisfied with their life. Eyesight was rated as slightly worse. Hearing was rated as same. Patient feels that their emotional and mental health rating is same. Patients states they are never, rarely angry. Patient states they are sometimes unusually tired/fatigued. Pain experienced in the last 7 days has been none. Patient states that she has experienced no weight loss or gain in last 6 months.     Depression Screening:   PHQ-2 Score: 0      Fall Risk Screening:   In the past year, patient has experienced: no history of falling in past year      Urinary Incontinence Screening:   Patient has not leaked urine accidently in the last six months.     Home Safety:  Patient does not have trouble with stairs inside or outside of their home. Patient has working smoke alarms and has working carbon monoxide detector. Home safety hazards include: none.     Nutrition:   Current diet is Regular.     Medications:   Patient is currently taking over-the-counter supplements. OTC medications include: see medication list. Patient is able to manage medications.     Activities of Daily Living (ADLs)/Instrumental Activities of Daily Living (IADLs):   Walk and transfer into and out of bed and chair?: Yes  Dress and groom yourself?: Yes    Bathe or shower yourself?: Yes    Feed yourself? Yes  Do your laundry/housekeeping?: Yes  Manage your money, pay your bills and track your expenses?: Yes  Make your own meals?: Yes    Do your own shopping?: Yes    Previous Hospitalizations:   Any hospitalizations or ED visits within the last 12 months?: No      Advance Care Planning:   Living will: Yes    Advanced directive: Yes      Cognitive Screening:   Provider or family/friend/caregiver concerned regarding cognition?: No    PREVENTIVE SCREENINGS      Cardiovascular  "Screening:    General: History Lipid Disorder and Screening Current      Diabetes Screening:     General: Screening Current      Colorectal Cancer Screening:     General: Screening Current      Breast Cancer Screening:       Due for: Mammogram        Cervical Cancer Screening:    General: Screening Not Indicated      Osteoporosis Screening:    General: Screening Not Indicated and History Osteoporosis      Abdominal Aortic Aneurysm (AAA) Screening:        General: Screening Not Indicated      Lung Cancer Screening:     General: Screening Not Indicated      Hepatitis C Screening:    General: Screening Current    Screening, Brief Intervention, and Referral to Treatment (SBIRT)    Screening  Typical number of drinks in a day: 0  Typical number of drinks in a week: 0  Interpretation: Low risk drinking behavior.    Single Item Drug Screening:  How often have you used an illegal drug (including marijuana) or a prescription medication for non-medical reasons in the past year? never    Single Item Drug Screen Score: 0  Interpretation: Negative screen for possible drug use disorder    Brief Intervention  Alcohol & drug use screenings were reviewed. No concerns regarding substance use disorder identified.     Other Counseling Topics:   Regular weightbearing exercise and calcium and vitamin D intake.          Physical Exam:     /76 (BP Location: Left arm, Patient Position: Sitting, Cuff Size: Large)   Pulse 73   Temp 97.6 °F (36.4 °C)   Resp 18   Ht 5' 5\" (1.651 m)   Wt 87.1 kg (192 lb)   SpO2 97%   BMI 31.95 kg/m²     Physical Exam     Keegan Ruff MD  "

## 2024-03-11 ENCOUNTER — HOSPITAL ENCOUNTER (OUTPATIENT)
Dept: RADIOLOGY | Age: 74
Discharge: HOME | End: 2024-03-11
Payer: COMMERCIAL

## 2024-03-11 DIAGNOSIS — N28.1 CYST OF KIDNEY, ACQUIRED: ICD-10-CM

## 2024-03-11 PROCEDURE — 76770 US EXAM ABDO BACK WALL COMP: CPT

## 2024-03-20 DIAGNOSIS — E72.11 HYPERHOMOCYSTEINEMIA (HCC): ICD-10-CM

## 2024-03-20 RX ORDER — MECOBAL/LEVOMEFOLAT CA/B6 PHOS 2-3-35 MG
2 TABLET ORAL DAILY
Qty: 180 TABLET | Refills: 3 | Status: SHIPPED | OUTPATIENT
Start: 2024-03-20 | End: 2024-06-18

## 2024-03-20 NOTE — TELEPHONE ENCOUNTER
Was originally sent 3/8 to [facility]. Unfortunately  insurance will not cover has to go through the local pharmacy, please advise.

## 2024-03-28 ENCOUNTER — HOSPITAL ENCOUNTER (OUTPATIENT)
Dept: RADIOLOGY | Age: 74
Discharge: HOME | End: 2024-03-28
Payer: COMMERCIAL

## 2024-03-28 DIAGNOSIS — M81.0 AGE-RELATED OSTEOPOROSIS WITHOUT CURRENT PATHOLOGICAL FRACTURE: ICD-10-CM

## 2024-03-28 DIAGNOSIS — Z12.31 VISIT FOR SCREENING MAMMOGRAM: ICD-10-CM

## 2024-03-28 PROCEDURE — 77080 DXA BONE DENSITY AXIAL: CPT

## 2024-03-28 PROCEDURE — 77067 SCR MAMMO BI INCL CAD: CPT

## 2024-03-28 PROCEDURE — 77063 BREAST TOMOSYNTHESIS BI: CPT

## 2024-05-07 ENCOUNTER — OFFICE VISIT (OUTPATIENT)
Dept: FAMILY MEDICINE CLINIC | Facility: CLINIC | Age: 74
End: 2024-05-07
Payer: COMMERCIAL

## 2024-05-07 VITALS
WEIGHT: 192 LBS | OXYGEN SATURATION: 96 % | RESPIRATION RATE: 18 BRPM | TEMPERATURE: 97.8 F | BODY MASS INDEX: 31.99 KG/M2 | HEIGHT: 65 IN | HEART RATE: 78 BPM | SYSTOLIC BLOOD PRESSURE: 130 MMHG | DIASTOLIC BLOOD PRESSURE: 76 MMHG

## 2024-05-07 DIAGNOSIS — M79.18 MYOFASCIAL PAIN SYNDROME: Primary | ICD-10-CM

## 2024-05-07 DIAGNOSIS — M47.816 FACET ARTHRITIS OF LUMBAR REGION: ICD-10-CM

## 2024-05-07 DIAGNOSIS — M51.36 LUMBAR DEGENERATIVE DISC DISEASE: ICD-10-CM

## 2024-05-07 PROCEDURE — 99213 OFFICE O/P EST LOW 20 MIN: CPT | Performed by: FAMILY MEDICINE

## 2024-05-07 PROCEDURE — G2211 COMPLEX E/M VISIT ADD ON: HCPCS | Performed by: FAMILY MEDICINE

## 2024-05-07 NOTE — PROGRESS NOTES
Name: Ginger Chicas      : 1950      MRN: [id number]  Encounter Provider: Keegan Ruff MD  Encounter Date: 2024   Encounter department: [facility]    Assessment & Plan     1. Myofascial pain syndrome    2. Facet arthritis of lumbar region    3. Lumbar degenerative disc disease    Aleve 1 to 2 tablets twice a day with food for 7 days only.  As needed Flexeril 5 mg at bedtime heat.  Consider PT evaluation for persistent symptoms.  Advised to call if any changes       Subjective     1 week history of intermittent pain right lower back at times radiating to gluteal area and right upper hamstring area.  With prolonged standing intermittent pain across both left and right lower back.  No leg weakness or numbness.  No new bowel or bladder changes.  No specific injury or trauma.2018 x-rays lumbar spine curvature lumbar spine.  Multilevel discogenic degenerative changes.  Facet arthritis present at the lumbar sacral junctionHistory of suspected angiomyolipoma right kidney.  3/2024 renal ultrasound multiple ill-defined echogenic masses about the right kidney poorly characterized on ultrasound prior CT scan 10/2014 suggested angiomyolipoma right kidney.  No hydronephrosis.      CKD stage. 2024 creatinine 0.99. GFR 57. Electrolytes normal. Mg++ 2.1. Phosphorus 2.5. PTH 51. 0. Urine protein creatinine ratio normal at 0.06. Hgb 14.5.  2023 creatinine 1.09. GFR 51. 2022 creatinine 1.10. GFR 50.           Review of Systems   Constitutional:  Negative for appetite change, chills, fever and unexpected weight change.   Respiratory:  Negative for shortness of breath.    Cardiovascular:  Negative for chest pain and palpitations.   Gastrointestinal:  Negative for abdominal pain, constipation, diarrhea, nausea and vomiting.         Fatty liver 2024  LFTs normal. Small hiatal hernia on CT scan. Infrequent reflux symptoms.   I BS diarrhea.  Stable on probiotics. Colonoscopy 2023    Endocrine:         Hypothyroidism on Levothyroxine 50 mcg daily.     Osteoporosis on Fosamax 70 mg weekly  no longer on Prolia . 03/2022  DEXA scan + osteoporosis based on the left femoral neck When compared to the prior examination, there has been a  9 % INCREASE in the total bone mineral density of the lumbar spine. There has been NO SIGNIFICANT CHANGE in the total bone mineral density of the left hip.  Prior intolerance to Actonel. 04/2022 Vitamin-D level 45.4.  On vitamin D 5,000 IU daily.          Lab Results       Component                Value               Date                       [id number]             3.639               01/15/2024                                                          Genitourinary:  Negative for difficulty urinating and hematuria.        See HPI History of recurrent UTIs on Macrodantin 50 mg every other day at . She is followed by Urology.  07/2020 renal ultrasound 1 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 with increase in size of 1 of the measured components.  No hydronephrosis.  Bladder postvoid residual 247 mL.   CT scan abdomen pelvis 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. Pap smear 09/2016   Musculoskeletal:  Positive for back pain.        See HPI    Skin:  Negative for rash.   Neurological:  Negative for weakness and numbness.   Hematological:                     Psychiatric/Behavioral:  Negative for sleep disturbance.        Past Medical History:   Diagnosis Date    Acute asthma exacerbation     last assessed 8/15/17    Asthma     Cataract     Disease of thyroid gland     Hypertriglyceridemia     Osteoporosis     Right lumbar radiculopathy 12/15/2016    Urinary tract infection     Vertigo      Past Surgical History:   Procedure Laterality Date    APPENDECTOMY  1979    BREAST BIOPSY      CATARACT EXTRACTION      HAND SURGERY Right 2007    thumb    LAPAROSCOPY      MAMMO STEREOTACTIC BREAST BIOPSY LEFT (ALL INC) Left 04/05/2022     Family History   Problem Relation Age of  Onset    Cirrhosis Mother     Kidney nephrosis Mother     Hypertension Father     Other Father 91        extra mammary Padget's dx    Cancer Paternal Grandmother 80        unknown type    Heart disease Paternal Grandfather     Heart attack Paternal Grandfather     Kidney nephrosis Daughter     Osteoporosis Maternal Grandmother     Arthritis Maternal Grandmother     Heart attack Maternal Grandfather     Heart disease Maternal Grandfather     Kidney nephrosis Daughter     Lymphoma Maternal Aunt 70    No Known Problems Paternal Aunt     No Known Problems Paternal Aunt     No Known Problems Paternal Aunt      Social History     Socioeconomic History    Marital status: /Civil Union     Spouse name: None    Number of children: None    Years of education: None    Highest education level: None   Occupational History    None   Tobacco Use    Smoking status: Former     Current packs/day: 0.00     Average packs/day: 0.3 packs/day for 4.0 years (1.0 ttl pk-yrs)     Types: Cigarettes     Start date: 1975     Quit date: 1979     Years since quittin.3    Smokeless tobacco: Never    Tobacco comments:         Vaping Use    Vaping status: Never Used   Substance and Sexual Activity    Alcohol use: No    Drug use: No    Sexual activity: Not Currently   Other Topics Concern    None   Social History Narrative    None     Social Determinants of Health     Financial Resource Strain: Low Risk  (3/8/2024)    Overall Financial Resource Strain (CARDIA)     Difficulty of Paying Living Expenses: Not hard at all   Food Insecurity: Not on file   Transportation Needs: No Transportation Needs (3/8/2024)    PRAPARE - Transportation     Lack of Transportation (Medical): No     Lack of Transportation (Non-Medical): No   Physical Activity: Not on file   Stress: Not on file   Social Connections: Not on file   Intimate Partner Violence: Not on file   Housing Stability: Not on file     Current Outpatient Medications on File Prior to  Visit   Medication Sig    alendronate (FOSAMAX) 70 mg tablet Take 1 tablet (70 mg total) by mouth every 7 days Take on an empty stomach with a full glass of water, and do not lie down for 30 minutes after    aspirin 81 MG tablet Take 1 tablet by mouth daily    Breo Ellipta 100-25 MCG/ACT inhaler Inhale 1 puff every morning Rinse mouth after using inhaler.    Cholecalciferol 4000 units CAPS Take 1 capsule by mouth daily    Coenzyme Q10 (CO Q-10) 30 MG CAPS Take 100 mg by mouth daily 2capsules daily    cyclobenzaprine (FLEXERIL) 5 mg tablet Take 1 tablet (5 mg total) by mouth daily at bedtime (Patient taking differently: Take 5 mg by mouth if needed prn)    Diclofenac Sodium (VOLTAREN) 1 % Apply 2 g topically 4 (four) times a day As needed for joint pain    fenofibrate 160 MG tablet Take 1 tablet (160 mg total) by mouth daily    Fluticasone-Salmeterol (Advair Diskus) 100-50 mcg/dose inhaler Inhale 1 puff daily in the early morning Rinse mouth after use.    L-Methylfolate-B6-B12 (Elfolate Plus) 3-35-2 MG TABS Take 2 tablets by mouth daily    levothyroxine 50 mcg tablet Take 1 tablet (50 mcg total) by mouth daily    nitrofurantoin (MACRODANTIN) 50 mg capsule Take 1 capsule by mouth daily     triamcinolone (KENALOG) 0.1 % cream APPLY TO AFFECTED AREA(S) ON BODY TWO TIMES DAILY AS NEEDED    albuterol (2.5 mg/3 mL) 0.083 % nebulizer solution Take 3 mL (2.5 mg total) by nebulization every 6 (six) hours as needed for wheezing or shortness of breath (Patient not taking: Reported on 7/10/2023)    albuterol (Ventolin HFA) 90 mcg/act inhaler 2 puffs Q 6 hours prn cough/wheezing (Patient not taking: Reported on 7/10/2023)     Allergies   Allergen Reactions    Penicillins      Shown on allergy testing.    Codeine Anxiety    Dust Mite Extract     Molds & Smuts     Pseudoephedrine     Rabbit Epithelium     Sulfa Antibiotics Hives     Immunization History   Administered Date(s) Administered    COVID-19 MODERNA VACC 0.5 ML IM  "01/27/2021, 02/24/2021, 04/14/2022    COVID-19 Moderna Vac BIVALENT 12 Yr+ IM 0.5 ML 10/20/2022    COVID-19 Moderna mRNA Vaccine 12 Yr+ 50 mcg/0.5 mL (Spikevax) 11/03/2023    INFLUENZA 12/10/2015, 10/19/2016, 10/26/2017, 10/20/2018, 10/20/2021, 10/17/2022, 10/04/2023    Influenza Quadrivalent, 6-35 Months IM 10/29/2014, 12/10/2015    Influenza Split High Dose Preservative Free IM 10/19/2016, 10/26/2017, 10/20/2018, 10/07/2019    Influenza, high dose seasonal 0.7 mL 09/30/2020    Influenza, seasonal, injectable 11/04/2013    Pneumococcal Conjugate 13-Valent 10/19/2016    Pneumococcal Polysaccharide PPV23 10/26/2017    Zoster 10/22/2020    Zoster Vaccine Recombinant 10/22/2020, 01/06/2021       Objective     /76 (BP Location: Left arm, Patient Position: Sitting, Cuff Size: Standard)   Pulse 78   Temp 97.8 °F (36.6 °C)   Resp 18   Ht 5' 5\" (1.651 m)   Wt 87.1 kg (192 lb)   SpO2 96%   BMI 31.95 kg/m²     Physical Exam  Constitutional:       General: She is not in acute distress.  Musculoskeletal:      Lumbar back: Spasms present. No tenderness or bony tenderness. Normal range of motion. Negative right straight leg raise test and negative left straight leg raise test.      Right lower leg: No edema.      Left lower leg: No edema.   Skin:     Findings: No rash.   Neurological:      General: No focal deficit present.      Mental Status: She is alert and oriented to person, place, and time.      Sensory: No sensory deficit.      Motor: No weakness.      Gait: Gait normal.      Deep Tendon Reflexes: Reflexes normal.      Comments: Strength in lower extremities normal.  Dorsiflexion/plantar flexion normal   Psychiatric:         Mood and Affect: Mood normal.       Keegan Ruff MD    "

## 2024-05-08 ENCOUNTER — TELEPHONE (OUTPATIENT)
Dept: FAMILY MEDICINE CLINIC | Facility: CLINIC | Age: 74
End: 2024-05-08

## 2024-05-08 DIAGNOSIS — M79.18 MYOFASCIAL PAIN SYNDROME: Primary | ICD-10-CM

## 2024-05-08 RX ORDER — TIZANIDINE 2 MG/1
2 TABLET ORAL EVERY 8 HOURS PRN
Qty: 20 TABLET | Refills: 1 | Status: SHIPPED | OUTPATIENT
Start: 2024-05-08

## 2024-05-08 NOTE — TELEPHONE ENCOUNTER
Pt stopped in.  She was seen yesterday by Dr. Ruff for back pain.  She thought she had flexeril at home (was told by Dr. Ruff to take it) but discovered she has none.  Please prescribe something to relax her muscles.    [address]

## 2024-05-13 DIAGNOSIS — E03.9 ACQUIRED HYPOTHYROIDISM: ICD-10-CM

## 2024-05-14 RX ORDER — LEVOTHYROXINE SODIUM 0.05 MG/1
50 TABLET ORAL DAILY
Qty: 90 TABLET | Refills: 1 | Status: SHIPPED | OUTPATIENT
Start: 2024-05-14

## 2024-05-16 ENCOUNTER — OFFICE VISIT (OUTPATIENT)
Dept: RHEUMATOLOGY | Facility: CLINIC | Age: 74
End: 2024-05-16
Payer: COMMERCIAL

## 2024-05-16 VITALS
HEART RATE: 75 BPM | BODY MASS INDEX: 31.99 KG/M2 | HEIGHT: 65 IN | OXYGEN SATURATION: 96 % | SYSTOLIC BLOOD PRESSURE: 128 MMHG | WEIGHT: 192 LBS | DIASTOLIC BLOOD PRESSURE: 64 MMHG

## 2024-05-16 DIAGNOSIS — M19.90 OSTEOARTHRITIS, UNSPECIFIED OSTEOARTHRITIS TYPE, UNSPECIFIED SITE: Primary | ICD-10-CM

## 2024-05-16 DIAGNOSIS — M81.0 AGE-RELATED OSTEOPOROSIS WITHOUT CURRENT PATHOLOGICAL FRACTURE: ICD-10-CM

## 2024-05-16 PROCEDURE — 99214 OFFICE O/P EST MOD 30 MIN: CPT | Performed by: INTERNAL MEDICINE

## 2024-05-16 RX ORDER — ALENDRONATE SODIUM 70 MG/1
70 TABLET ORAL
Qty: 12 TABLET | Refills: 3 | Status: SHIPPED | OUTPATIENT
Start: 2024-05-16

## 2024-05-16 NOTE — PROGRESS NOTES
RHEUMATOLOGY FOLLOW-UP NOTE      Assessment and Plan:   Assessment & Plan  1. Osteoporosis.  The patient's DEXA scan results have remained stable. The patient was reassured that there are no contraindications for her Fosamax treatment. It was suggested that she consult with a surgeon to discuss the advantages and disadvantages of an implant. Her Fosamax prescription will be renewed for a year.    Continue alendronate (Fosamax) once a week, take on an empty stomach with a full glass of water and do not lie down for 30 minutes after  Continue Vit. D 4,000 units daily  Continue calcium through diet  Continue diclofenac gel as needed for joint pain  I'm okay with any further dental work while on Fosamax  Next DEXA scan due 4/2026; will order at next visit    Return to clinic in 1 year    Plan:  Diagnoses and all orders for this visit:    Osteoarthritis, unspecified osteoarthritis type, unspecified site    Age-related osteoporosis without current pathological fracture  -     alendronate (FOSAMAX) 70 mg tablet; Take 1 tablet (70 mg total) by mouth every 7 days Take on an empty stomach with a full glass of water, and do not lie down for 30 minutes after        Follow-up plan: RTC in 1 year         Rheumatic Disease Summary      Chief Complaint  No chief complaint on file.      MINH Chicas is a 73 y.o.  female who presents for follow-up of osteoporosis.    History of Present Illness  The patient is a 73-year-old female who is here for follow-up of her osteoporosis.    The patient was last evaluated in 07/2023, during which she was prescribed Prolia. Her last Prolia injection was administered in 01/2024. Subsequently, she underwent a tooth extraction in 09/2023. Despite the transition from Prolia to Fosamax in 07/2023, she continues to experience dental issues. Her gynecologist had previously prescribed Actonel, which resulted in gastrointestinal upset, but not Fosamax. The only cessation of Fosamax was during a dental  procedure. A DEXA scan in 03/2023 revealed a T-score of -1.2 and -2.5, with a repeat scan scheduled for 03/2025. The patient expresses satisfaction with her current DEXA scan results. She experienced a fall but did not sustain a fracture. Her mobility remains stable. Her current regimen includes vitamin D 4000 units and calcium through her diet.    The patient underwent a molar tooth extraction in 07/2024. The dentist has proposed an implant procedure, however, her x-rays indicate a low sinus floor, prompting the possibility of raising the sinus floor. This procedure was recommended by her dentist. She experienced facial swelling following the extraction. The patient has a history of five root canals.    The patient uses Voltaren gel as needed for pain management.    The following portions of the patient's history were reviewed and updated as appropriate: allergies, current medications, past family history, past medical history, past social history, past surgical history and problem list.    Review of Systems:   See HPI    Home Medications:    Current Outpatient Medications:     alendronate (FOSAMAX) 70 mg tablet, Take 1 tablet (70 mg total) by mouth every 7 days Take on an empty stomach with a full glass of water, and do not lie down for 30 minutes after, Disp: 12 tablet, Rfl: 3    aspirin 81 MG tablet, Take 1 tablet by mouth daily (Patient not taking: Reported on 5/30/2024), Disp: , Rfl:     Cholecalciferol 4000 units CAPS, Take 1 capsule by mouth daily, Disp: , Rfl:     Coenzyme Q10 (CO Q-10) 30 MG CAPS, Take 100 mg by mouth daily 2capsules daily, Disp: , Rfl:     cyclobenzaprine (FLEXERIL) 5 mg tablet, Take 1 tablet (5 mg total) by mouth daily at bedtime (Patient taking differently: Take 5 mg by mouth if needed prn), Disp: 5 tablet, Rfl: 0    Diclofenac Sodium (VOLTAREN) 1 %, Apply 2 g topically 4 (four) times a day As needed for joint pain, Disp: 100 g, Rfl: 6    fenofibrate 160 MG tablet, Take 1 tablet (160 mg  "total) by mouth daily, Disp: 90 tablet, Rfl: 3    L-Methylfolate-B6-B12 (Elfolate Plus) 3-35-2 MG TABS, Take 2 tablets by mouth daily, Disp: 180 tablet, Rfl: 3    levothyroxine 50 mcg tablet, Take 1 tablet (50 mcg total) by mouth daily, Disp: 90 tablet, Rfl: 1    nitrofurantoin (MACRODANTIN) 50 mg capsule, Take 1 capsule by mouth daily , Disp: , Rfl:     albuterol (2.5 mg/3 mL) 0.083 % nebulizer solution, Take 3 mL (2.5 mg total) by nebulization every 6 (six) hours as needed for wheezing or shortness of breath (Patient not taking: Reported on 7/10/2023), Disp: 30 mL, Rfl: 0    albuterol (Ventolin HFA) 90 mcg/act inhaler, 2 puffs Q 6 hours prn cough/wheezing (Patient not taking: Reported on 7/10/2023), Disp: 18 g, Rfl: 3    aspirin 81 mg chewable tablet, Take 1 tablet every day by oral route., Disp: , Rfl:     Breo Ellipta 100-25 MCG/ACT inhaler, Inhale 1 puff every morning Rinse mouth after using inhaler. (Patient not taking: Reported on 5/16/2024), Disp: 60 blister, Rfl: 3    Coenzyme Q10-levOCARNitine (CO Q-10 PLUS PO), , Disp: , Rfl:     Fluticasone-Salmeterol (Advair Diskus) 100-50 mcg/dose inhaler, Inhale 1 puff daily in the early morning Rinse mouth after use., Disp: 60 blister, Rfl: 5    tiZANidine (ZANAFLEX) 2 mg tablet, Take 1 tablet (2 mg total) by mouth every 8 (eight) hours as needed for muscle spasms (Patient not taking: Reported on 5/30/2024), Disp: 20 tablet, Rfl: 1    triamcinolone (KENALOG) 0.1 % cream, , Disp: , Rfl:     VITAMIN D PO, , Disp: , Rfl:     Objective:    Vitals:    05/16/24 1209   BP: 128/64   Pulse: 75   SpO2: 96%   Weight: 87.1 kg (192 lb)   Height: 5' 5\" (1.651 m)       Physical Exam  Constitutional:       General: She is not in acute distress.  HENT:      Head: Normocephalic and atraumatic.   Eyes:      Conjunctiva/sclera: Conjunctivae normal.   Cardiovascular:      Rate and Rhythm: Normal rate and regular rhythm.      Heart sounds: S1 normal and S2 normal.      No friction rub. "   Pulmonary:      Effort: Pulmonary effort is normal. No respiratory distress.      Breath sounds: Normal breath sounds. No wheezing, rhonchi or rales.   Musculoskeletal:      Cervical back: Neck supple.   Skin:     Coloration: Skin is not pale.   Neurological:      Mental Status: She is alert. Mental status is at baseline.   Psychiatric:         Mood and Affect: Mood normal.         Behavior: Behavior normal.       Physical Exam  Lungs are normal.    Reviewed labs and imaging.    Imaging:   DEXA SCAN 3/28/24    RESULTS:     LUMBAR SPINE  Level: L1-L4:  BMD: 1.114 gm/cm2  T-score: 0.6        LEFT TOTAL HIP:  BMD: 0.797 gm/cm2  T-score: -1.2     LEFT FEMORAL NECK:  BMD: 0.566 gm/cm2  T score: -2.5        IMPRESSION:     1. Osteoporosis. Based on the left femoral neck    Labs:   Appointment on 01/15/2024   Component Date Value Ref Range Status    Homocyst(e)ine, P/S 01/15/2024 11.7  5.0 - 20.0 umol/L Final

## 2024-05-16 NOTE — PATIENT INSTRUCTIONS
Continue alendronate (Fosamax) once a week, take on an empty stomach with a full glass of water and do not lie down for 30 minutes after  Continue Vit. D 4,000 units daily  Continue calcium through diet  Continue diclofenac gel as needed for joint pain  I'm okay with any further dental work while on Fosamax  Next DEXA scan due 4/2026; will order at next visit    Return to clinic in 1 year

## 2024-05-30 ENCOUNTER — OFFICE VISIT (OUTPATIENT)
Dept: URGENT CARE | Facility: CLINIC | Age: 74
End: 2024-05-30
Payer: COMMERCIAL

## 2024-05-30 VITALS
TEMPERATURE: 97.4 F | HEIGHT: 65 IN | SYSTOLIC BLOOD PRESSURE: 124 MMHG | WEIGHT: 192 LBS | HEART RATE: 82 BPM | RESPIRATION RATE: 18 BRPM | OXYGEN SATURATION: 97 % | BODY MASS INDEX: 31.99 KG/M2 | DIASTOLIC BLOOD PRESSURE: 60 MMHG

## 2024-05-30 DIAGNOSIS — L23.5 ALLERGIC DERMATITIS DUE TO OTHER CHEMICAL PRODUCT: Primary | ICD-10-CM

## 2024-05-30 PROCEDURE — 99213 OFFICE O/P EST LOW 20 MIN: CPT | Performed by: NURSE PRACTITIONER

## 2024-05-30 PROCEDURE — S9083 URGENT CARE CENTER GLOBAL: HCPCS | Performed by: NURSE PRACTITIONER

## 2024-05-30 RX ORDER — PREDNISONE 20 MG/1
20 TABLET ORAL 2 TIMES DAILY WITH MEALS
Qty: 10 TABLET | Refills: 0 | Status: SHIPPED | OUTPATIENT
Start: 2024-05-30 | End: 2024-06-04

## 2024-05-30 RX ORDER — ASPIRIN 81 MG/1
TABLET, CHEWABLE ORAL
COMMUNITY

## 2024-05-30 NOTE — PROGRESS NOTES
[facility]        NAME: Ginger Chicas is a 73 y.o. female  : 1950    MRN: [id number]  DATE: May 30, 2024  TIME: 10:30 AM    Assessment and Plan   Allergic dermatitis due to other chemical product [L23.5]  1. Allergic dermatitis due to other chemical product  predniSONE 20 mg tablet            Patient Instructions     Take med as prescribed   Follow up with PCP in 3-5 days.  Proceed to  ER if symptoms worsen.    If tests have been performed at [facility], our office will contact you with results if changes need to be made to the care plan discussed with you at the visit.  You can review your full results on [facility].    Chief Complaint     Chief Complaint   Patient presents with    Rash     Pt used acidified wash instead of deodorant yesterday, She now has a rash under her arms that burns and itches.          History of Present Illness       HPI  Reports she used an anti-perspirant yesterday and almost immediately started having a burning on the bilateral underarms. This morning she noticed that the areas are red and very tender to touch on the left side, mild on the right side    Review of Systems   Review of Systems   Constitutional:  Negative for fever.   HENT:  Negative for drooling and trouble swallowing.    Respiratory:  Negative for shortness of breath.    Skin:  Positive for rash (bilateral underarms). Negative for color change.   Neurological:  Negative for light-headedness.         Current Medications       Current Outpatient Medications:     alendronate (FOSAMAX) 70 mg tablet, Take 1 tablet (70 mg total) by mouth every 7 days Take on an empty stomach with a full glass of water, and do not lie down for 30 minutes after, Disp: 12 tablet, Rfl: 3    aspirin 81 mg chewable tablet, Take 1 tablet every day by oral route., Disp: , Rfl:     Cholecalciferol 4000 units CAPS, Take 1 capsule by mouth daily, Disp: , Rfl:     Coenzyme Q10 (CO Q-10) 30 MG CAPS, Take 100 mg by mouth daily 2capsules  daily, Disp: , Rfl:     cyclobenzaprine (FLEXERIL) 5 mg tablet, Take 1 tablet (5 mg total) by mouth daily at bedtime (Patient taking differently: Take 5 mg by mouth if needed prn), Disp: 5 tablet, Rfl: 0    Diclofenac Sodium (VOLTAREN) 1 %, Apply 2 g topically 4 (four) times a day As needed for joint pain, Disp: 100 g, Rfl: 6    fenofibrate 160 MG tablet, Take 1 tablet (160 mg total) by mouth daily, Disp: 90 tablet, Rfl: 3    L-Methylfolate-B6-B12 (Elfolate Plus) 3-35-2 MG TABS, Take 2 tablets by mouth daily, Disp: 180 tablet, Rfl: 3    levothyroxine 50 mcg tablet, Take 1 tablet (50 mcg total) by mouth daily, Disp: 90 tablet, Rfl: 1    nitrofurantoin (MACRODANTIN) 50 mg capsule, Take 1 capsule by mouth daily , Disp: , Rfl:     predniSONE 20 mg tablet, Take 1 tablet (20 mg total) by mouth 2 (two) times a day with meals for 5 days, Disp: 10 tablet, Rfl: 0    triamcinolone (KENALOG) 0.1 % cream, , Disp: , Rfl:     VITAMIN D PO, , Disp: , Rfl:     albuterol (2.5 mg/3 mL) 0.083 % nebulizer solution, Take 3 mL (2.5 mg total) by nebulization every 6 (six) hours as needed for wheezing or shortness of breath (Patient not taking: Reported on 7/10/2023), Disp: 30 mL, Rfl: 0    albuterol (Ventolin HFA) 90 mcg/act inhaler, 2 puffs Q 6 hours prn cough/wheezing (Patient not taking: Reported on 7/10/2023), Disp: 18 g, Rfl: 3    aspirin 81 MG tablet, Take 1 tablet by mouth daily (Patient not taking: Reported on 5/30/2024), Disp: , Rfl:     Breo Ellipta 100-25 MCG/ACT inhaler, Inhale 1 puff every morning Rinse mouth after using inhaler. (Patient not taking: Reported on 5/16/2024), Disp: 60 blister, Rfl: 3    Coenzyme Q10-levOCARNitine (CO Q-10 PLUS PO), , Disp: , Rfl:     Fluticasone-Salmeterol (Advair Diskus) 100-50 mcg/dose inhaler, Inhale 1 puff daily in the early morning Rinse mouth after use., Disp: 60 blister, Rfl: 5    tiZANidine (ZANAFLEX) 2 mg tablet, Take 1 tablet (2 mg total) by mouth every 8 (eight) hours as needed for  "muscle spasms (Patient not taking: Reported on 5/30/2024), Disp: 20 tablet, Rfl: 1    Current Allergies     Allergies as of 05/30/2024 - Reviewed 05/30/2024   Allergen Reaction Noted    Penicillins  06/11/2017    Codeine Anxiety 06/11/2017    Dust mite extract  07/24/2013    Molds & smuts  07/24/2013    Pseudoephedrine  06/11/2017    Rabbit epithelium  07/24/2013    Sulfa antibiotics Hives 06/11/2017            The following portions of the patient's history were reviewed and updated as appropriate: allergies, current medications, past family history, past medical history, past social history, past surgical history and problem list.     Past Medical History:   Diagnosis Date    Acute asthma exacerbation     last assessed 8/15/17    Asthma     Cataract     Disease of thyroid gland     Hypertriglyceridemia     Osteoporosis     Right lumbar radiculopathy 12/15/2016    Urinary tract infection     Vertigo        Past Surgical History:   Procedure Laterality Date    APPENDECTOMY  1979    BREAST BIOPSY      CATARACT EXTRACTION      HAND SURGERY Right 2007    thumb    LAPAROSCOPY      MAMMO STEREOTACTIC BREAST BIOPSY LEFT (ALL INC) Left 04/05/2022       Family History   Problem Relation Age of Onset    Cirrhosis Mother     Kidney nephrosis Mother     Hypertension Father     Other Father 91        extra mammary Padget's dx    Cancer Paternal Grandmother 80        unknown type    Heart disease Paternal Grandfather     Heart attack Paternal Grandfather     Kidney nephrosis Daughter     Osteoporosis Maternal Grandmother     Arthritis Maternal Grandmother     Heart attack Maternal Grandfather     Heart disease Maternal Grandfather     Kidney nephrosis Daughter     Lymphoma Maternal Aunt 70    No Known Problems Paternal Aunt     No Known Problems Paternal Aunt     No Known Problems Paternal Aunt          Medications have been verified.        Objective   /60   Pulse 82   Temp (!) 97.4 °F (36.3 °C)   Resp 18   Ht 5' 5\" " (1.651 m)   Wt 87.1 kg (192 lb)   SpO2 97%   BMI 31.95 kg/m²   No LMP recorded. Patient is postmenopausal.       Physical Exam     Physical Exam  Cardiovascular:      Rate and Rhythm: Regular rhythm.      Heart sounds: Normal heart sounds.   Pulmonary:      Effort: Pulmonary effort is normal.      Breath sounds: Normal breath sounds.   Skin:     Findings: Erythema (oval erythematous lesions on the bilateal armpits. No drainage. No crusting or skin break. Mild TTP on the left side. Blanchable) present. No bruising.

## 2024-06-20 ENCOUNTER — RA CDI HCC (OUTPATIENT)
Dept: OTHER | Facility: HOSPITAL | Age: 74
End: 2024-06-20

## 2024-06-21 ENCOUNTER — OFFICE VISIT (OUTPATIENT)
Dept: FAMILY MEDICINE CLINIC | Facility: CLINIC | Age: 74
End: 2024-06-21
Payer: COMMERCIAL

## 2024-06-21 VITALS
DIASTOLIC BLOOD PRESSURE: 64 MMHG | HEART RATE: 87 BPM | HEIGHT: 65 IN | BODY MASS INDEX: 31.99 KG/M2 | OXYGEN SATURATION: 97 % | TEMPERATURE: 97.3 F | SYSTOLIC BLOOD PRESSURE: 132 MMHG | WEIGHT: 192 LBS | RESPIRATION RATE: 16 BRPM

## 2024-06-21 DIAGNOSIS — L30.9 DERMATITIS: Primary | ICD-10-CM

## 2024-06-21 PROCEDURE — 99214 OFFICE O/P EST MOD 30 MIN: CPT | Performed by: FAMILY MEDICINE

## 2024-06-21 PROCEDURE — G2211 COMPLEX E/M VISIT ADD ON: HCPCS | Performed by: FAMILY MEDICINE

## 2024-06-21 RX ORDER — TRIAMCINOLONE ACETONIDE 1 MG/G
CREAM TOPICAL 2 TIMES DAILY
Qty: 453.6 G | Refills: 1 | Status: SHIPPED | OUTPATIENT
Start: 2024-06-21

## 2024-06-21 NOTE — PROGRESS NOTES
"Ambulatory Visit  Name: Ginger Chicas      : 1950      MRN: [id number]  Encounter Provider: Jeremiah Neff MD  Encounter Date: 2024   Encounter department: [facility]    Assessment & Plan   1. Dermatitis  -     triamcinolone (KENALOG) 0.1 % cream; Apply topically 2 (two) times a day    Discussed etiologies for her current dermatitis.  She does have an upcoming appointment with dermatology.  Has responded well to Kenalog cream in the past.  Will reorder Kenalog cream twice daily.  She currently is on Fosamax and did complete a dose burst of steroids at the end of May.  Would hold off on any further oral steroids for the time being.  Could consider biopsy in the future.       History of Present Illness     Patient presents with:  Rash: In groin area has tried things in the best has not worked and its been two weeks it itchies and burns   Has been going with no under wear at home to see if that would work   Has derm but is unable to see until next month- [facility]     Similar rash 2 years ago   Has used neosporin, gold bond and cortisone.   No changes in detergent.  Denies any recent travel or new pets.  When she had this reaction in the past.  She used Kenalog cream which did help.  Of note she is on Prolia for osteoporosis. Recently took prednisone from urgent care.         Rash        Review of Systems   Skin:  Positive for rash.        Red itchy rash arms and groin.       Objective     /64 (BP Location: Left arm, Patient Position: Sitting, Cuff Size: Large)   Pulse 87   Temp (!) 97.3 °F (36.3 °C) (Temporal)   Resp 16   Ht 5' 5\" (1.651 m)   Wt 87.1 kg (192 lb)   SpO2 97%   BMI 31.95 kg/m²     Physical Exam  Constitutional:       Appearance: Normal appearance.   Skin:     General: Skin is dry.      Findings: Erythema and rash present.   Neurological:      Mental Status: She is alert.       Administrative Statements   I have spent a total time of " 30 minutes on 06/21/24 In caring for this patient including Diagnostic results, Prognosis, Risks and benefits of tx options, Instructions for management, Patient and family education, Risk factor reductions, Counseling / Coordination of care, Documenting in the medical record, Reviewing / ordering tests, medicine, procedures  , Obtaining or reviewing history  , and Communicating with other healthcare professionals .

## 2024-07-02 ENCOUNTER — OFFICE VISIT (OUTPATIENT)
Dept: FAMILY MEDICINE CLINIC | Facility: CLINIC | Age: 74
End: 2024-07-02
Payer: COMMERCIAL

## 2024-07-02 VITALS
TEMPERATURE: 97.6 F | BODY MASS INDEX: 31.95 KG/M2 | RESPIRATION RATE: 18 BRPM | SYSTOLIC BLOOD PRESSURE: 128 MMHG | HEIGHT: 65 IN | HEART RATE: 80 BPM | DIASTOLIC BLOOD PRESSURE: 68 MMHG | OXYGEN SATURATION: 96 %

## 2024-07-02 DIAGNOSIS — N18.31 STAGE 3A CHRONIC KIDNEY DISEASE (HCC): ICD-10-CM

## 2024-07-02 DIAGNOSIS — R42 DYSEQUILIBRIUM: Primary | ICD-10-CM

## 2024-07-02 PROCEDURE — 99214 OFFICE O/P EST MOD 30 MIN: CPT | Performed by: FAMILY MEDICINE

## 2024-07-02 PROCEDURE — G2211 COMPLEX E/M VISIT ADD ON: HCPCS | Performed by: FAMILY MEDICINE

## 2024-07-02 RX ORDER — MECLIZINE HCL 12.5 MG/1
12.5 TABLET ORAL EVERY 8 HOURS PRN
Qty: 20 TABLET | Refills: 0 | Status: SHIPPED | OUTPATIENT
Start: 2024-07-02

## 2024-07-02 NOTE — PROGRESS NOTES
Ambulatory Visit  Name: Ginger Chicas      : 1950      MRN: [id number]  Encounter Provider: Keegan Ruff MD  Encounter Date: 2024   Encounter department: [facility]    Assessment & Plan   1. Dysequilibrium  -     Ambulatory Referral to Physical Therapy; Future  -     meclizine (ANTIVERT) 12.5 MG tablet; Take 1 tablet (12.5 mg total) by mouth every 8 (eight) hours as needed for dizziness  2. Stage 3a chronic kidney disease (HCC)    Stay hydrated. PRN Meclizine. PT evaluation    Recheck as needed. Call if any changes        History of Present Illness     Patient reports over the last week episodes of feeling off balance,  unsteady  No true vertigo symptoms Mild intermittent nausea. No vomiting. No headaches or visual changes.  No facial numbness or slurred speech.  She has been treated in the past for benign positional vertigo with vestibular therapy.  No recent illnesses.  No seasonal allergies.    Medications reviewed. Labs 2024 reviewed see note.      Hyperlipidemia mixed type on Fenofibrate 160 mg daily. 2024 Lipid profile cholesterol 186. TGs 141. HDL 51. . LFTs normal. FBS 85.     CKD stage. 2024 creatinine 0.99. GFR 57. Electrolytes normal. Mg++ 2.1. Phosphorus 2.5. PTH 51. 0. Urine protein creatinine ratio normal at 0.06. Hgb 14.5.  2023 creatinine 1.09. GFR 51. 2022 creatinine 1.10. GFR 50.     2023 Echogenic lesions in the right kidney for which the midpole superior portion lesion has increased in size. Mild post void urinary bladder volume. 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  No development of hydronephrosis, calculus or fluid collection.  Left kidney is unremarkable      Review of Systems   Constitutional:  Negative for appetite change, chills, fever and unexpected weight change.   HENT:  Negative for congestion, ear pain,  rhinorrhea, sinus pain and trouble swallowing.    Eyes:  Negative for visual disturbance.        Cataract surgery 12/2020 OD   Respiratory:  Negative for cough, shortness of breath and wheezing.         Asthma currently stable on Breo 100/25 daily. Symptoms improved since removing carpeting in her house. S/p COV 19 infection 12/2023 02/2020 PFTs no large airway obstruction or bronchodilator responsiveness.  Decreased forced vital capacity and total lung capacity consistent with mild restrictive lung disease.  Mild diffuse impairment.      02/2018 CXR normal except for mildly elevated right hemidiaphragm.  CT scan chest     01/2016 normal except for small hiatal hernia.   Cardiovascular:  Negative for chest pain, palpitations and leg swelling.        Episodes of bradycardia during  Colonoscopy 01/2023. EKG 02/2023 NSR. T wave inversions V1-V3. 48 hour Holter The patient was in sinus rhythm throughout the duration of the study with an average heart rate 77 bpm.  No ventricular and rare supraventricular ectopic activity. No atrial fibrillation or atrial flutter. There was approximately 1 hour 36 minutes of bradycardia.  The slowest single episode occurred at 12:20 AM with a minimum heart rate 52 bpm. There was no evidence of heart block and no significant pauses were seen. No symptoms reported.   Gastrointestinal:  Positive for nausea. Negative for abdominal pain, blood in stool, constipation, diarrhea and vomiting.         Fatty liver 01/2024  LFTs normal. Small hiatal hernia on CT scan. Infrequent reflux symptoms.   I BS diarrhea.  Stable on probiotics. Colonoscopy 01/2023    Endocrine:        Hypothyroidism on Levothyroxine 50 mcg daily.     Osteoporosis on Fosamax 70 mg weekly  no longer on Prolia . 03/2022  DEXA scan + osteoporosis based on the left femoral neck When compared to the prior examination, there has been a  9 % INCREASE in the total bone mineral density of the lumbar spine. There has been NO  SIGNIFICANT CHANGE in the total bone mineral density of the left hip.  Prior intolerance to Actonel. 04/2022 Vitamin-D level 45.4.  On vitamin D 5,000 IU daily.          Lab Results       Component                Value               Date                       [id number]             3.639               01/15/2024                                                          Genitourinary:  Negative for difficulty urinating.        See HPI History of recurrent UTIs on Macrodantin 50 mg every other day at [facility]. She is followed by Urology.  07/2020 renal ultrasound 1 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 with increase in size of 1 of the measured components.  No hydronephrosis.  Bladder postvoid residual 247 mL.   CT scan abdomen pelvis 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. Pap smear 09/2016   Musculoskeletal:  Negative for arthralgias and myalgias.        Several week history of recurrent right shoulder pain right-handed.  No specific injury or trauma.  Patient's symptoms started when she tried to reach for something behind her.   Skin:  Negative for rash.   Allergic/Immunologic: Positive for environmental allergies.        See HPI 02/2020 allergy testing + mold allergy.   Neurological:  Negative for dizziness and headaches.   Hematological:  Negative for adenopathy. Does not bruise/bleed easily.        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  No history of venous thrombosis. 06/2023 homocysteine mildly elevated at 12.0  .  Lab Results       Component                Value               Date                       WBC                      5.15                01/15/2024                 HGB                      14.5                01/15/2024                 HCT                      45.3                01/15/2024                 MCV                      96                  01/15/2024                 PLT                      342                 01/15/2024                         Psychiatric/Behavioral:  Negative for dysphoric mood and sleep disturbance. The patient is not hyperactive.      Past Medical History:   Diagnosis Date    Acute asthma exacerbation     last assessed 8/15/17    Asthma     Cataract     Disease of thyroid gland     Hypertriglyceridemia     Osteoporosis     Right lumbar radiculopathy 12/15/2016    Urinary tract infection     Vertigo      Past Surgical History:   Procedure Laterality Date    APPENDECTOMY      BREAST BIOPSY      CATARACT EXTRACTION      HAND SURGERY Right     thumb    LAPAROSCOPY      MAMMO STEREOTACTIC BREAST BIOPSY LEFT (ALL INC) Left 2022     Family History   Problem Relation Age of Onset    Cirrhosis Mother     Kidney nephrosis Mother     Hypertension Father     Other Father 91        extra mammary Padget's dx    Cancer Paternal Grandmother 80        unknown type    Heart disease Paternal Grandfather     Heart attack Paternal Grandfather     Kidney nephrosis Daughter     Osteoporosis Maternal Grandmother     Arthritis Maternal Grandmother     Heart attack Maternal Grandfather     Heart disease Maternal Grandfather     Kidney nephrosis Daughter     Lymphoma Maternal Aunt 70    No Known Problems Paternal Aunt     No Known Problems Paternal Aunt     No Known Problems Paternal Aunt      Social History     Tobacco Use    Smoking status: Former     Current packs/day: 0.00     Average packs/day: 0.3 packs/day for 4.0 years (1.0 ttl pk-yrs)     Types: Cigarettes     Start date: 1975     Quit date: 1979     Years since quittin.5    Smokeless tobacco: Never    Tobacco comments:         Vaping Use    Vaping status: Never Used   Substance and Sexual Activity    Alcohol use: No    Drug use: No    Sexual activity: Not Currently     Current Outpatient Medications on File Prior to Visit   Medication Sig    albuterol (2.5 mg/3 mL) 0.083 % nebulizer solution Take 3 mL (2.5 mg total) by nebulization every 6 (six) hours as needed for wheezing  or shortness of breath (Patient taking differently: Take 2.5 mg by nebulization if needed for wheezing or shortness of breath)    albuterol (Ventolin HFA) 90 mcg/act inhaler 2 puffs Q 6 hours prn cough/wheezing (Patient taking differently: if needed 2 puffs Q 6 hours prn cough/wheezing)    alendronate (FOSAMAX) 70 mg tablet Take 1 tablet (70 mg total) by mouth every 7 days Take on an empty stomach with a full glass of water, and do not lie down for 30 minutes after    aspirin 81 mg chewable tablet Take 1 tablet every day by oral route.    Breo Ellipta 100-25 MCG/ACT inhaler Inhale 1 puff every morning Rinse mouth after using inhaler. (Patient taking differently: Inhale 1 puff if needed Rinse mouth after using inhaler.)    Coenzyme Q10 (CO Q-10) 30 MG CAPS Take 100 mg by mouth daily 2capsules daily    Coenzyme Q10-levOCARNitine (CO Q-10 PLUS PO)     cyclobenzaprine (FLEXERIL) 5 mg tablet Take 1 tablet (5 mg total) by mouth daily at bedtime (Patient taking differently: Take 5 mg by mouth if needed prn)    Diclofenac Sodium (VOLTAREN) 1 % Apply 2 g topically 4 (four) times a day As needed for joint pain (Patient taking differently: Apply 2 g topically if needed As needed for joint pain)    fenofibrate 160 MG tablet Take 1 tablet (160 mg total) by mouth daily    Fluticasone-Salmeterol (Advair Diskus) 100-50 mcg/dose inhaler Inhale 1 puff daily in the early morning Rinse mouth after use. (Patient taking differently: Inhale 1 puff if needed Rinse mouth after use.)    L-Methylfolate-B6-B12 (Elfolate Plus) 3-35-2 MG TABS Take 2 tablets by mouth daily    levothyroxine 50 mcg tablet Take 1 tablet (50 mcg total) by mouth daily    nitrofurantoin (MACRODANTIN) 50 mg capsule Take 1 capsule by mouth daily     triamcinolone (KENALOG) 0.1 % cream Apply topically 2 (two) times a day    VITAMIN D PO     aspirin 81 MG tablet Take 1 tablet by mouth daily (Patient not taking: Reported on 5/30/2024)    Cholecalciferol 4000 units CAPS Take  "1 capsule by mouth daily (Patient not taking: Reported on 6/21/2024)    tiZANidine (ZANAFLEX) 2 mg tablet Take 1 tablet (2 mg total) by mouth every 8 (eight) hours as needed for muscle spasms (Patient not taking: Reported on 5/30/2024)     Allergies   Allergen Reactions    Penicillins      Shown on allergy testing.    Codeine Anxiety    Dust Mite Extract     Molds & Smuts     Pseudoephedrine     Rabbit Epithelium     Sulfa Antibiotics Hives     Immunization History   Administered Date(s) Administered    COVID-19 MODERNA VACC 0.5 ML IM 01/27/2021, 02/24/2021, 04/14/2022    COVID-19 Moderna Vac BIVALENT 12 Yr+ IM 0.5 ML 10/20/2022    COVID-19 Moderna mRNA Vaccine 12 Yr+ 50 mcg/0.5 mL (Spikevax) 11/03/2023    INFLUENZA 12/10/2015, 10/19/2016, 10/26/2017, 10/20/2018, 10/20/2021, 10/17/2022, 10/04/2023    Influenza Quadrivalent, 6-35 Months IM 10/29/2014, 12/10/2015    Influenza Split High Dose Preservative Free IM 10/19/2016, 10/26/2017, 10/20/2018, 10/07/2019    Influenza, high dose seasonal 0.7 mL 09/30/2020    Influenza, seasonal, injectable 11/04/2013    Pneumococcal Conjugate 13-Valent 10/19/2016    Pneumococcal Polysaccharide PPV23 10/26/2017    Zoster 10/22/2020    Zoster Vaccine Recombinant 10/22/2020, 01/06/2021     Objective     /68 (BP Location: Left arm, Patient Position: Sitting, Cuff Size: Standard)   Pulse 80   Temp 97.6 °F (36.4 °C)   Resp 18   Ht 5' 5\" (1.651 m)   SpO2 96%   BMI 31.95 kg/m²     BP Readings from Last 3 Encounters:   07/02/24 128/68   06/21/24 132/64   05/30/24 124/60     Wt Readings from Last 3 Encounters:   06/21/24 87.1 kg (192 lb)   05/30/24 87.1 kg (192 lb)   05/16/24 87.1 kg (192 lb)           Physical Exam  Constitutional:       General: She is not in acute distress.  HENT:      Right Ear: Tympanic membrane and ear canal normal.      Left Ear: Tympanic membrane and ear canal normal.      Nose:      Comments:       Mouth/Throat:      Mouth: No oral lesions.      Pharynx: " Oropharynx is clear.   Eyes:      General: No scleral icterus.     Extraocular Movements: Extraocular movements intact.      Right eye: No nystagmus.      Left eye: No nystagmus.      Conjunctiva/sclera: Conjunctivae normal.      Pupils: Pupils are equal, round, and reactive to light.   Neck:      Thyroid: No thyroid mass or thyromegaly.      Vascular: Normal carotid pulses. No carotid bruit.   Cardiovascular:      Rate and Rhythm: Normal rate and regular rhythm.      Heart sounds: No murmur heard.     No gallop.   Pulmonary:      Effort: No respiratory distress.      Breath sounds: No wheezing or rales.   Lymphadenopathy:      Cervical: No cervical adenopathy.   Skin:     Findings: No rash.   Neurological:      General: No focal deficit present.      Mental Status: She is alert and oriented to person, place, and time.      Cranial Nerves: Cranial nerves 2-12 are intact.      Motor: No weakness or pronator drift.      Coordination: Romberg sign negative.      Gait: Gait is intact.   Psychiatric:         Mood and Affect: Mood normal.       Administrative Statements

## 2024-07-15 ENCOUNTER — EVALUATION (OUTPATIENT)
Dept: PHYSICAL THERAPY | Facility: CLINIC | Age: 74
End: 2024-07-15
Payer: COMMERCIAL

## 2024-07-15 DIAGNOSIS — R42 DIZZINESS: Primary | ICD-10-CM

## 2024-07-15 DIAGNOSIS — R42 DYSEQUILIBRIUM: ICD-10-CM

## 2024-07-15 PROCEDURE — 97112 NEUROMUSCULAR REEDUCATION: CPT | Performed by: PHYSICAL THERAPIST

## 2024-07-15 PROCEDURE — 97162 PT EVAL MOD COMPLEX 30 MIN: CPT | Performed by: PHYSICAL THERAPIST

## 2024-07-15 NOTE — PROGRESS NOTES
PT EVALUATION    Today's date: 07/15/24  Patient name: Ginger Chicas  : 1950  MRN: [id number]  Referring provider: Keegan Ruff MD  Dx:   1. Dizziness    2. Dysequilibrium          ASSESSMENT:  Ginger Chicas is a 73 y.o. female who presents with signs and symptoms consistent of acute on chronic dizziness. Upon evaluation this date, clinical examination demonstrated intact cranial nerves, negative neurological testing, and asymptomatic VBI testing. Sherman Hallpike was performed and demonstrated positive R canalithiasis BPPV with up-beating torsional nystagmus. CRT was performed 2x with  mild improvements in symptoms. Some nausea throughout treatment that was managed with ice pack on cervical spine. Discussed and educated patient on pathophysiology of BPPV and expectations post treatment. Due to these impairments, patient has difficulty performing work activities, a/iadls and recreational activities. Patient would benefit from skilled physical therapy to address the impairments, improve their level of function, and to improve their overall quality of life.    Impairments:    imbalance   Positional dizziness  VOR impairment   Dizziness with head turns     Prognosis:  Good  Positive and negative prognostic indicator(s):  pain >3 months and multiple comorbidities    Goals:    Short Term Goals: to be achieved by 4 weeks  1) Patient to be independent with basic HEP.  2) Decrease dizziness to 1/10 at its worst.    Long Term Goals: to be achieved by discharge  1) FOTO equal to or greater than target score indicating improvements with overall function.  2) Patient will demonstrate normalized BPPV testing in order to return to normal daily activities.   3) Patient will have little to no dizziness with positional changes and fast head movements in order to participate in daily activities.      Planned interventions:  patient education, canalith repositioning techniques and vestibulo-ocular motor training    Duration in  visits:  4  Frequency: 1 visits per week  Duration in weeks:  4    History of Current Injury: Patient notes that about two weeks ago she went to get out of bed and she got up and lost her balance. Then she found as she walking she was moving side to side. Patient notes that she went back to sleep and woke up in the morning and she was still veering when she walks and then felt like her head wasn't center and some pressure. Patient notes that she then went to the doctor and got some meclizine for as needed. Patient notes that he had her stand and close her eyes and she lost her balance to the left and unable. Patient notes that since then it hasn't been to bad but she still doesn't feel right. Patient notes that about a month she woke up and had one instance of the room spinning but went away quickly.       Pain location: chronic neck pain       Aggravating factors: getting out of bed, bending over   Easing factors: changes positions     Imaging: n/a  Special Questions:   Dizziness: Yes   Dysphagia: No  Dysarthria: No  Diploplia: No  Drop Attacks: No  Numbness: No  Nausea: yes the initial episode   Nystagmus: No      Hobbies/Interests: n/a  Occupation: retired teacher (K-6)   Patient goals: Patient reports goals for physical therapy would be decrease dizziness.       Objective     Concurrent Complaints  Positive for tinnitus (chronic issue). Negative for headaches (once and while.), nausea/motion sickness, visual change, hearing loss, memory loss, aural fullness, poor concentration and peripheral neuropathy    Active Range of Motion   Cervical/Thoracic Spine       Cervical    Flexion: Neck active flexion: WFL.   Extension: Neck active extension: WFL.      Left lateral flexion: Neck active lateral bend left: WFL.     Restriction level: minimal  Right lateral flexion: Neck active lateral bend right: WFL.     Restriction level minimal  Left rotation: Neck active rotation left: WFL. Restriction level: minimal  Right  rotation: Neck active rotation right: WFL.    Restriction level: minimal    Thoracic    Flexion:  Restriction level: minimal  Extension:  Restriction level: minimal  Neuro Exam:     Dizziness  Positive for light-headedness and rocking or swaying.   Negative for vertigo (1 episode a month ago), oscillopsia, motion sickness, diplopia and floating or swimming.     Exacerbating factors  Positive for bending over, looking up and supine to/from sitting.   Negative for rolling in bed, walking, turning head, optokinetic movement and walking in busy environment.     Symptoms   Intensity at best: 0/10  Intensity at worst: 5/10 and 6/10  Average intensity: 3/10 and 2/10    Headaches   Patient reports headaches: No (once and while.).     Oculomotor exam   Oculomotor ROM: WNL  Resting nystagmus: not present   Gaze holding nystagmus: not present left  and not present right  Smooth pursuits: within normal limits  Vertical saccades: normal  Horizontal saccades: normal  Convergence: normal (symetrical convergence)  Cover test: normal    Positional testing   Uledi-Hallpike   Left posterior canal: WNL  Right posterior canal: symptomatic, torsional and upbeating  Duration: 20 (seconds)  Uledi-Hallpike comment: mild non-fatiguing nystagmus with left head turn during Eply  Positional testing comment: Roll test not completed secondary to positive posterior canal testing. Will test at a later date if needed.           Precautions: cervical spine pain, osteoporosis, thyroid disorder, lumbar pain       Manuals 7/15                                                                Neuro Re-Ed             CRT 2x Eply                                                                                           Ther Ex                                                                                                                     Ther Activity                                       Gait Training                                       Modalities

## 2024-07-23 ENCOUNTER — OFFICE VISIT (OUTPATIENT)
Dept: PHYSICAL THERAPY | Facility: CLINIC | Age: 74
End: 2024-07-23
Payer: COMMERCIAL

## 2024-07-23 DIAGNOSIS — R42 DIZZINESS: Primary | ICD-10-CM

## 2024-07-23 DIAGNOSIS — R42 DYSEQUILIBRIUM: ICD-10-CM

## 2024-07-23 PROCEDURE — 97112 NEUROMUSCULAR REEDUCATION: CPT | Performed by: PHYSICAL THERAPIST

## 2024-07-23 NOTE — PROGRESS NOTES
Daily Note     Today's date: 2024  Patient name: Ginger Chicas  : 1950  MRN: [id number]  Referring provider: Keegan Ruff MD  Dx:   Encounter Diagnosis     ICD-10-CM    1. Dizziness  R42       2. Dysequilibrium  R42           Start Time: 0900  Stop Time: 0945  Total time in clinic (min): 45 minutes    Subjective: Patient notes she has had a few little episodes that feels like its going to go but then doesn't. Patient notes that she had some residual nausea for a little bit after last session but resolved with ice pack.       Objective: See treatment diary below    Positional testing   Sherman-Hallpike   Left posterior canal: WNL  Right posterior canal: symptomatic, torsional and up-beating; Duration: 20 (seconds)      Assessment: Tolerated treatment well. [place] Hallpike was performed and demonstrated positive R canalithiasis BPPV with fatiguing up-beating nystagmus. CRT was performed 3x with mild improvement in symptoms. No nausea throughout session. Held on [facility] HEP. Will see patient back for follow up in 1 week and earlier if needed. Patient would benefit from continued PT      Plan: Continue per plan of care.      Precautions: cervical spine pain, osteoporosis, thyroid disorder, lumbar pain       Manuals 7/15 7/23                                                               Neuro Re-Ed             CRT 2x Eply  2x Epley     1x Li     All for R PC                                                                                          Ther Ex                                                                                                                     Ther Activity                                       Gait Training                                       Modalities

## 2024-07-29 ENCOUNTER — OFFICE VISIT (OUTPATIENT)
Dept: PHYSICAL THERAPY | Facility: CLINIC | Age: 74
End: 2024-07-29
Payer: COMMERCIAL

## 2024-07-29 DIAGNOSIS — R42 DYSEQUILIBRIUM: ICD-10-CM

## 2024-07-29 DIAGNOSIS — R42 DIZZINESS: Primary | ICD-10-CM

## 2024-07-29 PROCEDURE — 97112 NEUROMUSCULAR REEDUCATION: CPT | Performed by: PHYSICAL THERAPIST

## 2024-08-07 ENCOUNTER — TELEPHONE (OUTPATIENT)
Dept: FAMILY MEDICINE CLINIC | Facility: CLINIC | Age: 74
End: 2024-08-07

## 2024-08-07 ENCOUNTER — HOSPITAL ENCOUNTER (OUTPATIENT)
Dept: RADIOLOGY | Age: 74
Discharge: HOME | End: 2024-08-07
Payer: COMMERCIAL

## 2024-08-07 DIAGNOSIS — D17.71 ANGIOMYOLIPOMA OF RIGHT KIDNEY: ICD-10-CM

## 2024-08-07 DIAGNOSIS — N28.1 CYST OF KIDNEY, ACQUIRED: ICD-10-CM

## 2024-08-07 DIAGNOSIS — J45.30 MILD PERSISTENT ASTHMA WITHOUT COMPLICATION: Primary | ICD-10-CM

## 2024-08-07 PROCEDURE — 76775 US EXAM ABDO BACK WALL LIM: CPT

## 2024-08-07 NOTE — TELEPHONE ENCOUNTER
Ginger came into the office to request a chest xray order. She is scheduled to establish with Pulmonology per a referral from her allergist and they have requested a chest xray prior to the visit. She was advised to check with PCP.

## 2024-08-09 ENCOUNTER — OFFICE VISIT (OUTPATIENT)
Dept: PHYSICAL THERAPY | Facility: CLINIC | Age: 74
End: 2024-08-09
Payer: COMMERCIAL

## 2024-08-09 DIAGNOSIS — R42 DIZZINESS: Primary | ICD-10-CM

## 2024-08-09 PROCEDURE — 97112 NEUROMUSCULAR REEDUCATION: CPT | Performed by: PHYSICAL THERAPIST

## 2024-08-09 NOTE — PROGRESS NOTES
"Daily Note     Today's date: 2024  Patient name: Ginger Chicas  : 1950  MRN: [id number]  Referring provider: Keegan Ruff MD  Dx:   Encounter Diagnosis     ICD-10-CM    1. Dizziness  R42               Start Time: 845  Stop Time: 930  Total time in clinic (min): 45 minutes    Subjective: Patient notes that she is feeling good with no episodes or \"blips\" of dizziness since las session.       Objective: See treatment diary below    Positional testing   Sherman-Hallpike   Left posterior canal: WNL  Right posterior canal: symptomatic, torsional and up-beating; Duration: <10 (seconds)      Assessment: Tolerated treatment well. [place] Hallpike was performed and demonstrated positive R canalithiasis BPPV with fatiguing up-beating nystagmus. CRT was performed 3x with improvement in symptoms and nystagmus compared to initial testing this date. No nausea throughout session. Patient would benefit from continued PT      Plan: Continue per plan of care.      Precautions: cervical spine pain, osteoporosis, thyroid disorder, lumbar pain       Manuals 7/15 7/23 7/29 8/9                                                              Neuro Re-Ed             CRT 2x Eply  2x Epley     1x Li     All for R PC  2x Epley     1x Li     All for R PC  2x R Epley     1x Li R    All for R PC                                                                                        Ther Ex                                                                                                                     Ther Activity                                       Gait Training                                       Modalities                                                "

## 2024-08-13 ENCOUNTER — OFFICE VISIT (OUTPATIENT)
Dept: PHYSICAL THERAPY | Facility: CLINIC | Age: 74
End: 2024-08-13
Payer: COMMERCIAL

## 2024-08-13 DIAGNOSIS — R42 DIZZINESS: Primary | ICD-10-CM

## 2024-08-13 DIAGNOSIS — R42 DYSEQUILIBRIUM: ICD-10-CM

## 2024-08-13 PROCEDURE — 97112 NEUROMUSCULAR REEDUCATION: CPT | Performed by: PHYSICAL THERAPIST

## 2024-08-13 NOTE — PROGRESS NOTES
Daily Note     Today's date: 2024  Patient name: Ginger Chicas  : 1950  MRN: [id number]  Referring provider: Keegan Ruff MD  Dx:   Encounter Diagnosis     ICD-10-CM    1. Dizziness  R42       2. Dysequilibrium  R42                 Start Time: 0815  Stop Time: 0900  Total time in clinic (min): 45 minutes    Subjective: Patient notes that she has had no dizziness since last session and overall is feeling good.       Objective: See treatment diary below    Positional testing   Sherman-Hallpike   Left posterior canal: WNL  Right posterior canal: symptomatic, torsional and up-beating; Duration: <10 (seconds)      Assessment: Tolerated treatment well. [place] Hallpike was performed and demonstrated positive R canalithiasis BPPV with fatiguing up-beating nystagmus. CRT was performed 3x with improvement in symptoms and nystagmus compared to initial testing this date. No nausea throughout session. Patient would benefit from continued PT      Plan: Continue per plan of care.      Precautions: cervical spine pain, osteoporosis, thyroid disorder, lumbar pain       Manuals 7/15 7/23 7/29 8/9  8/13                                                            Neuro Re-Ed             CRT 2x Eply  2x Epley     1x Li     All for R PC  2x Epley     1x Li     All for R PC  2x R Epley     1x Li R    All for R PC  2x R Epley     1x Li R    All for R PC                                                                                      Ther Ex                                                                                                                     Ther Activity                                       Gait Training                                       Modalities

## 2024-08-23 ENCOUNTER — OFFICE VISIT (OUTPATIENT)
Dept: PHYSICAL THERAPY | Facility: CLINIC | Age: 74
End: 2024-08-23
Payer: COMMERCIAL

## 2024-08-23 DIAGNOSIS — R42 DIZZINESS: Primary | ICD-10-CM

## 2024-08-23 DIAGNOSIS — R42 DYSEQUILIBRIUM: ICD-10-CM

## 2024-08-23 PROCEDURE — 97112 NEUROMUSCULAR REEDUCATION: CPT | Performed by: PHYSICAL THERAPIST

## 2024-08-23 NOTE — PROGRESS NOTES
Daily Note     Today's date: 2024  Patient name: Ginger Chicas  : 1950  MRN: [id number]  Referring provider: Keegan Ruff MD  Dx:   Encounter Diagnosis     ICD-10-CM    1. Dizziness  R42       2. Dysequilibrium  R42           Start Time: 0800  Stop Time: 0845  Total time in clinic (min): 45 minutes    Subjective: Patient reports that she has no dizziness since last session and has been feeling good.       Objective: See treatment diary below    Positional testing   Sherman-Hallpike   Left posterior canal: WNL  Right posterior canal: symptomatic, torsional and up-beating; Duration: 4-5 beats       Assessment: Tolerated treatment well. [place] Hallpike was performed and demonstrated positive R canalithiasis BPPV with fatiguing up-beating nystagmus. CRT was performed 4x for PC. Mild nystagmus on re-test.  No nausea throughout session. Patient would benefit from continued PT      Plan: Continue per plan of care.      Precautions: cervical spine pain, osteoporosis, thyroid disorder, lumbar pain       Manuals 7/15 7/23 7/29 8/9  8/13 8/22                                                           Neuro Re-Ed             CRT 2x Eply  2x Epley     1x Li     All for R PC  2x Epley     1x Li     All for R PC  2x R Epley     1x Li R    All for R PC  2x R Epley     1x Li R    All for R PC 2x R Epley     1x Li R    All for R PC                                                                                     Ther Ex                                                                                                                     Ther Activity                                       Gait Training                                       Modalities

## 2024-08-29 ENCOUNTER — OFFICE VISIT (OUTPATIENT)
Dept: PHYSICAL THERAPY | Facility: CLINIC | Age: 74
End: 2024-08-29
Payer: COMMERCIAL

## 2024-08-29 DIAGNOSIS — R42 DYSEQUILIBRIUM: ICD-10-CM

## 2024-08-29 DIAGNOSIS — R42 DIZZINESS: Primary | ICD-10-CM

## 2024-08-29 PROCEDURE — 97112 NEUROMUSCULAR REEDUCATION: CPT | Performed by: PHYSICAL THERAPIST

## 2024-08-29 PROCEDURE — 95992 CANALITH REPOSITIONING PROC: CPT | Performed by: PHYSICAL THERAPIST

## 2024-08-29 NOTE — PROGRESS NOTES
Daily Note     Today's date: 2024  Patient name: Ginger Chicas  : 1950  MRN: [id number]  Referring provider: Keegan Ruff MD  Dx:   Encounter Diagnosis     ICD-10-CM    1. Dizziness  R42       2. Dysequilibrium  R42                    Subjective: patient notes she is better overall but continues to have vertigo when lying down - less intense and shorter duration when at home      Objective: See treatment diary below      Assessment: Tolerated treatment well. Patient exhibited good technique with therapeutic exercises and would benefit from continued PT.  [place] Hallpike positive on the on first two Epley maneuvers; strong reaction when sitting up on second Epley; held on treatment after that due to nausea      Plan: Progress treatment as tolerated.       Precautions: cervical spine pain, osteoporosis, thyroid disorder, lumbar pain       Manuals 7/15 7/23 7/29 8/9  8/13 8/22 8/29                                                          Neuro Re-Ed             CRT 2x Eply  2x Epley     1x Li     All for R PC  2x Epley     1x Li     All for R PC  2x R Epley     1x Li R    All for R PC  2x R Epley     1x Li R    All for R PC 2x R Epley     1x Li R    All for R PC 2x Epley Right                                                                                     Ther Ex                                                                                                                     Ther Activity                                       Gait Training                                       Modalities

## 2024-09-03 ENCOUNTER — APPOINTMENT (OUTPATIENT)
Dept: LAB | Facility: CLINIC | Age: 74
End: 2024-09-03
Payer: COMMERCIAL

## 2024-09-03 ENCOUNTER — APPOINTMENT (OUTPATIENT)
Dept: RADIOLOGY | Age: 74
End: 2024-09-03
Payer: COMMERCIAL

## 2024-09-03 DIAGNOSIS — J45.30 MILD PERSISTENT ASTHMA WITHOUT COMPLICATION: ICD-10-CM

## 2024-09-03 LAB
ALBUMIN SERPL BCG-MCNC: 4.1 G/DL (ref 3.5–5)
ALP SERPL-CCNC: 58 U/L (ref 34–104)
ALT SERPL W P-5'-P-CCNC: 36 U/L (ref 7–52)
ANION GAP SERPL CALCULATED.3IONS-SCNC: 10 MMOL/L (ref 4–13)
AST SERPL W P-5'-P-CCNC: 35 U/L (ref 13–39)
BASOPHILS # BLD AUTO: 0.04 THOUSANDS/ÂΜL (ref 0–0.1)
BASOPHILS NFR BLD AUTO: 1 % (ref 0–1)
BILIRUB SERPL-MCNC: 0.71 MG/DL (ref 0.2–1)
BUN SERPL-MCNC: 16 MG/DL (ref 5–25)
CALCIUM SERPL-MCNC: 9.8 MG/DL (ref 8.4–10.2)
CHLORIDE SERPL-SCNC: 106 MMOL/L (ref 96–108)
CHOLEST SERPL-MCNC: 179 MG/DL
CO2 SERPL-SCNC: 26 MMOL/L (ref 21–32)
CREAT SERPL-MCNC: 0.93 MG/DL (ref 0.6–1.3)
EOSINOPHIL # BLD AUTO: 0.1 THOUSAND/ÂΜL (ref 0–0.61)
EOSINOPHIL NFR BLD AUTO: 2 % (ref 0–6)
ERYTHROCYTE [DISTWIDTH] IN BLOOD BY AUTOMATED COUNT: 12.7 % (ref 11.6–15.1)
GFR SERPL CREATININE-BSD FRML MDRD: 61 ML/MIN/1.73SQ M
GLUCOSE P FAST SERPL-MCNC: 99 MG/DL (ref 65–99)
HCT VFR BLD AUTO: 41.6 % (ref 34.8–46.1)
HDLC SERPL-MCNC: 53 MG/DL
HGB BLD-MCNC: 13.6 G/DL (ref 11.5–15.4)
IMM GRANULOCYTES # BLD AUTO: 0.02 THOUSAND/UL (ref 0–0.2)
IMM GRANULOCYTES NFR BLD AUTO: 0 % (ref 0–2)
LDLC SERPL CALC-MCNC: 99 MG/DL (ref 0–100)
LYMPHOCYTES # BLD AUTO: 1.58 THOUSANDS/ÂΜL (ref 0.6–4.47)
LYMPHOCYTES NFR BLD AUTO: 33 % (ref 14–44)
MCH RBC QN AUTO: 30.2 PG (ref 26.8–34.3)
MCHC RBC AUTO-ENTMCNC: 32.7 G/DL (ref 31.4–37.4)
MCV RBC AUTO: 92 FL (ref 82–98)
MONOCYTES # BLD AUTO: 0.29 THOUSAND/ÂΜL (ref 0.17–1.22)
MONOCYTES NFR BLD AUTO: 6 % (ref 4–12)
NEUTROPHILS # BLD AUTO: 2.78 THOUSANDS/ÂΜL (ref 1.85–7.62)
NEUTS SEG NFR BLD AUTO: 58 % (ref 43–75)
NONHDLC SERPL-MCNC: 126 MG/DL
NRBC BLD AUTO-RTO: 0 /100 WBCS
PLATELET # BLD AUTO: 311 THOUSANDS/UL (ref 149–390)
PMV BLD AUTO: 10.6 FL (ref 8.9–12.7)
POTASSIUM SERPL-SCNC: 4.7 MMOL/L (ref 3.5–5.3)
PROT SERPL-MCNC: 6.5 G/DL (ref 6.4–8.4)
RBC # BLD AUTO: 4.5 MILLION/UL (ref 3.81–5.12)
SODIUM SERPL-SCNC: 142 MMOL/L (ref 135–147)
TRIGL SERPL-MCNC: 133 MG/DL
TSH SERPL DL<=0.05 MIU/L-ACNC: 2.85 UIU/ML (ref 0.45–4.5)
WBC # BLD AUTO: 4.81 THOUSAND/UL (ref 4.31–10.16)

## 2024-09-03 PROCEDURE — 71046 X-RAY EXAM CHEST 2 VIEWS: CPT

## 2024-09-06 ENCOUNTER — RA CDI HCC (OUTPATIENT)
Dept: OTHER | Facility: HOSPITAL | Age: 74
End: 2024-09-06

## 2024-09-09 ENCOUNTER — OFFICE VISIT (OUTPATIENT)
Dept: PHYSICAL THERAPY | Facility: CLINIC | Age: 74
End: 2024-09-09
Payer: COMMERCIAL

## 2024-09-09 DIAGNOSIS — R42 DYSEQUILIBRIUM: ICD-10-CM

## 2024-09-09 DIAGNOSIS — R42 DIZZINESS: Primary | ICD-10-CM

## 2024-09-09 PROCEDURE — 97112 NEUROMUSCULAR REEDUCATION: CPT | Performed by: PHYSICAL THERAPIST

## 2024-09-13 ENCOUNTER — OFFICE VISIT (OUTPATIENT)
Dept: FAMILY MEDICINE CLINIC | Facility: CLINIC | Age: 74
End: 2024-09-13
Payer: COMMERCIAL

## 2024-09-13 VITALS
DIASTOLIC BLOOD PRESSURE: 70 MMHG | WEIGHT: 191 LBS | HEIGHT: 65 IN | RESPIRATION RATE: 18 BRPM | TEMPERATURE: 97.7 F | OXYGEN SATURATION: 96 % | SYSTOLIC BLOOD PRESSURE: 140 MMHG | HEART RATE: 86 BPM | BODY MASS INDEX: 31.82 KG/M2

## 2024-09-13 DIAGNOSIS — E72.11 HYPERHOMOCYSTEINEMIA (HCC): ICD-10-CM

## 2024-09-13 DIAGNOSIS — E55.9 VITAMIN D DEFICIENCY: ICD-10-CM

## 2024-09-13 DIAGNOSIS — J45.30 MILD PERSISTENT ASTHMA WITHOUT COMPLICATION: ICD-10-CM

## 2024-09-13 DIAGNOSIS — K76.0 FATTY LIVER: ICD-10-CM

## 2024-09-13 DIAGNOSIS — E78.2 MIXED HYPERLIPIDEMIA: Primary | ICD-10-CM

## 2024-09-13 DIAGNOSIS — E03.9 ACQUIRED HYPOTHYROIDISM: ICD-10-CM

## 2024-09-13 DIAGNOSIS — N18.31 STAGE 3A CHRONIC KIDNEY DISEASE (HCC): ICD-10-CM

## 2024-09-13 DIAGNOSIS — M81.0 AGE-RELATED OSTEOPOROSIS WITHOUT CURRENT PATHOLOGICAL FRACTURE: ICD-10-CM

## 2024-09-13 PROBLEM — Z86.16 HISTORY OF COVID-19: Status: RESOLVED | Noted: 2021-10-06 | Resolved: 2024-09-13

## 2024-09-13 PROCEDURE — G2211 COMPLEX E/M VISIT ADD ON: HCPCS | Performed by: FAMILY MEDICINE

## 2024-09-13 PROCEDURE — 99214 OFFICE O/P EST MOD 30 MIN: CPT | Performed by: FAMILY MEDICINE

## 2024-09-13 RX ORDER — KETOCONAZOLE 20 MG/G
CREAM TOPICAL
COMMUNITY
Start: 2024-07-03

## 2024-09-13 RX ORDER — ALBUTEROL SULFATE 90 UG/1
AEROSOL, METERED RESPIRATORY (INHALATION)
Qty: 18 G | Refills: 3 | Status: SHIPPED | OUTPATIENT
Start: 2024-09-13

## 2024-09-13 NOTE — PROGRESS NOTES
Ambulatory Visit  Name: Ginger Chicas      : 1950      MRN: [id number]  Encounter Provider: Keegan Ruff MD  Encounter Date: 2024   Encounter department: [facility]    Assessment & Plan  Mixed hyperlipidemia    Orders:    Comprehensive metabolic panel    Lipid panel    Stage 3a chronic kidney disease (HCC)  Lab Results   Component Value Date    EGFR 61 2024    EGFR 57 01/15/2024    EGFR 51 2023    CREATININE 0.93 2024    CREATININE 0.99 01/15/2024    CREATININE 1.09 2023            Hyperhomocysteinemia (HCC)    Orders:    Homocysteine; Future    Mild persistent asthma without complication    Orders:    CBC and differential    albuterol (Ventolin HFA) 90 mcg/act inhaler; 2 puffs Q 6 hours prn cough/wheezing    Fatty liver         Acquired hypothyroidism    Orders:    TSH, 3rd generation with Free T4 reflex    Age-related osteoporosis without current pathological fracture         Vitamin D deficiency    Orders:    Vitamin D 25 hydroxy      Continue with current medications.  Stay hydrated.  No NSAIDs.  Office visit 6 months with repeat labs.    CT scan abdomen and pelvis.    Sniff test.    Flu vaccine/COVID-19 vaccine recommended.       History of Present Illness       Follow-up visit for chronic medical problems.  Medications reviewed..    Hyperlipidemia mixed type on Fenofibrate 160 mg daily. 2024 lipid profile cholesterol 179.  Triglycerides 133.  HDL 53.  LDL 99. FBS 99.     CKD stage 2/3.  2024 creatinine 0.93.  GFR 61 electrolytes normal.  Hemoglobin 13.6. 2024 renal ultrasound.  Multiple ill-defined echogenic masses along the right kidney grossly stable in size from ultrasound 3/2024.  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 2024 creatinine 0.99. GFR 57.. Mg++ 2.1. Phosphorus 2.5. PTH 51. 0. Urine protein creatinine ratio normal at 0.06. 2023 creatinine 1.09. GFR 51. 2022 creatinine 1.10. GFR 50.     2023  Echogenic lesions in the right kidney for which the midpole superior portion lesion has increased in size. Mild post void urinary bladder volume. PVR 60.1     07/2020 renal u/s 1 large or multiple confluent angiomyolipomas of the right kidney with an increase in size of one of the measured components.  No development of hydronephrosis, calculus or fluid collection.  Left kidney is unremarkable      Recent Results (from the past 672 hour(s))   Comprehensive metabolic panel    Collection Time: 09/03/24  8:01 AM   Result Value Ref Range    Sodium 142 135 - 147 mmol/L    Potassium 4.7 3.5 - 5.3 mmol/L    Chloride 106 96 - 108 mmol/L    CO2 26 21 - 32 mmol/L    ANION GAP 10 4 - 13 mmol/L    BUN 16 5 - 25 mg/dL    Creatinine 0.93 0.60 - 1.30 mg/dL    Glucose, Fasting 99 65 - 99 mg/dL    Calcium 9.8 8.4 - 10.2 mg/dL    AST 35 13 - 39 U/L    ALT 36 7 - 52 U/L    Alkaline Phosphatase 58 34 - 104 U/L    Total Protein 6.5 6.4 - 8.4 g/dL    Albumin 4.1 3.5 - 5.0 g/dL    Total Bilirubin 0.71 0.20 - 1.00 mg/dL    eGFR 61 ml/min/1.73sq m   Lipid panel    Collection Time: 09/03/24  8:01 AM   Result Value Ref Range    Cholesterol 179 See Comment mg/dL    Triglycerides 133 See Comment mg/dL    HDL, Direct 53 >=50 mg/dL    LDL Calculated 99 0 - 100 mg/dL    Non-HDL-Chol (CHOL-HDL) 126 mg/dl   TSH, 3rd generation with Free T4 reflex    Collection Time: 09/03/24  8:01 AM   Result Value Ref Range    TSH 3RD GENERATON 2.847 0.450 - 4.500 uIU/mL   CBC and differential    Collection Time: 09/03/24  8:01 AM   Result Value Ref Range    WBC 4.81 4.31 - 10.16 Thousand/uL    RBC 4.50 3.81 - 5.12 Million/uL    Hemoglobin 13.6 11.5 - 15.4 g/dL    Hematocrit 41.6 34.8 - 46.1 %    MCV 92 82 - 98 fL    MCH 30.2 26.8 - 34.3 pg    MCHC 32.7 31.4 - 37.4 g/dL    RDW 12.7 11.6 - 15.1 %    MPV 10.6 8.9 - 12.7 fL    Platelets 311 149 - 390 Thousands/uL    nRBC 0 /100 WBCs    Segmented % 58 43 - 75 %    Immature Grans % 0 0 - 2 %    Lymphocytes % 33 14 - 44  %    Monocytes % 6 4 - 12 %    Eosinophils Relative 2 0 - 6 %    Basophils Relative 1 0 - 1 %    Absolute Neutrophils 2.78 1.85 - 7.62 Thousands/µL    Absolute Immature Grans 0.02 0.00 - 0.20 Thousand/uL    Absolute Lymphocytes 1.58 0.60 - 4.47 Thousands/µL    Absolute Monocytes 0.29 0.17 - 1.22 Thousand/µL    Eosinophils Absolute 0.10 0.00 - 0.61 Thousand/µL    Basophils Absolute 0.04 0.00 - 0.10 Thousands/µL         Review of Systems   Constitutional:  Positive for unexpected weight change (18 lb weight gain from 07/2024). Negative for appetite change, chills, fatigue and fever.   HENT:  Negative for congestion, ear pain, rhinorrhea, sinus pain, sore throat and trouble swallowing.         Upcoming dental work including implants.  Fosamax stopped 3 weeks ago   Eyes:  Negative for visual disturbance.   Respiratory:  Negative for cough, shortness of breath and wheezing.         Asthma stable on Breo 100/25 daily. Symptoms improved since removing carpeting in her house. S/p COV 19 infection 12/2023 9/2024 chest x-ray normal except for eventration of right hemidiaphragm unchanged from 3/2023.    02/2020 PFTs no large airway obstruction or bronchodilator responsiveness.  Decreased forced vital capacity and total lung capacity consistent with mild restrictive lung disease.  Mild diffuse impairment.      01/2026  CT scan chest  normal except for small hiatal hernia.   Cardiovascular:  Negative for chest pain, palpitations and leg swelling.        Episodes of bradycardia during  Colonoscopy 01/2023. EKG 02/2023 NSR. T wave inversions V1-V3. 48 hour Holter The patient was in sinus rhythm throughout the duration of the study with an average heart rate 77 bpm. No ventricular and rare supraventricular ectopic activity. No atrial fibrillation or atrial flutter. There was approximately 1 hour 36 minutes of bradycardia.  The slowest single episode occurred at 12:20 AM with a minimum heart rate 52 bpm. There was no evidence of  heart block and no significant pauses were seen. No symptoms reported.   Gastrointestinal:  Negative for abdominal pain, blood in stool, constipation, diarrhea, nausea and vomiting.         Fatty liver 09/2024  LFTs normal.     Small hiatal hernia on CT scan. Infrequent reflux symptoms.       IBS diarrhea.  Stable on probiotics. Colonoscopy 01/2023    Endocrine:        Hypothyroidism on Levothyroxine 50 mcg daily.     Osteoporosis on Fosamax 70 mg weekly  no longer on Prolia . 3/2024 DEXA scan osteoporosis left femoral neck no statistical changes from 3/2022.  Prior intolerance to Actonel. 04/2022 Vitamin-D level 45.4.  On vitamin D 5,000 IU daily.         Lab Results       Component                Value               Date                       [id number]             2.847               09/03/2024                                                                     Genitourinary:  Negative for difficulty urinating.        See HPI History of recurrent UTIs on Macrodantin 50 mg every other day at . She is followed by Urology.. Pap smear 09/2016   Musculoskeletal:  Negative for arthralgias and myalgias.        Several week history of recurrent right shoulder pain right-handed.  No specific injury or trauma.  Patient's symptoms started when she tried to reach for something behind her.   Skin:  Negative for rash.   Allergic/Immunologic: Positive for environmental allergies.        See HPI 02/2020 allergy testing + mold allergy.   Neurological:  Negative for dizziness and headaches.   Hematological:  Negative for adenopathy. Does not bruise/bleed easily.        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  No history of venous thrombosis. 01/2024  homocysteine mildly elevated at 11.7  .  Lab Results       Component                Value               Date                       WBC                      4.81                09/03/2024                 HGB                      13.6                09/03/2024                  HCT                      41.6                2024                 MCV                      92                  2024                 PLT                      311                 2024                           Psychiatric/Behavioral:  Negative for dysphoric mood and sleep disturbance. The patient is not hyperactive.      Past Medical History:   Diagnosis Date    Acute asthma exacerbation     last assessed 8/15/17    Asthma     Cataract     Disease of thyroid gland     Hypertriglyceridemia     Osteoporosis     Right lumbar radiculopathy 12/15/2016    Urinary tract infection     Vertigo      Past Surgical History:   Procedure Laterality Date    APPENDECTOMY  1979    BREAST BIOPSY      CATARACT EXTRACTION      HAND SURGERY Right     thumb    LAPAROSCOPY      MAMMO STEREOTACTIC BREAST BIOPSY LEFT (ALL INC) Left 2022     Family History   Problem Relation Age of Onset    Cirrhosis Mother     Kidney nephrosis Mother     Hypertension Father     Other Father 91        extra mammary Padget's dx    Cancer Paternal Grandmother 80        unknown type    Heart disease Paternal Grandfather     Heart attack Paternal Grandfather     Kidney nephrosis Daughter     Osteoporosis Maternal Grandmother     Arthritis Maternal Grandmother     Heart attack Maternal Grandfather     Heart disease Maternal Grandfather     Kidney nephrosis Daughter     Lymphoma Maternal Aunt 70    No Known Problems Paternal Aunt     No Known Problems Paternal Aunt     No Known Problems Paternal Aunt      Social History     Tobacco Use    Smoking status: Former     Current packs/day: 0.00     Average packs/day: 0.3 packs/day for 4.0 years (1.0 ttl pk-yrs)     Types: Cigarettes     Start date: 1975     Quit date: 1979     Years since quittin.7    Smokeless tobacco: Never    Tobacco comments:         Vaping Use    Vaping status: Never Used   Substance and Sexual Activity    Alcohol use: No    Drug use: No     Sexual activity: Not Currently     Current Outpatient Medications on File Prior to Visit   Medication Sig    albuterol (2.5 mg/3 mL) 0.083 % nebulizer solution Take 3 mL (2.5 mg total) by nebulization every 6 (six) hours as needed for wheezing or shortness of breath    alendronate (FOSAMAX) 70 mg tablet Take 1 tablet (70 mg total) by mouth every 7 days Take on an empty stomach with a full glass of water, and do not lie down for 30 minutes after    aspirin 81 mg chewable tablet Take 1 tablet every day by oral route.    Breo Ellipta 100-25 MCG/ACT inhaler Inhale 1 puff every morning Rinse mouth after using inhaler.    Cholecalciferol 4000 units CAPS Take 1 capsule by mouth daily    Coenzyme Q10 (CO Q-10) 30 MG CAPS Take 100 mg by mouth daily 2capsules daily    Coenzyme Q10-levOCARNitine (CO Q-10 PLUS PO)     Diclofenac Sodium (VOLTAREN) 1 % Apply 2 g topically 4 (four) times a day As needed for joint pain    fenofibrate 160 MG tablet Take 1 tablet (160 mg total) by mouth daily    ketoconazole (NIZORAL) 2 % cream APPLY TO AFFECTED AREA(S) TWO TIMES DAILY    L-Methylfolate-B6-B12 (Elfolate Plus) 3-35-2 MG TABS Take 2 tablets by mouth daily    levothyroxine 50 mcg tablet Take 1 tablet (50 mcg total) by mouth daily    nitrofurantoin (MACRODANTIN) 50 mg capsule Take 1 capsule by mouth daily     triamcinolone (KENALOG) 0.1 % cream Apply topically 2 (two) times a day (Patient taking differently: Apply topically 2 (two) times a day PRN)    VITAMIN D PO     [DISCONTINUED] albuterol (Ventolin HFA) 90 mcg/act inhaler 2 puffs Q 6 hours prn cough/wheezing    cyclobenzaprine (FLEXERIL) 5 mg tablet Take 1 tablet (5 mg total) by mouth daily at bedtime (Patient not taking: Reported on 7/24/2024)    [DISCONTINUED] Fluticasone-Salmeterol (Advair Diskus) 100-50 mcg/dose inhaler Inhale 1 puff daily in the early morning Rinse mouth after use. (Patient not taking: Reported on 9/5/2024)    [DISCONTINUED] meclizine (ANTIVERT) 12.5 MG tablet  "Take 1 tablet (12.5 mg total) by mouth every 8 (eight) hours as needed for dizziness (Patient not taking: Reported on 7/24/2024)     Allergies   Allergen Reactions    Penicillins      Shown on allergy testing.    Codeine Anxiety    Dust Mite Extract     Molds & Smuts     Pseudoephedrine     Rabbit Epithelium     Sulfa Antibiotics Hives     Immunization History   Administered Date(s) Administered    COVID-19 MODERNA VACC 0.5 ML IM 01/27/2021, 02/24/2021, 04/14/2022    COVID-19 Moderna Vac BIVALENT 12 Yr+ IM 0.5 ML 10/20/2022    COVID-19 Moderna mRNA Vaccine 12 Yr+ 50 mcg/0.5 mL (Spikevax) 11/03/2023    INFLUENZA 12/10/2015, 10/19/2016, 10/26/2017, 10/20/2018, 10/20/2021, 10/17/2022, 10/04/2023    Influenza Quadrivalent, 6-35 Months IM 10/29/2014, 12/10/2015    Influenza Split High Dose Preservative Free IM 10/19/2016, 10/26/2017, 10/20/2018, 10/07/2019    Influenza, high dose seasonal 0.7 mL 09/30/2020    Influenza, seasonal, injectable 11/04/2013    Pneumococcal Conjugate 13-Valent 10/19/2016    Pneumococcal Polysaccharide PPV23 10/26/2017    Zoster 10/22/2020    Zoster Vaccine Recombinant 10/22/2020, 01/06/2021     Objective       /70 (BP Location: Left arm, Patient Position: Sitting, Cuff Size: Standard)   Pulse 86   Temp 97.7 °F (36.5 °C)   Resp 18   Ht 5' 5\" (1.651 m)   Wt 86.6 kg (191 lb)   SpO2 96%   BMI 31.78 kg/m²      BP Readings from Last 3 Encounters:   09/13/24 140/70   09/05/24 124/60   07/24/24 138/57     Wt Readings from Last 3 Encounters:   09/13/24 86.6 kg (191 lb)   09/05/24 86.2 kg (190 lb)   07/24/24 78.5 kg (173 lb)           Physical Exam  Constitutional:       General: She is not in acute distress.  HENT:      Right Ear: Tympanic membrane and ear canal normal.      Left Ear: Tympanic membrane and ear canal normal.      Mouth/Throat:      Mouth: No oral lesions.      Pharynx: Oropharynx is clear.   Eyes:      Conjunctiva/sclera: Conjunctivae normal.   Neck:      Thyroid: No thyroid " mass or thyromegaly.      Vascular: Normal carotid pulses. No carotid bruit or JVD.   Cardiovascular:      Rate and Rhythm: Normal rate and regular rhythm.      Heart sounds: No murmur heard.     No gallop.   Pulmonary:      Effort: No respiratory distress.      Breath sounds: No wheezing or rales.   Lymphadenopathy:      Cervical: No cervical adenopathy.      Upper Body:      Right upper body: No supraclavicular adenopathy.      Left upper body: No supraclavicular adenopathy.   Skin:     Coloration: Skin is not cyanotic.      Findings: No rash.      Nails: There is no clubbing.   Neurological:      Mental Status: She is alert and oriented to person, place, and time.

## 2024-09-13 NOTE — ASSESSMENT & PLAN NOTE
Orders:    Vitamin D 25 hydroxy      Continue with current medications.  Stay hydrated.  No NSAIDs.  Office visit 6 months with repeat labs.    CT scan abdomen and pelvis.    Sniff test.    Flu vaccine/COVID-19 vaccine recommended.

## 2024-09-13 NOTE — ASSESSMENT & PLAN NOTE
Lab Results   Component Value Date    EGFR 61 09/03/2024    EGFR 57 01/15/2024    EGFR 51 06/02/2023    CREATININE 0.93 09/03/2024    CREATININE 0.99 01/15/2024    CREATININE 1.09 06/02/2023

## 2024-09-13 NOTE — ASSESSMENT & PLAN NOTE
Orders:    CBC and differential    albuterol (Ventolin HFA) 90 mcg/act inhaler; 2 puffs Q 6 hours prn cough/wheezing

## 2024-09-16 ENCOUNTER — OFFICE VISIT (OUTPATIENT)
Dept: PHYSICAL THERAPY | Facility: CLINIC | Age: 74
End: 2024-09-16
Payer: COMMERCIAL

## 2024-09-16 DIAGNOSIS — R42 DYSEQUILIBRIUM: ICD-10-CM

## 2024-09-16 DIAGNOSIS — R42 DIZZINESS: Primary | ICD-10-CM

## 2024-09-16 PROCEDURE — 97112 NEUROMUSCULAR REEDUCATION: CPT | Performed by: PHYSICAL THERAPIST

## 2024-09-16 NOTE — PROGRESS NOTES
Daily Note     Today's date: 2024  Patient name: Ginger Chicas  : 1950  MRN: [id number]  Referring provider: Keegan Ruff MD  Dx:   Encounter Diagnosis     ICD-10-CM    1. Dizziness  R42       2. Dysequilibrium  R42             Start Time: 1400  Stop Time: 1445  Total time in clinic (min): 45 minutes    Subjective: Patient reports she has had no dizziness since last session.       Objective: See treatment diary below    Positional testing   [place]-Hallpike   Left posterior canal: WNL  Right posterior canal: symptomatic, torsional and up-beating; 2-3x beats very minimal     Assessment: Tolerated treatment well. [place] Hallpike was performed and demonstrated positive R canalithiasis BPPV. Nystagmus was torsional and up-beating however very mild this date compared to previous session. CRT was performed 4x with no nausea throughout. Re-test demonstrated 1-2 beats and no nystagmus with the rest of the Eply positions. No dizziness at end of session. Will follow up next week. Patient exhibited good technique with therapeutic exercises and would benefit from continued PT.        Plan: Progress treatment as tolerated.       Precautions: cervical spine pain, osteoporosis, thyroid disorder, lumbar pain       Manuals 7/15 7/23 7/29 8/9  8/13 8/22 8/29 9/9 9/16                                                        Neuro Re-Ed             CRT 2x Eply  2x Epley     1x Li     All for R PC  2x Epley     1x Li     All for R PC  2x R Epley     1x Li R    All for R PC  2x R Epley     1x Li R    All for R PC 2x R Epley     1x Li R    All for R PC 2x Epley Right  2x Epley Right  3x R Epley     1x Li R    All for R PC                                                                                  Ther Ex                                                                                                                     Ther Activity                                       Gait Training                                        Modalities

## 2024-09-24 NOTE — PROGRESS NOTES
Ambulatory Visit  Name: Ginger Chicas      : 1950      MRN: [id number]  Encounter Provider: Keegan Ruff MD  Encounter Date: 2024   Encounter department: [facility]    Assessment & Plan  Hoarseness         Non-seasonal allergic rhinitis due to fungal spores         It band syndrome, left    Orders:    Ambulatory Referral to Physical Therapy; Future      Trial of OTC antihistamine such as Claritin or Zyrtec 10 mg daily.  Consider trial of oral steroids/ENT evaluation for persistent symptoms.    PT referral for L IT band syndrome       History of Present Illness         Patient presents with a 2-week history of hoarseness.  No pain or difficulty swallowing. + Nasal congestion.  Status post implant 2 weeks ago she completed a Z-Rui.  She stopped using her Breo inhaler 1 week ago.  No reflux symptoms.  Non-smoker.    Hyperlipidemia mixed type on Fenofibrate 160 mg daily. 2024 lipid profile cholesterol 179.  Triglycerides 133.  HDL 53.  LDL 99. FBS 99.     CKD stage 2/3.  2024 creatinine 0.93.  GFR 61 electrolytes normal.  Hemoglobin 13.6. 2024 renal ultrasound.  Multiple ill-defined echogenic masses along the right kidney grossly stable in size from ultrasound 3/2024.  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 2024 creatinine 0.99. GFR 57.. Mg++ 2.1. Phosphorus 2.5. PTH 51. 0. Urine protein creatinine ratio normal at 0.06. 2023 creatinine 1.09. GFR 51. 2022 creatinine 1.10. GFR 50.     2023 Echogenic lesions in the right kidney for which the midpole superior portion lesion has increased in size. Mild post void urinary bladder volume. 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  No development of hydronephrosis, calculus or fluid collection.  Left kidney is unremarkable      Review of Systems   Constitutional:  Negative for appetite  change, chills, fever and unexpected weight change.   HENT:  Positive for congestion and voice change. Negative for ear pain, rhinorrhea, sinus pain, sore throat and trouble swallowing.         See HPI    Respiratory:  Negative for cough and wheezing.         Asthma stable on Breo 100/25 daily. Symptoms improved since removing carpeting in her house. S/p COV 19 infection 12/2023 9/2024 chest x-ray normal except for eventration of right hemidiaphragm unchanged from 3/2023.    02/2020 PFTs no large airway obstruction or bronchodilator responsiveness.  Decreased forced vital capacity and total lung capacity consistent with mild restrictive lung disease.  Mild diffuse impairment.      01/2026  CT scan chest  normal except for small hiatal hernia.   Cardiovascular:  Negative for chest pain and palpitations.        Episodes of bradycardia during  Colonoscopy 01/2023. EKG 02/2023 NSR. T wave inversions V1-V3. 48 hour Holter The patient was in sinus rhythm throughout the duration of the study with an average heart rate 77 bpm. No ventricular and rare supraventricular ectopic activity. No atrial fibrillation or atrial flutter. There was approximately 1 hour 36 minutes of bradycardia.  The slowest single episode occurred at 12:20 AM with a minimum heart rate 52 bpm. There was no evidence of heart block and no significant pauses were seen. No symptoms reported.   Gastrointestinal:  Negative for diarrhea, nausea and vomiting.         Fatty liver 09/2024  LFTs normal.     Small hiatal hernia on CT scan.      IBS diarrhea.  Stable on probiotics. Colonoscopy 01/2023    Endocrine:        See HPI Hypothyroidism on Levothyroxine 50 mcg daily.     Osteoporosis on Fosamax 70 mg weekly  no longer on Prolia . 3/2024 DEXA scan osteoporosis left femoral neck no statistical changes from 3/2022.  Prior intolerance to Actonel. 04/2022 Vitamin-D level 45.4.  On vitamin D 5,000 IU daily.         Lab Results       Component                 Value               Date                       TOH2ODURCARK             2.847               09/03/2024                                                                     Genitourinary:         See HPI History of recurrent UTIs on Macrodantin 50 mg every other day at . She is followed by Urology.. Pap smear 09/2016   Musculoskeletal:  Negative for arthralgias and myalgias.        Recent pain left lateral hip/left lateral leg hamstring area.  No leg weakness or numbness..   Skin:  Negative for rash.   Allergic/Immunologic: Positive for environmental allergies.        See HPI 02/2020 allergy testing + mold allergy.   Neurological:  Negative for dizziness and headaches.   Hematological:  Negative for adenopathy.        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  No history of venous thrombosis. 01/2024  homocysteine mildly elevated at 11.7  .  Lab Results       Component                Value               Date                       WBC                      4.81                09/03/2024                 HGB                      13.6                09/03/2024                 HCT                      41.6                09/03/2024                 MCV                      92                  09/03/2024                 PLT                      311                 09/03/2024                           Psychiatric/Behavioral:  Negative for sleep disturbance.      Past Medical History:   Diagnosis Date    Acute asthma exacerbation     last assessed 8/15/17    Asthma     Cataract     Disease of thyroid gland     Hypertriglyceridemia     Osteoporosis     Right lumbar radiculopathy 12/15/2016    Urinary tract infection     Vertigo      Past Surgical History:   Procedure Laterality Date    APPENDECTOMY  1979    BREAST BIOPSY      CATARACT EXTRACTION      HAND SURGERY Right 2007    thumb    LAPAROSCOPY      MAMMO STEREOTACTIC BREAST BIOPSY LEFT (ALL INC) Left 04/05/2022     Family History   Problem Relation Age  of Onset    Cirrhosis Mother     Kidney nephrosis Mother     Hypertension Father     Other Father 91        extra mammary Padget's dx    Cancer Paternal Grandmother 80        unknown type    Heart disease Paternal Grandfather     Heart attack Paternal Grandfather     Kidney nephrosis Daughter     Osteoporosis Maternal Grandmother     Arthritis Maternal Grandmother     Heart attack Maternal Grandfather     Heart disease Maternal Grandfather     Kidney nephrosis Daughter     Lymphoma Maternal Aunt 70    No Known Problems Paternal Aunt     No Known Problems Paternal Aunt     No Known Problems Paternal Aunt      Social History     Tobacco Use    Smoking status: Former     Current packs/day: 0.00     Average packs/day: 0.3 packs/day for 4.0 years (1.0 ttl pk-yrs)     Types: Cigarettes     Start date: 1975     Quit date: 1979     Years since quittin.7    Smokeless tobacco: Never    Tobacco comments:         Vaping Use    Vaping status: Never Used   Substance and Sexual Activity    Alcohol use: No    Drug use: No    Sexual activity: Not Currently     Current Outpatient Medications on File Prior to Visit   Medication Sig    albuterol (2.5 mg/3 mL) 0.083 % nebulizer solution Take 3 mL (2.5 mg total) by nebulization every 6 (six) hours as needed for wheezing or shortness of breath    albuterol (Ventolin HFA) 90 mcg/act inhaler 2 puffs Q 6 hours prn cough/wheezing    alendronate (FOSAMAX) 70 mg tablet Take 1 tablet (70 mg total) by mouth every 7 days Take on an empty stomach with a full glass of water, and do not lie down for 30 minutes after    aspirin 81 mg chewable tablet Take 1 tablet every day by oral route.    Breo Ellipta 100-25 MCG/ACT inhaler Inhale 1 puff every morning Rinse mouth after using inhaler.    Cholecalciferol 4000 units CAPS Take 1 capsule by mouth daily    Coenzyme Q10 (CO Q-10) 30 MG CAPS Take 100 mg by mouth daily 2capsules daily    Coenzyme Q10-levOCARNitine (CO Q-10 PLUS PO)      cyclobenzaprine (FLEXERIL) 5 mg tablet Take 1 tablet (5 mg total) by mouth daily at bedtime (Patient not taking: Reported on 7/24/2024)    Diclofenac Sodium (VOLTAREN) 1 % Apply 2 g topically 4 (four) times a day As needed for joint pain    fenofibrate 160 MG tablet Take 1 tablet (160 mg total) by mouth daily    ketoconazole (NIZORAL) 2 % cream APPLY TO AFFECTED AREA(S) TWO TIMES DAILY    L-Methylfolate-B6-B12 (Elfolate Plus) 3-35-2 MG TABS Take 2 tablets by mouth daily    levothyroxine 50 mcg tablet Take 1 tablet (50 mcg total) by mouth daily    nitrofurantoin (MACRODANTIN) 50 mg capsule Take 1 capsule by mouth daily     triamcinolone (KENALOG) 0.1 % cream Apply topically 2 (two) times a day (Patient taking differently: Apply topically 2 (two) times a day PRN)    VITAMIN D PO      Allergies   Allergen Reactions    Penicillins      Shown on allergy testing.    Codeine Anxiety    Dust Mite Extract     Molds & Smuts     Pseudoephedrine     Rabbit Epithelium     Sulfa Antibiotics Hives     Immunization History   Administered Date(s) Administered    COVID-19 MODERNA VACC 0.5 ML IM 01/27/2021, 02/24/2021, 04/14/2022    COVID-19 Moderna Vac BIVALENT 12 Yr+ IM 0.5 ML 10/20/2022    COVID-19 Moderna mRNA Vaccine 12 Yr+ 50 mcg/0.5 mL (Spikevax) 11/03/2023    INFLUENZA 12/10/2015, 10/19/2016, 10/26/2017, 10/20/2018, 10/20/2021, 10/17/2022, 10/04/2023    Influenza Quadrivalent, 6-35 Months IM 10/29/2014, 12/10/2015    Influenza Split High Dose Preservative Free IM 10/19/2016, 10/26/2017, 10/20/2018, 10/07/2019    Influenza, high dose seasonal 0.7 mL 09/30/2020    Influenza, seasonal, injectable 11/04/2013    Pneumococcal Conjugate 13-Valent 10/19/2016    Pneumococcal Polysaccharide PPV23 10/26/2017    Zoster 10/22/2020    Zoster Vaccine Recombinant 10/22/2020, 01/06/2021     Objective       /70 (BP Location: Left arm, Patient Position: Sitting, Cuff Size: Standard)   Pulse 78   Temp 97.7 °F (36.5 °C)   Resp 18   Ht 5'  "5\" (1.651 m)   Wt 85.3 kg (188 lb)   SpO2 97%   BMI 31.28 kg/m²      BP Readings from Last 3 Encounters:   09/25/24 136/70   09/13/24 140/70   09/05/24 124/60     Wt Readings from Last 3 Encounters:   09/25/24 85.3 kg (188 lb)   09/13/24 86.6 kg (191 lb)   09/05/24 86.2 kg (190 lb)         Physical Exam  Constitutional:       General: She is not in acute distress.  HENT:      Right Ear: Tympanic membrane and ear canal normal.      Left Ear: Tympanic membrane and ear canal normal.      Nose:      Right Sinus: No maxillary sinus tenderness or frontal sinus tenderness.      Left Sinus: No maxillary sinus tenderness or frontal sinus tenderness.      Mouth/Throat:      Mouth: No oral lesions.      Pharynx: Posterior oropharyngeal erythema present.      Comments: Clear postnasal drainage  Eyes:      Conjunctiva/sclera: Conjunctivae normal.   Neck:      Thyroid: No thyroid mass, thyromegaly or thyroid tenderness.   Cardiovascular:      Rate and Rhythm: Normal rate and regular rhythm.      Heart sounds: No murmur heard.     No gallop.   Pulmonary:      Effort: No respiratory distress.      Breath sounds: No wheezing or rales.   Musculoskeletal:      Comments: Tenderness along left IT band.  Full range of motion of left hip.  Negative straight leg raise test.   Lymphadenopathy:      Cervical: No cervical adenopathy.   Skin:     Findings: No rash.   Neurological:      Mental Status: She is alert and oriented to person, place, and time.      Motor: No weakness.         "

## 2024-09-25 ENCOUNTER — OFFICE VISIT (OUTPATIENT)
Dept: FAMILY MEDICINE CLINIC | Facility: CLINIC | Age: 74
End: 2024-09-25
Payer: COMMERCIAL

## 2024-09-25 VITALS
WEIGHT: 188 LBS | HEIGHT: 65 IN | SYSTOLIC BLOOD PRESSURE: 136 MMHG | HEART RATE: 78 BPM | DIASTOLIC BLOOD PRESSURE: 70 MMHG | TEMPERATURE: 97.7 F | BODY MASS INDEX: 31.32 KG/M2 | OXYGEN SATURATION: 97 % | RESPIRATION RATE: 18 BRPM

## 2024-09-25 DIAGNOSIS — J30.89 NON-SEASONAL ALLERGIC RHINITIS DUE TO FUNGAL SPORES: ICD-10-CM

## 2024-09-25 DIAGNOSIS — R49.0 HOARSENESS: Primary | ICD-10-CM

## 2024-09-25 DIAGNOSIS — M76.32 IT BAND SYNDROME, LEFT: ICD-10-CM

## 2024-09-25 PROCEDURE — G2211 COMPLEX E/M VISIT ADD ON: HCPCS | Performed by: FAMILY MEDICINE

## 2024-09-25 PROCEDURE — 99214 OFFICE O/P EST MOD 30 MIN: CPT | Performed by: FAMILY MEDICINE

## 2024-09-25 RX ORDER — IBUPROFEN 600 MG/1
600 TABLET, FILM COATED ORAL
COMMUNITY
Start: 2024-09-17

## 2024-09-26 ENCOUNTER — TELEPHONE (OUTPATIENT)
Dept: FAMILY MEDICINE CLINIC | Facility: CLINIC | Age: 74
End: 2024-09-26

## 2024-09-26 ENCOUNTER — OFFICE VISIT (OUTPATIENT)
Dept: PHYSICAL THERAPY | Facility: CLINIC | Age: 74
End: 2024-09-26
Payer: COMMERCIAL

## 2024-09-26 DIAGNOSIS — R42 DYSEQUILIBRIUM: ICD-10-CM

## 2024-09-26 DIAGNOSIS — R42 DIZZINESS: Primary | ICD-10-CM

## 2024-09-26 DIAGNOSIS — R49.0 HOARSENESS: Primary | ICD-10-CM

## 2024-09-26 PROCEDURE — 97112 NEUROMUSCULAR REEDUCATION: CPT | Performed by: PHYSICAL THERAPIST

## 2024-09-26 RX ORDER — PREDNISONE 10 MG/1
10 TABLET ORAL 2 TIMES DAILY WITH MEALS
Qty: 10 TABLET | Refills: 0 | Status: SHIPPED | OUTPATIENT
Start: 2024-09-26 | End: 2024-10-01

## 2024-09-26 NOTE — TELEPHONE ENCOUNTER
Patient came into the office she spoke with her dentist and he said it is ok to take medication with the oral treatment she is getting please send medication to [facility] in [place].

## 2024-09-26 NOTE — PROGRESS NOTES
Re-Evaluation/Daily Note     Today's date: 2024  Patient name: Ginger Chicas  : 1950  MRN: [id number]  Referring provider: Keegan Ruff MD  Dx:   Encounter Diagnosis     ICD-10-CM    1. Dizziness  R42       2. Dysequilibrium  R42               Start Time: 1735  Stop Time: 1720  Total time in clinic (min): 1425 minutes    Subjective: Patient reports she continues not to have dizziness.       Objective: See treatment diary below    Oculomotor exam   Oculomotor ROM: WNL  Resting nystagmus: not present   Gaze holding nystagmus: not present left  and not present right  Smooth pursuits: within normal limits  Vertical saccades: normal  Horizontal saccades: normal  Convergence: normal (symetrical convergence)  Cover test: normal    Positional testing   [place]-Hallpike   Left posterior canal: WNL  Right posterior canal: symptomatic, torsional and up-beating <10 seconds     Assessment: Tolerated treatment well. Despite having no subjective dizziness between sessions patient continues to have positive positional testing. [place] Hallpike was performed and demonstrated positive R canalithiasis BPPV. Nystagmus was torsional, up-beating, and fatiguing lasting <10 seconds. CRT was performed 4x with no nausea throughout. Re-test and treatment demonstrated 1-2 beats and no nystagmus with the rest of the Epley positions. No dizziness at end of session. Will follow up next week. Patient exhibited good technique with therapeutic exercises and would benefit from continued PT.        Goals:    Short Term Goals: to be achieved by 4 weeks  1) Patient to be independent with basic HEP. MET  2) Decrease dizziness to 1/10 at its worst. MET    Long Term Goals: to be achieved by discharge  1) FOTO equal to or greater than target score indicating improvements with overall function. MET  2) Patient will demonstrate normalized BPPV testing in order to return to normal daily activities. PROGRESSING   3) Patient will have little to no  dizziness with positional changes and fast head movements in order to participate in daily activities. PROGRESSING     Plan: Progress treatment as tolerated.       Precautions: cervical spine pain, osteoporosis, thyroid disorder, lumbar pain       Manuals 7/15 7/23 7/29 8/9  8/13 8/22 8/29 9/9 9/16 9/26                                                       Neuro Re-Ed             CRT 2x Eply  2x Epley     1x Li     All for R PC  2x Epley     1x Li     All for R PC  2x R Epley     1x Li R    All for R PC  2x R Epley     1x Li R    All for R PC 2x R Epley     1x Li R    All for R PC 2x Epley Right  2x Epley Right  3x R Epley     1x Li R    All for R PC 3x R Epley     1x Li R    All for R PC                                                                                 Ther Ex                                                                                                                     Ther Activity                                       Gait Training                                       Modalities

## 2024-09-30 ENCOUNTER — OFFICE VISIT (OUTPATIENT)
Dept: PHYSICAL THERAPY | Facility: CLINIC | Age: 74
End: 2024-09-30
Payer: COMMERCIAL

## 2024-09-30 DIAGNOSIS — R42 DYSEQUILIBRIUM: ICD-10-CM

## 2024-09-30 DIAGNOSIS — R42 DIZZINESS: Primary | ICD-10-CM

## 2024-09-30 PROCEDURE — 97112 NEUROMUSCULAR REEDUCATION: CPT | Performed by: PHYSICAL THERAPIST

## 2024-09-30 NOTE — PROGRESS NOTES
Re-Evaluation/Daily Note     Today's date: 2024  Patient name: Ginger Chicas  : 1950  MRN: [id number]  Referring provider: Keegan Ruff MD  Dx:   Encounter Diagnosis     ICD-10-CM    1. Dizziness  R42       2. Dysequilibrium  R42               Start Time: 1700  Stop Time: 1738  Total time in clinic (min): 38 minutes  Subjective: Patient reports she has had no dizziness since last session.       Objective: See treatment diary below    Positional testing   [place]-Hallpike   Left posterior canal: WNL  Right posterior canal: symptomatic, torsional and up-beating; 2-3x beats very minimal     Assessment: Tolerated treatment well. [place] Hallpike was performed and demonstrated positive R canalithiasis BPPV. Nystagmus was torsional and up-beating however very mild to none this date. CRT was performed 3x with no nausea throughout. Re-test demonstrated no nystagmus or symptoms as well as with the rest of the Eply positions. No dizziness at end of session. Discharge BPPV at this time. Evaluation for left hip pain next week.     Plan: Progress treatment as tolerated.         Precautions: cervical spine pain, osteoporosis, thyroid disorder, lumbar pain       Manuals                                                        Neuro Re-Ed             CRT 3x R Epley  2x Epley     1x Li     All for R PC  2x Epley     1x Li     All for R PC  2x R Epley     1x Li R    All for R PC  2x R Epley     1x Li R    All for R PC 2x R Epley     1x Li R    All for R PC 2x Epley Right  2x Epley Right  3x R Epley     1x Li R    All for R PC 3x R Epley     1x Li R    All for R PC                                                                                 Ther Ex                                                                                                                     Ther Activity                                       Gait Training                                       Modalities

## 2024-10-03 ENCOUNTER — APPOINTMENT (OUTPATIENT)
Dept: PHYSICAL THERAPY | Facility: CLINIC | Age: 74
End: 2024-10-03
Payer: COMMERCIAL

## 2024-10-07 ENCOUNTER — EVALUATION (OUTPATIENT)
Dept: PHYSICAL THERAPY | Facility: CLINIC | Age: 74
End: 2024-10-07
Payer: COMMERCIAL

## 2024-10-07 ENCOUNTER — TELEPHONE (OUTPATIENT)
Age: 74
End: 2024-10-07

## 2024-10-07 DIAGNOSIS — M25.552 LEFT HIP PAIN: Primary | ICD-10-CM

## 2024-10-07 DIAGNOSIS — G89.29 CHRONIC BILATERAL LOW BACK PAIN, UNSPECIFIED WHETHER SCIATICA PRESENT: ICD-10-CM

## 2024-10-07 DIAGNOSIS — M54.50 CHRONIC BILATERAL LOW BACK PAIN, UNSPECIFIED WHETHER SCIATICA PRESENT: ICD-10-CM

## 2024-10-07 PROCEDURE — 97112 NEUROMUSCULAR REEDUCATION: CPT | Performed by: PHYSICAL THERAPIST

## 2024-10-07 PROCEDURE — 97162 PT EVAL MOD COMPLEX 30 MIN: CPT | Performed by: PHYSICAL THERAPIST

## 2024-10-07 NOTE — PROGRESS NOTES
PT EVALUATION    Today's date: 10/07/24  Patient name: Ginger Chicas  : 1950  MRN: [id number]  Referring provider: Keegan Ruff MD  Dx:   1. Left hip pain    2. Chronic bilateral low back pain, unspecified whether sciatica present        ASSESSMENT:  Ginger Chicas is a 74 y.o. female who presents with signs and symptoms consistent with their referring diagnosis of left hip pain due to nerve irritation. Patient demonstrates piriformis and proximal ITB tenderness with palpation. Limited lumbar ROM into flexion which causes comparable sign. Patient had no provocation with hip testing as well as normal ROM with both internal and external rotation. Overall, patient presents with pain, decreased lumbar and core strength, and decreased lumbar mobility. Due to these impairments, Patient has difficulty performing a/iadls and  recreational activities. Patient would benefit from skilled physical therapy to address the impairments, improve their level of function, and to improve their overall quality of life      Impairments:    restricted ROM    decreased strength   pain with function   activity intolerance   Postural dysfunction     Prognosis:  Good  Positive and negative prognostic indicator(s):  positive attitude about recovery and prior success with conservative care    Goals:    Short Term Goals: to be achieved by 4 weeks  1) Patient to be independent with basic HEP.  2) Decrease pain to 2/10 at its worst.  3) Increase lumbar spine ROM by 25% in all deficient planes.   4) Increase LE strength by 1/2 MMT grade in all deficient planes.    Long Term Goals: to be achieved by discharge  1) FOTO equal to or greater than target score indicating improvements with overall function.  2) Patient to be independent with comprehensive HEP.  3) Lumbar spine ROM WNL all planes to improve a/iadls.  4) Patient to report no sleep interruption secondary to pain.   5) Patient will be able to tolerate prolong sitting with little to no  discomfort in order to participate in daily activities.      Planned interventions:  home exercise program, patient education, manual therapy, graded activity, flexibility, functional range of motion exercises, strengthening, abdominal trunk stabilization, and modalities prn    Duration in visits:  4  Frequency: 1 visits per week  Duration in weeks:  4    History of Current Injury: Patient notes that she has had a flare up in left hip pain and LBP over the last month. Patient notes that the pain was intermittent throughout the day but the most when she was sleeping at night. Patient notes that she would wake up with pain but once she changed positions she was able to fall back to sleep. Patient notes that its similar to episodes she has had in the past but on the other side. Patient notes that its not as bad as it has been in the past and doesn't go all the way down the leg.     Pain location: left buttock and posterolateral thigh (not past the knee)   Pain descriptors:  sharp, shooting   Current Pain: 0/10   Pain at worst: 4/10   Pain at best: 0/10     Aggravating factors: sitting down onto the toilet, sitting prolong periods, laying on her sides at night  Easing factors: change of position, walking     Imaging: none at this time   Special Tests: denies numbness and tingling, denies B&B changes, no saddle paraesthesia      Patient goals: Patient reports goals for skilled PT would be  decreased pain    Objective     Concurrent Complaints  Positive for disturbed sleep. Negative for night pain, bladder dysfunction, bowel dysfunction and saddle (S4) numbness    Palpation   Left   Tenderness of the piriformis.     Right   Tenderness of the piriformis.     Additional Palpation Details  TTP of the proximal ITB    Tenderness     Left Hip   Tenderness in the greater trochanter.     Right Hip   Tenderness in the greater trochanter.     Neurological Testing     Sensation     Lumbar   Left   Intact: light touch    Right    Intact: light touch    Reflexes   Left   Patellar (L4): normal (2+)  Achilles (S1): normal (2+)  Clonus sign: negative    Right   Patellar (L4): normal (2+)  Achilles (S1): normal (2+)  Clonus sign: negative    Active Range of Motion   Left Hip   Normal active range of motion    Right Hip   Normal active range of motion    Joint Play     Hypomobile: L1, L2, L3, L4 and L5     Tests     Lumbar     Left   Negative crossed SLR, passive SLR and slump test.     Right   Negative crossed SLR, passive SLR and slump test.     Left Hip   Positive piriformis.   Negative CELI and FADIR.     Right Hip   Positive piriformis.   Negative CELI and FADIR.     Additional Tests Details  Bilateral hamstring pain           Precautions: history of vertigo, cervical spine pain, osteoporosis, thyroid disorder, lumbar pain        Manuals             STM to lateral hip             STM gapping lumbar spine                                        Neuro Re-Ed             Supine marches with TrA             Hip abd iso with belt             Hip add with bolster              Supine hip abd with TB single leg                                                                  Ther Ex             3-way hip              Bridges              LTR             pball roll ins              pball roll outs              Supine cross body stretch                          Nustep              Ther Activity             Mini squats                           Gait Training                                       Modalities

## 2024-10-07 NOTE — TELEPHONE ENCOUNTER
LM for pt to CB to go over Dr. Enriquez's note.    If pt CB please let her know:    Please ask patient to ask dentist for a clearance to resume Fosamax (verbal clearance is fine) when she seems him in a week.

## 2024-10-07 NOTE — TELEPHONE ENCOUNTER
Please ask patient to ask dentist for a clearance to resume Fosamax (verbal clearance is fine) when she seems him in a week.

## 2024-10-07 NOTE — TELEPHONE ENCOUNTER
Ginger called in regarding her dental procedure she had . Patient currently on her 3 week after her dental implants but she is still having difficulty with one implant , patient will be seeing the dentist in week . Patient wants to be cleared by dentist before she starts to take her medication ( fosamax ) again . Please advise . If the patient does not answer she asked for a voice message to be left on her answering machine , patient is stepping out

## 2024-10-08 ENCOUNTER — HOSPITAL ENCOUNTER (OUTPATIENT)
Dept: RADIOLOGY | Age: 74
Discharge: HOME | End: 2024-10-08
Payer: COMMERCIAL

## 2024-10-08 DIAGNOSIS — N28.1 CYST OF KIDNEY, ACQUIRED: ICD-10-CM

## 2024-10-08 DIAGNOSIS — D17.71 BENIGN LIPOMATOUS NEOPLASM OF KIDNEY: ICD-10-CM

## 2024-10-08 PROCEDURE — 74178 CT ABD&PLV WO CNTR FLWD CNTR: CPT

## 2024-10-08 RX ADMIN — IOHEXOL 100 ML: 350 INJECTION, SOLUTION INTRAVENOUS at 09:44

## 2024-10-15 ENCOUNTER — HOSPITAL ENCOUNTER (OUTPATIENT)
Dept: RADIOLOGY | Facility: HOSPITAL | Age: 74
Discharge: HOME | End: 2024-10-15
Payer: COMMERCIAL

## 2024-10-15 DIAGNOSIS — J98.6 CHRONICALLY ELEVATED HEMIDIAPHRAGM: ICD-10-CM

## 2024-10-15 PROCEDURE — 76000 FLUOROSCOPY <1 HR PHYS/QHP: CPT

## 2024-10-18 ENCOUNTER — OFFICE VISIT (OUTPATIENT)
Dept: PHYSICAL THERAPY | Facility: CLINIC | Age: 74
End: 2024-10-18
Payer: COMMERCIAL

## 2024-10-18 DIAGNOSIS — M54.50 CHRONIC BILATERAL LOW BACK PAIN, UNSPECIFIED WHETHER SCIATICA PRESENT: ICD-10-CM

## 2024-10-18 DIAGNOSIS — M25.552 LEFT HIP PAIN: Primary | ICD-10-CM

## 2024-10-18 DIAGNOSIS — G89.29 CHRONIC BILATERAL LOW BACK PAIN, UNSPECIFIED WHETHER SCIATICA PRESENT: ICD-10-CM

## 2024-10-18 PROCEDURE — 97112 NEUROMUSCULAR REEDUCATION: CPT | Performed by: PHYSICAL THERAPIST

## 2024-10-18 PROCEDURE — 97110 THERAPEUTIC EXERCISES: CPT | Performed by: PHYSICAL THERAPIST

## 2024-10-18 NOTE — PROGRESS NOTES
"Daily Note     Today's date: 10/18/2024  Patient name: Ginger Chicas  : 1950  MRN: [id number]  Referring provider: Keegan Ruff MD  Dx:   Encounter Diagnosis     ICD-10-CM    1. Left hip pain  M25.552       2. Chronic bilateral low back pain, unspecified whether sciatica present  M54.50     G89.29           Start Time: 08  Stop Time: 930  Total time in clinic (min): 45 minutes    Subjective: Patient notes that she is not doing well and has a lot going on medically and a lot on her mind. Patient notes that the back seemed to flare up more last week and was painful for a day or two. Patient notes that she tried to space out the exercises but it didn't seem to help. Patient notes that she got a CT for her kidneys and it showed some degenerative changes in the back and not sure what that means which didn't make her feel good. Patient notes that she also has to get some removed from her kidneys that she's worried about.       Objective: See treatment diary below      Assessment: Tolerated treatment well. Patient emotional throughout first half of session secondary to life stressors. Discussed pain science and how stress and anxiety can increase pain levels. Educated patient on what \"degenerative changes\" means which helped patient mentally. Focused on manual treatment with good response.  Patient noted decreased pain and discomfort post manual treatment. Updated modified HEP as needed. Will re-assess next visit and continue to progress as able. Patient would benefit from continued PT      Plan: Continue per plan of care.      Precautions: history of vertigo, cervical spine pain, osteoporosis, thyroid disorder, lumbar pain        Manuals 10/18            STM lumbar and thoracic paraspinals  ACP             STM gapping lumbar spine              Lumbar  ACP                          Neuro Re-Ed             Supine marches with TrA             Hip abd iso with belt             Hip add with bolster            "   Supine hip abd with TB single leg                                                                  Ther Ex             Prone press ups  5x            Supine piriformis stretch  :10x5 ea             90-90 hamstring stretch  :10x5 ea                                                                 Nustep              Ther Activity             Mini squats                           Gait Training                                       Modalities

## 2024-10-22 ENCOUNTER — TELEPHONE (OUTPATIENT)
Age: 74
End: 2024-10-22

## 2024-10-22 NOTE — TELEPHONE ENCOUNTER
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 Pt had CT scan completed on 10/8 which showed:    IMPRESSION:     Grossly stable size of the large right renal exophytic 7.3 cm angiomyolipoma compared to CT  dated 10/1/2014. Given its large size, it is at increased risk for spontaneous hemorrhage and consultation with interventional radiology can be considered for   embolization     2 small right renal angiomyolipomas measuring 1.0 and 1.8 cm respectively     Suboptimal opacification of the distal half of right ureter, otherwise no suspicious urothelial mass lesion in the opacified bilateral renal collecting system and ureters.    Pt scheduled for next available on 11/21 at 940am. Please review to see if pt is scheduled in appropriate timeframe.     What office location does the patient prefer?: [place]    Does patient have Imaging/Lab Results: CT scan    Have patient records been requested?: in epic  If No, are the records showing in Epic:       INSURANCE:   Do we accept the patient's insurance or is the patient Self-Pay?:    Insurance Provider: [facility]   Plan Type/Number:   Member ID#: [id number]       HISTORY:   Has the patient had any previous Urologist(s)?: no    Was the patient seen in the ED?: no    Has the patient had any outside testing done?: no    Does the patient have a personal history of cancer?:    Pt call back- [phone]

## 2024-10-25 ENCOUNTER — OFFICE VISIT (OUTPATIENT)
Dept: PHYSICAL THERAPY | Facility: CLINIC | Age: 74
End: 2024-10-25
Payer: COMMERCIAL

## 2024-10-25 DIAGNOSIS — M25.552 LEFT HIP PAIN: Primary | ICD-10-CM

## 2024-10-25 DIAGNOSIS — M54.50 CHRONIC BILATERAL LOW BACK PAIN, UNSPECIFIED WHETHER SCIATICA PRESENT: ICD-10-CM

## 2024-10-25 DIAGNOSIS — G89.29 CHRONIC BILATERAL LOW BACK PAIN, UNSPECIFIED WHETHER SCIATICA PRESENT: ICD-10-CM

## 2024-10-25 PROCEDURE — 97110 THERAPEUTIC EXERCISES: CPT | Performed by: PHYSICAL THERAPIST

## 2024-10-25 PROCEDURE — 97140 MANUAL THERAPY 1/> REGIONS: CPT | Performed by: PHYSICAL THERAPIST

## 2024-10-25 NOTE — PROGRESS NOTES
Daily Note     Today's date: 10/25/2024  Patient name: Ginger Chicas  : 1950  MRN: [id number]  Referring provider: Keegan Ruff MD  Dx:   Encounter Diagnosis     ICD-10-CM    1. Left hip pain  M25.552       2. Chronic bilateral low back pain, unspecified whether sciatica present  M54.50     G89.29             Start Time: 0800  Stop Time: 0845  Total time in clinic (min): 45 minutes    Subjective: Patient notes that she is in a better place compared to last session. Patient notes that she is no longer having the tightness in the back.       Objective: See treatment diary below      Assessment: Tolerated treatment well. Patient tolerated active treatment well with no flare up in pain. Will see patient next week for follow up. Patient would benefit from continued PT      Plan: Continue per plan of care.      Precautions: history of vertigo, cervical spine pain, osteoporosis, thyroid disorder, lumbar pain        Manuals 10/18 10/25           STM lumbar and thoracic paraspinals  ACP  ACP            STM gapping lumbar spine              Lumbar  ACP  ACP                         Neuro Re-Ed             Supine marches with TrA             Hip abd iso with belt             Hip add with bolster              Supine hip abd with TB single leg                                                                  Ther Ex             Prone press ups  5x            Supine piriformis stretch  :10x5 ea             90-90 hamstring stretch  :10x5 ea  :10x5 ea            Standing abduction   2x10 RTB            Standing extension   2x10 RTB           LTR   With pball     :05x2'           Bridges   2x10                                                   Nustep              Ther Activity             Mini squats                           Gait Training                                       Modalities

## 2024-10-26 DIAGNOSIS — E03.9 ACQUIRED HYPOTHYROIDISM: ICD-10-CM

## 2024-10-26 RX ORDER — LEVOTHYROXINE SODIUM 50 UG/1
50 TABLET ORAL DAILY
Qty: 90 TABLET | Refills: 1 | Status: SHIPPED | OUTPATIENT
Start: 2024-10-26

## 2024-10-29 ENCOUNTER — APPOINTMENT (OUTPATIENT)
Dept: PHYSICAL THERAPY | Facility: CLINIC | Age: 74
End: 2024-10-29
Payer: COMMERCIAL

## 2024-11-01 ENCOUNTER — APPOINTMENT (OUTPATIENT)
Dept: PHYSICAL THERAPY | Facility: CLINIC | Age: 74
End: 2024-11-01
Payer: COMMERCIAL

## 2024-11-01 ENCOUNTER — OFFICE VISIT (OUTPATIENT)
Dept: PHYSICAL THERAPY | Facility: CLINIC | Age: 74
End: 2024-11-01
Payer: COMMERCIAL

## 2024-11-01 DIAGNOSIS — M54.50 CHRONIC BILATERAL LOW BACK PAIN, UNSPECIFIED WHETHER SCIATICA PRESENT: Primary | ICD-10-CM

## 2024-11-01 DIAGNOSIS — G89.29 CHRONIC BILATERAL LOW BACK PAIN, UNSPECIFIED WHETHER SCIATICA PRESENT: Primary | ICD-10-CM

## 2024-11-01 PROCEDURE — 97110 THERAPEUTIC EXERCISES: CPT | Performed by: PHYSICAL THERAPIST

## 2024-11-01 PROCEDURE — 97140 MANUAL THERAPY 1/> REGIONS: CPT | Performed by: PHYSICAL THERAPIST

## 2024-11-01 NOTE — PROGRESS NOTES
Daily Note     Today's date: 2024  Patient name: Ginger Chicas  : 1950  MRN: [id number]  Referring provider: Keegan Ruff MD  Dx:   Encounter Diagnosis     ICD-10-CM    1. Chronic bilateral low back pain, unspecified whether sciatica present  M54.50     G89.29               Start Time: 1515  Stop Time: 1601  Total time in clinic (min): 46 minutes    Subjective: Patient reports that the back is very tight today all across the low back. Patient notes that she does still get the sharp pain down into the right buttock as well as the thigh at times. Patient notes that she was sore after last session and needed to use the heating pad once she got home.       Objective: See treatment diary below  Repeated movements:   Standing extension=peripheralized   Standing flexion=no change       Assessment: Tolerated treatment well. Secondary to continued pain, reassessed directional preference. Patient noted production of pain with standing extensions this date. Secondary to this as well as minimal improvement in symptoms thus far POC was transitioned to flexion based. Patient responded well with reports of decreased lumbar tension and no radicular symptoms.  Patient noted decreased pain and discomfort post manual treatment. Updated HEP this date. Will see patient next week for follow up. Patient would benefit from continued PT      Plan: Continue per plan of care.      Precautions: history of vertigo, cervical spine pain, osteoporosis, thyroid disorder, lumbar pain        Manuals 10/18 10/25 11/1          STM lumbar and thoracic paraspinals  ACP  ACP            STM gapping lumbar spine    ACP           Lumbar  ACP  ACP            STM piriformis    Right side     ACP           Neuro Re-Ed             Supine marches with TrA             Hip abd iso with belt   NV          Hip add with bolster    NV          Supine hip abd with TB single leg                                                                  Ther Ex              Prone press ups  5x            Supine piriformis stretch  :10x5 ea   :10x5 ea           90-90 hamstring stretch  :10x5 ea  :10x5 ea            Standing abduction   2x10 RTB            Standing extension   2x10 RTB           LTR   With pball     :05x2' With pball     :05x2'          Bridges   2x10            Pball roll ins    With red pball :10x2'          Pball roll outs    Blue pball   :10x3'                        Nustep              Ther Activity             Mini squats                           Gait Training                                       Modalities

## 2024-11-05 ENCOUNTER — APPOINTMENT (OUTPATIENT)
Dept: PHYSICAL THERAPY | Facility: CLINIC | Age: 74
End: 2024-11-05
Payer: COMMERCIAL

## 2024-11-07 ENCOUNTER — OFFICE VISIT (OUTPATIENT)
Dept: PHYSICAL THERAPY | Facility: CLINIC | Age: 74
End: 2024-11-07
Payer: COMMERCIAL

## 2024-11-07 DIAGNOSIS — G89.29 CHRONIC BILATERAL LOW BACK PAIN, UNSPECIFIED WHETHER SCIATICA PRESENT: Primary | ICD-10-CM

## 2024-11-07 DIAGNOSIS — M54.50 CHRONIC BILATERAL LOW BACK PAIN, UNSPECIFIED WHETHER SCIATICA PRESENT: Primary | ICD-10-CM

## 2024-11-07 DIAGNOSIS — M25.552 LEFT HIP PAIN: ICD-10-CM

## 2024-11-07 PROCEDURE — 97140 MANUAL THERAPY 1/> REGIONS: CPT | Performed by: PHYSICAL THERAPIST

## 2024-11-07 PROCEDURE — 97112 NEUROMUSCULAR REEDUCATION: CPT | Performed by: PHYSICAL THERAPIST

## 2024-11-07 PROCEDURE — 97110 THERAPEUTIC EXERCISES: CPT | Performed by: PHYSICAL THERAPIST

## 2024-11-07 NOTE — PROGRESS NOTES
Daily Note     Today's date: 2024  Patient name: Ginger Chicas  : 1950  MRN: [id number]  Referring provider: Keegan Ruff MD  Dx:   Encounter Diagnosis     ICD-10-CM    1. Chronic bilateral low back pain, unspecified whether sciatica present  M54.50     G89.29       2. Left hip pain  M25.552                 Start Time: 0900  Stop Time: 0945  Total time in clinic (min): 45 minutes    Subjective: Patient reports the back feels better compared to last session and felt like the exercises were good last visit.       Objective: See treatment diary below        Assessment: Tolerated treatment well. Continued with established POC. 1x episode of lumbar spasm that responded to rest.  Patient noted decreased pain and discomfort post manual treatment.  Patient would benefit from continued PT      Plan: Continue per plan of care.      Precautions: history of vertigo, cervical spine pain, osteoporosis, thyroid disorder, lumbar pain        Manuals 10/18 10/25 11/1 11/7         STM lumbar and thoracic paraspinals  ACP  ACP            STM gapping lumbar spine    ACP  ACP         Lumbar  ACP  ACP            STM piriformis    Right side     ACP  Right side     ACP          Neuro Re-Ed             Supine marches with TrA             Hip abd iso with belt   NV :05x2'         Hip add with bolster    NV          Supine hip abd with TB single leg                                                                  Ther Ex             Prone press ups  5x            Supine piriformis stretch  :10x5 ea   :10x5 ea  :10x5 ea          90-90 hamstring stretch  :10x5 ea  :10x5 ea            Standing abduction   2x10 RTB            Standing extension   2x10 RTB           LTR   With pball     :05x2' With pball     :05x2' With pball     :05x2'         Bridges   2x10            Pball roll ins    With red pball :10x2' With red pball :10x2'         Pball roll outs    Blue pball   :10x3'  Blue pball   :10x3'                       Nustep               Ther Activity             Mini squats                           Gait Training                                       Modalities

## 2024-11-11 ENCOUNTER — OFFICE VISIT (OUTPATIENT)
Dept: PHYSICAL THERAPY | Facility: CLINIC | Age: 74
End: 2024-11-11
Payer: COMMERCIAL

## 2024-11-11 DIAGNOSIS — M54.50 CHRONIC BILATERAL LOW BACK PAIN, UNSPECIFIED WHETHER SCIATICA PRESENT: Primary | ICD-10-CM

## 2024-11-11 DIAGNOSIS — G89.29 CHRONIC BILATERAL LOW BACK PAIN, UNSPECIFIED WHETHER SCIATICA PRESENT: Primary | ICD-10-CM

## 2024-11-11 PROCEDURE — 97140 MANUAL THERAPY 1/> REGIONS: CPT | Performed by: PHYSICAL THERAPIST

## 2024-11-11 PROCEDURE — 97110 THERAPEUTIC EXERCISES: CPT | Performed by: PHYSICAL THERAPIST

## 2024-11-11 PROCEDURE — 97112 NEUROMUSCULAR REEDUCATION: CPT | Performed by: PHYSICAL THERAPIST

## 2024-11-11 NOTE — PROGRESS NOTES
Daily Note     Today's date: 2024  Patient name: Ginger Chicas  : 1950  MRN: [id number]  Referring provider: Keegan Ruff MD  Dx:   Encounter Diagnosis     ICD-10-CM    1. Chronic bilateral low back pain, unspecified whether sciatica present  M54.50     G89.29                   Start Time: 1530  Stop Time: 1615  Total time in clinic (min): 45 minutes    Subjective: Patient reports that over the weekend her back felt pretty good. Patient notes that however today her back is bothering her more. Patient notes that she has some spasming on the right side into the R glute as well as tightness/knotting in her left upper mid back.       Objective: See treatment diary below        Assessment: Tolerated treatment well. Continued with established POC. Patient did have 1x right hip spasm requiring standing rest. Once stretched muscle relaxed and pain decreased. Modified positions and force of isometrics in order to avoid flare up in pain. Patient continues to have tenderness of the R SIJ and piriformis. Patient noted decreased pain and discomfort post manual treatment.  Patient would benefit from continued PT      Plan: Continue per plan of care.      Precautions: history of vertigo, cervical spine pain, osteoporosis, thyroid disorder, lumbar pain        Manuals 10/18 10/25 11/1 11/7 11/11        STM lumbar and thoracic paraspinals  ACP  ACP            STM gapping lumbar spine    ACP  ACP ACP        Lumbar  ACP  ACP    ACP         STM piriformis    Right side     ACP  Right side     ACP  Right side     ACP         Neuro Re-Ed             Supine marches with TrA     :05x2'        Hip abd iso with belt   NV :05x2' :05x2'        Hip add with bolster    NV  :05x2'        Supine hip abd with TB single leg                                                                  Ther Ex             Prone press ups  5x            Supine piriformis stretch  :10x5 ea   :10x5 ea  :10x5 ea  :10x5 ea        90-90 hamstring  stretch  :10x5 ea  :10x5 ea            Standing abduction   2x10 RTB            Standing extension   2x10 RTB           LTR   With pball     :05x2' With pball     :05x2' With pball     :05x2' With pball     :05x2'        Bridges   2x10            Pball roll ins    With red pball :10x2' With red pball :10x2' With red pball :10x2'        Pball roll outs    Blue pball   :10x3'  Blue pball   :10x3'  Blue pball   :10x3'                     Nustep              Ther Activity             Mini squats                           Gait Training                                       Modalities

## 2024-11-15 ENCOUNTER — OFFICE VISIT (OUTPATIENT)
Dept: PHYSICAL THERAPY | Facility: CLINIC | Age: 74
End: 2024-11-15
Payer: COMMERCIAL

## 2024-11-15 DIAGNOSIS — G89.29 CHRONIC BILATERAL LOW BACK PAIN, UNSPECIFIED WHETHER SCIATICA PRESENT: Primary | ICD-10-CM

## 2024-11-15 DIAGNOSIS — M54.50 CHRONIC BILATERAL LOW BACK PAIN, UNSPECIFIED WHETHER SCIATICA PRESENT: Primary | ICD-10-CM

## 2024-11-15 PROCEDURE — 97112 NEUROMUSCULAR REEDUCATION: CPT | Performed by: PHYSICAL THERAPIST

## 2024-11-15 PROCEDURE — 97140 MANUAL THERAPY 1/> REGIONS: CPT | Performed by: PHYSICAL THERAPIST

## 2024-11-15 PROCEDURE — 97110 THERAPEUTIC EXERCISES: CPT | Performed by: PHYSICAL THERAPIST

## 2024-11-15 NOTE — PROGRESS NOTES
Daily Note     Today's date: 11/15/2024  Patient name: Ginger Chicas  : 1950  MRN: [id number]  Referring provider: Keegan Ruff MD  Dx:   Encounter Diagnosis     ICD-10-CM    1. Chronic bilateral low back pain, unspecified whether sciatica present  M54.50     G89.29             Start Time: 0800  Stop Time: 0845  Total time in clinic (min): 45 minutes    Subjective: Patient reports that the back and hip have been feeling pretty good since last visit. Patient notes that she slept on her neck wrong and feels that a little bit but she's fine.       Objective: See treatment diary below        Assessment: Tolerated treatment well. Continued with established POC. No adverse effects to treatment throughout session.  Patient noted decreased pain and discomfort post manual treatment. Patient noted decreased pain and discomfort post manual treatment.  Will continue to progress as able. Patient would benefit from continued PT      Plan: Continue per plan of care.      Precautions: history of vertigo, cervical spine pain, osteoporosis, thyroid disorder, lumbar pain        Manuals 10/18 10/25 11/1 11/7 11/11 11/15       STM lumbar and thoracic paraspinals  ACP  ACP            STM gapping lumbar spine    ACP  ACP ACP ACP        Lumbar  ACP  ACP    ACP  ACP        STM piriformis    Right side     ACP  Right side     ACP  Right side     ACP  Right side     ACP        Neuro Re-Ed             Supine marches with TrA     :05x2' :05x2'       Hip abd iso with belt   NV :05x2' :05x2' :05x2'       Hip add with bolster    NV  :05x2' :05x2'       Supine hip abd with TB single leg                                                                  Ther Ex             Prone press ups  5x            Supine piriformis stretch  :10x5 ea   :10x5 ea  :10x5 ea  :10x5 ea :10x5 ea       90-90 hamstring stretch  :10x5 ea  :10x5 ea            Standing abduction   2x10 RTB            Standing extension   2x10 RTB           LTR   With pball      :05x2' With pball     :05x2' With pball     :05x2' With pball     :05x2' With pball     :05x2'       Bridges   2x10            Pball roll ins    With red pball :10x2' With red pball :10x2' With red pball :10x2' With red pball :10x2'       Pball roll outs    Blue pball   :10x3'  Blue pball   :10x3'  Blue pball   :10x3' Blue pball   :10x3'                    Nustep              Ther Activity             Mini squats                           Gait Training                                       Modalities

## 2024-11-18 ENCOUNTER — OFFICE VISIT (OUTPATIENT)
Dept: PHYSICAL THERAPY | Facility: CLINIC | Age: 74
End: 2024-11-18
Payer: COMMERCIAL

## 2024-11-18 DIAGNOSIS — M54.50 CHRONIC BILATERAL LOW BACK PAIN, UNSPECIFIED WHETHER SCIATICA PRESENT: Primary | ICD-10-CM

## 2024-11-18 DIAGNOSIS — G89.29 CHRONIC BILATERAL LOW BACK PAIN, UNSPECIFIED WHETHER SCIATICA PRESENT: Primary | ICD-10-CM

## 2024-11-18 PROCEDURE — 97112 NEUROMUSCULAR REEDUCATION: CPT | Performed by: PHYSICAL THERAPIST

## 2024-11-18 PROCEDURE — 97110 THERAPEUTIC EXERCISES: CPT | Performed by: PHYSICAL THERAPIST

## 2024-11-18 PROCEDURE — 97140 MANUAL THERAPY 1/> REGIONS: CPT | Performed by: PHYSICAL THERAPIST

## 2024-11-18 NOTE — PROGRESS NOTES
Daily Note     Today's date: 2024  Patient name: Ginger Chicas  : 1950  MRN: [id number]  Referring provider: Keegan Ruff MD  Dx:   Encounter Diagnosis     ICD-10-CM    1. Chronic bilateral low back pain, unspecified whether sciatica present  M54.50     G89.29           Start Time: 0730  Stop Time: 0815  Total time in clinic (min): 45 minutes    Subjective: Patient reports her back is feeling pretty good. Patient notes that she had minimal pain over the weekend. Patient notes that she was even doing a lot of bending over and lifting and her movement felt more fluid. Patient notes more just a soreness now rather than pain.       Objective: See treatment diary below        Assessment: Tolerated treatment well. Continued with established POC.  Will continue to progress as able. Patient would benefit from continued PT      Plan: Continue per plan of care.      Precautions: history of vertigo, cervical spine pain, osteoporosis, thyroid disorder, lumbar pain        Manuals 10/18 10/25 11/1 11/7 11/11 11/15 11/18      STM lumbar and thoracic paraspinals  ACP  ACP            STM gapping lumbar spine    ACP  ACP ACP ACP  ACP      Lumbar  ACP  ACP    ACP  ACP  ACP      STM piriformis    Right side     ACP  Right side     ACP  Right side     ACP  Right side     ACP  Right side     ACP       Neuro Re-Ed             Supine marches with TrA     :05x2' :05x2' :05x2'      Hip abd iso with belt   NV :05x2' :05x2' :05x2' :05x2'      Hip add with bolster    NV  :05x2' :05x2' :05x2'      Supine hip abd with TB single leg                                                                  Ther Ex             Prone press ups  5x            Supine piriformis stretch  :10x5 ea   :10x5 ea  :10x5 ea  :10x5 ea :10x5 ea :10x5 ea      90-90 hamstring stretch  :10x5 ea  :10x5 ea            Standing abduction   2x10 RTB            Standing extension   2x10 RTB           LTR   With pball     :05x2' With pball     :05x2' With pball      :05x2' With pball     :05x2' With pball     :05x2' With pball     :05x2'      Bridges   2x10            Pball roll ins    With red pball :10x2' With red pball :10x2' With red pball :10x2' With red pball :10x2' With red pball :10x2'      Pball roll outs    Blue pball   :10x3'  Blue pball   :10x3'  Blue pball   :10x3' Blue pball   :10x3' Blue pball   :10x3'                   Nustep              Ther Activity             Mini squats                           Gait Training                                       Modalities

## 2024-11-21 ENCOUNTER — CONSULT (OUTPATIENT)
Dept: UROLOGY | Facility: CLINIC | Age: 74
End: 2024-11-21
Payer: COMMERCIAL

## 2024-11-21 VITALS
DIASTOLIC BLOOD PRESSURE: 70 MMHG | SYSTOLIC BLOOD PRESSURE: 146 MMHG | HEART RATE: 90 BPM | WEIGHT: 185.8 LBS | HEIGHT: 65 IN | OXYGEN SATURATION: 98 % | BODY MASS INDEX: 30.96 KG/M2

## 2024-11-21 DIAGNOSIS — D17.71 ANGIOMYOLIPOMA OF KIDNEY: ICD-10-CM

## 2024-11-21 LAB — POST-VOID RESIDUAL VOLUME, ML POC: 42 ML

## 2024-11-21 PROCEDURE — 99203 OFFICE O/P NEW LOW 30 MIN: CPT

## 2024-11-21 PROCEDURE — 51798 US URINE CAPACITY MEASURE: CPT

## 2024-11-21 NOTE — PROGRESS NOTES
11/21/2024      No chief complaint on file.        Assessment and Plan    74 y.o. female managed by NEW PATIENT    Right Renal Angiomyolipomas  -CT renal protocol (10/08/24) showing grossly stable size of the large right renal exophytic 7.3 cm angiomyolipoma compared to CT dated 10/1/2014. Given its large size, it is at increased risk for spontaneous hemorrhage and consultation with interventional radiology can be considered for embolization. There are also 2 small right renal angiomyolipomas measuring 1.0 and 1.8 cm. Suboptimal opacification of the distal half of right ureter, otherwise no suspicious urothelial mass lesion in the opacified bilateral renal collecting system and ureters.  -Patient denies flank pain, gross hematuria, dysuria, frequency urgency.  -Patient was unable to provide a urine sample as she went prior to arriving. Her PVR was 42 mls.  -Referral placed to IR for consideration of angiomyolipoma embolization.  -All questions addressed.      History of Present Illness  Ginger Chicas is a 74 y.o. female here for evaluation of right renal angiomyolipmas. Patient is accompanied with her daughter today. Patient has pmhx of asthma, fatty liver, IBS, hypothyroidism, lumbar spondylosis, osteoporosis, Stage 3a CKD, hyperhomocysteinemia, mixed hyperlipidemia, and Vitamin D deficiency.     Patient states she was first told about her renal angiomyolipomas in 2008. Patient denies family hx of  malignancy. Patient denies hx of recurrent UTIs. Patient does have hx of kidney stones; she has never required stone interventions.     Patient has no voiding complaints. Patient denies dysuria, flank pain, gross hematuria, frequency, and urgency.     Review of Systems   Constitutional:  Negative for chills and fever.   HENT:  Negative for ear pain and sore throat.    Eyes:  Negative for pain and visual disturbance.   Respiratory:  Negative for cough and shortness of breath.    Cardiovascular:  Negative for chest pain  and palpitations.   Gastrointestinal:  Negative for abdominal pain, nausea and vomiting.   Genitourinary:  Negative for difficulty urinating, dysuria, flank pain, frequency, hematuria and urgency.   Musculoskeletal:  Negative for arthralgias and back pain.   Skin:  Negative for color change and rash.   Neurological:  Negative for seizures and syncope.   All other systems reviewed and are negative.               Vitals  There were no vitals filed for this visit.    Physical Exam  Constitutional:       General: She is not in acute distress.     Appearance: Normal appearance. She is not ill-appearing.   HENT:      Head: Normocephalic.   Eyes:      Extraocular Movements: Extraocular movements intact.      Pupils: Pupils are equal, round, and reactive to light.   Pulmonary:      Effort: Pulmonary effort is normal. No respiratory distress.   Musculoskeletal:         General: Normal range of motion.      Cervical back: Normal range of motion.   Neurological:      Mental Status: She is alert and oriented to person, place, and time.   Psychiatric:         Mood and Affect: Mood normal.         Behavior: Behavior normal.         Thought Content: Thought content normal.         Judgment: Judgment normal.           Past History  Past Medical History:   Diagnosis Date    Acute asthma exacerbation     last assessed 8/15/17    Asthma     Cataract     Disease of thyroid gland     Hypertriglyceridemia     Osteoporosis     Right lumbar radiculopathy 12/15/2016    Urinary tract infection     Vertigo      Social History     Socioeconomic History    Marital status: /Civil Union     Spouse name: Not on file    Number of children: Not on file    Years of education: Not on file    Highest education level: Not on file   Occupational History    Not on file   Tobacco Use    Smoking status: Former     Current packs/day: 0.00     Average packs/day: 0.3 packs/day for 4.0 years (1.0 ttl pk-yrs)     Types: Cigarettes     Start date: 1/1/1975      Quit date: 1979     Years since quittin.9    Smokeless tobacco: Never    Tobacco comments:         Vaping Use    Vaping status: Never Used   Substance and Sexual Activity    Alcohol use: No    Drug use: No    Sexual activity: Not Currently   Other Topics Concern    Not on file   Social History Narrative    Not on file     Social Drivers of Health     Financial Resource Strain: Low Risk  (3/8/2024)    Overall Financial Resource Strain (CARDIA)     Difficulty of Paying Living Expenses: Not hard at all   Food Insecurity: Not on file   Transportation Needs: No Transportation Needs (3/8/2024)    PRAPARE - Transportation     Lack of Transportation (Medical): No     Lack of Transportation (Non-Medical): No   Physical Activity: Not on file   Stress: Not on file   Social Connections: Not on file   Intimate Partner Violence: Not on file   Housing Stability: Not on file     Social History     Tobacco Use   Smoking Status Former    Current packs/day: 0.00    Average packs/day: 0.3 packs/day for 4.0 years (1.0 ttl pk-yrs)    Types: Cigarettes    Start date: 1975    Quit date: 1979    Years since quittin.9   Smokeless Tobacco Never   Tobacco Comments          Family History   Problem Relation Age of Onset    Cirrhosis Mother     Kidney nephrosis Mother     Hypertension Father     Other Father 91        extra mammary Padget's dx    Cancer Paternal Grandmother 80        unknown type    Heart disease Paternal Grandfather     Heart attack Paternal Grandfather     Kidney nephrosis Daughter     Osteoporosis Maternal Grandmother     Arthritis Maternal Grandmother     Heart attack Maternal Grandfather     Heart disease Maternal Grandfather     Kidney nephrosis Daughter     Lymphoma Maternal Aunt 70    No Known Problems Paternal Aunt     No Known Problems Paternal Aunt     No Known Problems Paternal Aunt        The following portions of the patient's history were reviewed and updated as appropriate: allergies,  "current medications, past medical history, past social history, past surgical history and problem list.    Results  No results found for this or any previous visit (from the past hour).]  No results found for: \"PSA\"  Lab Results   Component Value Date    CALCIUM 9.8 09/03/2024     01/03/2018    K 4.7 09/03/2024    CO2 26 09/03/2024     09/03/2024    BUN 16 09/03/2024    CREATININE 0.93 09/03/2024     Lab Results   Component Value Date    WBC 4.81 09/03/2024    HGB 13.6 09/03/2024    HCT 41.6 09/03/2024    MCV 92 09/03/2024     09/03/2024       JESUS Gotti  "

## 2024-11-22 ENCOUNTER — OFFICE VISIT (OUTPATIENT)
Dept: PHYSICAL THERAPY | Facility: CLINIC | Age: 74
End: 2024-11-22
Payer: COMMERCIAL

## 2024-11-22 DIAGNOSIS — G89.29 CHRONIC BILATERAL LOW BACK PAIN, UNSPECIFIED WHETHER SCIATICA PRESENT: Primary | ICD-10-CM

## 2024-11-22 DIAGNOSIS — M54.50 CHRONIC BILATERAL LOW BACK PAIN, UNSPECIFIED WHETHER SCIATICA PRESENT: Primary | ICD-10-CM

## 2024-11-22 PROCEDURE — 97110 THERAPEUTIC EXERCISES: CPT | Performed by: PHYSICAL THERAPIST

## 2024-11-22 PROCEDURE — 97140 MANUAL THERAPY 1/> REGIONS: CPT | Performed by: PHYSICAL THERAPIST

## 2024-11-22 PROCEDURE — 97112 NEUROMUSCULAR REEDUCATION: CPT | Performed by: PHYSICAL THERAPIST

## 2024-11-22 NOTE — PROGRESS NOTES
Daily Note     Today's date: 2024  Patient name: Ginger Chicas  : 1950  MRN: [id number]  Referring provider: Keegan Ruff MD  Dx:   Encounter Diagnosis     ICD-10-CM    1. Chronic bilateral low back pain, unspecified whether sciatica present  M54.50     G89.29             Start Time: 0930  Stop Time: 1015  Total time in clinic (min): 45 minutes    Subjective: Patient reports her back  and hip continues to feel good. Patient notes that she is moving better and doing more without noticing it.       Objective: See treatment diary below        Assessment: Tolerated treatment well. Continued with established POC. Mild restrictions with LTR to the left without flare up in pain.  Patient noted decreased pain and discomfort post manual treatment. Will continue to progress as able. Patient would benefit from continued PT      Plan: Continue per plan of care.      Precautions: history of vertigo, cervical spine pain, osteoporosis, thyroid disorder, lumbar pain        Manuals 10/18 10/25 11/1 11/7 11/11 11/15 11/18 11/22     STM lumbar and thoracic paraspinals  ACP  ACP            STM gapping lumbar spine    ACP  ACP ACP ACP  ACP      Lumbar  ACP  ACP    ACP  ACP  ACP      STM piriformis    Right side     ACP  Right side     ACP  Right side     ACP  Right side     ACP  Right side     ACP  Right side     ACP      Neuro Re-Ed             Supine marches with TrA     :05x2' :05x2' :05x2' :05x2'     Hip abd iso with belt   NV :05x2' :05x2' :05x2' :05x2' :05x2'     Hip add with bolster    NV  :05x2' :05x2' :05x2' :05x2'     Supine hip abd with TB single leg                                                                  Ther Ex             Prone press ups  5x            Supine piriformis stretch  :10x5 ea   :10x5 ea  :10x5 ea  :10x5 ea :10x5 ea :10x5 ea :10x5 ea     90-90 hamstring stretch  :10x5 ea  :10x5 ea            Standing abduction   2x10 RTB            Standing extension   2x10 RTB           LTR   With  pball     :05x2' With pball     :05x2' With pball     :05x2' With pball     :05x2' With pball     :05x2' With pball     :05x2' With pball     :05x2'     Bridges   2x10            Pball roll ins    With red pball :10x2' With red pball :10x2' With red pball :10x2' With red pball :10x2' With red pball :10x2' With red pball :10x2'     Pball roll outs    Blue pball   :10x3'  Blue pball   :10x3'  Blue pball   :10x3' Blue pball   :10x3' Blue pball   :10x3' Blue pball   :10x3'                  Nustep              Ther Activity             Mini squats                           Gait Training                                       Modalities

## 2024-11-26 ENCOUNTER — OFFICE VISIT (OUTPATIENT)
Dept: PHYSICAL THERAPY | Facility: CLINIC | Age: 74
End: 2024-11-26
Payer: COMMERCIAL

## 2024-11-26 DIAGNOSIS — M54.50 CHRONIC BILATERAL LOW BACK PAIN, UNSPECIFIED WHETHER SCIATICA PRESENT: Primary | ICD-10-CM

## 2024-11-26 DIAGNOSIS — G89.29 CHRONIC BILATERAL LOW BACK PAIN, UNSPECIFIED WHETHER SCIATICA PRESENT: Primary | ICD-10-CM

## 2024-11-26 DIAGNOSIS — M25.552 LEFT HIP PAIN: ICD-10-CM

## 2024-11-26 PROCEDURE — 97140 MANUAL THERAPY 1/> REGIONS: CPT | Performed by: PHYSICAL THERAPIST

## 2024-11-26 PROCEDURE — 97110 THERAPEUTIC EXERCISES: CPT | Performed by: PHYSICAL THERAPIST

## 2024-11-26 PROCEDURE — 97112 NEUROMUSCULAR REEDUCATION: CPT | Performed by: PHYSICAL THERAPIST

## 2024-11-26 NOTE — PROGRESS NOTES
Daily Note     Today's date: 2024  Patient name: Ginger Chicas  : 1950  MRN: [id number]  Referring provider: Keegan Ruff MD  Dx:   Encounter Diagnosis     ICD-10-CM    1. Chronic bilateral low back pain, unspecified whether sciatica present  M54.50     G89.29       2. Left hip pain  M25.552               Start Time: 0900  Stop Time: 0945  Total time in clinic (min): 45 minutes    Subjective: Patient reports she had 1x episode down the left leg last week. Other than that she has been doing well overall.       Objective: See treatment diary below        Assessment: Tolerated treatment well. Continued with established POC. No pain throughout session this date. Patient noted decreased pain and discomfort post manual treatment. Will continue to progress as able. Patient would benefit from continued PT      Plan: Continue per plan of care.      Precautions: history of vertigo, cervical spine pain, osteoporosis, thyroid disorder, lumbar pain        Manuals 10/18 10/25 11/1 11/7 11/11 11/15 11/18 11/22 11/26    STM lumbar and thoracic paraspinals  ACP  ACP            STM gapping lumbar spine    ACP  ACP ACP ACP  ACP      Lumbar  ACP  ACP    ACP  ACP  ACP      STM piriformis    Right side     ACP  Right side     ACP  Right side     ACP  Right side     ACP  Right side     ACP  Right side     ACP  Right side     ACP    Neuro Re-Ed             Supine marches with TrA     :05x2' :05x2' :05x2' :05x2' :05x2'    Hip abd iso with belt   NV :05x2' :05x2' :05x2' :05x2' :05x2' :05x2'    Hip add with bolster    NV  :05x2' :05x2' :05x2' :05x2' :05x2'    Supine hip abd with TB single leg                                                                  Ther Ex             Prone press ups  5x            Supine piriformis stretch  :10x5 ea   :10x5 ea  :10x5 ea  :10x5 ea :10x5 ea :10x5 ea :10x5 ea :10x5 ea    90-90 hamstring stretch  :10x5 ea  :10x5 ea            Standing abduction   2x10 RTB            Standing extension    2x10 RTB           LTR   With pball     :05x2' With pball     :05x2' With pball     :05x2' With pball     :05x2' With pball     :05x2' With pball     :05x2' With pball     :05x2' With pball     :05x2'    Bridges   2x10            Pball roll ins    With red pball :10x2' With red pball :10x2' With red pball :10x2' With red pball :10x2' With red pball :10x2' With red pball :10x2' With red pball :10x2'    Pball roll outs    Blue pball   :10x3'  Blue pball   :10x3'  Blue pball   :10x3' Blue pball   :10x3' Blue pball   :10x3' Blue pball   :10x3' Blue pball   :10x3'                 Nustep              Ther Activity             Mini squats                           Gait Training                                       Modalities

## 2024-12-02 ENCOUNTER — OFFICE VISIT (OUTPATIENT)
Dept: PHYSICAL THERAPY | Facility: CLINIC | Age: 74
End: 2024-12-02
Payer: COMMERCIAL

## 2024-12-02 DIAGNOSIS — M54.50 CHRONIC BILATERAL LOW BACK PAIN, UNSPECIFIED WHETHER SCIATICA PRESENT: Primary | ICD-10-CM

## 2024-12-02 DIAGNOSIS — G89.29 CHRONIC BILATERAL LOW BACK PAIN, UNSPECIFIED WHETHER SCIATICA PRESENT: Primary | ICD-10-CM

## 2024-12-02 PROCEDURE — 97112 NEUROMUSCULAR REEDUCATION: CPT | Performed by: PHYSICAL THERAPIST

## 2024-12-02 PROCEDURE — 97140 MANUAL THERAPY 1/> REGIONS: CPT | Performed by: PHYSICAL THERAPIST

## 2024-12-02 PROCEDURE — 97110 THERAPEUTIC EXERCISES: CPT | Performed by: PHYSICAL THERAPIST

## 2024-12-02 NOTE — PROGRESS NOTES
Daily Note     Today's date: 2024  Patient name: Ginger Chicas  : 1950  MRN: [id number]  Referring provider: Keegan Ruff MD  Dx:   Encounter Diagnosis     ICD-10-CM    1. Chronic bilateral low back pain, unspecified whether sciatica present  M54.50     G89.29                 Start Time: 0730  Stop Time: 0815  Total time in clinic (min): 45 minutes    Subjective: Patient reports she was doing a lot of heavy lifting over the weekend so the back was a little sore. Patient notes that she is very stressed this morning with just having a lot of things going on.       Objective: See treatment diary below        Assessment: Tolerated treatment well. Continued with established POC. Despite soreness able to perform all sets and reps without flare up. Patient noted decreased pain and discomfort post manual treatment. Will continue to progress as able. Patient would benefit from continued PT      Plan: Continue per plan of care.      Precautions: history of vertigo, cervical spine pain, osteoporosis, thyroid disorder, lumbar pain        Manuals 10/18 10/25 11/1 11/7 11/11 11/15 11/18 11/22 11/26 12/2   STM lumbar and thoracic paraspinals  ACP  ACP            STM gapping lumbar spine    ACP  ACP ACP ACP  ACP      Lumbar  ACP  ACP    ACP  ACP  ACP      STM piriformis    Right side     ACP  Right side     ACP  Right side     ACP  Right side     ACP  Right side     ACP  Right side     ACP  Right side     ACP Right side     ACP   Neuro Re-Ed             Supine marches with TrA     :05x2' :05x2' :05x2' :05x2' :05x2' :05x2'   Hip abd iso with belt   NV :05x2' :05x2' :05x2' :05x2' :05x2' :05x2' :05x2'   Hip add with bolster    NV  :05x2' :05x2' :05x2' :05x2' :05x2' :05x2'   Supine hip abd with TB single leg                                                                  Ther Ex             Prone press ups  5x            Supine piriformis stretch  :10x5 ea   :10x5 ea  :10x5 ea  :10x5 ea :10x5 ea :10x5 ea :10x5 ea  :10x5 ea :10x5 ea   90-90 hamstring stretch  :10x5 ea  :10x5 ea            Standing abduction   2x10 RTB            Standing extension   2x10 RTB           LTR   With pball     :05x2' With pball     :05x2' With pball     :05x2' With pball     :05x2' With pball     :05x2' With pball     :05x2' With pball     :05x2' With pball     :05x2' With pball     :05x2'   Bridges   2x10            Pball roll ins    With red pball :10x2' With red pball :10x2' With red pball :10x2' With red pball :10x2' With red pball :10x2' With red pball :10x2' With red pball :10x2' With red pball :10x2'   Pball roll outs    Blue pball   :10x3'  Blue pball   :10x3'  Blue pball   :10x3' Blue pball   :10x3' Blue pball   :10x3' Blue pball   :10x3' Blue pball   :10x3' Blue pball   :10x3'                Nustep              Ther Activity             Mini squats                           Gait Training                                       Modalities

## 2024-12-06 ENCOUNTER — APPOINTMENT (OUTPATIENT)
Dept: PHYSICAL THERAPY | Facility: CLINIC | Age: 74
End: 2024-12-06
Payer: COMMERCIAL

## 2024-12-09 ENCOUNTER — TELEMEDICINE (OUTPATIENT)
Dept: INTERVENTIONAL RADIOLOGY/VASCULAR | Facility: CLINIC | Age: 74
End: 2024-12-09
Payer: COMMERCIAL

## 2024-12-09 DIAGNOSIS — D17.71 ANGIOMYOLIPOMA OF KIDNEY: ICD-10-CM

## 2024-12-09 PROCEDURE — 99203 OFFICE O/P NEW LOW 30 MIN: CPT | Performed by: RADIOLOGY

## 2024-12-09 NOTE — PROGRESS NOTES
Virtual Regular Visit  Name: Ginger Chicas      : 1950      MRN: [id number]  Encounter Provider: Devon Crook DO  Encounter Date: 2024   Encounter department: [facility] INTERVENTIONAL RADIOLOGY [place]      Verification of patient location:  Patient is located at Home in the following state in which I hold an active license [place] :  Assessment & Plan  Angiomyolipoma of kidney  7.3 cm R renal AML, grossly stable in size since . Two smaller <2 cm R renal AMLs, mildly increased in size.     Due to risk of aneurysm formation / spontaneous hemorrhage, recommend R renal angiography / embolization since size > 4 cm. Discussed procedure details, risks and benefits. Discussed rationale for treatment to decrease risk of hemorrhage and to reduce tumor volume. Indicated their are risks such as post-embolization syndrome, AML recurrence needing repeat intervention, bleeding, infection &loss of normal renal parenchyma possibly affecting renal function. Stated that goal would be to target the larger AML and to observe the smaller AMLs.     Following our discussion, the patient stated that she is leaning towards intervention but wants to take some time to think about it.     I provided Mrs. Chicas with my contact information should she have any further questions and/or wishes to proceed.         Encounter provider Devon Crook DO    The patient was identified by name and date of birth. Ginger Chicas was informed that this is a telemedicine visit and that the visit is being conducted through the Epic Embedded platform. She agrees to proceed..  My office door was closed. No one else was in the room.  She acknowledged consent and understanding of privacy and security of the video platform. The patient has agreed to participate and understands they can discontinue the visit at any time.    Patient is aware this is a billable service.     History of Present Illness   74 y F w/ right renal AML  followed by US & CT since 2014. Seen by urology, referred to IR to discuss embolization.    Patient denies flank pain, but does endorse back pain with radicular symptoms being addressed by PT.  No gross hematuria, dysuria.   Renal function good.   Good baseline status, active.  +anxious  +asthma    CT renal protocol 10/8/24 - I personally reviewed the imaging.   Stable 7.3 cm exophytic R renal AML  Mild enlargement of 1 cm and 1.8 cm R renal AMLs    Review of Systems   All other systems reviewed and are negative.      Objective   There were no vitals taken for this visit.    Physical Exam  Constitutional:       Appearance: Normal appearance.   HENT:      Head: Normocephalic.   Eyes:      Pupils: Pupils are equal, round, and reactive to light.   Pulmonary:      Effort: Pulmonary effort is normal.   Musculoskeletal:         General: Normal range of motion.      Cervical back: Normal range of motion.   Neurological:      Mental Status: She is alert and oriented to person, place, and time.   Psychiatric:         Mood and Affect: Mood normal.         Visit Time  Total Visit Duration: 30 minutes

## 2024-12-10 ENCOUNTER — EVALUATION (OUTPATIENT)
Dept: PHYSICAL THERAPY | Facility: CLINIC | Age: 74
End: 2024-12-10
Payer: COMMERCIAL

## 2024-12-10 DIAGNOSIS — G89.29 CHRONIC BILATERAL LOW BACK PAIN, UNSPECIFIED WHETHER SCIATICA PRESENT: Primary | ICD-10-CM

## 2024-12-10 DIAGNOSIS — M25.551 RIGHT HIP PAIN: ICD-10-CM

## 2024-12-10 DIAGNOSIS — M54.16 LUMBAR RADICULOPATHY: ICD-10-CM

## 2024-12-10 DIAGNOSIS — M54.50 CHRONIC BILATERAL LOW BACK PAIN, UNSPECIFIED WHETHER SCIATICA PRESENT: Primary | ICD-10-CM

## 2024-12-10 PROCEDURE — 97110 THERAPEUTIC EXERCISES: CPT | Performed by: PHYSICAL THERAPIST

## 2024-12-10 PROCEDURE — 97112 NEUROMUSCULAR REEDUCATION: CPT | Performed by: PHYSICAL THERAPIST

## 2024-12-10 PROCEDURE — 97140 MANUAL THERAPY 1/> REGIONS: CPT | Performed by: PHYSICAL THERAPIST

## 2024-12-10 NOTE — PROGRESS NOTES
Daily Note     Today's date: 12/10/2024  Patient name: Ginger Chicas  : 1950  MRN: [id number]  Referring provider: Keegan Ruff MD  Dx:   Encounter Diagnosis     ICD-10-CM    1. Chronic bilateral low back pain, unspecified whether sciatica present  M54.50     G89.29       2. Right hip pain  M25.551       3. Lumbar radiculopathy  M54.16                   Start Time: 1030  Stop Time: 1115  Total time in clinic (min): 45 minutes    ASSESSMENT:  Ginger Chicas is a 74 y.o. female who presents with signs and symptoms consistent with chronic low back pain.The primary movement problem is posterior lumbar derangement, resulting in pathoanatomical symptoms of LBP and limiting patient's ability to perform normal daily activities without pain.  Patient is neurologically intact and no further referral appears necessary at this time based upon examination results. On last evaluation it seems patient responded well to extension however not change in pain so POC focused on flexion based with good response. Patient continues to have lumbar ROM restrictions and occasional right radicular symptoms into the R buttock. Continued TTP of the piriformis. Patient has met some of her short term goals and is progressing appropriately toward long term goals. Patient would benefit from continued skilled physical therapy to address the impairments, improve their level of function, and to improve their overall quality of life.      Impairments:    restricted ROM    decreased strength   pain with function   activity intolerance   Postural dysfunction     Prognosis:  Good  Positive and negative prognostic indicator(s):  positive attitude about recovery and prior success with conservative care    Goals:    Short Term Goals: to be achieved by 4 weeks  1) Patient to be independent with basic HEP. MET  2) Decrease pain to 2/10 at its worst. PROGRESSING   3) Increase lumbar spine ROM by 25% in all deficient planes. PROGRESSING   4) Increase LE  strength by 1/2 MMT grade in all deficient planes.PROGRESSING     Long Term Goals: to be achieved by discharge  1) FOTO equal to or greater than target score indicating improvements with overall function. PROGRESSING   2) Patient to be independent with comprehensive HEP.PROGRESSING   3) Lumbar spine ROM WNL all planes to improve a/iadls. PROGRESSING   4) Patient to report no sleep interruption secondary to pain. PROGRESSING   5) Patient will be able to tolerate prolong sitting with little to no discomfort in order to participate in daily activities. PROGRESSING       Planned interventions:  home exercise program, patient education, manual therapy, graded activity, flexibility, functional range of motion exercises, strengthening, abdominal trunk stabilization, and modalities prn    Duration in visits:  4  Frequency: 1 visits per week  Duration in weeks:  4    History of Current Injury: Patient notes that she had a bad episode yesterday and she's not sure why. Patient notes that she has been doing a lot of lifting. Patient notes that excluding yesterday overall she has been feeling better     Pain location: bilateral low back and right radicular   Pain descriptors:  sharp, tightness  Current Pain: 0/10   Pain at worst: 5/10   Pain at best: 0/10     Aggravating factors: standing for longer periods of time, lifting   Easing factors: change of position, walking     Imaging: none at this time   Special Tests: denies numbness and tingling, denies B&B changes, no saddle paraesthesia      Patient goals: Patient reports goals for skilled PT would be  decreased pain    Objective     Concurrent Complaints  Positive for disturbed sleep. Negative for night pain, bladder dysfunction, bowel dysfunction and saddle (S4) numbness    Palpation   Left   Tenderness of the piriformis.     Right   Tenderness of the piriformis.       Neurological Testing     Sensation     Lumbar   Left   Intact: light touch    Right   Intact: light  touch    Reflexes   Left   Patellar (L4): normal (2+)  Achilles (S1): normal (2+)  Clonus sign: negative    Right   Patellar (L4): normal (2+)  Achilles (S1): normal (2+)  Clonus sign: negative    Active Range of Motion   Left Hip   Normal active range of motion    Right Hip   Normal active range of motion    Lumbar:   Flexion: moderate restrictions   Extension: minimal restrictions   Lateral bending R: moderate restrictions   Lateral bending L: moderate restrictions   Rotation R: minimal restrictions   Rotation L: minimal restrictions       Joint Play     Hypomobile: L1, L2, L3, L4 and L5     Tests     Lumbar     Left   Negative crossed SLR, passive SLR and slump test.     Right   Negative crossed SLR, passive SLR and slump test.     Left Hip   Positive piriformis.   Negative CELI and FADIR.     Right Hip   Positive piriformis.   Negative CELI and FADIR.     Additional Tests Details  Bilateral hamstring pain          Plan: Continue per plan of care.      Precautions: history of vertigo, cervical spine pain, osteoporosis, thyroid disorder, lumbar pain        Manuals 12/10 10/25 11/1 11/7 11/11 11/15 11/18 11/22 11/26 12/2   STM lumbar and thoracic paraspinals   ACP            STM gapping lumbar spine    ACP  ACP ACP ACP  ACP      Lumbar  ACP  ACP    ACP  ACP  ACP      STM piriformis  Right side     ACP  Right side     ACP  Right side     ACP  Right side     ACP  Right side     ACP  Right side     ACP  Right side     ACP  Right side     ACP Right side     ACP   Neuro Re-Ed             Supine marches with TrA     :05x2' :05x2' :05x2' :05x2' :05x2' :05x2'   Hip abd iso with belt   NV :05x2' :05x2' :05x2' :05x2' :05x2' :05x2' :05x2'   Hip add with bolster    NV  :05x2' :05x2' :05x2' :05x2' :05x2' :05x2'   Supine hip abd with TB single leg  GTB                                                                 Ther Ex             Prone press ups              Supine piriformis stretch  :10x5 ea  :10x5 ea  :10x5 ea   :10x5 ea :10x5 ea :10x5 ea :10x5 ea :10x5 ea :10x5 ea   90-90 hamstring stretch   :10x5 ea            Standing abduction   2x10 RTB            Standing extension   2x10 RTB           LTR  With pball     :05x2' With pball     :05x2' With pball     :05x2' With pball     :05x2' With pball     :05x2' With pball     :05x2' With pball     :05x2' With pball     :05x2' With pball     :05x2' With pball     :05x2'   Bridges  2x10  2x10            Pball roll ins  With red pball :10x2'  With red pball :10x2' With red pball :10x2' With red pball :10x2' With red pball :10x2' With red pball :10x2' With red pball :10x2' With red pball :10x2' With red pball :10x2'   Pball roll outs  With red pball :10x2'  Blue pball   :10x3'  Blue pball   :10x3'  Blue pball   :10x3' Blue pball   :10x3' Blue pball   :10x3' Blue pball   :10x3' Blue pball   :10x3' Blue pball   :10x3'                Nustep              Ther Activity             Mini squats                           Gait Training                                       Modalities

## 2024-12-13 ENCOUNTER — OFFICE VISIT (OUTPATIENT)
Dept: PHYSICAL THERAPY | Facility: CLINIC | Age: 74
End: 2024-12-13
Payer: COMMERCIAL

## 2024-12-13 ENCOUNTER — PREP FOR PROCEDURE (OUTPATIENT)
Dept: INTERVENTIONAL RADIOLOGY/VASCULAR | Facility: CLINIC | Age: 74
End: 2024-12-13

## 2024-12-13 DIAGNOSIS — M25.551 RIGHT HIP PAIN: ICD-10-CM

## 2024-12-13 DIAGNOSIS — M54.50 CHRONIC BILATERAL LOW BACK PAIN, UNSPECIFIED WHETHER SCIATICA PRESENT: Primary | ICD-10-CM

## 2024-12-13 DIAGNOSIS — M54.16 LUMBAR RADICULOPATHY: ICD-10-CM

## 2024-12-13 DIAGNOSIS — G89.29 CHRONIC BILATERAL LOW BACK PAIN, UNSPECIFIED WHETHER SCIATICA PRESENT: Primary | ICD-10-CM

## 2024-12-13 DIAGNOSIS — D17.71 ANGIOMYOLIPOMA OF KIDNEY: Primary | ICD-10-CM

## 2024-12-13 DIAGNOSIS — M25.552 LEFT HIP PAIN: ICD-10-CM

## 2024-12-13 PROCEDURE — 97140 MANUAL THERAPY 1/> REGIONS: CPT

## 2024-12-13 PROCEDURE — 97112 NEUROMUSCULAR REEDUCATION: CPT

## 2024-12-13 PROCEDURE — 97110 THERAPEUTIC EXERCISES: CPT

## 2024-12-13 RX ORDER — LEVOFLOXACIN 5 MG/ML
500 INJECTION, SOLUTION INTRAVENOUS ONCE
OUTPATIENT
Start: 2024-12-13 | End: 2024-12-13

## 2024-12-13 NOTE — PROGRESS NOTES
"Daily Note     Today's date: 2024  Patient name: Ginger Chicas  : 1950  MRN: [id number]  Referring provider: Keegan Ruff MD  Dx:   Encounter Diagnosis     ICD-10-CM    1. Chronic bilateral low back pain, unspecified whether sciatica present  M54.50     G89.29       2. Right hip pain  M25.551       3. Lumbar radiculopathy  M54.16       4. Left hip pain  M25.552                      Subjective: Pt reports that she is doing pretty good today noting that she has been seeing good improvement since coming to therapy.       Objective: See treatment diary below      Assessment: Tolerated treatment fair. Continued with all exercises being appropriately challenged. Tenderness was present over her R piriformis. Added in seated marches on the pball with CGAx1 to progress patient, able to perform. Patient demonstrated fatigue post treatment, exhibited good technique with therapeutic exercises, and would benefit from continued PT      Plan: Continue per plan of care.      Precautions: history of vertigo, cervical spine pain, osteoporosis, thyroid disorder, lumbar pain        Manuals 12/10 12/13   11/11 11/15 11/18 11/22 11/26 12/2   STM lumbar and thoracic paraspinals   MM           STM gapping lumbar spine      ACP ACP  ACP      Lumbar  ACP     ACP  ACP  ACP      STM piriformis  Right side     ACP Right side    MM   Right side     ACP  Right side     ACP  Right side     ACP  Right side     ACP  Right side     ACP Right side     ACP   Neuro Re-Ed             Supine marches with TrA  On pball 2'   :05x2' :05x2' :05x2' :05x2' :05x2' :05x2'   Hip abd iso with belt     :05x2' :05x2' :05x2' :05x2' :05x2' :05x2'   Hip add with bolster   :05x2'   :05x2' :05x2' :05x2' :05x2' :05x2' :05x2'   Supine hip abd with TB single leg  GTB  GTB 2x10 3\" ea                                                               Ther Ex             Prone press ups              Supine piriformis stretch  :10x5 ea :10x5 ea   :10x5 ea :10x5 ea " :10x5 ea :10x5 ea :10x5 ea :10x5 ea   90-90 hamstring stretch              Standing abduction              Standing extension              LTR  With pball     :05x2' With pball     :05x2'   With pball     :05x2' With pball     :05x2' With pball     :05x2' With pball     :05x2' With pball     :05x2' With pball     :05x2'   Bridges  2x10  2x10           Pball roll ins  With red pball :10x2' With red pball :10x2'   With red pball :10x2' With red pball :10x2' With red pball :10x2' With red pball :10x2' With red pball :10x2' With red pball :10x2'   Pball roll outs  With red pball :10x2' With red pball :10x2'   Blue pball   :10x3' Blue pball   :10x3' Blue pball   :10x3' Blue pball   :10x3' Blue pball   :10x3' Blue pball   :10x3'                Nustep              Ther Activity             Mini squats                           Gait Training                                       Modalities

## 2024-12-16 ENCOUNTER — APPOINTMENT (OUTPATIENT)
Dept: PHYSICAL THERAPY | Facility: CLINIC | Age: 74
End: 2024-12-16
Payer: COMMERCIAL

## 2024-12-17 ENCOUNTER — OFFICE VISIT (OUTPATIENT)
Dept: PHYSICAL THERAPY | Facility: CLINIC | Age: 74
End: 2024-12-17
Payer: COMMERCIAL

## 2024-12-17 DIAGNOSIS — M54.16 LUMBAR RADICULOPATHY: ICD-10-CM

## 2024-12-17 DIAGNOSIS — G89.29 CHRONIC BILATERAL LOW BACK PAIN, UNSPECIFIED WHETHER SCIATICA PRESENT: Primary | ICD-10-CM

## 2024-12-17 DIAGNOSIS — M54.50 CHRONIC BILATERAL LOW BACK PAIN, UNSPECIFIED WHETHER SCIATICA PRESENT: Primary | ICD-10-CM

## 2024-12-17 DIAGNOSIS — M25.551 RIGHT HIP PAIN: ICD-10-CM

## 2024-12-17 PROCEDURE — 97110 THERAPEUTIC EXERCISES: CPT | Performed by: PHYSICAL THERAPIST

## 2024-12-17 PROCEDURE — 97112 NEUROMUSCULAR REEDUCATION: CPT | Performed by: PHYSICAL THERAPIST

## 2024-12-17 PROCEDURE — 97140 MANUAL THERAPY 1/> REGIONS: CPT | Performed by: PHYSICAL THERAPIST

## 2024-12-17 NOTE — PROGRESS NOTES
"Daily Note     Today's date: 2024  Patient name: Ginger Chicas  : 1950  MRN: [id number]  Referring provider: Keegan Ruff MD  Dx:   Encounter Diagnosis     ICD-10-CM    1. Chronic bilateral low back pain, unspecified whether sciatica present  M54.50     G89.29       2. Right hip pain  M25.551       3. Lumbar radiculopathy  M54.16             Start Time: 1530  Stop Time: 1615  Total time in clinic (min): 45 minutes    Subjective: Patient reports that she had some increased pain after last session but then the last two days she has been feeling better. Patient notes that she is feeling really good today.      Objective: See treatment diary below      Assessment: Tolerated treatment good. No flare up in pain throughout session. Introduced hip fall outs with supine marches with good response. Continued TTP of the right piriformis.  Patient noted decreased pain and discomfort post manual treatment. Patient demonstrated fatigue post treatment, exhibited good technique with therapeutic exercises, and would benefit from continued PT      Plan: Continue per plan of care.      Precautions: history of vertigo, cervical spine pain, osteoporosis, thyroid disorder, lumbar pain        Manuals 12/10 12/13 12/17  11/11 11/15 11/18 11/22 11/26 12/2   STM lumbar and thoracic paraspinals   MM           STM gapping lumbar spine      ACP ACP  ACP      Lumbar  ACP     ACP  ACP  ACP      STM piriformis  Right side     ACP Right side    MM Right side     ACP  Right side     ACP  Right side     ACP  Right side     ACP  Right side     ACP  Right side     ACP Right side     ACP   Neuro Re-Ed             Supine  with TrA  On pball 2' Supine + hip fallout     :05x2'  :05x2' :05x2' :05x2' :05x2' :05x2' :05x2'   Hip abd iso with belt     :05x2' :05x2' :05x2' :05x2' :05x2' :05x2'   Hip add with bolster   :05x2'   :05x2' :05x2' :05x2' :05x2' :05x2' :05x2'   Supine hip abd with TB single leg  GTB  GTB 2x10 3\" ea GTB 2x10  ea "                                                              Ther Ex             Prone press ups              Supine piriformis stretch  :10x5 ea :10x5 ea :10x5 ea  :10x5 ea :10x5 ea :10x5 ea :10x5 ea :10x5 ea :10x5 ea   90-90 hamstring stretch              Standing abduction              Standing extension              LTR  With pball     :05x2' With pball     :05x2' With red pball :10x2'  With pball     :05x2' With pball     :05x2' With pball     :05x2' With pball     :05x2' With pball     :05x2' With pball     :05x2'   Bridges  2x10  2x10 2x10           Pball roll ins  With red pball :10x2' With red pball :10x2' With red pball :10x2'  With red pball :10x2' With red pball :10x2' With red pball :10x2' With red pball :10x2' With red pball :10x2' With red pball :10x2'   Pball roll outs  With blue pball :10x2' With blue pball :10x2' With blue  pball :10x2'  Blue pball   :10x3' Blue pball   :10x3' Blue pball   :10x3' Blue pball   :10x3' Blue pball   :10x3' Blue pball   :10x3'                Nustep              Ther Activity             Mini squats                           Gait Training                                       Modalities

## 2024-12-20 ENCOUNTER — OFFICE VISIT (OUTPATIENT)
Dept: PHYSICAL THERAPY | Facility: CLINIC | Age: 74
End: 2024-12-20
Payer: COMMERCIAL

## 2024-12-20 DIAGNOSIS — M25.551 RIGHT HIP PAIN: Primary | ICD-10-CM

## 2024-12-20 DIAGNOSIS — M54.16 LUMBAR RADICULOPATHY: ICD-10-CM

## 2024-12-20 DIAGNOSIS — G89.29 CHRONIC BILATERAL LOW BACK PAIN, UNSPECIFIED WHETHER SCIATICA PRESENT: ICD-10-CM

## 2024-12-20 DIAGNOSIS — M54.50 CHRONIC BILATERAL LOW BACK PAIN, UNSPECIFIED WHETHER SCIATICA PRESENT: ICD-10-CM

## 2024-12-20 PROCEDURE — 97140 MANUAL THERAPY 1/> REGIONS: CPT | Performed by: PHYSICAL THERAPIST

## 2024-12-20 PROCEDURE — 97112 NEUROMUSCULAR REEDUCATION: CPT | Performed by: PHYSICAL THERAPIST

## 2024-12-20 PROCEDURE — 97110 THERAPEUTIC EXERCISES: CPT | Performed by: PHYSICAL THERAPIST

## 2024-12-20 NOTE — PROGRESS NOTES
"Daily Note     Today's date: 2024  Patient name: Ginger Chicas  : 1950  MRN: [id number]  Referring provider: Keegan Ruff MD  Dx:   Encounter Diagnosis     ICD-10-CM    1. Right hip pain  M25.551       2. Lumbar radiculopathy  M54.16       3. Chronic bilateral low back pain, unspecified whether sciatica present  M54.50     G89.29               Start Time: 1015  Stop Time: 1100  Total time in clinic (min): 45 minutes    Subjective: Patient reports some stiffness and right proximal hip pain upon arrival. Patient notes that she did a lot of walking and sitting while she was in [facility] for a shower last night.       Objective: See treatment diary below      Assessment: Tolerated treatment good. Despite increased soreness patient able to perform all sets and reps as noted without flare up.  Patient noted decreased pain and discomfort post manual treatment. Patient demonstrated fatigue post treatment, exhibited good technique with therapeutic exercises, and would benefit from continued PT      Plan: Continue per plan of care.      Precautions: history of vertigo, cervical spine pain, osteoporosis, thyroid disorder, lumbar pain        Manuals 12/10 12/13 12/17 12/20 11/11 11/15 11/18 11/22 11/26 12/2   STM lumbar and thoracic paraspinals   MM           STM gapping lumbar spine      ACP ACP  ACP      Lumbar  ACP     ACP  ACP  ACP      STM piriformis  Right side     ACP Right side    MM Right side     ACP Right side     ACP Right side     ACP  Right side     ACP  Right side     ACP  Right side     ACP  Right side     ACP Right side     ACP   Neuro Re-Ed             Supine  with TrA  On pball 2' Supine + hip fallout     :05x2' Supine + hip fallout     :05x2' :05x2' :05x2' :05x2' :05x2' :05x2' :05x2'   Hip abd iso with belt     :05x2' :05x2' :05x2' :05x2' :05x2' :05x2'   Hip add with bolster   :05x2'   :05x2' :05x2' :05x2' :05x2' :05x2' :05x2'   Supine hip abd with TB single leg  GTB  GTB 2x10 3\" ea " GTB 2x10  ea GTB 2x10  ea                                                             Ther Ex             Prone press ups              Supine piriformis stretch  :10x5 ea :10x5 ea :10x5 ea :10x5 ea :10x5 ea :10x5 ea :10x5 ea :10x5 ea :10x5 ea :10x5 ea   90-90 hamstring stretch              Standing abduction              Standing extension              LTR  With pball     :05x2' With pball     :05x2' With red pball :10x2' With red pball :10x2' With pball     :05x2' With pball     :05x2' With pball     :05x2' With pball     :05x2' With pball     :05x2' With pball     :05x2'   Bridges  2x10  2x10 2x10  2x10          Pball roll ins  With red pball :10x2' With red pball :10x2' With red pball :10x2' With red pball :10x2' With red pball :10x2' With red pball :10x2' With red pball :10x2' With red pball :10x2' With red pball :10x2' With red pball :10x2'   Pball roll outs  With blue pball :10x2' With blue pball :10x2' With blue  pball :10x2' With blue  pball :10x2' Blue pball   :10x3' Blue pball   :10x3' Blue pball   :10x3' Blue pball   :10x3' Blue pball   :10x3' Blue pball   :10x3'                Nustep              Ther Activity             Mini squats                           Gait Training                                       Modalities

## 2024-12-24 ENCOUNTER — APPOINTMENT (OUTPATIENT)
Dept: PHYSICAL THERAPY | Facility: CLINIC | Age: 74
End: 2024-12-24
Payer: COMMERCIAL

## 2024-12-26 ENCOUNTER — OFFICE VISIT (OUTPATIENT)
Dept: PHYSICAL THERAPY | Facility: CLINIC | Age: 74
End: 2024-12-26
Payer: COMMERCIAL

## 2024-12-26 DIAGNOSIS — G89.29 CHRONIC BILATERAL LOW BACK PAIN, UNSPECIFIED WHETHER SCIATICA PRESENT: ICD-10-CM

## 2024-12-26 DIAGNOSIS — M25.551 RIGHT HIP PAIN: Primary | ICD-10-CM

## 2024-12-26 DIAGNOSIS — M54.50 CHRONIC BILATERAL LOW BACK PAIN, UNSPECIFIED WHETHER SCIATICA PRESENT: ICD-10-CM

## 2024-12-26 DIAGNOSIS — M54.16 LUMBAR RADICULOPATHY: ICD-10-CM

## 2024-12-26 PROCEDURE — 97112 NEUROMUSCULAR REEDUCATION: CPT | Performed by: PHYSICAL THERAPIST

## 2024-12-26 PROCEDURE — 97140 MANUAL THERAPY 1/> REGIONS: CPT | Performed by: PHYSICAL THERAPIST

## 2024-12-26 PROCEDURE — 97110 THERAPEUTIC EXERCISES: CPT | Performed by: PHYSICAL THERAPIST

## 2024-12-26 NOTE — PROGRESS NOTES
Daily Note     Today's date: 2024  Patient name: Ginger Chicas  : 1950  MRN: [id number]  Referring provider: Keegan Ruff MD  Dx:   Encounter Diagnosis     ICD-10-CM    1. Right hip pain  M25.551       2. Lumbar radiculopathy  M54.16       3. Chronic bilateral low back pain, unspecified whether sciatica present  M54.50     G89.29                 Start Time: 1202  Stop Time: 1248  Total time in clinic (min): 46 minutes    Subjective: Patient reports the last two days just haven't been good. Patient notes that she just hurts all over. Patient notes that she has been getting more spasms in her upper back as well as having some pain into the hip and upper leg. Patient notes that she has been non-stop standing and preparing for the holidays. Patient notes that if she moves the wrong way it'll shoot into her back. Patient notes that she just feels very tense. Patient notes she also had some things that happened and was experiencing more stress.       Objective: See treatment diary below      Assessment: Tolerated treatment air. Modified session secondary to flare up in pain. Utilized moist heat at start of session for pain management. Incorporated diaphragmatic breathing this date in order to promote muscular relaxation. Will resume normal POC next visit if able. Patient demonstrated fatigue post treatment, exhibited good technique with therapeutic exercises, and would benefit from continued PT      Plan: Continue per plan of care.      Precautions: history of vertigo, cervical spine pain, osteoporosis, thyroid disorder, lumbar pain        Manuals 12/10 12/13 12/17 12/20 12/26 11/15 11/18 11/22 11/26 12/2   STM lumbar and thoracic paraspinals   MM           STM gapping lumbar spine       ACP  ACP      Lumbar  ACP      ACP  ACP      STM piriformis  Right side     ACP Right side    MM Right side     ACP Right side     ACP  Right side     ACP  Right side     ACP  Right side     ACP  Right side     ACP  "Right side     ACP   Neuro Re-Ed             Supine marches with TrA  On pball 2' Supine + hip fallout     :05x2' Supine + hip fallout     :05x2'  :05x2' :05x2' :05x2' :05x2' :05x2'   Hip abd iso with belt      :05x2' :05x2' :05x2' :05x2' :05x2'   Hip add with bolster   :05x2'    :05x2' :05x2' :05x2' :05x2' :05x2'   Supine hip abd with TB single leg  GTB  GTB 2x10 3\" ea GTB 2x10  ea GTB 2x10  ea         Diaphragmatic breathing      5'                                                Ther Ex             Prone press ups              Supine piriformis stretch  :10x5 ea :10x5 ea :10x5 ea :10x5 ea  :10x5 ea :10x5 ea :10x5 ea :10x5 ea :10x5 ea   SKTC     With towel :10x10                                   LTR  With pball     :05x2' With pball     :05x2' With red pball :10x2' With red pball :10x2' Without ball :05x2' With pball     :05x2' With pball     :05x2' With pball     :05x2' With pball     :05x2' With pball     :05x2'   Bridges  2x10  2x10 2x10  2x10          Pball roll ins  With red pball :10x2' With red pball :10x2' With red pball :10x2' With red pball :10x2'  With red pball :10x2' With red pball :10x2' With red pball :10x2' With red pball :10x2' With red pball :10x2'   Pball roll outs  With blue pball :10x2' With blue pball :10x2' With blue  pball :10x2' With blue  pball :10x2'  Blue pball   :10x3' Blue pball   :10x3' Blue pball   :10x3' Blue pball   :10x3' Blue pball   :10x3'                Nustep              Ther Activity             Mini squats                           Gait Training                                       Modalities             Moist heat      10 minutes                                 "

## 2024-12-31 ENCOUNTER — OFFICE VISIT (OUTPATIENT)
Dept: PHYSICAL THERAPY | Facility: CLINIC | Age: 74
End: 2024-12-31
Payer: COMMERCIAL

## 2024-12-31 DIAGNOSIS — M25.551 RIGHT HIP PAIN: Primary | ICD-10-CM

## 2024-12-31 PROCEDURE — 97112 NEUROMUSCULAR REEDUCATION: CPT | Performed by: PHYSICAL THERAPIST

## 2024-12-31 PROCEDURE — 97140 MANUAL THERAPY 1/> REGIONS: CPT | Performed by: PHYSICAL THERAPIST

## 2024-12-31 PROCEDURE — 97110 THERAPEUTIC EXERCISES: CPT | Performed by: PHYSICAL THERAPIST

## 2024-12-31 NOTE — PROGRESS NOTES
"Daily Note     Today's date: 2024  Patient name: Ginger Chicas  : 1950  MRN: [id number]  Referring provider: Keegan Ruff MD  Dx:   Encounter Diagnosis     ICD-10-CM    1. Right hip pain  M25.551                   Start Time: 0900  Stop Time: 09  Total time in clinic (min): 45 minutes    Subjective: Patient reports she is feeling better compared to last session however continues to have R gluteal pain that will occasionally radiate down the along the lateral hip.      Objective: See treatment diary below      Assessment: Tolerated treatment fair.  Trialed some extension based exercises however no peripheralization or centralization throughout. Increased tenderness of the R upper gluteals as well as piriformis during manual treatment. Will continue to progress as able.  Patient demonstrated fatigue post treatment, exhibited good technique with therapeutic exercises, and would benefit from continued PT      Plan: Continue per plan of care.      Precautions: history of vertigo, cervical spine pain, osteoporosis, thyroid disorder, lumbar pain        Manuals 12/10 12/13 12/17 12/20 12/26 12/20 11/18 11/22 11/26 12   STM lumbar and thoracic paraspinals   MM           STM gapping lumbar spine        ACP      Lumbar  ACP       ACP      STM piriformis  Right side     ACP Right side    MM Right side     ACP Right side     ACP  Right side     ACP  Right side     ACP  Right side     ACP  Right side     ACP Right side     ACP   Neuro Re-Ed             Supine marches with TrA  On pball 2' Supine + hip fallout     :05x2' Supine + hip fallout     :05x2'   :05x2' :05x2' :05x2' :05x2'   Hip abd iso with belt       :05x2' :05x2' :05x2' :05x2'   Hip add with bolster   :05x2'     :05x2' :05x2' :05x2' :05x2'   Supine hip abd with TB single leg  GTB  GTB 2x10 3\" ea GTB 2x10  ea GTB 2x10  ea         Diaphragmatic breathing      5'                                                Ther Ex             Prone press ups      "         Supine piriformis stretch  :10x5 ea :10x5 ea :10x5 ea :10x5 ea  :10x5 ea :10x5 ea :10x5 ea :10x5 ea :10x5 ea   SKTC     With towel :10x10                                   LTR  With pball     :05x2' With pball     :05x2' With red pball :10x2' With red pball :10x2' Without ball :05x2' With pball     :05x2' With pball     :05x2' With pball     :05x2' With pball     :05x2' With pball     :05x2'   Bridges  2x10  2x10 2x10  2x10          Pball roll ins  With red pball :10x2' With red pball :10x2' With red pball :10x2' With red pball :10x2'  With red pball :10x2' With red pball :10x2' With red pball :10x2' With red pball :10x2' With red pball :10x2'   Pball roll outs  With blue pball :10x2' With blue pball :10x2' With blue  pball :10x2' With blue  pball :10x2'   Blue pball   :10x3' Blue pball   :10x3' Blue pball   :10x3' Blue pball   :10x3'                Nustep              Ther Activity             Mini squats                           Gait Training                                       Modalities             Moist heat      10 minutes

## 2025-01-03 ENCOUNTER — OFFICE VISIT (OUTPATIENT)
Dept: PHYSICAL THERAPY | Facility: CLINIC | Age: 75
End: 2025-01-03
Payer: COMMERCIAL

## 2025-01-03 DIAGNOSIS — G89.29 CHRONIC BILATERAL LOW BACK PAIN, UNSPECIFIED WHETHER SCIATICA PRESENT: ICD-10-CM

## 2025-01-03 DIAGNOSIS — M54.16 LUMBAR RADICULOPATHY: ICD-10-CM

## 2025-01-03 DIAGNOSIS — M54.50 CHRONIC BILATERAL LOW BACK PAIN, UNSPECIFIED WHETHER SCIATICA PRESENT: ICD-10-CM

## 2025-01-03 DIAGNOSIS — M25.551 RIGHT HIP PAIN: Primary | ICD-10-CM

## 2025-01-03 PROCEDURE — 97112 NEUROMUSCULAR REEDUCATION: CPT | Performed by: PHYSICAL THERAPIST

## 2025-01-03 PROCEDURE — 97110 THERAPEUTIC EXERCISES: CPT | Performed by: PHYSICAL THERAPIST

## 2025-01-03 NOTE — PROGRESS NOTES
"Daily Note     Today's date: 1/3/2025  Patient name: Ginger Chicas  : 1950  MRN: [id number]  Referring provider: Keegan Ruff MD  Dx:   Encounter Diagnosis     ICD-10-CM    1. Right hip pain  M25.551       2. Lumbar radiculopathy  M54.16       3. Chronic bilateral low back pain, unspecified whether sciatica present  M54.50     G89.29                     Start Time: 08  Stop Time: 930  Total time in clinic (min): 45 minutes    Subjective: Patient reports she felt really good after last session. Patient notes that she is having minimal pain upon arrival and feels pretty good.       Objective: See treatment diary below      Assessment: Tolerated treatment fair.  Continued with light flexion based exercises with good response.  Patient noted decreased pain and discomfort post manual treatment. Ended with moist heat for pain management. Will continue to progress as able. Patient demonstrated fatigue post treatment, exhibited good technique with therapeutic exercises, and would benefit from continued PT      Plan: Continue per plan of care.      Precautions: history of vertigo, cervical spine pain, osteoporosis, thyroid disorder, lumbar pain        Manuals 12/10 12/13 12/17 12/20 12/26 12/30 1/3 11/22 11/26 12/2   STM lumbar and thoracic paraspinals   MM           STM gapping lumbar spine              Lumbar  ACP             STM piriformis  Right side     ACP Right side    MM Right side     ACP Right side     ACP  Right side     ACP  Right side     ACP  Right side     ACP  Right side     ACP Right side     ACP   Neuro Re-Ed             Supine marches with TrA  On pball 2' Supine + hip fallout     :05x2' Supine + hip fallout     :05x2'   :05x2' :05x2' :05x2' :05x2'   Hip abd iso with belt       :05x2' :05x2' :05x2' :05x2'   Hip add with bolster   :05x2'     :05x2' :05x2' :05x2' :05x2'   Supine hip abd with TB single leg  GTB  GTB 2x10 3\" ea GTB 2x10  ea GTB 2x10  ea         Diaphragmatic breathing      5'   "                                              Ther Ex             Prone press ups              Supine piriformis stretch  :10x5 ea :10x5 ea :10x5 ea :10x5 ea  :10x5 ea :10x10 RLE  :10x5 ea :10x5 ea :10x5 ea   SKTC     With towel :10x10                                   LTR  With pball     :05x2' With pball     :05x2' With red pball :10x2' With red pball :10x2' Without ball :05x2' With pball     :05x2' With pball     :05x2' With pball     :05x2' With pball     :05x2' With pball     :05x2'   Bridges  2x10  2x10 2x10  2x10          Pball roll ins  With red pball :10x2' With red pball :10x2' With red pball :10x2' With red pball :10x2'  With red pball :10x2' With red pball :10x2' With red pball :10x2' With red pball :10x2' With red pball :10x2'   Pball roll outs  With blue pball :10x2' With blue pball :10x2' With blue  pball :10x2' With blue  pball :10x2'    Blue pball   :10x3' Blue pball   :10x3' Blue pball   :10x3'                Nustep              Ther Activity             Mini squats                           Gait Training                                       Modalities             Moist heat      10 minutes

## 2025-01-06 ENCOUNTER — OFFICE VISIT (OUTPATIENT)
Dept: PHYSICAL THERAPY | Facility: CLINIC | Age: 75
End: 2025-01-06
Payer: COMMERCIAL

## 2025-01-06 DIAGNOSIS — M25.551 RIGHT HIP PAIN: Primary | ICD-10-CM

## 2025-01-06 DIAGNOSIS — M54.50 CHRONIC BILATERAL LOW BACK PAIN, UNSPECIFIED WHETHER SCIATICA PRESENT: ICD-10-CM

## 2025-01-06 DIAGNOSIS — G89.29 CHRONIC BILATERAL LOW BACK PAIN, UNSPECIFIED WHETHER SCIATICA PRESENT: ICD-10-CM

## 2025-01-06 DIAGNOSIS — M54.16 LUMBAR RADICULOPATHY: ICD-10-CM

## 2025-01-06 PROCEDURE — 97140 MANUAL THERAPY 1/> REGIONS: CPT | Performed by: PHYSICAL THERAPIST

## 2025-01-06 PROCEDURE — 97112 NEUROMUSCULAR REEDUCATION: CPT | Performed by: PHYSICAL THERAPIST

## 2025-01-06 PROCEDURE — 97110 THERAPEUTIC EXERCISES: CPT | Performed by: PHYSICAL THERAPIST

## 2025-01-06 NOTE — PROGRESS NOTES
"Daily Note     Today's date: 2025  Patient name: Ginger Chicas  : 1950  MRN: [id number]  Referring provider: Keegan Ruff MD  Dx:   Encounter Diagnosis     ICD-10-CM    1. Right hip pain  M25.551       2. Lumbar radiculopathy  M54.16       3. Chronic bilateral low back pain, unspecified whether sciatica present  M54.50     G89.29               Start Time: 0900  Stop Time: 945  Total time in clinic (min): 45 minutes    Subjective: Patient reports she had limited pain over the weekend. Patient notes she could feel a little soreness but no real pain.       Objective: See treatment diary below      Assessment: Tolerated treatment fair.  Continued with light flexion based exercises with good response.  Patient noted decreased pain and discomfort post manual treatment. Patient demonstrated fatigue post treatment, exhibited good technique with therapeutic exercises, and would benefit from continued PT      Plan: Continue per plan of care.      Precautions: history of vertigo, cervical spine pain, osteoporosis, thyroid disorder, lumbar pain        Manuals 12/10 12/13 12/17 12/20 12/26 12/30 1/3 1/6 11/26 12/2   STM lumbar and thoracic paraspinals   MM           STM gapping lumbar spine              Lumbar  ACP             STM piriformis  Right side     ACP Right side    MM Right side     ACP Right side     ACP  Right side     ACP  Right side     ACP  Right side     ACP  Right side     ACP Right side     ACP   Neuro Re-Ed             Supine marches with TrA  On pball 2' Supine + hip fallout     :05x2' Supine + hip fallout     :05x2'   :05x2' :05x2' :05x2' :05x2'   Hip abd iso with belt       :05x2' :05x2' :05x2' :05x2'   Hip add with bolster   :05x2'     :05x2' :05x2' :05x2' :05x2'   Supine hip abd with TB single leg  GTB  GTB 2x10 3\" ea GTB 2x10  ea GTB 2x10  ea         Diaphragmatic breathing      5'                                                Ther Ex             Prone press ups              Supine " piriformis stretch  :10x5 ea :10x5 ea :10x5 ea :10x5 ea  :10x5 ea :10x10 RLE  :10x5 ea :10x5 ea :10x5 ea   SKTC     With towel :10x10                                   LTR  With pball     :05x2' With pball     :05x2' With red pball :10x2' With red pball :10x2' Without ball :05x2' With pball     :05x2' With pball     :05x2' With pball     :05x2' With pball     :05x2' With pball     :05x2'   Bridges  2x10  2x10 2x10  2x10          Pball roll ins  With red pball :10x2' With red pball :10x2' With red pball :10x2' With red pball :10x2'  With red pball :10x2' With red pball :10x2' With red pball :10x2' With red pball :10x2' With red pball :10x2'   Pball roll outs  With blue pball :10x2' With blue pball :10x2' With blue  pball :10x2' With blue  pball :10x2'    Blue pball   :10x3' Blue pball   :10x3' Blue pball   :10x3'                Nustep              Ther Activity             Mini squats                           Gait Training                                       Modalities             Moist heat      10 minutes

## 2025-01-10 ENCOUNTER — OFFICE VISIT (OUTPATIENT)
Dept: PHYSICAL THERAPY | Facility: CLINIC | Age: 75
End: 2025-01-10
Payer: COMMERCIAL

## 2025-01-10 DIAGNOSIS — M25.551 RIGHT HIP PAIN: Primary | ICD-10-CM

## 2025-01-10 DIAGNOSIS — M54.16 LUMBAR RADICULOPATHY: ICD-10-CM

## 2025-01-10 PROCEDURE — 97112 NEUROMUSCULAR REEDUCATION: CPT | Performed by: PHYSICAL THERAPIST

## 2025-01-10 PROCEDURE — 97110 THERAPEUTIC EXERCISES: CPT | Performed by: PHYSICAL THERAPIST

## 2025-01-10 PROCEDURE — 97140 MANUAL THERAPY 1/> REGIONS: CPT | Performed by: PHYSICAL THERAPIST

## 2025-01-10 NOTE — PROGRESS NOTES
"Daily Note     Today's date: 1/10/2025  Patient name: Ginger Chicas  : 1950  MRN: [id number]  Referring provider: Keegna Ruff MD  Dx:   Encounter Diagnosis     ICD-10-CM    1. Right hip pain  M25.551       2. Lumbar radiculopathy  M54.16           Start Time: 0845  Stop Time: 09  Total time in clinic (min): 40 minutes    Subjective: Patient reports she is doing really good. Patient notes that she is having no pain. If she feels it, its just a twinge and then goes away. Patient notes that she is happy and feels like it is on the mend.       Objective: See treatment diary below      Assessment: Tolerated treatment fair.  Continued with light flexion based exercises with good response.  Patient noted decreased pain and discomfort post manual treatment. Patient demonstrated fatigue post treatment, exhibited good technique with therapeutic exercises, and would benefit from continued PT      Plan: Continue per plan of care.      Precautions: history of vertigo, cervical spine pain, osteoporosis, thyroid disorder, lumbar pain        Manuals 12/10 12/13 12/17 12/20 12/26 12/30 1/3 1/6 1/10 12/2   STM lumbar and thoracic paraspinals   MM           STM gapping lumbar spine              Lumbar  ACP             STM piriformis  Right side     ACP Right side    MM Right side     ACP Right side     ACP  Right side     ACP  Right side     ACP  Right side     ACP  Right side     ACP Right side     ACP   Neuro Re-Ed             Supine marches with TrA  On pball 2' Supine + hip fallout     :05x2' Supine + hip fallout     :05x2'   :05x2' :05x2' :05x2' :05x2'   Hip abd iso with belt       :05x2' :05x2' :05x2' :05x2'   Hip add with bolster   :05x2'     :05x2' :05x2' :05x2' :05x2'   Supine hip abd with TB single leg  GTB  GTB 2x10 3\" ea GTB 2x10  ea GTB 2x10  ea     YTB 2x10 ea     Diaphragmatic breathing      5'                                                Ther Ex             Prone press ups              Supine " piriformis stretch  :10x5 ea :10x5 ea :10x5 ea :10x5 ea  :10x5 ea :10x10 RLE  RLE   :10   2x10  RLE   :10   2x10  :10x5 ea   SKTC     With towel :10x10                                   LTR  With pball     :05x2' With pball     :05x2' With red pball :10x2' With red pball :10x2' Without ball :05x2' With pball     :05x2' With pball     :05x2' With pball     :05x2' With pball     :05x2' With pball     :05x2'   Bridges  2x10  2x10 2x10  2x10          Pball roll ins  With red pball :10x2' With red pball :10x2' With red pball :10x2' With red pball :10x2'  With red pball :10x2' With red pball :10x2' With red pball :10x2' With red pball :10x2' With red pball :10x2'   Pball roll outs  With blue pball :10x2' With blue pball :10x2' With blue  pball :10x2' With blue  pball :10x2'      Blue pball   :10x3'                Nustep              Ther Activity             Mini squats                           Gait Training                                       Modalities             Moist heat      10 minutes

## 2025-01-13 ENCOUNTER — OFFICE VISIT (OUTPATIENT)
Dept: PHYSICAL THERAPY | Facility: CLINIC | Age: 75
End: 2025-01-13
Payer: COMMERCIAL

## 2025-01-13 DIAGNOSIS — G89.29 CHRONIC BILATERAL LOW BACK PAIN, UNSPECIFIED WHETHER SCIATICA PRESENT: ICD-10-CM

## 2025-01-13 DIAGNOSIS — M54.16 LUMBAR RADICULOPATHY: ICD-10-CM

## 2025-01-13 DIAGNOSIS — M54.50 CHRONIC BILATERAL LOW BACK PAIN, UNSPECIFIED WHETHER SCIATICA PRESENT: ICD-10-CM

## 2025-01-13 DIAGNOSIS — M25.551 RIGHT HIP PAIN: Primary | ICD-10-CM

## 2025-01-13 PROCEDURE — 97110 THERAPEUTIC EXERCISES: CPT | Performed by: PHYSICAL THERAPIST

## 2025-01-13 PROCEDURE — 97140 MANUAL THERAPY 1/> REGIONS: CPT | Performed by: PHYSICAL THERAPIST

## 2025-01-13 PROCEDURE — 97112 NEUROMUSCULAR REEDUCATION: CPT | Performed by: PHYSICAL THERAPIST

## 2025-01-13 NOTE — PROGRESS NOTES
"Daily Note     Today's date: 2025  Patient name: Ginger Chicas  : 1950  MRN: [id number]  Referring provider: Keegan Ruff MD  Dx:   Encounter Diagnosis     ICD-10-CM    1. Right hip pain  M25.551       2. Lumbar radiculopathy  M54.16       3. Chronic bilateral low back pain, unspecified whether sciatica present  M54.50     G89.29             Start Time: 1115  Stop Time: 1200  Total time in clinic (min): 45 minutes    Subjective: Patient reports she was doing some lifting while putting away chato stuff and now her back is a little more sore again.       Objective: See treatment diary below      Assessment: Tolerated treatment fair.  Despite some soreness this date, patient had no flare up in pain. Patient able to tolerate all sets and reps as noted.  Patient demonstrated fatigue post treatment, exhibited good technique with therapeutic exercises, and would benefit from continued PT      Plan: Continue per plan of care.      Precautions: history of vertigo, cervical spine pain, osteoporosis, thyroid disorder, lumbar pain        Manuals 12/10 12/13 12/17 12/20 12/26 12/30 1/3 1/6 1/10 1/13   STM lumbar and thoracic paraspinals   MM           STM gapping lumbar spine              Lumbar  ACP             STM piriformis  Right side     ACP Right side    MM Right side     ACP Right side     ACP  Right side     ACP  Right side     ACP  Right side     ACP  Right side     ACP Right side     ACP   Neuro Re-Ed             Supine marches with TrA  On pball 2' Supine + hip fallout     :05x2' Supine + hip fallout     :05x2'   :05x2' :05x2' :05x2' :05x2'   Hip abd iso with belt       :05x2' :05x2' :05x2' :05x2'   Hip add with bolster   :05x2'     :05x2' :05x2' :05x2' :05x2'   Supine hip abd with TB single leg  GTB  GTB 2x10 3\" ea GTB 2x10  ea GTB 2x10  ea     YTB 2x10 ea  GTB 2x10 ea    Diaphragmatic breathing      5'                                                Ther Ex             Prone press ups            "   Supine piriformis stretch  :10x5 ea :10x5 ea :10x5 ea :10x5 ea  :10x5 ea :10x10 RLE  RLE   :10   2x10  RLE   :10   2x10  RLE   :10   2x10    SKTC     With towel :10x10                                   LTR  With pball     :05x2' With pball     :05x2' With red pball :10x2' With red pball :10x2' Without ball :05x2' With pball     :05x2' With pball     :05x2' With pball     :05x2' With pball     :05x2' With pball     :05x2'   Bridges  2x10  2x10 2x10  2x10          Pball roll ins  With red pball :10x2' With red pball :10x2' With red pball :10x2' With red pball :10x2'  With red pball :10x2' With red pball :10x2' With red pball :10x2' With red pball :10x2' With red pball :10x2'   Pball roll outs  With blue pball :10x2' With blue pball :10x2' With blue  pball :10x2' With blue  pball :10x2'                      Nustep              Ther Activity             Mini squats                           Gait Training                                       Modalities             Moist heat      10 minutes

## 2025-01-17 ENCOUNTER — OFFICE VISIT (OUTPATIENT)
Dept: PHYSICAL THERAPY | Facility: CLINIC | Age: 75
End: 2025-01-17
Payer: COMMERCIAL

## 2025-01-17 DIAGNOSIS — M54.50 CHRONIC BILATERAL LOW BACK PAIN, UNSPECIFIED WHETHER SCIATICA PRESENT: ICD-10-CM

## 2025-01-17 DIAGNOSIS — G89.29 CHRONIC BILATERAL LOW BACK PAIN, UNSPECIFIED WHETHER SCIATICA PRESENT: ICD-10-CM

## 2025-01-17 DIAGNOSIS — M54.16 LUMBAR RADICULOPATHY: ICD-10-CM

## 2025-01-17 DIAGNOSIS — M25.551 RIGHT HIP PAIN: Primary | ICD-10-CM

## 2025-01-17 PROCEDURE — 97112 NEUROMUSCULAR REEDUCATION: CPT | Performed by: PHYSICAL THERAPIST

## 2025-01-17 PROCEDURE — 97140 MANUAL THERAPY 1/> REGIONS: CPT | Performed by: PHYSICAL THERAPIST

## 2025-01-17 PROCEDURE — 97110 THERAPEUTIC EXERCISES: CPT | Performed by: PHYSICAL THERAPIST

## 2025-01-17 NOTE — PROGRESS NOTES
"Daily Note     Today's date: 2025  Patient name: Ginger Chicas  : 1950  MRN: [id number]  Referring provider: Keegan Ruff MD  Dx:   Encounter Diagnosis     ICD-10-CM    1. Right hip pain  M25.551       2. Lumbar radiculopathy  M54.16       3. Chronic bilateral low back pain, unspecified whether sciatica present  M54.50     G89.29               Start Time: 1145  Stop Time: 1223  Total time in clinic (min): 38 minutes    Subjective: Patient reports more stiffness than pain upon arrival.       Objective: See treatment diary below      Assessment: Tolerated treatment fair.  No pain throughout session.  Patient noted decreased pain and discomfort post manual treatment. Patient demonstrated fatigue post treatment, exhibited good technique with therapeutic exercises, and would benefit from continued PT      Plan: Continue per plan of care.      Precautions: history of vertigo, cervical spine pain, osteoporosis, thyroid disorder, lumbar pain        Manuals 1/17 12/13 12/17 12/20 12/26 12/30 1/3 1/6 1/10 1/13   STM lumbar and thoracic paraspinals   MM           STM gapping lumbar spine              Lumbar              STM piriformis  Right side     ACP Right side    MM Right side     ACP Right side     ACP  Right side     ACP  Right side     ACP  Right side     ACP  Right side     ACP Right side     ACP   Neuro Re-Ed             Supine marches with TrA  On pball 2' Supine + hip fallout     :05x2' Supine + hip fallout     :05x2'   :05x2' :05x2' :05x2' :05x2'   Hip abd iso with belt :05x2'      :05x2' :05x2' :05x2' :05x2'   Hip add with bolster  05x2' :05x2'     :05x2' :05x2' :05x2' :05x2'   Supine hip abd with TB single leg  GTB   2x10 ea  GTB 2x10 3\" ea GTB 2x10  ea GTB 2x10  ea     YTB 2x10 ea  GTB 2x10 ea    Diaphragmatic breathing      5'                                                Ther Ex             Prone press ups              Supine piriformis stretch  RLE :10 2x10  :10x5 ea :10x5 ea :10x5 ea  " :10x5 ea :10x10 RLE  RLE   :10   2x10  RLE   :10   2x10  RLE   :10   2x10    SKTC     With towel :10x10                                   LTR  With pball     :05x2' With pball     :05x2' With red pball :10x2' With red pball :10x2' Without ball :05x2' With pball     :05x2' With pball     :05x2' With pball     :05x2' With pball     :05x2' With pball     :05x2'   Bridges  2x10  2x10 2x10  2x10          Pball roll ins  With red pball :10x2' With red pball :10x2' With red pball :10x2' With red pball :10x2'  With red pball :10x2' With red pball :10x2' With red pball :10x2' With red pball :10x2' With red pball :10x2'   Pball roll outs   With blue pball :10x2' With blue  pball :10x2' With blue  pball :10x2'                      Nustep              Ther Activity             Mini squats                           Gait Training                                       Modalities             Moist heat      10 minutes

## 2025-01-21 ENCOUNTER — OFFICE VISIT (OUTPATIENT)
Dept: PHYSICAL THERAPY | Facility: CLINIC | Age: 75
End: 2025-01-21
Payer: COMMERCIAL

## 2025-01-21 DIAGNOSIS — M54.16 LUMBAR RADICULOPATHY: ICD-10-CM

## 2025-01-21 DIAGNOSIS — M25.551 RIGHT HIP PAIN: Primary | ICD-10-CM

## 2025-01-21 DIAGNOSIS — G89.29 CHRONIC BILATERAL LOW BACK PAIN, UNSPECIFIED WHETHER SCIATICA PRESENT: ICD-10-CM

## 2025-01-21 DIAGNOSIS — M54.50 CHRONIC BILATERAL LOW BACK PAIN, UNSPECIFIED WHETHER SCIATICA PRESENT: ICD-10-CM

## 2025-01-21 PROCEDURE — 97140 MANUAL THERAPY 1/> REGIONS: CPT | Performed by: PHYSICAL THERAPIST

## 2025-01-21 PROCEDURE — 97110 THERAPEUTIC EXERCISES: CPT | Performed by: PHYSICAL THERAPIST

## 2025-01-21 PROCEDURE — 97112 NEUROMUSCULAR REEDUCATION: CPT | Performed by: PHYSICAL THERAPIST

## 2025-01-21 NOTE — PROGRESS NOTES
Daily Note     Today's date: 2025  Patient name: Ginger Chicas  : 1950  MRN: [id number]  Referring provider: Keegan Ruff MD  Dx:   Encounter Diagnosis     ICD-10-CM    1. Right hip pain  M25.551       2. Lumbar radiculopathy  M54.16       3. Chronic bilateral low back pain, unspecified whether sciatica present  M54.50     G89.29                 Start Time: 0815  Stop Time: 09  Total time in clinic (min): 45 minutes    Subjective: Patient reports she is feeling good today with little to no pain.       Objective: See treatment diary below      Assessment: Tolerated treatment fair.  No flare up throughout session. Reports of improved mobility with piriformis stretch. Patient noted decreased pain and discomfort post manual treatment. Patient demonstrated fatigue post treatment, exhibited good technique with therapeutic exercises, and would benefit from continued PT      Plan: Continue per plan of care.      Precautions: history of vertigo, cervical spine pain, osteoporosis, thyroid disorder, lumbar pain        Manuals 1/17 1/21 12/17 12/20 12/26 12/30 1/3 1/6 1/10 1/13   STM lumbar and thoracic paraspinals              STM gapping lumbar spine              Lumbar              STM piriformis  Right side     ACP Right side    ACP Right side     ACP Right side     ACP  Right side     ACP  Right side     ACP  Right side     ACP  Right side     ACP Right side     ACP   Neuro Re-Ed             Supine marches with TrA  Supine :05x2' Supine + hip fallout     :05x2' Supine + hip fallout     :05x2'   :05x2' :05x2' :05x2' :05x2'   Hip abd iso with belt :05x2' :05x2'     :05x2' :05x2' :05x2' :05x2'   Hip add with bolster  05x2' :05x2'     :05x2' :05x2' :05x2' :05x2'   Supine hip abd with TB single leg  GTB   2x10 ea  GTB 2x10 ea GTB 2x10  ea GTB 2x10  ea     YTB 2x10 ea  GTB 2x10 ea    Diaphragmatic breathing      5'                                                Ther Ex             Prone press ups               Supine piriformis stretch  RLE :10 2x10  RLE :10 2x10  :10x5 ea :10x5 ea  :10x5 ea :10x10 RLE  RLE   :10   2x10  RLE   :10   2x10  RLE   :10   2x10    SKTC     With towel :10x10                                   LTR  With pball     :05x2' With pball     :05x2' With red pball :10x2' With red pball :10x2' Without ball :05x2' With pball     :05x2' With pball     :05x2' With pball     :05x2' With pball     :05x2' With pball     :05x2'   Bridges  2x10  2x10 2x10  2x10          Pball roll ins  With red pball :10x2' With red pball :10x2' With red pball :10x2' With red pball :10x2'  With red pball :10x2' With red pball :10x2' With red pball :10x2' With red pball :10x2' With red pball :10x2'   Pball roll outs                           Nustep              Ther Activity             Mini squats                           Gait Training                                       Modalities             Moist heat      10 minutes

## 2025-01-22 ENCOUNTER — TELEPHONE (OUTPATIENT)
Dept: RADIOLOGY | Facility: HOSPITAL | Age: 75
End: 2025-01-22

## 2025-01-22 RX ORDER — SODIUM CHLORIDE 9 MG/ML
75 INJECTION, SOLUTION INTRAVENOUS CONTINUOUS
OUTPATIENT
Start: 2025-01-22

## 2025-01-22 NOTE — PRE-PROCEDURE INSTRUCTIONS
Pre-procedure Instructions for Interventional Radiology  [address] [address]  INTERVENTIONAL RADIOLOGY [phone]    You are scheduled for a/an Renal angiogram with Embolization.    On Thursday 1/30/25.    Your tentative arrival time is 7:30 AM.  Short stay will notify you the day before your procedure with the exact arrival time and the location to arrive.    To prepare for your procedure:  Please arrange for someone to drive you home after the procedure and stay with you until the next morning if you are instructed to do so.  This is typically for patients receiving some type of sedative or anesthetic for the procedure.  DO NOT EAT OR DRINK ANYTHING after midnight on the evening before your procedure including candy & gum.  ONLY SIPS OF WATER with your medications are allowed on the morning of your procedure.  TAKE ALL OF YOUR REGULAR MEDICATIONS THE MORNING OF YOUR PROCEDURE with sips of water!  We may call you to stop some of your blood sugar, blood pressure and blood thinning medications depending on the procedure.  Please take all of these medications unless we instruct you to stop them.  If you have an allergy to x-ray dye, please contact Interventional Radiology for an x-ray dye preparation which usually consists of an oral steroid and Benadryl.    The day of your procedure:  Bring a list of the medications you take at home.  Bring medications you take for breathing problems (such as inhalers), medications for chest pain, or both.  Bring a case for your glasses or contacts.  Bring your insurance card and a form of photo ID.  Please leave all valuables such as credit cards and jewelry at home.  Report to the admitting office to the left of the registration desk in the main lobby at the [facility], Entrance B.  You will then be directed to the Short Stay Center.  While your procedure is being performed, your family may wait in the Radiology Waiting Room on  the 1st floor in Radiology.  if they need to leave, they may provide a number to be called following the procedure.   Be prepared to stay overnight just in case. Sometimes procedures will indicate the need for further observation or treatment.   If you are scheduled for a follow-up visit with the Interventional Radiologist after your procedure, you will be called with a date and time.    Special Instructions (Medications to stop taking before your procedure etc.):  ASA last dose 1/24/25 and restart 1/31/25.

## 2025-01-23 ENCOUNTER — TELEPHONE (OUTPATIENT)
Dept: RADIOLOGY | Facility: HOSPITAL | Age: 75
End: 2025-01-23

## 2025-01-23 ENCOUNTER — OFFICE VISIT (OUTPATIENT)
Dept: PHYSICAL THERAPY | Facility: CLINIC | Age: 75
End: 2025-01-23
Payer: COMMERCIAL

## 2025-01-23 DIAGNOSIS — M54.50 CHRONIC BILATERAL LOW BACK PAIN, UNSPECIFIED WHETHER SCIATICA PRESENT: ICD-10-CM

## 2025-01-23 DIAGNOSIS — M25.551 RIGHT HIP PAIN: Primary | ICD-10-CM

## 2025-01-23 DIAGNOSIS — G89.29 CHRONIC BILATERAL LOW BACK PAIN, UNSPECIFIED WHETHER SCIATICA PRESENT: ICD-10-CM

## 2025-01-23 DIAGNOSIS — M54.16 LUMBAR RADICULOPATHY: ICD-10-CM

## 2025-01-23 PROCEDURE — 97110 THERAPEUTIC EXERCISES: CPT

## 2025-01-23 PROCEDURE — 97112 NEUROMUSCULAR REEDUCATION: CPT

## 2025-01-23 PROCEDURE — 97140 MANUAL THERAPY 1/> REGIONS: CPT

## 2025-01-23 NOTE — PROGRESS NOTES
Daily Note     Today's date: 2025  Patient name: Ginger Chicas  : 1950  MRN: [id number]  Referring provider: Keegan Ruff MD  Dx:   Encounter Diagnosis     ICD-10-CM    1. Right hip pain  M25.551       2. Lumbar radiculopathy  M54.16       3. Chronic bilateral low back pain, unspecified whether sciatica present  M54.50     G89.29                      Subjective: Patient has nothing new to report, denies significant soreness after last visit.        Objective: See treatment diary below      Assessment: Tolerated treatment well. Patient demonstrated fatigue post treatment and would benefit from continued PT.  Patient continues to respond well to established POC with no increased pain throughout treatment.  One episode of muscle cramping with initial reps of hip add squeeze which was transient; patient attributes to dehydration.  Patient will be taking 1 week off from PT due to a procedure scheduled next week though has appointments scheduled for the week following.        Plan: Progress treatment as tolerated.       Precautions: history of vertigo, cervical spine pain, osteoporosis, thyroid disorder, lumbar pain        Manuals 1/17 1/21 1/23  12/26 12/30 1/3 1/6 1/10 1/13   STM lumbar and thoracic paraspinals              STM gapping lumbar spine              Lumbar              STM piriformis  Right side     ACP Right side    ACP Right side    MC   Right side     ACP  Right side     ACP  Right side     ACP  Right side     ACP Right side     ACP   Neuro Re-Ed             Supine marches with TrA  Supine :05x2' Supine :05x2'    :05x2' :05x2' :05x2' :05x2'   Hip abd iso with belt :05x2' :05x2' :05x2'    :05x2' :05x2' :05x2' :05x2'   Hip add with bolster  05x2' :05x2' :05x2'    :05x2' :05x2' :05x2' :05x2'   Supine hip abd with TB single leg  GTB   2x10 ea  GTB 2x10 ea GTB 2x10 ea      YTB 2x10 ea  GTB 2x10 ea    Diaphragmatic breathing      5'                      Recumbent bike   6'                        Ther Ex             Prone press ups              Supine piriformis stretch  RLE :10 2x10  RLE :10 2x10  RLE :10 2x10    :10x5 ea :10x10 RLE  RLE   :10   2x10  RLE   :10   2x10  RLE   :10   2x10    SKTC     With towel :10x10                                   LTR  With pball     :05x2' With pball     :05x2' With pball     :05x2'  Without ball :05x2' With pball     :05x2' With pball     :05x2' With pball     :05x2' With pball     :05x2' With pball     :05x2'   Bridges  2x10  2x10 2x10          Pball roll ins  With red pball :10x2' With red pball :10x2' With red pball :10x2'   With red pball :10x2' With red pball :10x2' With red pball :10x2' With red pball :10x2' With red pball :10x2'   Pball roll outs                           Nustep              Ther Activity             Mini squats                           Gait Training                                       Modalities             Moist heat      10 minutes

## 2025-01-30 ENCOUNTER — TELEPHONE (OUTPATIENT)
Dept: INTERVENTIONAL RADIOLOGY/VASCULAR | Facility: CLINIC | Age: 75
End: 2025-01-30

## 2025-01-30 ENCOUNTER — HOSPITAL ENCOUNTER (OUTPATIENT)
Dept: RADIOLOGY | Facility: HOSPITAL | Age: 75
Discharge: HOME | End: 2025-01-30
Attending: RADIOLOGY
Payer: COMMERCIAL

## 2025-01-30 VITALS
BODY MASS INDEX: 31.16 KG/M2 | WEIGHT: 187 LBS | HEART RATE: 69 BPM | TEMPERATURE: 53.6 F | RESPIRATION RATE: 14 BRPM | OXYGEN SATURATION: 96 % | SYSTOLIC BLOOD PRESSURE: 111 MMHG | HEIGHT: 65 IN | DIASTOLIC BLOOD PRESSURE: 54 MMHG

## 2025-01-30 DIAGNOSIS — D17.71 ANGIOMYOLIPOMA OF KIDNEY: ICD-10-CM

## 2025-01-30 LAB
ANION GAP SERPL CALCULATED.3IONS-SCNC: 7 MMOL/L (ref 4–13)
BUN SERPL-MCNC: 17 MG/DL (ref 5–25)
CALCIUM SERPL-MCNC: 10.1 MG/DL (ref 8.4–10.2)
CHLORIDE SERPL-SCNC: 108 MMOL/L (ref 96–108)
CO2 SERPL-SCNC: 26 MMOL/L (ref 21–32)
CREAT SERPL-MCNC: 0.95 MG/DL (ref 0.6–1.3)
ERYTHROCYTE [DISTWIDTH] IN BLOOD BY AUTOMATED COUNT: 12.3 % (ref 11.6–15.1)
GFR SERPL CREATININE-BSD FRML MDRD: 59 ML/MIN/1.73SQ M
GLUCOSE P FAST SERPL-MCNC: 109 MG/DL (ref 65–99)
GLUCOSE SERPL-MCNC: 109 MG/DL (ref 65–140)
HCT VFR BLD AUTO: 44.6 % (ref 34.8–46.1)
HGB BLD-MCNC: 14.9 G/DL (ref 11.5–15.4)
INR PPP: 0.92 (ref 0.85–1.19)
MCH RBC QN AUTO: 30.9 PG (ref 26.8–34.3)
MCHC RBC AUTO-ENTMCNC: 33.4 G/DL (ref 31.4–37.4)
MCV RBC AUTO: 93 FL (ref 82–98)
PLATELET # BLD AUTO: 303 THOUSANDS/UL (ref 149–390)
PMV BLD AUTO: 9.7 FL (ref 8.9–12.7)
POTASSIUM SERPL-SCNC: 4 MMOL/L (ref 3.5–5.3)
PROTHROMBIN TIME: 12.7 SECONDS (ref 12.3–15)
RBC # BLD AUTO: 4.82 MILLION/UL (ref 3.81–5.12)
SODIUM SERPL-SCNC: 141 MMOL/L (ref 135–147)
WBC # BLD AUTO: 4.67 THOUSAND/UL (ref 4.31–10.16)

## 2025-01-30 PROCEDURE — 37242 VASC EMBOLIZE/OCCLUDE ARTERY: CPT

## 2025-01-30 PROCEDURE — 37243 VASC EMBOLIZE/OCCLUDE ORGAN: CPT | Performed by: RADIOLOGY

## 2025-01-30 PROCEDURE — 99152 MOD SED SAME PHYS/QHP 5/>YRS: CPT | Performed by: RADIOLOGY

## 2025-01-30 PROCEDURE — C1769 GUIDE WIRE: HCPCS

## 2025-01-30 PROCEDURE — 85027 COMPLETE CBC AUTOMATED: CPT | Performed by: RADIOLOGY

## 2025-01-30 PROCEDURE — 99153 MOD SED SAME PHYS/QHP EA: CPT

## 2025-01-30 PROCEDURE — 76937 US GUIDE VASCULAR ACCESS: CPT | Performed by: RADIOLOGY

## 2025-01-30 PROCEDURE — 36247 INS CATH ABD/L-EXT ART 3RD: CPT

## 2025-01-30 PROCEDURE — C1894 INTRO/SHEATH, NON-LASER: HCPCS

## 2025-01-30 PROCEDURE — 36248 INS CATH ABD/L-EXT ART ADDL: CPT

## 2025-01-30 PROCEDURE — 36245 INS CATH ABD/L-EXT ART 1ST: CPT

## 2025-01-30 PROCEDURE — 99152 MOD SED SAME PHYS/QHP 5/>YRS: CPT

## 2025-01-30 PROCEDURE — 80048 BASIC METABOLIC PNL TOTAL CA: CPT | Performed by: RADIOLOGY

## 2025-01-30 PROCEDURE — 76937 US GUIDE VASCULAR ACCESS: CPT

## 2025-01-30 PROCEDURE — C1887 CATHETER, GUIDING: HCPCS

## 2025-01-30 PROCEDURE — 36247 INS CATH ABD/L-EXT ART 3RD: CPT | Performed by: RADIOLOGY

## 2025-01-30 PROCEDURE — 36248 INS CATH ABD/L-EXT ART ADDL: CPT | Performed by: RADIOLOGY

## 2025-01-30 PROCEDURE — 85610 PROTHROMBIN TIME: CPT | Performed by: RADIOLOGY

## 2025-01-30 PROCEDURE — 76377 3D RENDER W/INTRP POSTPROCES: CPT | Performed by: RADIOLOGY

## 2025-01-30 PROCEDURE — 37243 VASC EMBOLIZE/OCCLUDE ORGAN: CPT

## 2025-01-30 RX ORDER — CEFAZOLIN SODIUM 2 G/50ML
2000 SOLUTION INTRAVENOUS ONCE
Status: COMPLETED | OUTPATIENT
Start: 2025-01-30 | End: 2025-01-30

## 2025-01-30 RX ORDER — IODIXANOL 320 MG/ML
250 INJECTION, SOLUTION INTRAVASCULAR
Status: COMPLETED | OUTPATIENT
Start: 2025-01-30 | End: 2025-01-30

## 2025-01-30 RX ORDER — ALCOHOL 1 ML/ML
INJECTION, SOLUTION PERCUTANEOUS AS NEEDED
Status: COMPLETED | OUTPATIENT
Start: 2025-01-30 | End: 2025-01-30

## 2025-01-30 RX ORDER — FENTANYL CITRATE 50 UG/ML
INJECTION, SOLUTION INTRAMUSCULAR; INTRAVENOUS AS NEEDED
Status: COMPLETED | OUTPATIENT
Start: 2025-01-30 | End: 2025-01-30

## 2025-01-30 RX ORDER — SODIUM CHLORIDE 9 MG/ML
75 INJECTION, SOLUTION INTRAVENOUS CONTINUOUS
Status: DISCONTINUED | OUTPATIENT
Start: 2025-01-30 | End: 2025-01-31 | Stop reason: HOSPADM

## 2025-01-30 RX ORDER — ACETAMINOPHEN 325 MG/1
650 TABLET ORAL ONCE
Status: COMPLETED | OUTPATIENT
Start: 2025-01-30 | End: 2025-01-30

## 2025-01-30 RX ORDER — MIDAZOLAM HYDROCHLORIDE 2 MG/2ML
INJECTION, SOLUTION INTRAMUSCULAR; INTRAVENOUS AS NEEDED
Status: COMPLETED | OUTPATIENT
Start: 2025-01-30 | End: 2025-01-30

## 2025-01-30 RX ORDER — LEVOFLOXACIN 5 MG/ML
500 INJECTION, SOLUTION INTRAVENOUS ONCE
Status: DISCONTINUED | OUTPATIENT
Start: 2025-01-30 | End: 2025-01-30

## 2025-01-30 RX ADMIN — MIDAZOLAM 1 MG: 1 INJECTION INTRAMUSCULAR; INTRAVENOUS at 09:59

## 2025-01-30 RX ADMIN — ACETAMINOPHEN 650 MG: 325 TABLET, FILM COATED ORAL at 13:55

## 2025-01-30 RX ADMIN — MIDAZOLAM 1 MG: 1 INJECTION INTRAMUSCULAR; INTRAVENOUS at 10:19

## 2025-01-30 RX ADMIN — IODIXANOL 110 ML: 320 INJECTION, SOLUTION INTRAVASCULAR at 11:42

## 2025-01-30 RX ADMIN — ALCOHOL 10 ML: 1 INJECTION, SOLUTION PERCUTANEOUS at 11:27

## 2025-01-30 RX ADMIN — FENTANYL CITRATE 50 MCG: 50 INJECTION INTRAMUSCULAR; INTRAVENOUS at 09:16

## 2025-01-30 RX ADMIN — SODIUM CHLORIDE 75 ML/HR: 0.9 INJECTION, SOLUTION INTRAVENOUS at 08:13

## 2025-01-30 RX ADMIN — MIDAZOLAM 1 MG: 1 INJECTION INTRAMUSCULAR; INTRAVENOUS at 09:04

## 2025-01-30 RX ADMIN — CEFAZOLIN SODIUM 2000 MG: 2 SOLUTION INTRAVENOUS at 08:58

## 2025-01-30 RX ADMIN — FENTANYL CITRATE 50 MCG: 50 INJECTION INTRAMUSCULAR; INTRAVENOUS at 10:19

## 2025-01-30 RX ADMIN — MIDAZOLAM 1 MG: 1 INJECTION INTRAMUSCULAR; INTRAVENOUS at 09:16

## 2025-01-30 RX ADMIN — FENTANYL CITRATE 50 MCG: 50 INJECTION INTRAMUSCULAR; INTRAVENOUS at 09:04

## 2025-01-30 RX ADMIN — FENTANYL CITRATE 50 MCG: 50 INJECTION INTRAMUSCULAR; INTRAVENOUS at 09:59

## 2025-01-30 NOTE — BRIEF OP NOTE (RAD/CATH)
Right Renal AML Embolization  Procedure Note    PATIENT NAME: Ginger Chicas  : 1950  MRN: [id number]     Pre-op Diagnosis:   1. Angiomyolipoma of kidney      Post-op Diagnosis:   1. 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     R CFA 5F sheath access, closed with Vascade / manual pressure    Specimens: none    Complications:  none    Anesthesia: conscious sedation and local    Devon Crook DO     Date: 2025  Time: 11:36 AM

## 2025-01-30 NOTE — H&P
Interventional Radiology  History and Physical 2025     Ginger Chicas   1950   [id number]    Assessment/Plan:  R renal AML, here for elective embolization.    Problem List Items Addressed This Visit          Genitourinary    Angiomyolipoma of kidney    Relevant Orders    IR embolization (specify vessel or site)          Subjective:     Patient ID: Ginger Chicas is a 74 y.o. female.    History of Present Illness  74 y F w/ R renal AMLs, largest >7cm. Due to risk of rupture, here for elective embolization. Currently without complaints.     Review of Systems   All other systems reviewed and are negative.        Past Medical History:   Diagnosis Date    Acute asthma exacerbation     last assessed 8/15/17    Asthma     Cataract     Disease of thyroid gland     Hypertriglyceridemia     Osteoporosis     Right lumbar radiculopathy 12/15/2016    Urinary tract infection     Vertigo         Past Surgical History:   Procedure Laterality Date    APPENDECTOMY      BREAST BIOPSY      CATARACT EXTRACTION      HAND SURGERY Right     thumb    LAPAROSCOPY      MAMMO STEREOTACTIC BREAST BIOPSY LEFT (ALL INC) Left 2022        Social History     Tobacco Use   Smoking Status Former    Current packs/day: 0.00    Average packs/day: 0.3 packs/day for 4.0 years (1.0 ttl pk-yrs)    Types: Cigarettes    Start date: 1975    Quit date: 1979    Years since quittin.1   Smokeless Tobacco Never   Tobacco Comments             Social History     Substance and Sexual Activity   Alcohol Use No        Social History     Substance and Sexual Activity   Drug Use No        Allergies   Allergen Reactions    Penicillins      Shown on allergy testing.    Codeine Anxiety    Dust Mite Extract     Molds & Smuts     Pseudoephedrine     Rabbit Epithelium     Sulfa Antibiotics Hives       Current Outpatient Medications   Medication Sig Dispense Refill    albuterol (Ventolin HFA) 90 mcg/act inhaler 2 puffs Q 6 hours prn cough/wheezing  18 g 3    aspirin 81 mg chewable tablet Take 1 tablet every day by oral route.      Breo Ellipta 100-25 MCG/ACT inhaler Inhale 1 puff every morning Rinse mouth after using inhaler. 60 blister 3    Cholecalciferol 4000 units CAPS Take 1 capsule by mouth daily      Coenzyme Q10 (CO Q-10) 30 MG CAPS Take 100 mg by mouth daily 2capsules daily      fenofibrate 160 MG tablet Take 1 tablet (160 mg total) by mouth daily 90 tablet 3    ketoconazole (NIZORAL) 2 % cream APPLY TO AFFECTED AREA(S) TWO TIMES DAILY      L-Methylfolate-B6-B12 (Elfolate Plus) 3-35-2 MG TABS Take 2 tablets by mouth daily 180 tablet 3    levothyroxine 50 mcg tablet TAKE 1 TABLET BY MOUTH EVERY DAY 90 tablet 1    nitrofurantoin (MACRODANTIN) 50 mg capsule Take 1 capsule by mouth daily       albuterol (2.5 mg/3 mL) 0.083 % nebulizer solution Take 3 mL (2.5 mg total) by nebulization every 6 (six) hours as needed for wheezing or shortness of breath 30 mL 0    alendronate (FOSAMAX) 70 mg tablet Take 1 tablet (70 mg total) by mouth every 7 days Take on an empty stomach with a full glass of water, and do not lie down for 30 minutes after (Patient not taking: Reported on 11/21/2024) 12 tablet 3    Diclofenac Sodium (VOLTAREN) 1 % Apply 2 g topically 4 (four) times a day As needed for joint pain 100 g 6    triamcinolone (KENALOG) 0.1 % cream Apply topically 2 (two) times a day (Patient taking differently: Apply topically 2 (two) times a day PRN) 453.6 g 1     Current Facility-Administered Medications   Medication Dose Route Frequency Provider Last Rate Last Admin    ceFAZolin (ANCEF) IVPB (premix in dextrose) 2,000 mg 50 mL  2,000 mg Intravenous Once Devon Crook, DO        sodium chloride 0.9 % infusion  75 mL/hr Intravenous Continuous Devon Crook DO 75 mL/hr at 01/30/25 0813 75 mL/hr at 01/30/25 0813          Objective:    Vitals:    01/30/25 0723   BP: 137/62   Pulse: 71   Resp: 18   Temp: (!) 97 °F (36.1 °C)   TempSrc: Temporal  "  SpO2: 96%   Weight: 84.8 kg (187 lb)   Height: 5' 5\" (1.651 m)        Physical Exam  HENT:      Head: Normocephalic.   Eyes:      Pupils: Pupils are equal, round, and reactive to light.   Cardiovascular:      Rate and Rhythm: Normal rate.   Pulmonary:      Effort: Pulmonary effort is normal.   Abdominal:      General: Abdomen is flat.   Musculoskeletal:         General: Normal range of motion.      Cervical back: Normal range of motion.   Skin:     General: Skin is warm.   Neurological:      Mental Status: She is alert and oriented to person, place, and time.   Psychiatric:         Mood and Affect: Mood normal.           No results found for: \"BNP\"   Lab Results   Component Value Date    WBC 4.67 01/30/2025    HGB 14.9 01/30/2025    HCT 44.6 01/30/2025    MCV 93 01/30/2025     01/30/2025     Lab Results   Component Value Date    INR 0.92 01/30/2025    PROTIME 12.7 01/30/2025     No results found for: \"PTT\"      I have personally reviewed pertinent imaging and laboratory results.     Code Status: No Order  Advance Directive and Living Will:      Power of :    POLST:      This text is generated with voice recognition software. There may be translation, syntax,  or grammatical errors. If you have any questions, please contact the dictating provider.   "

## 2025-01-31 ENCOUNTER — TELEPHONE (OUTPATIENT)
Dept: INTERVENTIONAL RADIOLOGY/VASCULAR | Facility: CLINIC | Age: 75
End: 2025-01-31

## 2025-01-31 DIAGNOSIS — D17.71 ANGIOMYOLIPOMA OF KIDNEY: Primary | ICD-10-CM

## 2025-01-31 NOTE — TELEPHONE ENCOUNTER
Spoke with Mrs. Chicas by phone. Doing well, minimal R upper quarrant discomfort. Discussed procedural findings.     Explained will follow up AML with CT / clinic in 3 months & for her to follow up with her urologist as needed.

## 2025-01-31 NOTE — TELEPHONE ENCOUNTER
Left VM to speak with Mrs. Chicas to discuss procedure, answer questions and to check on how she is doing. Left call back number. Will try again tomorrow.

## 2025-02-07 ENCOUNTER — APPOINTMENT (OUTPATIENT)
Dept: PHYSICAL THERAPY | Facility: CLINIC | Age: 75
End: 2025-02-07
Payer: COMMERCIAL

## 2025-02-11 ENCOUNTER — OFFICE VISIT (OUTPATIENT)
Dept: PHYSICAL THERAPY | Facility: CLINIC | Age: 75
End: 2025-02-11
Payer: COMMERCIAL

## 2025-02-11 DIAGNOSIS — M54.16 LUMBAR RADICULOPATHY: ICD-10-CM

## 2025-02-11 DIAGNOSIS — M25.551 RIGHT HIP PAIN: Primary | ICD-10-CM

## 2025-02-11 PROCEDURE — 97110 THERAPEUTIC EXERCISES: CPT | Performed by: PHYSICAL THERAPIST

## 2025-02-11 PROCEDURE — 97112 NEUROMUSCULAR REEDUCATION: CPT | Performed by: PHYSICAL THERAPIST

## 2025-02-11 PROCEDURE — 97140 MANUAL THERAPY 1/> REGIONS: CPT | Performed by: PHYSICAL THERAPIST

## 2025-02-11 NOTE — PROGRESS NOTES
Daily Note     Today's date: 2025  Patient name: Ginger Chicas  : 1950  MRN: [id number]  Referring provider: Keegan Ruff MD  Dx:   Encounter Diagnosis     ICD-10-CM    1. Right hip pain  M25.551       2. Lumbar radiculopathy  M54.16             Start Time: 0815  Stop Time: 0900  Total time in clinic (min): 45 minutes    Subjective: Patient reports the procedure went well but it took longer to recover than she thought it would. Patient notes that she no longer has the pressure in the low back since the procedure.      Objective: See treatment diary below      Assessment: Tolerated treatment well. Patient returns since las appointment on . Patient notes not doing a lot of physical activity since her procedure so her back and hip has been feeling pretty good. Continued with established POC with good response. No flare up in pain despite having 1-2 weeks off of therapy. Patient demonstrated fatigue post treatment and would benefit from continued PT.      Plan: Progress treatment as tolerated.       Precautions: history of vertigo, cervical spine pain, osteoporosis, thyroid disorder, lumbar pain        Manuals 1/17 1/21 1/23 2/11 12/26 12/30 1/3 1/6 1/10 1/13   STM lumbar and thoracic paraspinals              STM gapping lumbar spine              Lumbar              STM piriformis  Right side     ACP Right side    ACP Right side    MC Right side    ACP  Right side     ACP  Right side     ACP  Right side     ACP  Right side     ACP Right side     ACP   Neuro Re-Ed             Supine marches with TrA  Supine :05x2' Supine :05x2' Supine :05x2'   :05x2' :05x2' :05x2' :05x2'   Hip abd iso with belt :05x2' :05x2' :05x2' Supine :05x2'   :05x2' :05x2' :05x2' :05x2'   Hip add with bolster  05x2' :05x2' :05x2' Supine :05x2'   :05x2' :05x2' :05x2' :05x2'   Supine hip abd with TB single leg  GTB   2x10 ea  GTB 2x10 ea GTB 2x10 ea      YTB 2x10 ea  GTB 2x10 ea    Diaphragmatic breathing      5'                       Recumbent bike 6' 6' 6' 6'   6' 6' 6' 6'                Ther Ex             Prone press ups              Supine piriformis stretch  RLE :10 2x10  RLE :10 2x10  RLE :10 2x10  RLE :10 2x10   :10x5 ea :10x10 RLE  RLE   :10   2x10  RLE   :10   2x10  RLE   :10   2x10    SKTC     With towel :10x10                                   LTR  With pball     :05x2' With pball     :05x2' With pball     :05x2' With red pball :10x2' Without ball :05x2' With pball     :05x2' With pball     :05x2' With pball     :05x2' With pball     :05x2' With pball     :05x2'   Bridges  2x10  2x10 2x10 2x10          Pball roll ins  With red pball :10x2' With red pball :10x2' With red pball :10x2' With red pball :10x2'  With red pball :10x2' With red pball :10x2' With red pball :10x2' With red pball :10x2' With red pball :10x2'   Pball roll outs                           Nustep              Ther Activity             Mini squats                           Gait Training                                       Modalities             Moist heat      10 minutes

## 2025-02-14 ENCOUNTER — OFFICE VISIT (OUTPATIENT)
Dept: PHYSICAL THERAPY | Facility: CLINIC | Age: 75
End: 2025-02-14
Payer: COMMERCIAL

## 2025-02-14 DIAGNOSIS — M54.16 LUMBAR RADICULOPATHY: ICD-10-CM

## 2025-02-14 DIAGNOSIS — M25.551 RIGHT HIP PAIN: Primary | ICD-10-CM

## 2025-02-14 DIAGNOSIS — G89.29 CHRONIC BILATERAL LOW BACK PAIN, UNSPECIFIED WHETHER SCIATICA PRESENT: ICD-10-CM

## 2025-02-14 DIAGNOSIS — M54.50 CHRONIC BILATERAL LOW BACK PAIN, UNSPECIFIED WHETHER SCIATICA PRESENT: ICD-10-CM

## 2025-02-14 PROCEDURE — 97110 THERAPEUTIC EXERCISES: CPT | Performed by: PHYSICAL THERAPIST

## 2025-02-14 PROCEDURE — 97140 MANUAL THERAPY 1/> REGIONS: CPT | Performed by: PHYSICAL THERAPIST

## 2025-02-14 PROCEDURE — 97112 NEUROMUSCULAR REEDUCATION: CPT | Performed by: PHYSICAL THERAPIST

## 2025-02-14 NOTE — PROGRESS NOTES
Daily Note     Today's date: 2025  Patient name: Ginger Chicas  : 1950  MRN: [id number]  Referring provider: Keegan Ruff MD  Dx:   Encounter Diagnosis     ICD-10-CM    1. Right hip pain  M25.551       2. Lumbar radiculopathy  M54.16       3. Chronic bilateral low back pain, unspecified whether sciatica present  M54.50     G89.29               Start Time: 0845  Stop Time: 930  Total time in clinic (min): 45 minutes    Subjective: Patient notes that she was a little sore after shoveling the other day. Patient notes that this morning she was letting her dog out and she pulled and she twisted her back wrong so she's having some spasms.       Objective: See treatment diary below      Assessment: Continued with established POC with good response. Despite some spasms this date patient able to perform all sets and reps as noted.  Patient noted decreased pain and discomfort post manual treatment. Patient demonstrated fatigue post treatment and would benefit from continued PT.      Plan: Progress treatment as tolerated.       Precautions: history of vertigo, cervical spine pain, osteoporosis, thyroid disorder, lumbar pain        Manuals 1/17 1/21 1/23 2/11 2/14   1/6 1/10 1/13   STM lumbar and thoracic paraspinals              STM gapping lumbar spine              Lumbar              STM piriformis  Right side     ACP Right side    ACP Right side    MC Right side    ACP Right side    ACP Right side     ACP  Right side     ACP  Right side     ACP  Right side     ACP Right side     ACP   Neuro Re-Ed             Supine marches with TrA  Supine :05x2' Supine :05x2' Supine :05x2' Supine :05x2'  :05x2' :05x2' :05x2' :05x2'   Hip abd iso with belt :05x2' :05x2' :05x2' Supine :05x2' Supine :05x2'  :05x2' :05x2' :05x2' :05x2'   Hip add with bolster  05x2' :05x2' :05x2' Supine :05x2' Supine :05x2'  :05x2' :05x2' :05x2' :05x2'   Supine hip abd with TB single leg  GTB   2x10 ea  GTB 2x10 ea GTB 2x10 ea      YTB 2x10  ea  GTB 2x10 ea    Diaphragmatic breathing                           Recumbent bike 6' 6' 6' 6' 6'  6' 6' 6' 6'                Ther Ex             Prone press ups              Supine piriformis stretch  RLE :10 2x10  RLE :10 2x10  RLE :10 2x10  RLE :10 2x10  RLE :10 2x10  :10x5 ea :10x10 RLE  RLE   :10   2x10  RLE   :10   2x10  RLE   :10   2x10    SKTC                                       LTR  With pball     :05x2' With pball     :05x2' With pball     :05x2' With red pball :10x2' With red pball :10x2' With pball     :05x2' With pball     :05x2' With pball     :05x2' With pball     :05x2' With pball     :05x2'   Bridges  2x10  2x10 2x10 2x10  2x10        Pball roll ins  With red pball :10x2' With red pball :10x2' With red pball :10x2' With red pball :10x2' With red pball :10x2' With red pball :10x2' With red pball :10x2' With red pball :10x2' With red pball :10x2' With red pball :10x2'   Pball roll outs                           Nustep              Ther Activity             Mini squats                           Gait Training                                       Modalities             Moist heat      10 minutes

## 2025-02-18 ENCOUNTER — OFFICE VISIT (OUTPATIENT)
Dept: PHYSICAL THERAPY | Facility: CLINIC | Age: 75
End: 2025-02-18
Payer: COMMERCIAL

## 2025-02-18 DIAGNOSIS — M25.551 RIGHT HIP PAIN: Primary | ICD-10-CM

## 2025-02-18 DIAGNOSIS — M54.50 CHRONIC BILATERAL LOW BACK PAIN, UNSPECIFIED WHETHER SCIATICA PRESENT: ICD-10-CM

## 2025-02-18 DIAGNOSIS — M54.16 LUMBAR RADICULOPATHY: ICD-10-CM

## 2025-02-18 DIAGNOSIS — G89.29 CHRONIC BILATERAL LOW BACK PAIN, UNSPECIFIED WHETHER SCIATICA PRESENT: ICD-10-CM

## 2025-02-18 PROCEDURE — 97110 THERAPEUTIC EXERCISES: CPT | Performed by: PHYSICAL THERAPIST

## 2025-02-18 PROCEDURE — 97112 NEUROMUSCULAR REEDUCATION: CPT | Performed by: PHYSICAL THERAPIST

## 2025-02-18 PROCEDURE — 97140 MANUAL THERAPY 1/> REGIONS: CPT | Performed by: PHYSICAL THERAPIST

## 2025-02-18 NOTE — PROGRESS NOTES
Daily Note     Today's date: 2025  Patient name: Ginger Chicas  : 1950  MRN: [id number]  Referring provider: Keegan Ruff MD  Dx:   Encounter Diagnosis     ICD-10-CM    1. Right hip pain  M25.551       2. Lumbar radiculopathy  M54.16       3. Chronic bilateral low back pain, unspecified whether sciatica present  M54.50     G89.29                 Start Time: 0900  Stop Time: 09  Total time in clinic (min): 45 minutes    Subjective: Patient reports that her  fell on ice and is the hospital so she has a lot on her mind.       Objective: See treatment diary below      Assessment: Continued with established POC with good response. No flare up in pain throughout. Patient demonstrated fatigue post treatment and would benefit from continued PT.      Plan: Progress treatment as tolerated.       Precautions: history of vertigo, cervical spine pain, osteoporosis, thyroid disorder, lumbar pain        Manuals 1/17 1/21 1/23 2/11 2/14 2/18  1/6 1/10 1/13   STM lumbar and thoracic paraspinals              STM gapping lumbar spine              Lumbar              STM piriformis  Right side     ACP Right side    ACP Right side    MC Right side    ACP Right side    ACP Right side     ACP  Right side     ACP  Right side     ACP  Right side     ACP Right side     ACP   Neuro Re-Ed             Supine marches with TrA  Supine :05x2' Supine :05x2' Supine :05x2' Supine :05x2' Supine :05x2' :05x2' :05x2' :05x2' :05x2'   Hip abd iso with belt :05x2' :05x2' :05x2' Supine :05x2' Supine :05x2' Supine :05x2' :05x2' :05x2' :05x2' :05x2'   Hip add with bolster  05x2' :05x2' :05x2' Supine :05x2' Supine :05x2' Supine :05x2' :05x2' :05x2' :05x2' :05x2'   Supine hip abd with TB single leg  GTB   2x10 ea  GTB 2x10 ea GTB 2x10 ea      YTB 2x10 ea  GTB 2x10 ea    Diaphragmatic breathing                           Recumbent bike 6' 6' 6' 6' 6' 6' 6' 6' 6' 6'                Ther Ex             Prone press ups              Supine  piriformis stretch  RLE :10 2x10  RLE :10 2x10  RLE :10 2x10  RLE :10 2x10  RLE :10 2x10  RLE :10 2x10  :10x10 RLE  RLE   :10   2x10  RLE   :10   2x10  RLE   :10   2x10    SKTC                                       LTR  With pball     :05x2' With pball     :05x2' With pball     :05x2' With red pball :10x2' With red pball :10x2' With pball     :05x2' With pball     :05x2' With pball     :05x2' With pball     :05x2' With pball     :05x2'   Bridges  2x10  2x10 2x10 2x10  2x10        Pball roll ins  With red pball :10x2' With red pball :10x2' With red pball :10x2' With red pball :10x2' With red pball :10x2' With red pball :10x2' With red pball :10x2' With red pball :10x2' With red pball :10x2' With red pball :10x2'   Pball roll outs                           Nustep              Ther Activity             Mini squats                           Gait Training                                       Modalities             Moist heat      10 minutes

## 2025-02-25 ENCOUNTER — OFFICE VISIT (OUTPATIENT)
Dept: PHYSICAL THERAPY | Facility: CLINIC | Age: 75
End: 2025-02-25
Payer: COMMERCIAL

## 2025-02-25 DIAGNOSIS — M54.50 CHRONIC BILATERAL LOW BACK PAIN, UNSPECIFIED WHETHER SCIATICA PRESENT: ICD-10-CM

## 2025-02-25 DIAGNOSIS — G89.29 CHRONIC BILATERAL LOW BACK PAIN, UNSPECIFIED WHETHER SCIATICA PRESENT: ICD-10-CM

## 2025-02-25 DIAGNOSIS — M54.16 LUMBAR RADICULOPATHY: Primary | ICD-10-CM

## 2025-02-25 PROCEDURE — 97140 MANUAL THERAPY 1/> REGIONS: CPT | Performed by: PHYSICAL THERAPIST

## 2025-02-25 PROCEDURE — 97110 THERAPEUTIC EXERCISES: CPT | Performed by: PHYSICAL THERAPIST

## 2025-02-25 PROCEDURE — 97112 NEUROMUSCULAR REEDUCATION: CPT | Performed by: PHYSICAL THERAPIST

## 2025-02-25 NOTE — PROGRESS NOTES
Daily Note     Today's date: 2025  Patient name: Ginger Chicas  : 1950  MRN: [id number]  Referring provider: Keegan Ruff MD  Dx:   Encounter Diagnosis     ICD-10-CM    1. Lumbar radiculopathy  M54.16       2. Chronic bilateral low back pain, unspecified whether sciatica present  M54.50     G89.29                   Start Time: 0815  Stop Time: 0900  Total time in clinic (min): 45 minutes    Subjective: Patient reports her back has been very tight and having muscle spasms. Patient notes no sharp pains. Patient contributes increased pain with the stress and lack of sleep she has been having.       Objective: See treatment diary below      Assessment: Continued with established POC with good response. Some mild discomfort throughout session but able to perform all sets and reps.  Patient noted decreased pain and discomfort post manual treatment. Ended session with moist heat for pain management. Patient demonstrated fatigue post treatment and would benefit from continued PT.      Plan: Progress treatment as tolerated.       Precautions: history of vertigo, cervical spine pain, osteoporosis, thyroid disorder, lumbar pain        Manuals 1/17 1/21 1/23 2/11 2/14 2/18 2/25 1/6 1/10 1/13   STM lumbar and thoracic paraspinals              STM gapping lumbar spine              Lumbar              STM piriformis  Right side     ACP Right side    ACP Right side    MC Right side    ACP Right side    ACP Right side     ACP  Right side     ACP  Right side     ACP  Right side     ACP Right side     ACP   Neuro Re-Ed             Supine marches with TrA  Supine :05x2' Supine :05x2' Supine :05x2' Supine :05x2' Supine :05x2'  :05x2' :05x2' :05x2'   Hip abd iso with belt :05x2' :05x2' :05x2' Supine :05x2' Supine :05x2' Supine :05x2' :05x2' :05x2' :05x2' :05x2'   Hip add with bolster  05x2' :05x2' :05x2' Supine :05x2' Supine :05x2' Supine :05x2' :05x2' :05x2' :05x2' :05x2'   Supine hip abd with TB single leg  GTB   2x10  ea  GTB 2x10 ea GTB 2x10 ea      YTB 2x10 ea  GTB 2x10 ea    Diaphragmatic breathing                           Recumbent bike 6' 6' 6' 6' 6' 6' 6' 6' 6' 6'                Ther Ex             Prone press ups              Supine piriformis stretch  RLE :10 2x10  RLE :10 2x10  RLE :10 2x10  RLE :10 2x10  RLE :10 2x10  RLE :10 2x10  :10x10 RLE  RLE   :10   2x10  RLE   :10   2x10  RLE   :10   2x10    SKTC             Supine cross legged QL stretch        :10x2'                   LTR  With pball     :05x2' With pball     :05x2' With pball     :05x2' With red pball :10x2' With red pball :10x2' With pball     :05x2' With pball     :05x2' With pball     :05x2' With pball     :05x2' With pball     :05x2'   Bridges  2x10  2x10 2x10 2x10  2x10        Pball roll ins  With red pball :10x2' With red pball :10x2' With red pball :10x2' With red pball :10x2' With red pball :10x2' With red pball :10x2' With red pball :10x2' With red pball :10x2' With red pball :10x2' With red pball :10x2'   Pball roll outs                           Nustep              Ther Activity             Mini squats                           Gait Training                                       Modalities             Moist heat      10 minutes   7 minutes post session

## 2025-03-04 ENCOUNTER — EVALUATION (OUTPATIENT)
Dept: PHYSICAL THERAPY | Facility: CLINIC | Age: 75
End: 2025-03-04
Payer: COMMERCIAL

## 2025-03-04 DIAGNOSIS — M54.16 LUMBAR RADICULOPATHY: Primary | ICD-10-CM

## 2025-03-04 DIAGNOSIS — G89.29 CHRONIC BILATERAL LOW BACK PAIN, UNSPECIFIED WHETHER SCIATICA PRESENT: ICD-10-CM

## 2025-03-04 DIAGNOSIS — M54.50 CHRONIC BILATERAL LOW BACK PAIN, UNSPECIFIED WHETHER SCIATICA PRESENT: ICD-10-CM

## 2025-03-04 DIAGNOSIS — M25.551 RIGHT HIP PAIN: ICD-10-CM

## 2025-03-04 PROCEDURE — 97110 THERAPEUTIC EXERCISES: CPT | Performed by: PHYSICAL THERAPIST

## 2025-03-04 PROCEDURE — 97140 MANUAL THERAPY 1/> REGIONS: CPT | Performed by: PHYSICAL THERAPIST

## 2025-03-04 PROCEDURE — 97112 NEUROMUSCULAR REEDUCATION: CPT | Performed by: PHYSICAL THERAPIST

## 2025-03-04 NOTE — PROGRESS NOTES
PT RE-EVALUATION     Today's date: 3/4/2025  Patient name: Ginger Chicas  : 1950  MRN: [id number]  Referring provider: Keegan Ruff MD  Dx:   Encounter Diagnosis     ICD-10-CM    1. Lumbar radiculopathy  M54.16       2. Chronic bilateral low back pain, unspecified whether sciatica present  M54.50     G89.29       3. Right hip pain  M25.551                     Start Time: 1445  Stop Time: 1530  Total time in clinic (min): 45 minutes    ASSESSMENT:  Ginger Chicas is a 74 y.o. female who presents with signs and symptoms consistent with chronic low back pain.The primary movement problem is posterior lumbar derangement, resulting in pathoanatomical symptoms of LBP and limiting patient's ability to perform normal daily activities without pain. Overall improvement in pain with functional activities.  Patient continues to have lumbar ROM restrictions and occasional right radicular symptoms into the R buttock. Continued TTP of the piriformis. Patient has met some of her short term goals and is progressing appropriately toward long term goals. Patient would benefit from continued skilled physical therapy to address the impairments, improve their level of function, and to improve their overall quality of life.      Impairments:    restricted ROM    decreased strength   pain with function   activity intolerance   Postural dysfunction     Prognosis:  Good  Positive and negative prognostic indicator(s):  positive attitude about recovery and prior success with conservative care    Goals:    Short Term Goals: to be achieved by 4 weeks  1) Patient to be independent with basic HEP. MET  2) Decrease pain to 2/10 at its worst. PROGRESSING   3) Increase lumbar spine ROM by 25% in all deficient planes. PROGRESSING   4) Increase LE strength by 1/2 MMT grade in all deficient planes.PROGRESSING     Long Term Goals: to be achieved by discharge  1) FOTO equal to or greater than target score indicating improvements with overall  function. PROGRESSING   2) Patient to be independent with comprehensive HEP.PROGRESSING   3) Lumbar spine ROM WNL all planes to improve a/iadls. PROGRESSING   4) Patient to report no sleep interruption secondary to pain. PROGRESSING   5) Patient will be able to tolerate prolong sitting with little to no discomfort in order to participate in daily activities. PROGRESSING       Planned interventions:  home exercise program, patient education, manual therapy, graded activity, flexibility, functional range of motion exercises, strengthening, abdominal trunk stabilization, and modalities prn    Duration in visits:  4  Frequency: 1 visits per week  Duration in weeks:  4    History of Current Injury: Patient notes that she isn't having the sharp intense pain like she had in the past. Patient notes that its more of a tightness around the    Pain location: bilateral low back and right radicular   Pain descriptors:  sharp, tightness  Current Pain: 0/10   Pain at worst: 4/10 (FROM 5/10)    Pain at best: 0/10     Aggravating factors: standing for longer periods of time, lifting   Easing factors: change of position, walking     Imaging: none at this time   Special Tests: denies numbness and tingling, denies B&B changes, no saddle paraesthesia      Patient goals: Patient reports goals for skilled PT would be  decreased pain    Objective     Concurrent Complaints  Positive for disturbed sleep. Negative for night pain, bladder dysfunction, bowel dysfunction and saddle (S4) numbness    Palpation   Left   Tenderness of the piriformis.     Right   Tenderness of the piriformis.       Neurological Testing     Sensation     Lumbar   Left   Intact: light touch    Right   Intact: light touch    Reflexes   Left   Patellar (L4): normal (2+)  Achilles (S1): normal (2+)  Clonus sign: negative    Right   Patellar (L4): normal (2+)  Achilles (S1): normal (2+)  Clonus sign: negative    Active Range of Motion   Left Hip   Normal active range of  motion    Right Hip   Normal active range of motion    Lumbar:   Flexion: moderate restrictions   Extension: minimal restrictions   Lateral bending R: moderate restrictions   Lateral bending L: moderate restrictions   Rotation R: minimal restrictions   Rotation L: minimal restrictions       Joint Play     Hypomobile: L1, L2, L3, L4 and L5     Tests     Lumbar     Left   Negative crossed SLR, passive SLR and slump test.     Right   Negative crossed SLR, passive SLR and slump test.     Left Hip   Positive piriformis.   Negative CELI and FADIR.     Right Hip   Positive piriformis.   Negative CELI and FADIR.     Additional Tests Details  Bilateral hamstring pain          Plan: Continue per plan of care.      Precautions: history of vertigo, cervical spine pain, osteoporosis, thyroid disorder, lumbar pain      Manuals 1/17 1/21 1/23 2/11 2/14 2/18 2/25 3/4 1/10 1/13   STM lumbar and thoracic paraspinals              STM gapping lumbar spine              Lumbar              STM piriformis  Right side     ACP Right side    ACP Right side    MC Right side    ACP Right side    ACP Right side     ACP  Right side     ACP  Right side     ACP  Right side     ACP Right side     ACP   Neuro Re-Ed             Supine marches with TrA  Supine :05x2' Supine :05x2' Supine :05x2' Supine :05x2' Supine :05x2'  :05x2' :05x2' :05x2'   Hip abd iso with belt :05x2' :05x2' :05x2' Supine :05x2' Supine :05x2' Supine :05x2' :05x2'  :05x2' :05x2'   Hip add with bolster  05x2' :05x2' :05x2' Supine :05x2' Supine :05x2' Supine :05x2' :05x2'  :05x2' :05x2'   Supine hip abd with TB single leg  GTB   2x10 ea  GTB 2x10 ea GTB 2x10 ea      YTB 2x10 ea  GTB 2x10 ea    Diaphragmatic breathing                           Recumbent bike 6' 6' 6' 6' 6' 6' 6' 6' 6' 6'                Ther Ex             Prone press ups              Supine piriformis stretch  RLE :10 2x10  RLE :10 2x10  RLE :10 2x10  RLE :10 2x10  RLE :10 2x10  RLE :10 2x10  :10x10 RLE  RLE    :10   2x10  RLE   :10   2x10  RLE   :10   2x10    Clamshells         2x10      Supine cross legged QL stretch        :10x2' :10x2'                  LTR  With pball     :05x2' With pball     :05x2' With pball     :05x2' With red pball :10x2' With red pball :10x2' With pball     :05x2' With pball     :05x2' With pball     :05x2' With pball     :05x2' With pball     :05x2'   Bridges  2x10  2x10 2x10 2x10  2x10        Pball roll ins  With red pball :10x2' With red pball :10x2' With red pball :10x2' With red pball :10x2' With red pball :10x2' With red pball :10x2' With red pball :10x2' With red pball :10x2' With red pball :10x2' With red pball :10x2'   Pball roll outs                           Nustep              Ther Activity             Mini squats                           Gait Training                                       Modalities             Moist heat      10 minutes   7 minutes post session

## 2025-03-06 ENCOUNTER — OFFICE VISIT (OUTPATIENT)
Dept: PHYSICAL THERAPY | Facility: CLINIC | Age: 75
End: 2025-03-06
Payer: COMMERCIAL

## 2025-03-06 DIAGNOSIS — M54.16 LUMBAR RADICULOPATHY: Primary | ICD-10-CM

## 2025-03-06 DIAGNOSIS — M54.50 CHRONIC BILATERAL LOW BACK PAIN, UNSPECIFIED WHETHER SCIATICA PRESENT: ICD-10-CM

## 2025-03-06 DIAGNOSIS — M25.551 RIGHT HIP PAIN: ICD-10-CM

## 2025-03-06 DIAGNOSIS — G89.29 CHRONIC BILATERAL LOW BACK PAIN, UNSPECIFIED WHETHER SCIATICA PRESENT: ICD-10-CM

## 2025-03-06 PROCEDURE — 97112 NEUROMUSCULAR REEDUCATION: CPT | Performed by: PHYSICAL THERAPIST

## 2025-03-06 PROCEDURE — 97110 THERAPEUTIC EXERCISES: CPT | Performed by: PHYSICAL THERAPIST

## 2025-03-06 PROCEDURE — 97140 MANUAL THERAPY 1/> REGIONS: CPT | Performed by: PHYSICAL THERAPIST

## 2025-03-06 NOTE — PROGRESS NOTES
Daily Note     Today's date: 3/6/2025  Patient name: Ginger Chicas  : 1950  MRN: [id number]  Referring provider: Keegan Ruff MD  Dx:   Encounter Diagnosis     ICD-10-CM    1. Lumbar radiculopathy  M54.16       2. Chronic bilateral low back pain, unspecified whether sciatica present  M54.50     G89.29       3. Right hip pain  M25.551                     Start Time: 1200  Stop Time: 1240  Total time in clinic (min): 40 minutes    Subjective: Patient reports her back feel great after last session.       Objective: See treatment diary below      Assessment: Continued with established POC from last session secondary to positive response. No pain or spasms throughout session.  Patient noted decreased pain and discomfort post manual treatment.  Patient demonstrated fatigue post treatment and would benefit from continued PT.      Plan: Progress treatment as tolerated.       Precautions: history of vertigo, cervical spine pain, osteoporosis, thyroid disorder, lumbar pain        Manuals 1/17 1/21 1/23 2/11 2/14 2/18 2/25 3/4 3/6 1/13   STM lumbar and thoracic paraspinals              STM gapping lumbar spine              Lumbar              STM piriformis  Right side     ACP Right side    ACP Right side    MC Right side    ACP Right side    ACP Right side     ACP  Right side     ACP  Right side     ACP  Right side     ACP Right side     ACP   Neuro Re-Ed             Supine marches with TrA  Supine :05x2' Supine :05x2' Supine :05x2' Supine :05x2' Supine :05x2'  :05x2' :05x2' :05x2'   Hip abd iso with belt :05x2' :05x2' :05x2' Supine :05x2' Supine :05x2' Supine :05x2' :05x2'   :05x2'   Hip add with bolster  05x2' :05x2' :05x2' Supine :05x2' Supine :05x2' Supine :05x2' :05x2'   :05x2'   Supine hip abd with TB single leg  GTB   2x10 ea  GTB 2x10 ea GTB 2x10 ea        GTB 2x10 ea    Diaphragmatic breathing                           Recumbent bike 6' 6' 6' 6' 6' 6' 6' 6' 6' 6'                Ther Ex             Prone  press ups              Supine piriformis stretch  RLE :10 2x10  RLE :10 2x10  RLE :10 2x10  RLE :10 2x10  RLE :10 2x10  RLE :10 2x10  :10x10 RLE  RLE   :10   2x10  RLE   :10   2x10  RLE   :10   2x10    Clamshells         2x10  2x10     Supine cross legged QL stretch        :10x2' :10x2' :10x2'                 LTR  With pball     :05x2' With pball     :05x2' With pball     :05x2' With red pball :10x2' With red pball :10x2' With pball     :05x2' With pball     :05x2' With pball     :05x2' With pball     :05x2' With pball     :05x2'   Bridges  2x10  2x10 2x10 2x10  2x10        Pball roll ins  With red pball :10x2' With red pball :10x2' With red pball :10x2' With red pball :10x2' With red pball :10x2' With red pball :10x2' With red pball :10x2' With red pball :10x2' With red pball :10x2' With red pball :10x2'   Pball roll outs                           Nustep              Ther Activity             Mini squats                           Gait Training                                       Modalities             Moist heat      10 minutes   7 minutes post session

## 2025-03-11 ENCOUNTER — OFFICE VISIT (OUTPATIENT)
Dept: PHYSICAL THERAPY | Facility: CLINIC | Age: 75
End: 2025-03-11
Payer: COMMERCIAL

## 2025-03-11 DIAGNOSIS — G89.29 CHRONIC BILATERAL LOW BACK PAIN, UNSPECIFIED WHETHER SCIATICA PRESENT: ICD-10-CM

## 2025-03-11 DIAGNOSIS — M25.551 RIGHT HIP PAIN: ICD-10-CM

## 2025-03-11 DIAGNOSIS — M54.50 CHRONIC BILATERAL LOW BACK PAIN, UNSPECIFIED WHETHER SCIATICA PRESENT: ICD-10-CM

## 2025-03-11 DIAGNOSIS — M54.16 LUMBAR RADICULOPATHY: Primary | ICD-10-CM

## 2025-03-11 PROCEDURE — 97110 THERAPEUTIC EXERCISES: CPT | Performed by: PHYSICAL THERAPIST

## 2025-03-11 PROCEDURE — 97140 MANUAL THERAPY 1/> REGIONS: CPT | Performed by: PHYSICAL THERAPIST

## 2025-03-11 PROCEDURE — 97112 NEUROMUSCULAR REEDUCATION: CPT | Performed by: PHYSICAL THERAPIST

## 2025-03-11 NOTE — PROGRESS NOTES
Daily Note     Today's date: 3/11/2025  Patient name: Ginger Chicas  : 1950  MRN: [id number]  Referring provider: Keegan Ruff MD  Dx:   Encounter Diagnosis     ICD-10-CM    1. Lumbar radiculopathy  M54.16       2. Chronic bilateral low back pain, unspecified whether sciatica present  M54.50     G89.29       3. Right hip pain  M25.551             Start Time: 1150  Stop Time: 1232  Total time in clinic (min): 42 minutes    Subjective: Patient reports some right hip pain but not terrible upon arrival.       Objective: See treatment diary below      Assessment: Continued with established POC.  Patient noted decreased pain and discomfort post manual treatment. No pain or spasms throughout session.  Patient noted decreased pain and discomfort post manual treatment.  Patient demonstrated fatigue post treatment and would benefit from continued PT.      Plan: Progress treatment as tolerated.       Precautions: history of vertigo, cervical spine pain, osteoporosis, thyroid disorder, lumbar pain        Manuals 1/17 1/21 1/23 2/11 2/14 2/18 2/25 3/4 3/6 3/11   STM lumbar and thoracic paraspinals              STM gapping lumbar spine              Lumbar              STM piriformis  Right side     ACP Right side    ACP Right side    MC Right side    ACP Right side    ACP Right side     ACP  Right side     ACP  Right side     ACP  Right side     ACP Right side     ACP   Neuro Re-Ed             Supine marches with TrA  Supine :05x2' Supine :05x2' Supine :05x2' Supine :05x2' Supine :05x2'  :05x2' :05x2' :05x2'   Hip abd iso with belt :05x2' :05x2' :05x2' Supine :05x2' Supine :05x2' Supine :05x2' :05x2'      Hip add with bolster  05x2' :05x2' :05x2' Supine :05x2' Supine :05x2' Supine :05x2' :05x2'      Supine hip abd with TB single leg  GTB   2x10 ea  GTB 2x10 ea GTB 2x10 ea           Diaphragmatic breathing                           Recumbent bike 6' 6' 6' 6' 6' 6' 6' 6' 6' 6'                Ther Ex             Prone  press ups              Supine piriformis stretch  RLE :10 2x10  RLE :10 2x10  RLE :10 2x10  RLE :10 2x10  RLE :10 2x10  RLE :10 2x10  :10x10 RLE  RLE   :10   2x10  RLE   :10   2x10  RLE   :10   2x10    Clamshells         2x10  2x10  2x10    Supine cross legged QL stretch        :10x2' :10x2' :10x2' :10x2'   Bridges           2x10    LTR  With pball     :05x2' With pball     :05x2' With pball     :05x2' With red pball :10x2' With red pball :10x2' With pball     :05x2' With pball     :05x2' With pball     :05x2' With pball     :05x2' With pball     :05x2'   Bridges  2x10  2x10 2x10 2x10  2x10        Pball roll ins  With red pball :10x2' With red pball :10x2' With red pball :10x2' With red pball :10x2' With red pball :10x2' With red pball :10x2' With red pball :10x2' With red pball :10x2' With red pball :10x2' With red pball :10x2'   Pball roll outs                           Nustep              Ther Activity             Mini squats                           Gait Training                                       Modalities             Moist heat      10 minutes   7 minutes post session

## 2025-03-18 ENCOUNTER — RA CDI HCC (OUTPATIENT)
Dept: OTHER | Facility: HOSPITAL | Age: 75
End: 2025-03-18

## 2025-03-18 ENCOUNTER — APPOINTMENT (OUTPATIENT)
Dept: PHYSICAL THERAPY | Facility: CLINIC | Age: 75
End: 2025-03-18
Payer: COMMERCIAL

## 2025-03-20 ENCOUNTER — TELEPHONE (OUTPATIENT)
Dept: FAMILY MEDICINE CLINIC | Facility: CLINIC | Age: 75
End: 2025-03-20

## 2025-03-21 ENCOUNTER — APPOINTMENT (OUTPATIENT)
Dept: LAB | Facility: CLINIC | Age: 75
End: 2025-03-21
Payer: COMMERCIAL

## 2025-03-21 ENCOUNTER — OFFICE VISIT (OUTPATIENT)
Dept: PHYSICAL THERAPY | Facility: CLINIC | Age: 75
End: 2025-03-21
Payer: COMMERCIAL

## 2025-03-21 DIAGNOSIS — M25.551 RIGHT HIP PAIN: ICD-10-CM

## 2025-03-21 DIAGNOSIS — M54.16 LUMBAR RADICULOPATHY: Primary | ICD-10-CM

## 2025-03-21 DIAGNOSIS — G89.29 CHRONIC BILATERAL LOW BACK PAIN, UNSPECIFIED WHETHER SCIATICA PRESENT: ICD-10-CM

## 2025-03-21 DIAGNOSIS — E72.11 HYPERHOMOCYSTEINEMIA (HCC): ICD-10-CM

## 2025-03-21 DIAGNOSIS — M54.50 CHRONIC BILATERAL LOW BACK PAIN, UNSPECIFIED WHETHER SCIATICA PRESENT: ICD-10-CM

## 2025-03-21 LAB
25(OH)D3 SERPL-MCNC: 65.6 NG/ML (ref 30–100)
ALBUMIN SERPL BCG-MCNC: 4 G/DL (ref 3.5–5)
ALP SERPL-CCNC: 55 U/L (ref 34–104)
ALT SERPL W P-5'-P-CCNC: 25 U/L (ref 7–52)
ANION GAP SERPL CALCULATED.3IONS-SCNC: 8 MMOL/L (ref 4–13)
AST SERPL W P-5'-P-CCNC: 31 U/L (ref 13–39)
BASOPHILS # BLD AUTO: 0.04 THOUSANDS/ÂΜL (ref 0–0.1)
BASOPHILS NFR BLD AUTO: 1 % (ref 0–1)
BILIRUB SERPL-MCNC: 0.65 MG/DL (ref 0.2–1)
BUN SERPL-MCNC: 17 MG/DL (ref 5–25)
CALCIUM SERPL-MCNC: 9.6 MG/DL (ref 8.4–10.2)
CHLORIDE SERPL-SCNC: 108 MMOL/L (ref 96–108)
CHOLEST SERPL-MCNC: 172 MG/DL (ref ?–200)
CO2 SERPL-SCNC: 26 MMOL/L (ref 21–32)
CREAT SERPL-MCNC: 1.12 MG/DL (ref 0.6–1.3)
EOSINOPHIL # BLD AUTO: 0.09 THOUSAND/ÂΜL (ref 0–0.61)
EOSINOPHIL NFR BLD AUTO: 2 % (ref 0–6)
ERYTHROCYTE [DISTWIDTH] IN BLOOD BY AUTOMATED COUNT: 12.9 % (ref 11.6–15.1)
GFR SERPL CREATININE-BSD FRML MDRD: 48 ML/MIN/1.73SQ M
GLUCOSE P FAST SERPL-MCNC: 92 MG/DL (ref 65–99)
HCT VFR BLD AUTO: 43.1 % (ref 34.8–46.1)
HCYS SERPL-SCNC: 13.2 UMOL/L (ref 5–20)
HDLC SERPL-MCNC: 59 MG/DL
HGB BLD-MCNC: 14.1 G/DL (ref 11.5–15.4)
IMM GRANULOCYTES # BLD AUTO: 0.01 THOUSAND/UL (ref 0–0.2)
IMM GRANULOCYTES NFR BLD AUTO: 0 % (ref 0–2)
LDLC SERPL CALC-MCNC: 88 MG/DL (ref 0–100)
LYMPHOCYTES # BLD AUTO: 1.5 THOUSANDS/ÂΜL (ref 0.6–4.47)
LYMPHOCYTES NFR BLD AUTO: 34 % (ref 14–44)
MCH RBC QN AUTO: 30.7 PG (ref 26.8–34.3)
MCHC RBC AUTO-ENTMCNC: 32.7 G/DL (ref 31.4–37.4)
MCV RBC AUTO: 94 FL (ref 82–98)
MONOCYTES # BLD AUTO: 0.22 THOUSAND/ÂΜL (ref 0.17–1.22)
MONOCYTES NFR BLD AUTO: 5 % (ref 4–12)
NEUTROPHILS # BLD AUTO: 2.62 THOUSANDS/ÂΜL (ref 1.85–7.62)
NEUTS SEG NFR BLD AUTO: 58 % (ref 43–75)
NONHDLC SERPL-MCNC: 113 MG/DL
NRBC BLD AUTO-RTO: 0 /100 WBCS
PLATELET # BLD AUTO: 292 THOUSANDS/UL (ref 149–390)
PMV BLD AUTO: 10.5 FL (ref 8.9–12.7)
POTASSIUM SERPL-SCNC: 4.5 MMOL/L (ref 3.5–5.3)
PROT SERPL-MCNC: 7 G/DL (ref 6.4–8.4)
RBC # BLD AUTO: 4.59 MILLION/UL (ref 3.81–5.12)
SODIUM SERPL-SCNC: 142 MMOL/L (ref 135–147)
T4 FREE SERPL-MCNC: 1.01 NG/DL (ref 0.61–1.12)
TRIGL SERPL-MCNC: 125 MG/DL (ref ?–150)
TSH SERPL DL<=0.05 MIU/L-ACNC: 4.53 UIU/ML (ref 0.45–4.5)
WBC # BLD AUTO: 4.48 THOUSAND/UL (ref 4.31–10.16)

## 2025-03-21 PROCEDURE — 83090 ASSAY OF HOMOCYSTEINE: CPT

## 2025-03-21 PROCEDURE — 97112 NEUROMUSCULAR REEDUCATION: CPT | Performed by: PHYSICAL THERAPIST

## 2025-03-21 PROCEDURE — 97110 THERAPEUTIC EXERCISES: CPT | Performed by: PHYSICAL THERAPIST

## 2025-03-21 NOTE — PROGRESS NOTES
Daily Note     Today's date: 3/21/2025  Patient name: Ginger Chicas  : 1950  MRN: [id number]  Referring provider: Keegan Ruff MD  Dx:   Encounter Diagnosis     ICD-10-CM    1. Lumbar radiculopathy  M54.16       2. Chronic bilateral low back pain, unspecified whether sciatica present  M54.50     G89.29       3. Right hip pain  M25.551               Start Time: 09  Stop Time: 1015  Total time in clinic (min): 45 minutes    Subjective: Patient reports that the back and hip are feeling great. Patient notes that she had minimal issues with it on her trip despite lots of sitting and walking.       Objective: See treatment diary below      Assessment: Continued with established POC. Secondary to progress patient will decreased to 1x week.  Patient demonstrated fatigue post treatment and would benefit from continued PT.      Plan: Progress treatment as tolerated.       Precautions: history of vertigo, cervical spine pain, osteoporosis, thyroid disorder, lumbar pain        Manuals 1/17 1/21 1/23 2/11 2/14 2/18 2/25 3/4 3/6 3/11   STM lumbar and thoracic paraspinals              STM gapping lumbar spine              Lumbar              STM piriformis  Right side     ACP Right side    ACP Right side    MC Right side    ACP Right side    ACP Right side     ACP  Right side     ACP  Right side     ACP  Right side     ACP Right side     ACP   Neuro Re-Ed             Supine marches with TrA  Supine :05x2' Supine :05x2' Supine :05x2' Supine :05x2' Supine :05x2'  :05x2' :05x2' :05x2'   Hip abd iso with belt :05x2' :05x2' :05x2' Supine :05x2' Supine :05x2' Supine :05x2' :05x2'      Hip add with bolster  05x2' :05x2' :05x2' Supine :05x2' Supine :05x2' Supine :05x2' :05x2'      Supine hip abd with TB single leg  GTB   2x10 ea  GTB 2x10 ea GTB 2x10 ea           Diaphragmatic breathing                           Recumbent bike 6' 6' 6' 6' 6' 6' 6' 6' 6' 6'                Ther Ex             Prone press ups               Supine piriformis stretch  RLE :10 2x10  RLE :10 2x10  RLE :10 2x10  RLE :10 2x10  RLE :10 2x10  RLE :10 2x10  :10x10 RLE  RLE   :10   2x10  RLE   :10   2x10  RLE   :10   2x10    Clamshells         2x10  2x10  2x10    Supine cross legged QL stretch        :10x2' :10x2' :10x2' :10x2'   Bridges           2x10    LTR  With pball     :05x2' With pball     :05x2' With pball     :05x2' With red pball :10x2' With red pball :10x2' With pball     :05x2' With pball     :05x2' With pball     :05x2' With pball     :05x2' With pball     :05x2'   Bridges  2x10  2x10 2x10 2x10  2x10        Pball roll ins  With red pball :10x2' With red pball :10x2' With red pball :10x2' With red pball :10x2' With red pball :10x2' With red pball :10x2' With red pball :10x2' With red pball :10x2' With red pball :10x2' With red pball :10x2'   Pball roll outs                           Nustep              Ther Activity             Mini squats                           Gait Training                                       Modalities             Moist heat      10 minutes   7 minutes post session

## 2025-03-25 ENCOUNTER — OFFICE VISIT (OUTPATIENT)
Dept: PHYSICAL THERAPY | Facility: CLINIC | Age: 75
End: 2025-03-25
Payer: COMMERCIAL

## 2025-03-25 ENCOUNTER — OFFICE VISIT (OUTPATIENT)
Dept: FAMILY MEDICINE CLINIC | Facility: CLINIC | Age: 75
End: 2025-03-25
Payer: COMMERCIAL

## 2025-03-25 VITALS
BODY MASS INDEX: 31.49 KG/M2 | HEART RATE: 70 BPM | SYSTOLIC BLOOD PRESSURE: 128 MMHG | RESPIRATION RATE: 16 BRPM | OXYGEN SATURATION: 97 % | HEIGHT: 65 IN | DIASTOLIC BLOOD PRESSURE: 76 MMHG | TEMPERATURE: 97.5 F | WEIGHT: 189 LBS

## 2025-03-25 DIAGNOSIS — E78.2 MIXED HYPERLIPIDEMIA: Primary | ICD-10-CM

## 2025-03-25 DIAGNOSIS — D17.71 ANGIOMYOLIPOMA OF KIDNEY: ICD-10-CM

## 2025-03-25 DIAGNOSIS — M54.16 LUMBAR RADICULOPATHY: Primary | ICD-10-CM

## 2025-03-25 DIAGNOSIS — M54.50 CHRONIC BILATERAL LOW BACK PAIN, UNSPECIFIED WHETHER SCIATICA PRESENT: ICD-10-CM

## 2025-03-25 DIAGNOSIS — G89.29 CHRONIC BILATERAL LOW BACK PAIN, UNSPECIFIED WHETHER SCIATICA PRESENT: ICD-10-CM

## 2025-03-25 DIAGNOSIS — J45.30 MILD PERSISTENT ASTHMA WITHOUT COMPLICATION: ICD-10-CM

## 2025-03-25 DIAGNOSIS — E72.11 HYPERHOMOCYSTEINEMIA (HCC): ICD-10-CM

## 2025-03-25 DIAGNOSIS — M81.0 AGE-RELATED OSTEOPOROSIS WITHOUT CURRENT PATHOLOGICAL FRACTURE: ICD-10-CM

## 2025-03-25 DIAGNOSIS — M25.551 RIGHT HIP PAIN: ICD-10-CM

## 2025-03-25 DIAGNOSIS — N18.31 STAGE 3A CHRONIC KIDNEY DISEASE (HCC): ICD-10-CM

## 2025-03-25 DIAGNOSIS — E03.9 ACQUIRED HYPOTHYROIDISM: ICD-10-CM

## 2025-03-25 DIAGNOSIS — Z00.00 MEDICARE ANNUAL WELLNESS VISIT, SUBSEQUENT: ICD-10-CM

## 2025-03-25 DIAGNOSIS — K76.0 FATTY LIVER: ICD-10-CM

## 2025-03-25 DIAGNOSIS — K58.0 IRRITABLE BOWEL SYNDROME WITH DIARRHEA: ICD-10-CM

## 2025-03-25 PROCEDURE — G2211 COMPLEX E/M VISIT ADD ON: HCPCS | Performed by: FAMILY MEDICINE

## 2025-03-25 PROCEDURE — 97112 NEUROMUSCULAR REEDUCATION: CPT | Performed by: PHYSICAL THERAPIST

## 2025-03-25 PROCEDURE — 97110 THERAPEUTIC EXERCISES: CPT | Performed by: PHYSICAL THERAPIST

## 2025-03-25 PROCEDURE — 99214 OFFICE O/P EST MOD 30 MIN: CPT | Performed by: FAMILY MEDICINE

## 2025-03-25 PROCEDURE — G0439 PPPS, SUBSEQ VISIT: HCPCS | Performed by: FAMILY MEDICINE

## 2025-03-25 PROCEDURE — G0537 PR RISK ASCVD TST ONCE PR 12 MO: HCPCS | Performed by: FAMILY MEDICINE

## 2025-03-25 NOTE — ASSESSMENT & PLAN NOTE
Asthma stable on Breo 100/25 daily. Infrequent PRN Albuterol Symptoms improved since removing carpeting in her house.    10/2024 Right diaphragmatic movement may be slightly diminished compared to the normal left side during respiration but there is no paradoxical diaphragmatic movement suspicious for paralysis.     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    02/2020 PFTs no large airway obstruction or bronchodilator responsiveness.  Decreased forced vital capacity and total lung capacity consistent with mild restrictive lung disease.  Mild diffuse impairment.      01/2016  CT scan chest  normal except for small hiatal hernia.

## 2025-03-25 NOTE — ASSESSMENT & PLAN NOTE
Hyperlipidemia mixed type on Fenofibrate 160 mg daily.      Lab Results   Component Value Date    CHOLESTEROL 172 03/21/2025    CHOLESTEROL 179 09/03/2024    CHOLESTEROL 177 01/15/2024     Lab Results   Component Value Date    HDL 59 03/21/2025    HDL 53 09/03/2024    HDL 61 01/15/2024     Lab Results   Component Value Date    TRIG 125 03/21/2025    TRIG 133 09/03/2024    TRIG 135 01/15/2024     Lab Results   Component Value Date    LDLCALC 88 03/21/2025        Lab Results   Component Value Date    GLUF 92 03/21/2025             Orders:    Comprehensive metabolic panel    Lipid panel

## 2025-03-25 NOTE — ASSESSMENT & PLAN NOTE
Lab Results   Component Value Date    EGFR 48 03/21/2025    EGFR 59 01/30/2025    EGFR 61 09/03/2024    CREATININE 1.12 03/21/2025    CREATININE 0.95 01/30/2025    CREATININE 0.93 09/03/2024

## 2025-03-25 NOTE — PROGRESS NOTES
Name: Ginger Chicas      : 1950      MRN: [id number]  Encounter Provider: Keegan Ruff MD  Encounter Date: 3/25/2025   Encounter department: [facility]  :  Assessment & Plan  Mixed hyperlipidemia    Hyperlipidemia mixed type on Fenofibrate 160 mg daily.      Lab Results   Component Value Date    CHOLESTEROL 172 2025    CHOLESTEROL 179 2024    CHOLESTEROL 177 01/15/2024     Lab Results   Component Value Date    HDL 59 2025    HDL 53 2024    HDL 61 01/15/2024     Lab Results   Component Value Date    TRIG 125 2025    TRIG 133 2024    TRIG 135 01/15/2024     Lab Results   Component Value Date    LDLCALC 88 2025        Lab Results   Component Value Date    GLUF 92 2025             Orders:    Comprehensive metabolic panel    Lipid panel      Stage 3a chronic kidney disease (HCC)  Lab Results   Component Value Date    EGFR 48 2025    EGFR 59 2025    EGFR 61 2024    CREATININE 1.12 2025    CREATININE 0.95 2025    CREATININE 0.93 2024     Lab Results   Component Value Date    WBC 4.48 2025    HGB 14.1 2025    HCT 43.1 2025    MCV 94 2025     2025         CKD stage 2/3.      2024 creatinine 0.93.  GFR 61    088/ renal ultrasound.  Multiple ill-defined echogenic masses along the right kidney grossly stable in size from ultrasound 3/2024.  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     2024 creatinine 0.99. GFR 57.. Mg++ 2.1. Phosphorus 2.5. PTH 51. 0. Urine protein creatinine ratio normal at 0.06.     2023 creatinine 1.09. GFR 51.     2023 Echogenic lesions in the right kidney for which the midpole superior portion lesion has increased in size. Mild post void urinary bladder volume. 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   No development of hydronephrosis, calculus or fluid collection.  Left kidney is unremarkable         Hyperhomocysteinemia (HCC)    History of elevated homocystine level on vitamin supplement.  No history of venous thrombosis. 03/2025  homocysteine normal at 13.2     Orders:    Homocysteine; Future      Mild persistent asthma without complication    Asthma stable on Breo 100/25 daily. Infrequent PRN Albuterol Symptoms improved since removing carpeting in her house.    10/2024 Right diaphragmatic movement may be slightly diminished compared to the normal left side during respiration but there is no paradoxical diaphragmatic movement suspicious for paralysis.     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    02/2020 PFTs no large airway obstruction or bronchodilator responsiveness.  Decreased forced vital capacity and total lung capacity consistent with mild restrictive lung disease.  Mild diffuse impairment.      01/2016  CT scan chest  normal except for small hiatal hernia.         Fatty liver    Lab Results   Component Value Date    ALT 25 03/21/2025    AST 31 03/21/2025    ALKPHOS 55 03/21/2025    BILITOT 0.6 01/03/2018       Orders:    CBC and differential    Irritable bowel syndrome with diarrhea      IBS diarrhea.  Stable on probiotics. Colonoscopy 01/2023          Acquired hypothyroidism     Hypothyroidism on Levothyroxine 50 mcg daily.     Osteoporosis on Fosamax 70 mg weekly  no longer on Prolia . 3/2024 DEXA scan osteoporosis left femoral neck no statistical changes from 3/2022.  Prior intolerance to Actonel. 04/2022 Vitamin-D level 45.4.  On vitamin D 5,000 IU daily.         Lab Results   Component Value Date    CRY2ZAMQVEHL 4.529 (H) 03/21/2025 03/2025     Orders:    TSH, 3rd generation with Free T4 reflex    Age-related osteoporosis without current pathological fracture    Osteoporosis on Fosamax 70 mg weekly  no longer on Prolia . 3/2024  DEXA scan osteoporosis left femoral neck no statistical changes from 3/2022.  Prior intolerance to Actonel. 03/2025 Vitamin-D level 65.6.  On vitamin D 5,000 IU daily.           Angiomyolipoma of kidney    10/2024 CT scan Grossly stable size of the large right renal exophytic 7.3 cm angiomyolipoma compared to CT  dated 10/1/2014. Given its large size, it is at increased risk for spontaneous hemorrhage and consultation with interventional radiology can be considered for   embolization     2 small right renal angiomyolipomas measuring 1.0 and 1.8 cm respectively     Suboptimal opacification of the distal half of right ureter, otherwise no suspicious urothelial mass lesion in the opacified bilateral renal collecting system and ureters.    01/2025 s/p embolization by IR       Scheduled for repeat CT scan        Medicare annual wellness visit, subsequent                History of Present Illness   HPI  Review of Systems   Constitutional:  Negative for appetite change, chills, fatigue, fever and unexpected weight change.   HENT:  Positive for voice change. Negative for congestion, ear pain, rhinorrhea, sinus pain, sore throat and trouble swallowing.    Eyes:  Negative for visual disturbance.   Respiratory:  Negative for cough, shortness of breath and wheezing.    Cardiovascular:  Negative for chest pain, palpitations and leg swelling.        Episodes of bradycardia during  Colonoscopy 01/2023. EKG 02/2023 NSR. T wave inversions V1-V3. 48 hour Holter The patient was in sinus rhythm throughout the duration of the study with an average heart rate 77 bpm. No ventricular and rare supraventricular ectopic activity. No atrial fibrillation or atrial flutter. There was approximately 1 hour 36 minutes of bradycardia.  The slowest single episode occurred at 12:20 AM with a minimum heart rate 52 bpm. There was no evidence of heart block and no significant pauses were seen. No symptoms reported.   Gastrointestinal:  Negative for  "abdominal pain, blood in stool, constipation, diarrhea, nausea and vomiting.               Endocrine:                                                                          Genitourinary:  Negative for difficulty urinating.        See HPI History of recurrent UTIs on Macrodantin 50 mg every other day at HS. She is followed by Urology.. Pap smear 09/2016   Musculoskeletal:  Negative for arthralgias and myalgias.   Skin:  Negative for rash.   Allergic/Immunologic: Positive for environmental allergies.        See HPI 02/2020 allergy testing + mold allergy.   Neurological:  Negative for dizziness and headaches.   Hematological:  Negative for adenopathy. Does not bruise/bleed easily.                      Psychiatric/Behavioral:  Negative for dysphoric mood and sleep disturbance.        Objective   /76 (BP Location: Left arm, Patient Position: Sitting, Cuff Size: Standard)   Pulse 70   Temp 97.5 °F (36.4 °C) (Temporal)   Resp 16   Ht 5' 5\" (1.651 m)   Wt 85.7 kg (189 lb)   SpO2 97%   BMI 31.45 kg/m²      Wt Readings from Last 3 Encounters:   03/25/25 85.7 kg (189 lb)   01/30/25 84.8 kg (187 lb)   01/24/25 84.8 kg (187 lb)         Physical Exam  Constitutional:       General: She is not in acute distress.  HENT:      Right Ear: Tympanic membrane and ear canal normal.      Left Ear: Tympanic membrane and ear canal normal.      Mouth/Throat:      Mouth: No oral lesions.      Pharynx: Oropharynx is clear.   Eyes:      General: No scleral icterus.     Conjunctiva/sclera: Conjunctivae normal.   Neck:      Thyroid: No thyroid mass or thyromegaly.      Vascular: Normal carotid pulses. No carotid bruit or JVD.   Cardiovascular:      Rate and Rhythm: Normal rate and regular rhythm.      Heart sounds: No murmur heard.     No gallop.   Pulmonary:      Effort: Pulmonary effort is normal.      Breath sounds: Normal breath sounds. No wheezing or rales.   Musculoskeletal:      Right lower leg: No edema.      Left lower " leg: No edema.   Lymphadenopathy:      Cervical: No cervical adenopathy.      Upper Body:      Right upper body: No supraclavicular adenopathy.      Left upper body: No supraclavicular adenopathy.   Skin:     Coloration: Skin is not cyanotic.      Findings: No rash.      Nails: There is no clubbing.   Neurological:      General: No focal deficit present.      Mental Status: She is alert and oriented to person, place, and time.   Psychiatric:         Mood and Affect: Mood normal.         Cognition and Memory: Cognition normal.

## 2025-03-25 NOTE — ASSESSMENT & PLAN NOTE
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  No history of venous thrombosis. 03/2025  homocysteine normal at 13.2     Orders:    Homocysteine; Future

## 2025-03-25 NOTE — ASSESSMENT & PLAN NOTE
Lab Results   Component Value Date    EGFR 48 03/21/2025    EGFR 59 01/30/2025    EGFR 61 09/03/2024    CREATININE 1.12 03/21/2025    CREATININE 0.95 01/30/2025    CREATININE 0.93 09/03/2024     Lab Results   Component Value Date    WBC 4.48 03/21/2025    HGB 14.1 03/21/2025    HCT 43.1 03/21/2025    MCV 94 03/21/2025     03/21/2025         CKD stage 2/3.      9/2024 creatinine 0.93.  GFR 61    088/2024 renal ultrasound.  Multiple ill-defined echogenic masses along the right kidney grossly stable in size from ultrasound 3/2024.  Right renal angiomyolipoma suggested on CT scan 2014 no hydronephrosis.     01/2024 creatinine 0.99. GFR 57.. Mg++ 2.1. Phosphorus 2.5. PTH 51. 0. Urine protein creatinine ratio normal at 0.06.     06/2023 creatinine 1.09. GFR 51.     07/2023 Echogenic lesions in the right kidney for which the midpole superior portion lesion has increased in size. Mild post void urinary bladder volume. PVR 60.1     07/2020 renal u/s 1 large or multiple confluent angiomyolipomas of the right kidney with an increase in size of one of the measured components.  No development of hydronephrosis, calculus or fluid collection.  Left kidney is unremarkable

## 2025-03-25 NOTE — PATIENT INSTRUCTIONS
Medicare Preventive Visit Patient Instructions  Thank you for completing your Welcome to Medicare Visit or Medicare Annual Wellness Visit today. Your next wellness visit will be due in one year (3/26/2026).  The screening/preventive services that you may require over the next 5-10 years are detailed below. Some tests may not apply to you based off risk factors and/or age. Screening tests ordered at today's visit but not completed yet may show as past due. Also, please note that scanned in results may not display below.  Preventive Screenings:  Service Recommendations Previous Testing/Comments   Colorectal Cancer Screening  * Colonoscopy    * Fecal Occult Blood Test (FOBT)/Fecal Immunochemical Test (FIT)  * Fecal DNA/Cologuard Test  * Flexible Sigmoidoscopy Age: 45-75 years old   Colonoscopy: every 10 years (may be performed more frequently if at higher risk)  OR  FOBT/FIT: every 1 year  OR  Cologuard: every 3 years  OR  Sigmoidoscopy: every 5 years  Screening may be recommended earlier than age 45 if at higher risk for colorectal cancer. Also, an individualized decision between you and your healthcare provider will decide whether screening between the ages of 76-85 would be appropriate. Colonoscopy: 01/26/2023  FOBT/FIT: Not on file  Cologuard: Not on file  Sigmoidoscopy: Not on file    Screening Current     Breast Cancer Screening Age: 40+ years old  Frequency: every 1-2 years  Not required if history of left and right mastectomy Mammogram: 03/28/2024    Screening Current   Cervical Cancer Screening Between the ages of 21-29, pap smear recommended once every 3 years.   Between the ages of 30-65, can perform pap smear with HPV co-testing every 5 years.   Recommendations may differ for women with a history of total hysterectomy, cervical cancer, or abnormal pap smears in past. Pap Smear: 01/06/2022    Screening Not Indicated   Hepatitis C Screening Once for adults born between 1945 and 1965  More frequently in  patients at high risk for Hepatitis C Hep C Antibody: Not on file    Screening Current   Diabetes Screening 1-2 times per year if you're at risk for diabetes or have pre-diabetes Fasting glucose: 92 mg/dL (3/21/2025)  A1C: No results in last 5 years (No results in last 5 years)  Screening Current   Cholesterol Screening Once every 5 years if you don't have a lipid disorder. May order more often based on risk factors. Lipid panel: 03/21/2025    Screening Not Indicated  History Lipid Disorder     Other Preventive Screenings Covered by Medicare:  Abdominal Aortic Aneurysm (AAA) Screening: covered once if your at risk. You're considered to be at risk if you have a family history of AAA.  Lung Cancer Screening: covers low dose CT scan once per year if you meet all of the following conditions: (1) Age 55-77; (2) No signs or symptoms of lung cancer; (3) Current smoker or have quit smoking within the last 15 years; (4) You have a tobacco smoking history of at least 20 pack years (packs per day multiplied by number of years you smoked); (5) You get a written order from a healthcare provider.  Glaucoma Screening: covered annually if you're considered high risk: (1) You have diabetes OR (2) Family history of glaucoma OR (3)  aged 50 and older OR (4)  American aged 65 and older  Osteoporosis Screening: covered every 2 years if you meet one of the following conditions: (1) You're estrogen deficient and at risk for osteoporosis based off medical history and other findings; (2) Have a vertebral abnormality; (3) On glucocorticoid therapy for more than 3 months; (4) Have primary hyperparathyroidism; (5) On osteoporosis medications and need to assess response to drug therapy.   Last bone density test (DXA Scan): 03/28/2024.  HIV Screening: covered annually if you're between the age of 15-65. Also covered annually if you are younger than 15 and older than 65 with risk factors for HIV infection. For pregnant  patients, it is covered up to 3 times per pregnancy.    Immunizations:  Immunization Recommendations   Influenza Vaccine Annual influenza vaccination during flu season is recommended for all persons aged >= 6 months who do not have contraindications   Pneumococcal Vaccine   * Pneumococcal conjugate vaccine = PCV13 (Prevnar 13), PCV15 (Vaxneuvance), PCV20 (Prevnar 20)  * Pneumococcal polysaccharide vaccine = PPSV23 (Pneumovax) Adults 19-65 yo with certain risk factors or if 65+ yo  If never received any pneumonia vaccine: recommend Prevnar 20 (PCV20)  Give PCV20 if previously received 1 dose of PCV13 or PPSV23   Hepatitis B Vaccine 3 dose series if at intermediate or high risk (ex: diabetes, end stage renal disease, liver disease)   Respiratory syncytial virus (RSV) Vaccine - COVERED BY MEDICARE PART D  * RSVPreF3 (Arexvy) CDC recommends that adults 60 years of age and older may receive a single dose of RSV vaccine using shared clinical decision-making (SCDM)   Tetanus (Td) Vaccine - COST NOT COVERED BY MEDICARE PART B Following completion of primary series, a booster dose should be given every 10 years to maintain immunity against tetanus. Td may also be given as tetanus wound prophylaxis.   Tdap Vaccine - COST NOT COVERED BY MEDICARE PART B Recommended at least once for all adults. For pregnant patients, recommended with each pregnancy.   Shingles Vaccine (Shingrix) - COST NOT COVERED BY MEDICARE PART B  2 shot series recommended in those 19 years and older who have or will have weakened immune systems or those 50 years and older     Health Maintenance Due:      Topic Date Due   • Breast Cancer Screening: Mammogram  03/28/2025   • Hepatitis C Screening  04/08/2026 (Originally 1950)   • DXA SCAN  03/28/2026   • Colorectal Cancer Screening  01/26/2033     Immunizations Due:  There are no preventive care reminders to display for this patient.  Advance Directives   What are advance directives?  Advance directives  are legal documents that state your wishes and plans for medical care. These plans are made ahead of time in case you lose your ability to make decisions for yourself. Advance directives can apply to any medical decision, such as the treatments you want, and if you want to donate organs.   What are the types of advance directives?  There are many types of advance directives, and each state has rules about how to use them. You may choose a combination of any of the following:  Living will:  This is a written record of the treatment you want. You can also choose which treatments you do not want, which to limit, and which to stop at a certain time. This includes surgery, medicine, IV fluid, and tube feedings.   Durable power of  for healthcare (DPAHC):  This is a written record that states who you want to make healthcare choices for you when you are unable to make them for yourself. This person, called a proxy, is usually a family member or a friend. You may choose more than 1 proxy.  Do not resuscitate (DNR) order:  A DNR order is used in case your heart stops beating or you stop breathing. It is a request not to have certain forms of treatment, such as CPR. A DNR order may be included in other types of advance directives.  Medical directive:  This covers the care that you want if you are in a coma, near death, or unable to make decisions for yourself. You can list the treatments you want for each condition. Treatment may include pain medicine, surgery, blood transfusions, dialysis, IV or tube feedings, and a ventilator (breathing machine).  Values history:  This document has questions about your views, beliefs, and how you feel and think about life. This information can help others choose the care that you would choose.  Why are advance directives important?  An advance directive helps you control your care. Although spoken wishes may be used, it is better to have your wishes written down. Spoken wishes can  be misunderstood, or not followed. Treatments may be given even if you do not want them. An advance directive may make it easier for your family to make difficult choices about your care.   Weight Management   Why it is important to manage your weight:  Being overweight increases your risk of health conditions such as heart disease, high blood pressure, type 2 diabetes, and certain types of cancer. It can also increase your risk for osteoarthritis, sleep apnea, and other respiratory problems. Aim for a slow, steady weight loss. Even a small amount of weight loss can lower your risk of health problems.  How to lose weight safely:  A safe and healthy way to lose weight is to eat fewer calories and get regular exercise. You can lose up about 1 pound a week by decreasing the number of calories you eat by 500 calories each day.   Healthy meal plan for weight management:  A healthy meal plan includes a variety of foods, contains fewer calories, and helps you stay healthy. A healthy meal plan includes the following:  Eat whole-grain foods more often.  A healthy meal plan should contain fiber. Fiber is the part of grains, fruits, and vegetables that is not broken down by your body. Whole-grain foods are healthy and provide extra fiber in your diet. Some examples of whole-grain foods are whole-wheat breads and pastas, oatmeal, brown rice, and bulgur.  Eat a variety of vegetables every day.  Include dark, leafy greens such as spinach, kale, liz greens, and mustard greens. Eat yellow and orange vegetables such as carrots, sweet potatoes, and winter squash.   Eat a variety of fruits every day.  Choose fresh or canned fruit (canned in its own juice or light syrup) instead of juice. Fruit juice has very little or no fiber.  Eat low-fat dairy foods.  Drink fat-free (skim) milk or 1% milk. Eat fat-free yogurt and low-fat cottage cheese. Try low-fat cheeses such as mozzarella and other reduced-fat cheeses.  Choose meat and other  protein foods that are low in fat.  Choose beans or other legumes such as split peas or lentils. Choose fish, skinless poultry (chicken or turkey), or lean cuts of red meat (beef or pork). Before you cook meat or poultry, cut off any visible fat.   Use less fat and oil.  Try baking foods instead of frying them. Add less fat, such as margarine, sour cream, regular salad dressing and mayonnaise to foods. Eat fewer high-fat foods. Some examples of high-fat foods include french fries, doughnuts, ice cream, and cakes.  Eat fewer sweets.  Limit foods and drinks that are high in sugar. This includes candy, cookies, regular soda, and sweetened drinks.  Exercise:  Exercise at least 30 minutes per day on most days of the week. Some examples of exercise include walking, biking, dancing, and swimming. You can also fit in more physical activity by taking the stairs instead of the elevator or parking farther away from stores. Ask your healthcare provider about the best exercise plan for you.      © Copyright [employer] 2018 Information is for End User's use only and may not be sold, redistributed or otherwise used for commercial purposes. All illustrations and images included in CareNotes® are the copyrighted property of [employer] or [employer]

## 2025-03-25 NOTE — ASSESSMENT & PLAN NOTE
Lab Results   Component Value Date    ALT 25 03/21/2025    AST 31 03/21/2025    ALKPHOS 55 03/21/2025    BILITOT 0.6 01/03/2018       Orders:    CBC and differential

## 2025-03-25 NOTE — ASSESSMENT & PLAN NOTE
10/2024 CT scan Grossly stable size of the large right renal exophytic 7.3 cm angiomyolipoma compared to CT  dated 10/1/2014. Given its large size, it is at increased risk for spontaneous hemorrhage and consultation with interventional radiology can be considered for   embolization     2 small right renal angiomyolipomas measuring 1.0 and 1.8 cm respectively     Suboptimal opacification of the distal half of right ureter, otherwise no suspicious urothelial mass lesion in the opacified bilateral renal collecting system and ureters.    01/2025 s/p embolization by IR       Scheduled for repeat CT scan

## 2025-03-25 NOTE — PROGRESS NOTES
Name: Ginger Chicas      : 1950      MRN: [id number]  Encounter Provider: Keegan Ruff MD  Encounter Date: 3/25/2025   Encounter department: [facility]    Assessment & Plan  Mixed hyperlipidemia    Orders:    Comprehensive metabolic panel    Lipid panel    Stage 3a chronic kidney disease (HCC)  Lab Results   Component Value Date    EGFR 48 2025    EGFR 59 2025    EGFR 61 2024    CREATININE 1.12 2025    CREATININE 0.95 2025    CREATININE 0.93 2024            Hyperhomocysteinemia (HCC)    Orders:    Homocysteine; Future    Mild persistent asthma without complication         Fatty liver    Orders:    CBC and differential    Irritable bowel syndrome with diarrhea         Acquired hypothyroidism    Orders:    TSH, 3rd generation with Free T4 reflex    Age-related osteoporosis without current pathological fracture         Angiomyolipoma of kidney         Medicare annual wellness visit, subsequent           Depression Screening and Follow-up Plan: Patient was screened for depression during today's encounter. They screened negative with a PHQ-2 score of 0.        Preventive health issues were discussed with patient, and age appropriate screening tests were ordered as noted in patient's After Visit Summary. Personalized health advice and appropriate referrals for health education or preventive services given if needed, as noted in patient's After Visit Summary.    History of Present Illness     HPI   Patient Care Team:  Keegan Ruff MD as PCP - General (Family Medicine)  MD Lorenzo Baez MD (Urology)  [facility] JE[facility] (Inactive) (Rheumatology)    Review of Systems  Medical History Reviewed by provider this encounter:  Tobacco  Allergies  Meds  Problems  Med Hx  Surg Hx  Fam Hx       Annual Wellness Visit Questionnaire   Ginger is here for her Subsequent Wellness visit. Last Medicare Wellness visit information reviewed, patient  interviewed and updates made to the record today.      Health Risk Assessment:   Patient rates overall health as good. Patient feels that their physical health rating is same. Patient is satisfied with their life. Eyesight was rated as slightly worse. Hearing was rated as same. Patient feels that their emotional and mental health rating is slightly worse. Patients states they are never, rarely angry. Patient states they are often unusually tired/fatigued. Pain experienced in the last 7 days has been some. Patient's pain rating has been 1/10. Patient states that she has experienced no weight loss or gain in last 6 months.     Depression Screening:   PHQ-2 Score: 0      Fall Risk Screening:   In the past year, patient has experienced: no history of falling in past year      Urinary Incontinence Screening:   Patient has not leaked urine accidently in the last six months.     Home Safety:  Patient does not have trouble with stairs inside or outside of their home. Patient has working smoke alarms and has working carbon monoxide detector. Home safety hazards include: none.     Nutrition:   Current diet is Regular.     Medications:   Patient is not currently taking any over-the-counter supplements. Patient is able to manage medications.     Activities of Daily Living (ADLs)/Instrumental Activities of Daily Living (IADLs):   Walk and transfer into and out of bed and chair?: Yes  Dress and groom yourself?: Yes    Bathe or shower yourself?: Yes    Feed yourself? Yes  Do your laundry/housekeeping?: Yes  Manage your money, pay your bills and track your expenses?: Yes  Make your own meals?: Yes    Do your own shopping?: Yes    Previous Hospitalizations:   Any hospitalizations or ED visits within the last 12 months?: No      Advance Care Planning:   Living will: No    Advanced directive: No      Cognitive Screening:   Provider or family/friend/caregiver concerned regarding cognition?: No    PREVENTIVE SCREENINGS       Cardiovascular Screening:    General: History Lipid Disorder and Screening Current      Diabetes Screening:     General: Screening Current      Colorectal Cancer Screening:     General: Screening Current      Breast Cancer Screening:     General: Screening Current    Due for: Mammogram        Cervical Cancer Screening:    General: Screening Not Indicated      Osteoporosis Screening:    General: Screening Not Indicated and History Osteoporosis      Abdominal Aortic Aneurysm (AAA) Screening:        General: Screening Not Indicated      Lung Cancer Screening:     General: Screening Not Indicated      Hepatitis C Screening:    General: Screening Current    Cardiovascular Risk Assessment:  Patient does not have underlying ASCVD and their cardiovascular risk was assessed today. Their cardiovascular risk factors include: hyperlipidemia, overweight/obesity, CKD/proteinuria and NAFLD/MASLD.     The 10-year ASCVD risk score (Vijay MURRAY, et al., 2019) is: 12.6%    Values used to calculate the score:      Age: 74 years      Sex: Female      Is Non- : No      Diabetic: No      Tobacco smoker: No      Systolic Blood Pressure: 128 mmHg      Is BP treated: No      HDL Cholesterol: 59 mg/dL      Total Cholesterol: 172 mg/dL    Time spent assessing cardiovascular risk: 5 minutes.     Screening, Brief Intervention, and Referral to Treatment (SBIRT)     Screening  Typical number of drinks in a day: 0  Typical number of drinks in a week: 0  Interpretation: Low risk drinking behavior.    Single Item Drug Screening:  How often have you used an illegal drug (including marijuana) or a prescription medication for non-medical reasons in the past year? never    Single Item Drug Screen Score: 0  Interpretation: Negative screen for possible drug use disorder    Brief Intervention  Alcohol & drug use screenings were reviewed. No concerns regarding substance use disorder identified.     Other Counseling Topics:   Regular  "weightbearing exercise and calcium and vitamin D intake.     Social Drivers of Health     Financial Resource Strain: Low Risk  (3/8/2024)    Overall Financial Resource Strain (CARDIA)     Difficulty of Paying Living Expenses: Not hard at all   Food Insecurity: No Food Insecurity (3/25/2025)    Hunger Vital Sign     Worried About Running Out of Food in the Last Year: Never true     Ran Out of Food in the Last Year: Never true   Transportation Needs: No Transportation Needs (3/25/2025)    PRAPARE - Transportation     Lack of Transportation (Medical): No     Lack of Transportation (Non-Medical): No   Housing Stability: Low Risk  (3/25/2025)    Housing Stability Vital Sign     Unable to Pay for Housing in the Last Year: No     Number of Times Moved in the Last Year: 0     Homeless in the Last Year: No   Utilities: Not At Risk (3/25/2025)    [facility] Utilities     Threatened with loss of utilities: No         Objective   /76 (BP Location: Left arm, Patient Position: Sitting, Cuff Size: Standard)   Pulse 70   Temp 97.5 °F (36.4 °C) (Temporal)   Resp 16   Ht 5' 5\" (1.651 m)   Wt 85.7 kg (189 lb)   SpO2 97%   BMI 31.45 kg/m²     Physical Exam    "

## 2025-03-25 NOTE — PROGRESS NOTES
Daily Note     Today's date: 3/25/2025  Patient name: Ginger Chicas  : 1950  MRN: [id number]  Referring provider: Keegan Ruff MD  Dx:   Encounter Diagnosis     ICD-10-CM    1. Lumbar radiculopathy  M54.16       2. Chronic bilateral low back pain, unspecified whether sciatica present  M54.50     G89.29       3. Right hip pain  M25.551                 Start Time: 0900  Stop Time: 09  Total time in clinic (min): 45 minutes    Subjective: Patient reports that overall she just feels fatigued today. Patient notes that the back is just very mildly achy but otherwise doing very well.       Objective: See treatment diary below      Assessment: Continued with established POC. Decreased TTP of piriformis during manual treatment this date. Will continue to progress as able.  Patient demonstrated fatigue post treatment and would benefit from continued PT.        Plan: Progress treatment as tolerated.       Precautions: history of vertigo, cervical spine pain, osteoporosis, thyroid disorder, lumbar pain        Manuals 3/24 1/21 1/23 2/11 2/14 2/18 2/25 3/4 3/6 3/11   STM lumbar and thoracic paraspinals              STM gapping lumbar spine              Lumbar              STM piriformis  Right side     ACP Right side    ACP Right side    MC Right side    ACP Right side    ACP Right side     ACP  Right side     ACP  Right side     ACP  Right side     ACP Right side     ACP   Neuro Re-Ed             Supine march with TrA + fall out   :05x2' Supine :05x2' Supine :05x2' Supine :05x2' Supine :05x2' Supine :05x2'  :05x2' :05x2' :05x2'   Hip abd iso with belt  :05x2' :05x2' Supine :05x2' Supine :05x2' Supine :05x2' :05x2'      Hip add with bolster   :05x2' :05x2' Supine :05x2' Supine :05x2' Supine :05x2' :05x2'      Supine hip abd with TB single leg   GTB 2x10 ea GTB 2x10 ea           Diaphragmatic breathing                           Recumbent bike 6' 6' 6' 6' 6' 6' 6' 6' 6' 6'                Ther Ex             Prone  press ups              Supine piriformis stretch  RLE :10 2x10  RLE :10 2x10  RLE :10 2x10  RLE :10 2x10  RLE :10 2x10  RLE :10 2x10  :10x10 RLE  RLE   :10   2x10  RLE   :10   2x10  RLE   :10   2x10    Clamshells  2x10        2x10  2x10  2x10    Supine cross legged QL stretch  :10x2'      :10x2' :10x2' :10x2' :10x2'   Bridges           2x10    LTR  With pball     :05x2' With pball     :05x2' With pball     :05x2' With red pball :10x2' With red pball :10x2' With pball     :05x2' With pball     :05x2' With pball     :05x2' With pball     :05x2' With pball     :05x2'   Bridges  2x10  2x10 2x10 2x10  2x10        Pball roll ins  With red pball :10x2' With red pball :10x2' With red pball :10x2' With red pball :10x2' With red pball :10x2' With red pball :10x2' With red pball :10x2' With red pball :10x2' With red pball :10x2' With red pball :10x2'   Pball roll outs                           Nustep              Ther Activity             Mini squats                           Gait Training                                       Modalities             Moist heat      10 minutes   7 minutes post session

## 2025-03-25 NOTE — ASSESSMENT & PLAN NOTE
Osteoporosis on Fosamax 70 mg weekly  no longer on Prolia . 3/2024 DEXA scan osteoporosis left femoral neck no statistical changes from 3/2022.  Prior intolerance to Actonel. 03/2025 Vitamin-D level 65.6.  On vitamin D 5,000 IU daily.

## 2025-03-25 NOTE — ASSESSMENT & PLAN NOTE
Hypothyroidism on Levothyroxine 50 mcg daily.     Osteoporosis on Fosamax 70 mg weekly  no longer on Prolia . 3/2024 DEXA scan osteoporosis left femoral neck no statistical changes from 3/2022.  Prior intolerance to Actonel. 04/2022 Vitamin-D level 45.4.  On vitamin D 5,000 IU daily.         Lab Results   Component Value Date    THA2LMJQPPZY 4.529 (H) 03/21/2025 03/2025     Orders:    TSH, 3rd generation with Free T4 reflex

## 2025-04-01 ENCOUNTER — OFFICE VISIT (OUTPATIENT)
Dept: PHYSICAL THERAPY | Facility: CLINIC | Age: 75
End: 2025-04-01
Payer: COMMERCIAL

## 2025-04-01 DIAGNOSIS — M54.16 LUMBAR RADICULOPATHY: Primary | ICD-10-CM

## 2025-04-01 DIAGNOSIS — M54.50 CHRONIC BILATERAL LOW BACK PAIN, UNSPECIFIED WHETHER SCIATICA PRESENT: ICD-10-CM

## 2025-04-01 DIAGNOSIS — G89.29 CHRONIC BILATERAL LOW BACK PAIN, UNSPECIFIED WHETHER SCIATICA PRESENT: ICD-10-CM

## 2025-04-01 DIAGNOSIS — M25.551 RIGHT HIP PAIN: ICD-10-CM

## 2025-04-01 PROCEDURE — 97110 THERAPEUTIC EXERCISES: CPT | Performed by: PHYSICAL THERAPIST

## 2025-04-01 PROCEDURE — 97112 NEUROMUSCULAR REEDUCATION: CPT | Performed by: PHYSICAL THERAPIST

## 2025-04-01 NOTE — PROGRESS NOTES
Daily Note     Today's date: 2025  Patient name: Ginger Chicas  : 1950  MRN: [id number]  Referring provider: Keegan Ruff MD  Dx:   Encounter Diagnosis     ICD-10-CM    1. Lumbar radiculopathy  M54.16       2. Chronic bilateral low back pain, unspecified whether sciatica present  M54.50     G89.29       3. Right hip pain  M25.551                   Start Time: 1115  Stop Time: 1205  Total time in clinic (min): 50 minutes    Subjective: Patient reports that she picked up heavy bags of fertilizer and bucket so he back is very sore now. Patient notes that the left side is spasming.       Objective: See treatment diary below      Assessment: Continued with established POC. Modified session accordingly due to pain. Utilized moist heat at the end of session for pain management with good response.  Patient demonstrated fatigue post treatment and would benefit from continued PT.        Plan: Progress treatment as tolerated.       Precautions: history of vertigo, cervical spine pain, osteoporosis, thyroid disorder, lumbar pain        Manuals 3/24 4/1 1/23 2/11 2/14 2/18 2/25 3/4 3/6 3/11   STM lumbar and thoracic paraspinals              STM gapping lumbar spine              Lumbar              STM piriformis  Right side     ACP  Right side    MC Right side    ACP Right side    ACP Right side     ACP  Right side     ACP  Right side     ACP  Right side     ACP Right side     ACP   Neuro Re-Ed             Supine marches with TrA + fall out   :05x2' Supine :05x2' Supine :05x2' Supine :05x2' Supine :05x2' Supine :05x2'  :05x2' :05x2' :05x2'   Hip abd iso with belt   :05x2' Supine :05x2' Supine :05x2' Supine :05x2' :05x2'      Hip add with bolster    :05x2' Supine :05x2' Supine :05x2' Supine :05x2' :05x2'      Supine hip abd with TB single leg    GTB 2x10 ea           Diaphragmatic breathing                           Recumbent bike 6' 6' 6' 6' 6' 6' 6' 6' 6' 6'                Ther Ex             Prone press ups               Supine piriformis stretch  RLE :10 2x10  RLE :10 2x10  RLE :10 2x10  RLE :10 2x10  RLE :10 2x10  RLE :10 2x10  :10x10 RLE  RLE   :10   2x10  RLE   :10   2x10  RLE   :10   2x10    Clamshells  2x10  2x10 ea       2x10  2x10  2x10    Supine cross legged QL stretch  :10x2' :10x2'     :10x2' :10x2' :10x2' :10x2'   Bridges           2x10    LTR  With pball     :05x2' With pball     :05x2' With pball     :05x2' With red pball :10x2' With red pball :10x2' With pball     :05x2' With pball     :05x2' With pball     :05x2' With pball     :05x2' With pball     :05x2'   Bridges  2x10  8x 2x10 2x10  2x10        Pball roll ins  With red pball :10x2' With red pball :10x2' With red pball :10x2' With red pball :10x2' With red pball :10x2' With red pball :10x2' With red pball :10x2' With red pball :10x2' With red pball :10x2' With red pball :10x2'   Pball roll outs                           Nustep              Ther Activity             Mini squats                           Gait Training                                       Modalities             Moist heat      10 minutes   7 minutes post session

## 2025-04-03 ENCOUNTER — HOSPITAL ENCOUNTER (OUTPATIENT)
Dept: RADIOLOGY | Age: 75
Discharge: HOME | End: 2025-04-03
Payer: COMMERCIAL

## 2025-04-03 DIAGNOSIS — Z12.31 VISIT FOR SCREENING MAMMOGRAM: ICD-10-CM

## 2025-04-03 PROCEDURE — 77067 SCR MAMMO BI INCL CAD: CPT

## 2025-04-03 PROCEDURE — 77063 BREAST TOMOSYNTHESIS BI: CPT

## 2025-04-10 ENCOUNTER — OFFICE VISIT (OUTPATIENT)
Dept: PHYSICAL THERAPY | Facility: CLINIC | Age: 75
End: 2025-04-10
Payer: COMMERCIAL

## 2025-04-10 DIAGNOSIS — M54.50 CHRONIC BILATERAL LOW BACK PAIN, UNSPECIFIED WHETHER SCIATICA PRESENT: ICD-10-CM

## 2025-04-10 DIAGNOSIS — M25.551 RIGHT HIP PAIN: ICD-10-CM

## 2025-04-10 DIAGNOSIS — G89.29 CHRONIC BILATERAL LOW BACK PAIN, UNSPECIFIED WHETHER SCIATICA PRESENT: ICD-10-CM

## 2025-04-10 DIAGNOSIS — M54.16 LUMBAR RADICULOPATHY: Primary | ICD-10-CM

## 2025-04-10 PROCEDURE — 97112 NEUROMUSCULAR REEDUCATION: CPT | Performed by: PHYSICAL THERAPIST

## 2025-04-10 PROCEDURE — 97140 MANUAL THERAPY 1/> REGIONS: CPT | Performed by: PHYSICAL THERAPIST

## 2025-04-10 PROCEDURE — 97110 THERAPEUTIC EXERCISES: CPT | Performed by: PHYSICAL THERAPIST

## 2025-04-10 NOTE — PROGRESS NOTES
Daily Note     Today's date: 4/10/2025  Patient name: Ginger Chicas  : 1950  MRN: [id number]  Referring provider: Keegan Ruff MD  Dx:   Encounter Diagnosis     ICD-10-CM    1. Lumbar radiculopathy  M54.16       2. Chronic bilateral low back pain, unspecified whether sciatica present  M54.50     G89.29       3. Right hip pain  M25.551                     Start Time: 0815  Stop Time: 0900  Total time in clinic (min): 45 minutes    Subjective: Patient reports that she is feel better compared to previous session. Patient notes that the pain lasted a few days but then worked itself out. Patient notes that she feels back to normal.       Objective: See treatment diary below      Assessment: Continued with established POC. Resumed normal POC with good response.  Patient noted decreased pain and discomfort post manual treatment. Patient demonstrated fatigue post treatment and would benefit from continued PT.        Plan: Progress treatment as tolerated.       Precautions: history of vertigo, cervical spine pain, osteoporosis, thyroid disorder, lumbar pain        Manuals 3/24 4/1 4/10 2/11 2/14 2/18 2/25 3/4 3/6 3/11   STM lumbar and thoracic paraspinals              STM gapping lumbar spine              Lumbar              STM piriformis  Right side     ACP  Right side  ACP Right side    ACP Right side    ACP Right side     ACP  Right side     ACP  Right side     ACP  Right side     ACP Right side     ACP   Neuro Re-Ed             Supine marches with TrA + fall out   :05x2' Supine :05x2' Supine +fall out  :05x2' Supine :05x2' Supine :05x2' Supine :05x2'  :05x2' :05x2' :05x2'   Hip abd iso with belt    Supine :05x2' Supine :05x2' Supine :05x2' :05x2'      Hip add with bolster     Supine :05x2' Supine :05x2' Supine :05x2' :05x2'      Supine hip abd with TB single leg               Diaphragmatic breathing                           Recumbent bike 6' 6' 6' 6' 6' 6' 6' 6' 6' 6'                Ther Ex              Prone press ups              Supine piriformis stretch  RLE :10 2x10  RLE :10 2x10  RLE :10 2x10  RLE :10 2x10  RLE :10 2x10  RLE :10 2x10  :10x10 RLE  RLE   :10   2x10  RLE   :10   2x10  RLE   :10   2x10    Clamshells  2x10  2x10 ea  2x10 ea      2x10  2x10  2x10    Supine cross legged QL stretch  :10x2' :10x2' :10x2'    :10x2' :10x2' :10x2' :10x2'   Bridges           2x10    LTR  With pball     :05x2' With pball     :05x2' With pball     :05x2' With red pball :10x2' With red pball :10x2' With pball     :05x2' With pball     :05x2' With pball     :05x2' With pball     :05x2' With pball     :05x2'   Bridges  2x10  8x 2x10 2x10  2x10        Pball roll ins  With red pball :10x2' With red pball :10x2' With red pball :10x2' With red pball :10x2' With red pball :10x2' With red pball :10x2' With red pball :10x2' With red pball :10x2' With red pball :10x2' With red pball :10x2'   Pball roll outs                           Nustep              Ther Activity             Mini squats                           Gait Training                                       Modalities             Moist heat      10 minutes   7 minutes post session

## 2025-04-15 ENCOUNTER — OFFICE VISIT (OUTPATIENT)
Dept: PHYSICAL THERAPY | Facility: CLINIC | Age: 75
End: 2025-04-15
Payer: COMMERCIAL

## 2025-04-15 DIAGNOSIS — M54.50 CHRONIC BILATERAL LOW BACK PAIN, UNSPECIFIED WHETHER SCIATICA PRESENT: ICD-10-CM

## 2025-04-15 DIAGNOSIS — G89.29 CHRONIC BILATERAL LOW BACK PAIN, UNSPECIFIED WHETHER SCIATICA PRESENT: ICD-10-CM

## 2025-04-15 DIAGNOSIS — M54.16 LUMBAR RADICULOPATHY: Primary | ICD-10-CM

## 2025-04-15 DIAGNOSIS — M25.551 RIGHT HIP PAIN: ICD-10-CM

## 2025-04-15 PROCEDURE — 97110 THERAPEUTIC EXERCISES: CPT | Performed by: PHYSICAL THERAPIST

## 2025-04-15 PROCEDURE — 97112 NEUROMUSCULAR REEDUCATION: CPT | Performed by: PHYSICAL THERAPIST

## 2025-04-15 PROCEDURE — 97140 MANUAL THERAPY 1/> REGIONS: CPT | Performed by: PHYSICAL THERAPIST

## 2025-04-15 NOTE — PROGRESS NOTES
Daily Note     Today's date: 4/15/2025  Patient name: Ginger Chicas  : 1950  MRN: [id number]  Referring provider: Keegan Ruff MD  Dx:   Encounter Diagnosis     ICD-10-CM    1. Lumbar radiculopathy  M54.16       2. Chronic bilateral low back pain, unspecified whether sciatica present  M54.50     G89.29       3. Right hip pain  M25.551                       Start Time: 1615  Stop Time: 1700  Total time in clinic (min): 45 minutes    Subjective: Patient reports that she continues to feel better with minimal pain.       Objective: See treatment diary below      Assessment: Continued with established POC. Resumed normal POC with good response.  Patient noted decreased pain and discomfort post manual treatment. Patient demonstrated fatigue post treatment and would benefit from continued PT.        Plan: Progress treatment as tolerated.       Precautions: history of vertigo, cervical spine pain, osteoporosis, thyroid disorder, lumbar pain        Manuals 3/24 4/1 4/10 4/15 2/14 2/18 2/25 3/4 3/6 3/11   STM lumbar and thoracic paraspinals              STM gapping lumbar spine              Lumbar              STM piriformis  Right side     ACP  Right side  ACP Right side    ACP Right side    ACP Right side     ACP  Right side     ACP  Right side     ACP  Right side     ACP Right side     ACP   Neuro Re-Ed             Supine marches with TrA + fall out   :05x2' Supine :05x2' Supine +fall out  :05x2' Supine +fall out  :05x2' Supine :05x2' Supine :05x2'  :05x2' :05x2' :05x2'   Hip abd iso with belt     Supine :05x2' Supine :05x2' :05x2'      Hip add with bolster      Supine :05x2' Supine :05x2' :05x2'      Supine hip abd with TB single leg               Diaphragmatic breathing                           Recumbent bike 6' 6' 6' 6' 6' 6' 6' 6' 6' 6'                Ther Ex             Prone press ups              Supine piriformis stretch  RLE :10 2x10  RLE :10 2x10  RLE :10 2x10  RLE :10 2x10  RLE :10 2x10  RLE :10  2x10  :10x10 RLE  RLE   :10   2x10  RLE   :10   2x10  RLE   :10   2x10    Clamshells  2x10  2x10 ea  2x10 ea  2x10 ea     2x10  2x10  2x10    Supine cross legged QL stretch  :10x2' :10x2' :10x2' :10x2'   :10x2' :10x2' :10x2' :10x2'   Bridges           2x10    LTR  With pball     :05x2' With pball     :05x2' With pball     :05x2' With red pball :10x2' With red pball :10x2' With pball     :05x2' With pball     :05x2' With pball     :05x2' With pball     :05x2' With pball     :05x2'   Bridges  2x10  8x 2x10 2x10  2x10        Pball roll ins  With red pball :10x2' With red pball :10x2' With red pball :10x2' With red pball :10x2' With red pball :10x2' With red pball :10x2' With red pball :10x2' With red pball :10x2' With red pball :10x2' With red pball :10x2'   Pball roll outs                           Nustep              Ther Activity             Mini squats                           Gait Training                                       Modalities             Moist heat      10 minutes   7 minutes post session

## 2025-04-21 DIAGNOSIS — E03.9 ACQUIRED HYPOTHYROIDISM: ICD-10-CM

## 2025-04-21 RX ORDER — LEVOTHYROXINE SODIUM 50 UG/1
50 TABLET ORAL DAILY
Qty: 90 TABLET | Refills: 1 | Status: SHIPPED | OUTPATIENT
Start: 2025-04-21

## 2025-04-25 ENCOUNTER — OFFICE VISIT (OUTPATIENT)
Dept: PHYSICAL THERAPY | Facility: CLINIC | Age: 75
End: 2025-04-25
Attending: FAMILY MEDICINE
Payer: COMMERCIAL

## 2025-04-25 DIAGNOSIS — G89.29 CHRONIC BILATERAL LOW BACK PAIN, UNSPECIFIED WHETHER SCIATICA PRESENT: ICD-10-CM

## 2025-04-25 DIAGNOSIS — M54.50 CHRONIC BILATERAL LOW BACK PAIN, UNSPECIFIED WHETHER SCIATICA PRESENT: ICD-10-CM

## 2025-04-25 DIAGNOSIS — M25.551 RIGHT HIP PAIN: ICD-10-CM

## 2025-04-25 DIAGNOSIS — M54.16 LUMBAR RADICULOPATHY: Primary | ICD-10-CM

## 2025-04-25 PROCEDURE — 97112 NEUROMUSCULAR REEDUCATION: CPT | Performed by: PHYSICAL THERAPIST

## 2025-04-25 PROCEDURE — 97110 THERAPEUTIC EXERCISES: CPT | Performed by: PHYSICAL THERAPIST

## 2025-04-25 NOTE — PROGRESS NOTES
Daily Note     Today's date: 2025  Patient name: Ginger Chicas  : 1950  MRN: [id number]  Referring provider: Keegan Ruff MD  Dx:   Encounter Diagnosis     ICD-10-CM    1. Lumbar radiculopathy  M54.16       2. Chronic bilateral low back pain, unspecified whether sciatica present  M54.50     G89.29       3. Right hip pain  M25.551             Start Time: 09  Stop Time: 1015  Total time in clinic (min): 45 minutes    Subjective: Patient reports the back is doing well with minimal issues at this time.      Objective: See treatment diary below      Assessment: Continued with established POC. No flare up in pain throughout session.  Patient noted decreased pain and discomfort post manual treatment. Patient demonstrated fatigue post treatment and would benefit from continued PT.        Plan: Progress treatment as tolerated.       Precautions: history of vertigo, cervical spine pain, osteoporosis, thyroid disorder, lumbar pain        Manuals 3/24 4/1 4/10 4/15 4/25 2/18 2/25 3/4 3/6 3/11   STM lumbar and thoracic paraspinals              STM gapping lumbar spine              Lumbar              STM piriformis  Right side     ACP  Right side  ACP Right side    ACP Right side    ACP Right side     ACP  Right side     ACP  Right side     ACP  Right side     ACP Right side     ACP   Neuro Re-Ed             Supine marches with TrA + fall out   :05x2' Supine :05x2' Supine +fall out  :05x2' Supine +fall out  :05x2' Supine +fall out  :05x2' Supine :05x2'  :05x2' :05x2' :05x2'   Hip abd iso with belt      Supine :05x2' :05x2'      Hip add with bolster       Supine :05x2' :05x2'      Supine hip abd with TB single leg               Diaphragmatic breathing                           Recumbent bike 6' 6' 6' 6' 6' 6' 6' 6' 6' 6'                Ther Ex             Prone press ups              Supine piriformis stretch  RLE :10 2x10  RLE :10 2x10  RLE :10 2x10  RLE :10 2x10  RLE :10 2x10  RLE :10 2x10  :10x10 RLE  RLE    :10   2x10  RLE   :10   2x10  RLE   :10   2x10    Clamshells  2x10  2x10 ea  2x10 ea  2x10 ea  2x10 ea   2x10  2x10  2x10    Supine cross legged QL stretch  :10x2' :10x2' :10x2' :10x2' :10x2'  :10x2' :10x2' :10x2' :10x2'   Bridges           2x10    LTR  With pball     :05x2' With pball     :05x2' With pball     :05x2' With red pball :10x2' With red pball :10x2' With pball     :05x2' With pball     :05x2' With pball     :05x2' With pball     :05x2' With pball     :05x2'   Bridges  2x10  8x 2x10 2x10  2x10        Pball roll ins  With red pball :10x2' With red pball :10x2' With red pball :10x2' With red pball :10x2' With red pball :10x2' With red pball :10x2' With red pball :10x2' With red pball :10x2' With red pball :10x2' With red pball :10x2'   Pball roll outs                           Nustep              Ther Activity             Mini squats                           Gait Training                                       Modalities             Moist heat      10 minutes   7 minutes post session

## 2025-04-29 ENCOUNTER — OFFICE VISIT (OUTPATIENT)
Dept: PHYSICAL THERAPY | Facility: CLINIC | Age: 75
End: 2025-04-29
Attending: FAMILY MEDICINE
Payer: COMMERCIAL

## 2025-04-29 DIAGNOSIS — M54.16 LUMBAR RADICULOPATHY: Primary | ICD-10-CM

## 2025-04-29 DIAGNOSIS — M54.50 CHRONIC BILATERAL LOW BACK PAIN, UNSPECIFIED WHETHER SCIATICA PRESENT: ICD-10-CM

## 2025-04-29 DIAGNOSIS — G89.29 CHRONIC BILATERAL LOW BACK PAIN, UNSPECIFIED WHETHER SCIATICA PRESENT: ICD-10-CM

## 2025-04-29 DIAGNOSIS — M25.551 RIGHT HIP PAIN: ICD-10-CM

## 2025-04-29 PROCEDURE — 97112 NEUROMUSCULAR REEDUCATION: CPT | Performed by: PHYSICAL THERAPIST

## 2025-04-29 PROCEDURE — 97110 THERAPEUTIC EXERCISES: CPT | Performed by: PHYSICAL THERAPIST

## 2025-04-29 NOTE — PROGRESS NOTES
Daily Note     Today's date: 2025  Patient name: Ginger Chicas  : 1950  MRN: [id number]  Referring provider: Keegan Ruff MD  Dx:   Encounter Diagnosis     ICD-10-CM    1. Lumbar radiculopathy  M54.16       2. Chronic bilateral low back pain, unspecified whether sciatica present  M54.50     G89.29       3. Right hip pain  M25.551               Start Time: 0900  Stop Time: 945  Total time in clinic (min): 45 minutes    Subjective: Patient reports some increased back and hip soreness after lifting a heavy container of gas. Patient notes that it was painful the day after but then each day it has been getting better.       Objective: See treatment diary below      Assessment: Continued with established POC. Despite some soreness  able to perform all sets and reps as noted. Patient noted decreased pain and discomfort post manual treatment. Patient demonstrated fatigue post treatment and would benefit from continued PT.        Plan: Progress treatment as tolerated.       Precautions: history of vertigo, cervical spine pain, osteoporosis, thyroid disorder, lumbar pain        Manuals 3/24 4/1 4/10 4/15 4/25 4/29 2/25 3/4 3/6 3/11   STM lumbar and thoracic paraspinals              STM gapping lumbar spine              Lumbar              STM piriformis  Right side     ACP  Right side  ACP Right side    ACP Right side    ACP Right side     ACP  Right side     ACP  Right side     ACP  Right side     ACP Right side     ACP   Neuro Re-Ed             Supine march with TrA + fall out   :05x2' Supine :05x2' Supine +fall out  :05x2' Supine +fall out  :05x2' Supine +fall out  :05x2' Supine :05x2'  :05x2' :05x2' :05x2'   Hip abd iso with belt      Supine :05x2' :05x2'      Hip add with bolster       Supine :05x2' :05x2'      Supine hip abd with TB single leg               Diaphragmatic breathing                           Recumbent bike 6' 6' 6' 6' 6' 6' 6' 6' 6' 6'                Ther Ex             Prone press  ups              Supine piriformis stretch  RLE :10 2x10  RLE :10 2x10  RLE :10 2x10  RLE :10 2x10  RLE :10 2x10  RLE :10 2x10  :10x10 RLE  RLE   :10   2x10  RLE   :10   2x10  RLE   :10   2x10    Clamshells  2x10  2x10 ea  2x10 ea  2x10 ea  2x10 ea   2x10  2x10  2x10    Supine cross legged QL stretch  :10x2' :10x2' :10x2' :10x2' :10x2'  :10x2' :10x2' :10x2' :10x2'   Bridges           2x10    LTR  With pball     :05x2' With pball     :05x2' With pball     :05x2' With red pball :10x2' With red pball :10x2' With pball     :05x2' With pball     :05x2' With pball     :05x2' With pball     :05x2' With pball     :05x2'   Bridges  2x10  8x 2x10 2x10  2x10        Pball roll ins  With red pball :10x2' With red pball :10x2' With red pball :10x2' With red pball :10x2' With red pball :10x2' With red pball :10x2' With red pball :10x2' With red pball :10x2' With red pball :10x2' With red pball :10x2'   Pball roll outs                           Nustep              Ther Activity             Mini squats                           Gait Training                                       Modalities             Moist heat      10 minutes   7 minutes post session

## 2025-05-08 ENCOUNTER — OFFICE VISIT (OUTPATIENT)
Dept: PHYSICAL THERAPY | Facility: CLINIC | Age: 75
End: 2025-05-08
Attending: FAMILY MEDICINE
Payer: COMMERCIAL

## 2025-05-08 DIAGNOSIS — M25.551 RIGHT HIP PAIN: ICD-10-CM

## 2025-05-08 DIAGNOSIS — M54.16 LUMBAR RADICULOPATHY: Primary | ICD-10-CM

## 2025-05-08 DIAGNOSIS — G89.29 CHRONIC BILATERAL LOW BACK PAIN, UNSPECIFIED WHETHER SCIATICA PRESENT: ICD-10-CM

## 2025-05-08 DIAGNOSIS — M54.50 CHRONIC BILATERAL LOW BACK PAIN, UNSPECIFIED WHETHER SCIATICA PRESENT: ICD-10-CM

## 2025-05-08 PROCEDURE — 97110 THERAPEUTIC EXERCISES: CPT | Performed by: PHYSICAL THERAPIST

## 2025-05-08 PROCEDURE — 97140 MANUAL THERAPY 1/> REGIONS: CPT | Performed by: PHYSICAL THERAPIST

## 2025-05-08 PROCEDURE — 97112 NEUROMUSCULAR REEDUCATION: CPT | Performed by: PHYSICAL THERAPIST

## 2025-05-08 NOTE — PROGRESS NOTES
Daily Note     Today's date: 2025  Patient name: Ginger Chicas  : 1950  MRN: [id number]  Referring provider: Keegan Ruff MD  Dx:   Encounter Diagnosis     ICD-10-CM    1. Lumbar radiculopathy  M54.16       2. Chronic bilateral low back pain, unspecified whether sciatica present  M54.50     G89.29       3. Right hip pain  M25.551                 Start Time: 0815  Stop Time: 0900  Total time in clinic (min): 45 minutes    Subjective: Patient reports overall she has been doing well with minimal pain since last session.       Objective: See treatment diary below      Assessment: Continued with established POC. No flare up in pain throughout. Plan for introduction of lifting techniques and functional core strengthening now that pain is controlled. Re-evaluation next visit. Patient noted decreased pain and discomfort post manual treatment. Patient demonstrated fatigue post treatment and would benefit from continued PT.        Plan: Progress treatment as tolerated.       Precautions: history of vertigo, cervical spine pain, osteoporosis, thyroid disorder, lumbar pain        Manuals 3/24 4/1 4/10 4/15 4/25 4/29 5/8 3/4 3/6 3/11   STM lumbar and thoracic paraspinals              STM gapping lumbar spine              Lumbar              STM piriformis  Right side     ACP  Right side  ACP Right side    ACP Right side    ACP Right side     ACP  Right side     ACP  Right side     ACP  Right side     ACP Right side     ACP   Neuro Re-Ed             Supine marches with TrA + fall out   :05x2' Supine :05x2' Supine +fall out  :05x2' Supine +fall out  :05x2' Supine +fall out  :05x2' Supine :05x2' Supine +fall out  :05x2 :05x2' :05x2' :05x2'   Hip abd iso with belt      Supine :05x2'       Hip add with bolster       Supine :05x2'       Supine hip abd with TB single leg               Palloff press        NV                    Recumbent bike 6' 6' 6' 6' 6' 6' 6' 6' 6' 6'                Ther Ex             Prone press  ups              Supine piriformis stretch  RLE :10 2x10  RLE :10 2x10  RLE :10 2x10  RLE :10 2x10  RLE :10 2x10  RLE :10 2x10  :10x10 RLE  RLE   :10   2x10  RLE   :10   2x10  RLE   :10   2x10    Clamshells  2x10  2x10 ea  2x10 ea  2x10 ea  2x10 ea   2x10  2x10  2x10    Supine cross legged QL stretch  :10x2' :10x2' :10x2' :10x2' :10x2'  :10x2' :10x2' :10x2' :10x2'   Bridges           2x10    LTR  With pball     :05x2' With pball     :05x2' With pball     :05x2' With red pball :10x2' With red pball :10x2' With pball     :05x2' With pball     :05x2' With pball     :05x2' With pball     :05x2' With pball     :05x2'   Bridges  2x10  8x 2x10 2x10  2x10        Pball roll ins  With red pball :10x2' With red pball :10x2' With red pball :10x2' With red pball :10x2' With red pball :10x2' With red pball :10x2' With red pball :10x2' With red pball :10x2' With red pball :10x2' With red pball :10x2'   Pball roll outs                           Nustep              Ther Activity             Mini squats        NV      Hip hinge with dowel        NV      Gait Training                                       Modalities             Moist heat      10 minutes

## 2025-05-15 ENCOUNTER — EVALUATION (OUTPATIENT)
Dept: PHYSICAL THERAPY | Facility: CLINIC | Age: 75
End: 2025-05-15
Attending: FAMILY MEDICINE
Payer: COMMERCIAL

## 2025-05-15 ENCOUNTER — OFFICE VISIT (OUTPATIENT)
Dept: RHEUMATOLOGY | Facility: CLINIC | Age: 75
End: 2025-05-15
Payer: COMMERCIAL

## 2025-05-15 VITALS
SYSTOLIC BLOOD PRESSURE: 124 MMHG | BODY MASS INDEX: 31.65 KG/M2 | HEIGHT: 65 IN | OXYGEN SATURATION: 98 % | DIASTOLIC BLOOD PRESSURE: 64 MMHG | WEIGHT: 190 LBS | HEART RATE: 78 BPM

## 2025-05-15 DIAGNOSIS — M19.90 OSTEOARTHRITIS, UNSPECIFIED OSTEOARTHRITIS TYPE, UNSPECIFIED SITE: ICD-10-CM

## 2025-05-15 DIAGNOSIS — M54.50 CHRONIC BILATERAL LOW BACK PAIN, UNSPECIFIED WHETHER SCIATICA PRESENT: ICD-10-CM

## 2025-05-15 DIAGNOSIS — G89.29 CHRONIC BILATERAL LOW BACK PAIN, UNSPECIFIED WHETHER SCIATICA PRESENT: ICD-10-CM

## 2025-05-15 DIAGNOSIS — M25.551 RIGHT HIP PAIN: ICD-10-CM

## 2025-05-15 DIAGNOSIS — M54.16 LUMBAR RADICULOPATHY: Primary | ICD-10-CM

## 2025-05-15 DIAGNOSIS — M81.0 AGE-RELATED OSTEOPOROSIS WITHOUT CURRENT PATHOLOGICAL FRACTURE: Primary | ICD-10-CM

## 2025-05-15 PROCEDURE — 97112 NEUROMUSCULAR REEDUCATION: CPT | Performed by: PHYSICAL THERAPIST

## 2025-05-15 PROCEDURE — 97110 THERAPEUTIC EXERCISES: CPT | Performed by: PHYSICAL THERAPIST

## 2025-05-15 PROCEDURE — 99214 OFFICE O/P EST MOD 30 MIN: CPT | Performed by: INTERNAL MEDICINE

## 2025-05-15 RX ORDER — CYCLOBENZAPRINE HCL 5 MG
TABLET ORAL
COMMUNITY
Start: 2025-04-21

## 2025-05-15 RX ORDER — ALENDRONATE SODIUM 70 MG/1
70 TABLET ORAL
Qty: 12 TABLET | Refills: 3 | Status: SHIPPED | OUTPATIENT
Start: 2025-05-15

## 2025-05-15 NOTE — PATIENT INSTRUCTIONS
Continue alendronate (Fosamax) once a week, take on an empty stomach with a full glass of water and do not lie down for 30 minutes after  Continue Vit. D 4,000 units daily  Continue calcium through diet  Use diclofenac gel as needed for joint pain  Next DEXA scan due 4/2026 - call [phone]  Get weight bearing exercises     Return to clinic in 1 year

## 2025-05-15 NOTE — ASSESSMENT & PLAN NOTE
Last DEXA scan 3/28/24. Stable without any significant changes. No fragility fractures    C/w fosfamax 70mg weekly; reinforced again how to take.   If patient starts to develop heartburn, can add PPI. Currently just burping that is not on a regular basis, but she has noticed it when she's on the fosfamax and not prior.   C/w daily vitamin D 4000 IU daily; calcium through diet.   Fall precautions ( had a fall w/T12 compression fracture; does have SAVANNAH and son and  who helps around the house, but slightly worsened LBP)  Ordered DEXA scan.   Follow-up in 1 year.    Orders:    DXA bone density spine hip and pelvis; Future    alendronate (FOSAMAX) 70 mg tablet; Take 1 tablet (70 mg total) by mouth every 7 days Take on an empty stomach with a full glass of water, and do not lie down for 30 minutes after

## 2025-05-15 NOTE — PROGRESS NOTES
Name: Ginger Chicas      : 1950      MRN: [id number]  Encounter Provider: Marija Enriquez MD  Encounter Date: 5/15/2025   Encounter department: [facility]  :  Assessment & Plan  Age-related osteoporosis without current pathological fracture  Last DEXA scan 3/28/24. Stable without any significant changes. No fragility fractures    C/w fosfamax 70mg weekly; reinforced again how to take.   If patient starts to develop heartburn, can add PPI. Currently just burping that is not on a regular basis, but she has noticed it when she's on the fosfamax and not prior.   C/w daily vitamin D 4000 IU daily; calcium through diet.   Fall precautions ( had a fall w/T12 compression fracture; does have SAVANNAH and son and  who helps around the house, but slightly worsened LBP)  Ordered DEXA scan.   Follow-up in 1 year.    Orders:    DXA bone density spine hip and pelvis; Future    alendronate (FOSAMAX) 70 mg tablet; Take 1 tablet (70 mg total) by mouth every 7 days Take on an empty stomach with a full glass of water, and do not lie down for 30 minutes after    Osteoarthritis, unspecified osteoarthritis type, unspecified site  R index DIP bony deformity and tenderness.    Advised again to trial voltaren gel over joints that bother her. Use heat as well to help with pain.   Alternatively can trial tylenol prn.     Orders:    Diclofenac Sodium (VOLTAREN) 1 %; Apply 2 g topically 4 (four) times a day As needed for joint pain        History of Present Illness   75YO F who presents today for follow-up regarding her osteoporosis. Last seen 1 year ago. In the interim, notes that she had to take approximately a 2-3mo holiday from fosfamax, held by her dentist. Pt had gotten a dental extraction and implant placed Oct-2024. Due to poor healing, he asked that this be held temporarily. Resumed approximately dec, and has been taking as prescribed since then for the last 5 months.     Denies any falls  "or fragility fractures. Has had more help around the house from SAVANNAH and grandson. Her  had a fall on ice around Feb resulting in a T12 compression fracture. He has needed more help around the house as a result. However, she has still had to increase the lifting around the house a bit which has slightly exacerbated her lumbar radiculopathy, sciatica and IT band syndrome. PT has helped.     Reports some associated burping with taking fosfamax. Denies any heartburn or epigastric pain. Taking it empty stomach, full glass of water and upright at least 30min.     Rx'd voltaren gel. Has never tried.     R index finger pain. Constant discomfort, but some days becomes worse achy pain. Denies any burning pain or paresthesias.  States it's minor in comparison to the back pain.      Review of Systems   Constitutional:  Negative for chills and fever.   Respiratory:  Negative for shortness of breath.    Cardiovascular:  Negative for chest pain and palpitations.   Gastrointestinal:  Negative for abdominal pain and blood in stool.   Musculoskeletal:  Positive for arthralgias and back pain.   Skin:  Negative for rash.   Neurological:  Negative for numbness and headaches.          Objective   /64   Pulse 78   Ht 5' 5\" (1.651 m)   Wt 86.2 kg (190 lb)   SpO2 98%   BMI 31.62 kg/m²      Physical Exam  Vitals reviewed.   Constitutional:       General: She is not in acute distress.     Appearance: She is not ill-appearing, toxic-appearing or diaphoretic.   HENT:      Head: Normocephalic and atraumatic.     Eyes:      General: No scleral icterus.        Right eye: No discharge.         Left eye: No discharge.      Conjunctiva/sclera: Conjunctivae normal.       Cardiovascular:      Rate and Rhythm: Normal rate and regular rhythm.      Pulses: Normal pulses.      Heart sounds: Normal heart sounds. No murmur heard.     No friction rub. No gallop.   Pulmonary:      Effort: Pulmonary effort is normal. No respiratory distress. "      Breath sounds: Normal breath sounds. No stridor. No wheezing, rhonchi or rales.   Chest:      Chest wall: No tenderness.     Musculoskeletal:      Right lower leg: No edema.      Left lower leg: No edema.      Comments: Bony nodular deformity over DIP of R index finger. Mild tenderness to palpation.      Skin:     General: Skin is warm and dry.     Neurological:      General: No focal deficit present.      Mental Status: She is alert.      Comments: conversant

## 2025-05-15 NOTE — PROGRESS NOTES
Re-Evaluation/Daily Note     Today's date: 5/15/2025  Patient name: Ginger Chicas  : 1950  MRN: [id number]  Referring provider: Keegan Ruff MD  Dx:   Encounter Diagnosis     ICD-10-CM    1. Lumbar radiculopathy  M54.16       2. Chronic bilateral low back pain, unspecified whether sciatica present  M54.50     G89.29       3. Right hip pain  M25.551             Start Time: 730  Stop Time: 815  Total time in clinic (min): 45 minutes  ASSESSMENT:  Ginger Chicas is a 74 y.o. female who presents with signs and symptoms consistent with chronic low back pain.The primary movement problem is posterior lumbar derangement, resulting in pathoanatomical symptoms of LBP and limiting patient's ability to perform normal daily activities without pain. Due to recent flare up, skewed objective and subjective testing this date with no changes in measurements. Patient has met some of her short term goals and is progressing appropriately toward long term goals. Patient would benefit from continued skilled physical therapy to address the impairments, improve their level of function, and to improve their overall quality of life.      Impairments:    restricted ROM    decreased strength   pain with function   activity intolerance   Postural dysfunction     Prognosis:  Good  Positive and negative prognostic indicator(s):  positive attitude about recovery and prior success with conservative care    Goals:    Short Term Goals: to be achieved by 4 weeks  1) Patient to be independent with basic HEP. MET  2) Decrease pain to 2/10 at its worst. PROGRESSING   3) Increase lumbar spine ROM by 25% in all deficient planes. PROGRESSING   4) Increase LE strength by 1/2 MMT grade in all deficient planes.PROGRESSING     Long Term Goals: to be achieved by discharge  1) FOTO equal to or greater than target score indicating improvements with overall function. PROGRESSING   2) Patient to be independent with comprehensive HEP.PROGRESSING   3) Lumbar  spine ROM WNL all planes to improve a/iadls. PROGRESSING   4) Patient to report no sleep interruption secondary to pain. PROGRESSING   5) Patient will be able to tolerate prolong sitting with little to no discomfort in order to participate in daily activities. PROGRESSING       Planned interventions:  home exercise program, patient education, manual therapy, graded activity, flexibility, functional range of motion exercises, strengthening, abdominal trunk stabilization, and modalities prn    Duration in visits:  4  Frequency: 1 visits per week  Duration in weeks:  4    History of Current Injury: Patient reports that on Monday she was at the [employer] store and had to sit on a hard block stool for about 50 minutes. Patient notes that her back starting to have spasms and then the pain went down the leg. Patient notes that since then she has been having these symptoms on and off.     Pain location: bilateral low back and right radicular   Pain descriptors:  sharp, tightness  Current Pain: 0/10   Pain at worst: 4/10 (FROM 5/10)    Pain at best: 0/10     Aggravating factors: standing for longer periods of time, lifting   Easing factors: change of position, walking     Imaging: none at this time   Special Tests: denies numbness and tingling, denies B&B changes, no saddle paraesthesia      Patient goals: Patient reports goals for skilled PT would be  decreased pain    Objective     Concurrent Complaints  Positive for disturbed sleep. Negative for night pain, bladder dysfunction, bowel dysfunction and saddle (S4) numbness    Palpation   Left   Tenderness of the piriformis.     Right   Tenderness of the piriformis.       Neurological Testing     Sensation     Lumbar   Left   Intact: light touch    Right   Intact: light touch    Reflexes   Left   Patellar (L4): normal (2+)  Achilles (S1): normal (2+)  Clonus sign: negative    Right   Patellar (L4): normal (2+)  Achilles (S1): normal (2+)  Clonus sign: negative    Active Range of  Motion   Left Hip   Normal active range of motion    Right Hip   Normal active range of motion    Lumbar:   Flexion: moderate restrictions   Extension: minimal restrictions   Lateral bending R: moderate restrictions   Lateral bending L: moderate restrictions   Rotation R: minimal restrictions   Rotation L: minimal restrictions       Joint Play     Hypomobile: L1, L2, L3, L4 and L5     Tests     Lumbar     Left   Negative crossed SLR, passive SLR and slump test.     Right   Negative crossed SLR, passive SLR and slump test.     Left Hip   Positive piriformis.   Negative CELI and FADIR.     Right Hip   Positive piriformis.   Negative CELI and FADIR.     Additional Tests Details  Bilateral hamstring pain           Precautions: history of vertigo, cervical spine pain, osteoporosis, thyroid disorder, lumbar pain        Manuals 3/24 4/1 4/10 4/15 4/25 4/29 5/8 5/15 3/6 3/11   STM lumbar and thoracic paraspinals              STM gapping lumbar spine              Lumbar              STM piriformis  Right side     ACP  Right side  ACP Right side    ACP Right side    ACP Right side     ACP  Right side     ACP  Right side     ACP  Right side     ACP Right side     ACP   Neuro Re-Ed             Supine marches with TrA + fall out   :05x2' Supine :05x2' Supine +fall out  :05x2' Supine +fall out  :05x2' Supine +fall out  :05x2' Supine :05x2' Supine +fall out  :05x2  :05x2' :05x2'   Hip abd iso with belt      Supine :05x2'       Hip add with bolster       Supine :05x2'  Supine :05x2'     Supine hip abd with TB single leg               Palloff press        NV                    Recumbent bike 6' 6' 6' 6' 6' 6' 6' 6' 6' 6'                Ther Ex             Prone press ups              Supine piriformis stretch  RLE :10 2x10  RLE :10 2x10  RLE :10 2x10  RLE :10 2x10  RLE :10 2x10  RLE :10 2x10  :10x10 RLE  RLE   :10x10 RLE   :10   2x10  RLE   :10   2x10    Clamshells  2x10  2x10 ea  2x10 ea  2x10 ea  2x10 ea    2x10  2x10     Supine cross legged QL stretch  :10x2' :10x2' :10x2' :10x2' :10x2'  :10x2'  :10x2' :10x2'   Bridges           2x10    LTR  With pball     :05x2' With pball     :05x2' With pball     :05x2' With red pball :10x2' With red pball :10x2' With pball     :05x2' With pball     :05x2' With pball     :05x2' With pball     :05x2' With pball     :05x2'   Bridges  2x10  8x 2x10 2x10  2x10        Pball roll ins  With red pball :10x2' With red pball :10x2' With red pball :10x2' With red pball :10x2' With red pball :10x2' With red pball :10x2' With red pball :10x2' With red pball :10x2' With red pball :10x2' With red pball :10x2'   Pball roll outs         :05x2'                  Nustep              Ther Activity             Mini squats        NV      Hip hinge with dowel        NV      Gait Training                                       Modalities             Moist heat      10 minutes    15 minutes

## 2025-05-16 DIAGNOSIS — E78.2 MIXED HYPERLIPIDEMIA: ICD-10-CM

## 2025-05-16 RX ORDER — FENOFIBRATE 160 MG/1
160 TABLET ORAL DAILY
Qty: 90 TABLET | Refills: 1 | Status: SHIPPED | OUTPATIENT
Start: 2025-05-16

## 2025-05-22 ENCOUNTER — OFFICE VISIT (OUTPATIENT)
Dept: PHYSICAL THERAPY | Facility: CLINIC | Age: 75
End: 2025-05-22
Attending: FAMILY MEDICINE
Payer: COMMERCIAL

## 2025-05-22 DIAGNOSIS — M54.16 LUMBAR RADICULOPATHY: Primary | ICD-10-CM

## 2025-05-22 DIAGNOSIS — M25.551 RIGHT HIP PAIN: ICD-10-CM

## 2025-05-22 DIAGNOSIS — M54.50 CHRONIC BILATERAL LOW BACK PAIN, UNSPECIFIED WHETHER SCIATICA PRESENT: ICD-10-CM

## 2025-05-22 DIAGNOSIS — G89.29 CHRONIC BILATERAL LOW BACK PAIN, UNSPECIFIED WHETHER SCIATICA PRESENT: ICD-10-CM

## 2025-05-22 PROCEDURE — 97110 THERAPEUTIC EXERCISES: CPT | Performed by: PHYSICAL THERAPIST

## 2025-05-22 PROCEDURE — 97112 NEUROMUSCULAR REEDUCATION: CPT | Performed by: PHYSICAL THERAPIST

## 2025-05-22 NOTE — PROGRESS NOTES
Daily Note     Today's date: 2025  Patient name: Ginger Chicas  : 1950  MRN: [id number]  Referring provider: Keegan Ruff MD  Dx:   Encounter Diagnosis     ICD-10-CM    1. Lumbar radiculopathy  M54.16       2. Chronic bilateral low back pain, unspecified whether sciatica present  M54.50     G89.29       3. Right hip pain  M25.551             Start Time: 0815  Stop Time: 900  Total time in clinic (min): 45 minutes    Subjective: Patient reports its more of a soreness than pain now and has been getting better the last few days.       Objective: See treatment diary below      Assessment: Continued with established POC. No flare up in pain throughout. Patient noted decreased pain and discomfort post manual treatment. Patient demonstrated fatigue post treatment and would benefit from continued PT.        Plan: Progress treatment as tolerated.       Precautions: history of vertigo, cervical spine pain, osteoporosis, thyroid disorder, lumbar pain      Manuals 3/24 4/1 4/10 4/15 4/25 4/29 5/8 5/15 5/22 3/11   STM lumbar and thoracic paraspinals              STM gapping lumbar spine              Lumbar              STM piriformis  Right side     ACP  Right side  ACP Right side    ACP Right side    ACP Right side     ACP  Right side     ACP  Right side     ACP  Right side     ACP Right side     ACP   Neuro Re-Ed             Supine marches with TrA + fall out   :05x2' Supine :05x2' Supine +fall out  :05x2' Supine +fall out  :05x2' Supine +fall out  :05x2' Supine :05x2' Supine +fall out  :05x2  :05x2' :05x2'   Hip abd iso with belt      Supine :05x2'   Supine :05x2'    Hip add with bolster       Supine :05x2'  Supine :05x2'     Supine hip abd with TB single leg               Palloff press        NV                    Recumbent bike 6' 6' 6' 6' 6' 6' 6' 6' 6' 6'                Ther Ex             Prone press ups              Supine piriformis stretch  RLE :10 2x10  RLE :10 2x10  RLE :10 2x10  RLE :10 2x10  RLE  :10 2x10  RLE :10 2x10  :10x10 RLE  RLE   :10x10 RLE   :10   2x10  RLE   :10   2x10    Clamshells  2x10  2x10 ea  2x10 ea  2x10 ea  2x10 ea    2x10  2x10    Supine cross legged QL stretch  :10x2' :10x2' :10x2' :10x2' :10x2'  :10x2'  :10x2' :10x2'   LTR  With pball     :05x2' With pball     :05x2' With pball     :05x2' With red pball :10x2' With red pball :10x2' With pball     :05x2' With pball     :05x2' With pball     :05x2' With pball     :05x2' With pball     :05x2'   Bridges  2x10  8x 2x10 2x10  2x10    2x10    Pball roll ins  With red pball :10x2' With red pball :10x2' With red pball :10x2' With red pball :10x2' With red pball :10x2' With red pball :10x2' With red pball :10x2' With red pball :10x2' With red pball :10x2' With red pball :10x2'   Pball roll outs         :05x2'                  Nustep              Ther Activity             Mini squats        NV      Hip hinge with dowel        NV      Gait Training                                       Modalities             Moist heat      10 minutes    15 minutes

## 2025-05-29 ENCOUNTER — OFFICE VISIT (OUTPATIENT)
Dept: PHYSICAL THERAPY | Facility: CLINIC | Age: 75
End: 2025-05-29
Attending: FAMILY MEDICINE
Payer: COMMERCIAL

## 2025-05-29 DIAGNOSIS — G89.29 CHRONIC BILATERAL LOW BACK PAIN, UNSPECIFIED WHETHER SCIATICA PRESENT: ICD-10-CM

## 2025-05-29 DIAGNOSIS — M25.551 RIGHT HIP PAIN: ICD-10-CM

## 2025-05-29 DIAGNOSIS — M54.50 CHRONIC BILATERAL LOW BACK PAIN, UNSPECIFIED WHETHER SCIATICA PRESENT: ICD-10-CM

## 2025-05-29 DIAGNOSIS — M54.16 LUMBAR RADICULOPATHY: Primary | ICD-10-CM

## 2025-05-29 PROCEDURE — 97112 NEUROMUSCULAR REEDUCATION: CPT | Performed by: PHYSICAL THERAPIST

## 2025-05-29 NOTE — PROGRESS NOTES
Daily Note     Today's date: 2025  Patient name: Ginger Chicas  : 1950  MRN: [id number]  Referring provider: Keegan Ruff MD  Dx:   Encounter Diagnosis     ICD-10-CM    1. Lumbar radiculopathy  M54.16       2. Chronic bilateral low back pain, unspecified whether sciatica present  M54.50     G89.29       3. Right hip pain  M25.551               Start Time: 0815  Stop Time: 09  Total time in clinic (min): 45 minutes    Subjective: Patient reports some soreness and spasming from doing some lifting yesterday.      Objective: See treatment diary below      Assessment: Continued with established POC. Modified height of bridges due to discomfort. Able to perform all sets and reps as noted. Ended session with moist heat for pain management with good response. Patient demonstrated fatigue post treatment and would benefit from continued PT.        Plan: Progress treatment as tolerated.       Precautions: history of vertigo, cervical spine pain, osteoporosis, thyroid disorder, lumbar pain      Manuals 3/24 4/1 4/10 4/15 4/25 4/29 5/8 5/15 5/22 5/29   STM lumbar and thoracic paraspinals              STM gapping lumbar spine              Lumbar              STM piriformis  Right side     ACP  Right side  ACP Right side    ACP Right side    ACP Right side     ACP  Right side     ACP  Right side     ACP  Right side     ACP    Neuro Re-Ed             Supine march with TrA + fall out   :05x2' Supine :05x2' Supine +fall out  :05x2' Supine +fall out  :05x2' Supine +fall out  :05x2' Supine :05x2' Supine +fall out  :05x2  :05x2' :05x2'   Hip abd iso with belt      Supine :05x2'   Supine :05x2'    Hip add with bolster       Supine :05x2'  Supine :05x2'     Supine hip abd with TB single leg               Palloff press        NV                    Recumbent bike 6' 6' 6' 6' 6' 6' 6' 6' 6' 6'                Ther Ex             Prone press ups              Supine piriformis stretch  RLE :10 2x10  RLE :10 2x10  RLE :10  2x10  RLE :10 2x10  RLE :10 2x10  RLE :10 2x10  :10x10 RLE  RLE   :10x10 RLE   :10   2x10  RLE   :10   2x10    Clamshells  2x10  2x10 ea  2x10 ea  2x10 ea  2x10 ea    2x10  2x10    Supine cross legged QL stretch  :10x2' :10x2' :10x2' :10x2' :10x2'  :10x2'  :10x2' :10x2'   LTR  With pball     :05x2' With pball     :05x2' With pball     :05x2' With red pball :10x2' With red pball :10x2' With pball     :05x2' With pball     :05x2' With pball     :05x2' With pball     :05x2' With pball     :05x2'   Bridges  2x10  8x 2x10 2x10  2x10    2x10    Pball roll ins  With red pball :10x2' With red pball :10x2' With red pball :10x2' With red pball :10x2' With red pball :10x2' With red pball :10x2' With red pball :10x2' With red pball :10x2' With red pball :10x2' With red pball :10x2'   Pball roll outs         :05x2'                  Nustep              Ther Activity             Mini squats        NV      Hip hinge with dowel        NV      Gait Training                                       Modalities             Moist heat      10 minutes    15 minutes   7 minutes

## 2025-05-30 ENCOUNTER — APPOINTMENT (OUTPATIENT)
Dept: PHYSICAL THERAPY | Facility: CLINIC | Age: 75
End: 2025-05-30
Attending: FAMILY MEDICINE
Payer: COMMERCIAL

## 2025-06-05 ENCOUNTER — OFFICE VISIT (OUTPATIENT)
Dept: PHYSICAL THERAPY | Facility: CLINIC | Age: 75
End: 2025-06-05
Attending: FAMILY MEDICINE
Payer: COMMERCIAL

## 2025-06-05 DIAGNOSIS — M54.16 LUMBAR RADICULOPATHY: Primary | ICD-10-CM

## 2025-06-05 DIAGNOSIS — G89.29 CHRONIC BILATERAL LOW BACK PAIN, UNSPECIFIED WHETHER SCIATICA PRESENT: ICD-10-CM

## 2025-06-05 DIAGNOSIS — M54.50 CHRONIC BILATERAL LOW BACK PAIN, UNSPECIFIED WHETHER SCIATICA PRESENT: ICD-10-CM

## 2025-06-05 DIAGNOSIS — M25.551 RIGHT HIP PAIN: ICD-10-CM

## 2025-06-05 PROCEDURE — 97110 THERAPEUTIC EXERCISES: CPT | Performed by: PHYSICAL THERAPIST

## 2025-06-05 PROCEDURE — 97112 NEUROMUSCULAR REEDUCATION: CPT | Performed by: PHYSICAL THERAPIST

## 2025-06-05 PROCEDURE — 97140 MANUAL THERAPY 1/> REGIONS: CPT | Performed by: PHYSICAL THERAPIST

## 2025-06-05 NOTE — PROGRESS NOTES
Daily Note     Today's date: 2025  Patient name: Ginger Chicas  : 1950  MRN: [id number]  Referring provider: Keegan Ruff MD  Dx:   Encounter Diagnosis     ICD-10-CM    1. Lumbar radiculopathy  M54.16       2. Chronic bilateral low back pain, unspecified whether sciatica present  M54.50     G89.29       3. Right hip pain  M25.551                 Start Time: 1530  Stop Time: 1615  Total time in clinic (min): 45 minutes    Subjective: Patient notes that from driving a lot her side of the hip is bothering her and her back is a little spasmy.      Objective: See treatment diary below      Assessment: Continued with established POC. Modified session as needed due to pain.  Patient noted decreased pain and discomfort post manual treatment. Patient demonstrated fatigue post treatment and would benefit from continued PT.        Plan: Progress treatment as tolerated.       Precautions: history of vertigo, cervical spine pain, osteoporosis, thyroid disorder, lumbar pain      Manuals 3/24 4/1 4/10 4/15 4/25 4/29 5/8 5/15 5/22 5/29   STM lumbar and thoracic paraspinals              STM gapping lumbar spine              Lumbar              STM piriformis  Right side ACP   Right side  ACP Right side    ACP Right side    ACP Right side     ACP  Right side     ACP  Right side     ACP  Right side     ACP    Neuro Re-Ed             Supine marches with TrA  Supine :05x2' Supine +fall out  :05x2' Supine +fall out  :05x2' Supine +fall out  :05x2' Supine :05x2' Supine +fall out  :05x2  :05x2' :05x2'   Hip abd iso with belt      Supine :05x2'   Supine :05x2'    Hip add with bolster       Supine :05x2'  Supine :05x2'     Supine hip abd with TB single leg               Palloff press        NV                    Recumbent bike 6' 6' 6' 6' 6' 6' 6' 6' 6' 6'                Ther Ex             Prone press ups              Supine piriformis stretch  RLE   :10   2x10  RLE :10 2x10  RLE :10 2x10  RLE :10 2x10  RLE :10 2x10  RLE  :10 2x10  :10x10 RLE  RLE   :10x10 RLE   :10   2x10  RLE   :10   2x10    Clamshells   2x10 ea  2x10 ea  2x10 ea  2x10 ea    2x10  2x10    Supine cross legged QL stretch    :10   2x10  :10x2' :10x2' :10x2' :10x2'  :10x2'  :10x2' :10x2'   LTR  With pball     :05x2' With pball     :05x2' With pball     :05x2' With red pball :10x2' With red pball :10x2' With pball     :05x2' With pball     :05x2' With pball     :05x2' With pball     :05x2' With pball     :05x2'   Bridges   8x 2x10 2x10  2x10    2x10    Pball roll ins  P! This date. With red pball :10x2' With red pball :10x2' With red pball :10x2' With red pball :10x2' With red pball :10x2' With red pball :10x2' With red pball :10x2' With red pball :10x2' With red pball :10x2'   Pball roll outs         :05x2'     Supine hamstring stretch with towel  :05x10             Nustep              Ther Activity             Mini squats        NV      Hip hinge with dowel        NV      Gait Training                                       Modalities             Moist heat      10 minutes    15 minutes   7 minutes

## 2025-06-06 ENCOUNTER — APPOINTMENT (OUTPATIENT)
Dept: PHYSICAL THERAPY | Facility: CLINIC | Age: 75
End: 2025-06-06
Attending: FAMILY MEDICINE
Payer: COMMERCIAL

## 2025-06-10 ENCOUNTER — OFFICE VISIT (OUTPATIENT)
Dept: PHYSICAL THERAPY | Facility: CLINIC | Age: 75
End: 2025-06-10
Attending: FAMILY MEDICINE
Payer: COMMERCIAL

## 2025-06-10 DIAGNOSIS — G89.29 CHRONIC BILATERAL LOW BACK PAIN, UNSPECIFIED WHETHER SCIATICA PRESENT: ICD-10-CM

## 2025-06-10 DIAGNOSIS — M54.16 LUMBAR RADICULOPATHY: Primary | ICD-10-CM

## 2025-06-10 DIAGNOSIS — M54.50 CHRONIC BILATERAL LOW BACK PAIN, UNSPECIFIED WHETHER SCIATICA PRESENT: ICD-10-CM

## 2025-06-10 DIAGNOSIS — M25.551 RIGHT HIP PAIN: ICD-10-CM

## 2025-06-10 PROCEDURE — 97110 THERAPEUTIC EXERCISES: CPT | Performed by: PHYSICAL THERAPIST

## 2025-06-10 PROCEDURE — 97112 NEUROMUSCULAR REEDUCATION: CPT | Performed by: PHYSICAL THERAPIST

## 2025-06-10 PROCEDURE — 97140 MANUAL THERAPY 1/> REGIONS: CPT | Performed by: PHYSICAL THERAPIST

## 2025-06-10 NOTE — PROGRESS NOTES
Daily Note     Today's date: 6/10/2025  Patient name: Ginger Chicas  : 1950  MRN: [id number]  Referring provider: Keegan Ruff MD  Dx:   Encounter Diagnosis     ICD-10-CM    1. Lumbar radiculopathy  M54.16       2. Chronic bilateral low back pain, unspecified whether sciatica present  M54.50     G89.29       3. Right hip pain  M25.551             Start Time: 1530  Stop Time: 1608  Total time in clinic (min): 38 minutes    Subjective: Patient notes that on and off the last few days she was getting the shooting pain into the right hip and leg. Patient notes especially at night. Patient notes laying on her stomach helps but if she changes positions while sleeping she's wake up from the pain.       Objective: See treatment diary below      Assessment: Continued with established POC. Introduced some light extension based exercises for radicular symptoms. Performed manual treatment in prone with good response  Patient noted decreased pain and discomfort post manual treatment. Patient demonstrated fatigue post treatment and would benefit from continued PT.        Plan: Progress treatment as tolerated.       Precautions: history of vertigo, cervical spine pain, osteoporosis, thyroid disorder, lumbar pain      Manuals 6/5 6/10 4/10 4/15 4/25 4/29 5/8 5/15 5/22 5/29   STM lumbar and thoracic paraspinals              STM gapping lumbar spine              Lumbar   ACP            STM piriformis  Right side ACP  Right side ACP  Right side  ACP Right side    ACP Right side    ACP Right side     ACP  Right side     ACP  Right side     ACP  Right side     ACP    Neuro Re-Ed             Supine marches with TrA   Supine +fall out  :05x2' Supine +fall out  :05x2' Supine +fall out  :05x2' Supine :05x2' Supine +fall out  :05x2  :05x2' :05x2'   Hip abd iso with belt      Supine :05x2'   Supine :05x2'    Hip add with bolster       Supine :05x2'  Supine :05x2'     Supine hip abd with TB single leg               Prone hip  extensions   5x ea      NV                    Recumbent bike 6' 6' 6' 6' 6' 6' 6' 6' 6' 6'                Ther Ex             Prone press ups   On elbows to mid range 10x            Supine piriformis stretch  RLE   :10   2x10  RLE :10 2x10  RLE :10 2x10  RLE :10 2x10  RLE :10 2x10  RLE :10 2x10  :10x10 RLE  RLE   :10x10 RLE   :10   2x10  RLE   :10   2x10    Clamshells   2x10 ea  2x10 ea  2x10 ea  2x10 ea    2x10  2x10    Supine cross legged QL stretch    :10   2x10  :10x2' :10x2' :10x2' :10x2'  :10x2'  :10x2' :10x2'   LTR  With pball     :05x2' With pball     :05x2' With pball     :05x2' With red pball :10x2' With red pball :10x2' With pball     :05x2' With pball     :05x2' With pball     :05x2' With pball     :05x2' With pball     :05x2'   Bridges   2x10  2x10 2x10  2x10    2x10    Pball roll ins  P! This date.  With red pball :10x2' With red pball :10x2' With red pball :10x2' With red pball :10x2' With red pball :10x2' With red pball :10x2' With red pball :10x2' With red pball :10x2'   Pball roll outs         :05x2'     Supine hamstring stretch with towel  :05x10             Nustep              Ther Activity             Mini squats        NV      Hip hinge with dowel        NV      Gait Training                                       Modalities             Moist heat      10 minutes    15 minutes   7 minutes

## 2025-06-20 ENCOUNTER — OFFICE VISIT (OUTPATIENT)
Dept: PHYSICAL THERAPY | Facility: CLINIC | Age: 75
End: 2025-06-20
Attending: FAMILY MEDICINE
Payer: COMMERCIAL

## 2025-06-20 DIAGNOSIS — M54.50 CHRONIC BILATERAL LOW BACK PAIN, UNSPECIFIED WHETHER SCIATICA PRESENT: ICD-10-CM

## 2025-06-20 DIAGNOSIS — M25.551 RIGHT HIP PAIN: ICD-10-CM

## 2025-06-20 DIAGNOSIS — G89.29 CHRONIC BILATERAL LOW BACK PAIN, UNSPECIFIED WHETHER SCIATICA PRESENT: ICD-10-CM

## 2025-06-20 DIAGNOSIS — M54.16 LUMBAR RADICULOPATHY: Primary | ICD-10-CM

## 2025-06-20 PROCEDURE — 97110 THERAPEUTIC EXERCISES: CPT | Performed by: PHYSICAL THERAPIST

## 2025-06-20 PROCEDURE — 97112 NEUROMUSCULAR REEDUCATION: CPT | Performed by: PHYSICAL THERAPIST

## 2025-06-20 NOTE — PROGRESS NOTES
Daily Note     Today's date: 2025  Patient name: Ginger Chicas  : 1950  MRN: [id number]  Referring provider: Keegan Ruff MD  Dx:   Encounter Diagnosis     ICD-10-CM    1. Lumbar radiculopathy  M54.16       2. Chronic bilateral low back pain, unspecified whether sciatica present  M54.50     G89.29       3. Right hip pain  M25.551               Start Time: 1400  Stop Time: 1445  Total time in clinic (min): 45 minutes    Subjective: Patient notes reports her radicular symptoms have resolved since last session. Patient notes overall feeling better.       Objective: See treatment diary below      Assessment: Continued with established POC. Continued light extension based exercises with good response.  Patient demonstrated fatigue post treatment and would benefit from continued PT.        Plan: Progress treatment as tolerated.       Precautions: history of vertigo, cervical spine pain, osteoporosis, thyroid disorder, lumbar pain      Manuals 6/5 6/10 6/20 4/15 4/25 4/29 5/8 5/15 5/22 5/29   STM lumbar and thoracic paraspinals              STM gapping lumbar spine              Lumbar   ACP            STM piriformis  Right side ACP  Right side ACP  Right side  ACP Right side    ACP Right side    ACP Right side     ACP  Right side     ACP  Right side     ACP  Right side     ACP    Neuro Re-Ed             Supine marches with TrA    Supine +fall out  :05x2' Supine +fall out  :05x2' Supine :05x2' Supine +fall out  :05x2  :05x2' :05x2'   Hip abd iso with belt      Supine :05x2'   Supine :05x2'    Hip add with bolster       Supine :05x2'  Supine :05x2'     Supine hip abd with TB single leg               Prone hip extensions   5x ea      NV                    Recumbent bike 6' 6' 6' 6' 6' 6' 6' 6' 6' 6'                Ther Ex             Prone press ups   On elbows to mid range 10x  On elbows to mid range 10x           Supine piriformis stretch  RLE   :10   2x10  RLE :10 2x10  RLE :10 2x10  RLE :10 2x10  RLE :10  2x10  RLE :10 2x10  :10x10 RLE  RLE   :10x10 RLE   :10   2x10  RLE   :10   2x10    Clamshells   2x10 ea  2x10 ea  2x10 ea  2x10 ea    2x10  2x10    Supine cross legged QL stretch    :10   2x10  :10x2' :10x2' :10x2' :10x2'  :10x2'  :10x2' :10x2'   LTR  With pball     :05x2' With pball     :05x2' With pball     :05x2' With red pball :10x2' With red pball :10x2' With pball     :05x2' With pball     :05x2' With pball     :05x2' With pball     :05x2' With pball     :05x2'   Bridges   2x10  2x10 2x10  2x10    2x10    Pball roll ins  P! This date.  With red pball :10x2' With red pball :10x2' With red pball :10x2' With red pball :10x2' With red pball :10x2' With red pball :10x2' With red pball :10x2' With red pball :10x2'   Pball roll outs         :05x2'     Supine hamstring stretch with towel  :05x10             Nustep              Ther Activity             Mini squats        NV      Hip hinge with dowel        NV      Gait Training                                       Modalities             Moist heat      10 minutes    15 minutes   7 minutes

## 2025-06-24 ENCOUNTER — HOSPITAL ENCOUNTER (OUTPATIENT)
Dept: MAMMOGRAPHY | Facility: CLINIC | Age: 75
Discharge: HOME | End: 2025-06-24
Payer: COMMERCIAL

## 2025-06-24 DIAGNOSIS — R92.8 ABNORMAL MAMMOGRAM: ICD-10-CM

## 2025-06-24 PROCEDURE — 77065 DX MAMMO INCL CAD UNI: CPT

## 2025-06-26 ENCOUNTER — OFFICE VISIT (OUTPATIENT)
Dept: PHYSICAL THERAPY | Facility: CLINIC | Age: 75
End: 2025-06-26
Attending: FAMILY MEDICINE
Payer: COMMERCIAL

## 2025-06-26 DIAGNOSIS — M54.50 CHRONIC BILATERAL LOW BACK PAIN, UNSPECIFIED WHETHER SCIATICA PRESENT: ICD-10-CM

## 2025-06-26 DIAGNOSIS — M25.551 RIGHT HIP PAIN: ICD-10-CM

## 2025-06-26 DIAGNOSIS — G89.29 CHRONIC BILATERAL LOW BACK PAIN, UNSPECIFIED WHETHER SCIATICA PRESENT: ICD-10-CM

## 2025-06-26 DIAGNOSIS — M54.16 LUMBAR RADICULOPATHY: Primary | ICD-10-CM

## 2025-06-26 PROCEDURE — 97140 MANUAL THERAPY 1/> REGIONS: CPT | Performed by: PHYSICAL THERAPIST

## 2025-06-26 PROCEDURE — 97110 THERAPEUTIC EXERCISES: CPT | Performed by: PHYSICAL THERAPIST

## 2025-06-26 PROCEDURE — 97112 NEUROMUSCULAR REEDUCATION: CPT | Performed by: PHYSICAL THERAPIST

## 2025-06-26 NOTE — PROGRESS NOTES
Daily Note     Today's date: 2025  Patient name: Ginger Chicsa  : 1950  MRN: [id number]  Referring provider: Keegan Ruff MD  Dx:   Encounter Diagnosis     ICD-10-CM    1. Lumbar radiculopathy  M54.16       2. Chronic bilateral low back pain, unspecified whether sciatica present  M54.50     G89.29       3. Right hip pain  M25.551           Start Time: 0945  Stop Time: 1030  Total time in clinic (min): 45 minutes      Subjective: Patient notes that this week has been pretty good pain wise.       Objective: See treatment diary below      Assessment: Continued with established POC with no adverse reactions. Patient demonstrated fatigue post treatment and would benefit from continued PT.       Precautions: history of vertigo, cervical spine pain, osteoporosis, thyroid disorder, lumbar pain      Manuals 6/5 6/10 6/20 6/23 6/26 4/29 5/8 5/15 5/22 5/29   STM lumbar and thoracic paraspinals              STM gapping lumbar spine              Lumbar   ACP            STM piriformis  Right side ACP  Right side ACP  Right side  ACP  Right side  ACP Right side     ACP  Right side     ACP  Right side     ACP  Right side     ACP    Neuro Re-Ed             Supine marches with TrA      Supine :05x2' Supine +fall out  :05x2  :05x2' :05x2'   Hip abd iso with belt      Supine :05x2'   Supine :05x2'    Hip add with bolster       Supine :05x2'  Supine :05x2'     Supine hip abd with TB single leg               Prone hip extensions   5x ea      NV                    Recumbent bike 6' 6' 6'  6' 6' 6' 6' 6' 6'                Ther Ex             Prone press ups   On elbows to mid range 10x  On elbows to mid range 10x           Supine piriformis stretch  RLE   :10   2x10  RLE :10 2x10  RLE :10 2x10   RLE :10 2x10  RLE :10 2x10  :10x10 RLE  RLE   :10x10 RLE   :10   2x10  RLE   :10   2x10    Clamshells   2x10 ea  2x10 ea   2x10 ea    2x10  2x10    Supine cross legged QL stretch    :10   2x10  :10x2' :10x2'  :10 2x10   :10x2'   :10x2' :10x2'   LTR  With pball     :05x2' With pball     :05x2' With pball     :05x2'  With pball     :05x2' With pball     :05x2' With pball     :05x2' With pball     :05x2' With pball     :05x2' With pball     :05x2'   Bridges   2x10  2x10  2x10     2x10    Pball roll ins  P! This date.  With red pball :10x2'  With red pball :10x2' With red pball :10x2' With red pball :10x2' With red pball :10x2' With red pball :10x2' With red pball :10x2'   Pball roll outs         :05x2'     Supine hamstring stretch with towel  :05x10             Nustep              Ther Activity             Mini squats        NV      Hip hinge with dowel        NV      Gait Training                                       Modalities             Moist heat         15 minutes   7 minutes

## 2025-07-01 ENCOUNTER — OFFICE VISIT (OUTPATIENT)
Dept: PHYSICAL THERAPY | Facility: CLINIC | Age: 75
End: 2025-07-01
Attending: FAMILY MEDICINE
Payer: COMMERCIAL

## 2025-07-01 DIAGNOSIS — M54.50 CHRONIC BILATERAL LOW BACK PAIN, UNSPECIFIED WHETHER SCIATICA PRESENT: ICD-10-CM

## 2025-07-01 DIAGNOSIS — G89.29 CHRONIC BILATERAL LOW BACK PAIN, UNSPECIFIED WHETHER SCIATICA PRESENT: ICD-10-CM

## 2025-07-01 DIAGNOSIS — M54.16 LUMBAR RADICULOPATHY: Primary | ICD-10-CM

## 2025-07-01 DIAGNOSIS — M25.551 RIGHT HIP PAIN: ICD-10-CM

## 2025-07-01 PROCEDURE — 97110 THERAPEUTIC EXERCISES: CPT | Performed by: PHYSICAL THERAPIST

## 2025-07-01 PROCEDURE — 97112 NEUROMUSCULAR REEDUCATION: CPT | Performed by: PHYSICAL THERAPIST

## 2025-07-01 PROCEDURE — 97140 MANUAL THERAPY 1/> REGIONS: CPT | Performed by: PHYSICAL THERAPIST

## 2025-07-01 NOTE — PROGRESS NOTES
Daily Note     Today's date: 2025  Patient name: Ginger Chicas  : 1950  MRN: [id number]  Referring provider: Keegan Ruff MD  Dx:   Encounter Diagnosis     ICD-10-CM    1. Lumbar radiculopathy  M54.16       2. Chronic bilateral low back pain, unspecified whether sciatica present  M54.50     G89.29       3. Right hip pain  M25.551             Start Time: 1615  Stop Time: 1700  Total time in clinic (min): 45 minutes      Subjective: Patient notes  her back has been doing well this week.       Objective: See treatment diary below      Assessment: Continued with established POC with no adverse reactions. Patient demonstrated fatigue post treatment and would benefit from continued PT.       Precautions: history of vertigo, cervical spine pain, osteoporosis, thyroid disorder, lumbar pain      Manuals 6/5 6/10 6/20 6/23 6/26 7/1 5/8 5/15 5/22 5/29   STM lumbar and thoracic paraspinals              STM gapping lumbar spine              Lumbar   ACP            STM piriformis  Right side ACP  Right side ACP  Right side  ACP  Right side  ACP Right side  ACP Right side     ACP  Right side     ACP  Right side     ACP    Neuro Re-Ed             Supine marches with TrA       Supine +fall out  :05x2  :05x2' :05x2'   Hip abd iso with belt         Supine :05x2'    Hip add with bolster         Supine :05x2'     Supine hip abd with TB single leg               Prone hip extensions   5x ea      NV                    Recumbent bike 6' 6' 6'  6' 9' 6' 6' 6' 6'                Ther Ex             Prone press ups   On elbows to mid range 10x  On elbows to mid range 10x           Supine piriformis stretch  RLE   :10   2x10  RLE :10 2x10  RLE :10 2x10   RLE :10 2x10   :10x10 RLE  RLE   :10x10 RLE   :10   2x10  RLE   :10   2x10    Clamshells   2x10 ea  2x10 ea   2x10 ea    2x10  2x10    Supine cross legged QL stretch    :10   2x10  :10x2' :10x2'  :10 2x10   :10x2'  :10x2' :10x2'   LTR  With pball     :05x2' With pball     :05x2'  Internal Medicine Daily Progress Note    Patient: Carrington Gordon Date: 6/5/2017   YOB: 1960 Attending: Ronak Lugo MD   57 year old male      Chief Complaint: hypertension, diabetes     Subjective:  In a good mood, tolerated the diet very well, had 2 small BMs, passing gas. 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, including hip (CMS/HCC)   • S/P CABG x 4   • Aorto-iliac disease (CMS/HCC)       Allergies: ALLERGIES:  No Known Allergies    Review of Systems: A 10 point review of systems is negative except as in the HPI    Vitals :   Vitals:    06/05/17 0545   BP: 153/68   Pulse: 75   Resp: 20   Temp:        Physical Examination :  Constitutional:  Well developed, well nourished, no acute distress, non-toxic appearance  Eyes:  Pupils equal round reactive to light, conjunctiva normal   HENT:  Atraumatic, normocephalic,  external ears normal, nose normal  Neck: Supple   Respiratory:  No respiratory distress, normal breath sounds, no rales, no wheezing   Cardiovascular:  Normal rate, normal rhythm, no murmurs, no gallops, no rubs   GI:  Soft, obese , normal bowel sounds,  non-tender, no hernias noted  Neurologic:  Alert & oriented x 3, no focal deficits noted   Extremities: No clubbing, cyanosis, nor edema  Psychiatric:  Speech and behavior appropriate      Medications :  Scheduled  • [START ON 6/6/2017] hydrochlorothiazide  25 mg Oral Daily   • carvedilol  6.25 mg Oral BID WC   • lisinopril  10 mg Oral Daily   • clopidogrel  75 mg Oral Daily   • aspirin  81 mg Oral Daily   • simvastatin  40 mg Oral Nightly   • insulin regular (human)   Subcutaneous TID AC   • sodium chloride (PF)  2 mL Injection 2 times per day   • nicotine  1 patch Transdermal Daily    And   • nicotine  1 patch Transdermal Daily   • enoxaparin (LOVENOX) injection  40 mg Subcutaneous Nightly   • famotidine  20 mg Intravenous 2  With pball     :05x2'  With pball     :05x2'  With pball     :05x2' With pball     :05x2' With pball     :05x2' With pball     :05x2'   Bridges   2x10  2x10  2x10     2x10    Pball roll ins  P! This date.  With red pball :10x2'  With red pball :10x2'  With red pball :10x2' With red pball :10x2' With red pball :10x2' With red pball :10x2'   Pball roll outs         :05x2'     Supine hamstring stretch with towel  :05x10             Nustep              Ther Activity             Mini squats        NV      Hip hinge with dowel        NV      Gait Training                                       Modalities             Moist heat         15 minutes   7 minutes                               times per day       PRN  • nitroGLYcerin (NITROSTAT) sublingual tablet 0.4 mg  0.4 mg Sublingual Q5 Min PRN For total dose see MAR   • HYDROcodone-acetaminophen (NORCO) 5-325 MG per tablet 1 tablet  1 tablet Oral Q6H PRN For total dose see MAR   • sodium chloride 0.9% infusion   Intravenous PRN For total dose see MAR   • sodium chloride (PF) 0.9 % injection 2 mL  2 mL Injection PRN For total dose see MAR   • dextrose 50 % injection 25 g  25 g Intravenous PRN For total dose see MAR   • dextrose 5 % infusion   Intravenous Continuous PRN For total dose see MAR   • glucagon (GLUCAGEN) injection 1 mg  1 mg Intramuscular PRN For total dose see MAR   • dextrose (GLUTOSE) 40 % gel 15 g  15 g Oral PRN For total dose see MAR   • ondansetron (ZOFRAN) injection 4 mg  4 mg Intravenous BID PRN For total dose see MAR   • hydrALAZINE (APRESOLINE) injection 20 mg  20 mg Intravenous Q3H PRN For total dose see MAR   • labetalol (NORMODYNE) injection 10-20 mg  10-20 mg Intravenous Q4H PRN For total dose see MAR   • naLOXone (NARCAN) injection 0.1 mg  0.1 mg Intravenous PRN For total dose see MAR   • potassium chloride (K-DUR,KLOR-CON) CR tablet 20 mEq  20 mEq Oral Q4H PRN For total dose see MAR   • potassium chloride (KLOR-CON) packet 20 mEq  20 mEq Per NG tube Q4H PRN For total dose see MAR   • potassium chloride 20 mEq/100mL IVPB premix  20 mEq Intravenous Q4H PRN For total dose see MAR   • potassium chloride (K-DUR,KLOR-CON) CR tablet 40 mEq  40 mEq Oral Q4H PRN For total dose see MAR   • potassium chloride (KLOR-CON) packet 40 mEq  40 mEq Per NG tube Q4H PRN For total dose see MAR   • potassium chloride 20 mEq/100mL IVPB premix  40 mEq Intravenous Q4H PRN For total dose see MAR   • magnesium sulfate 1 g in dextrose 5% 100 mL IVPB premix  1 g Intravenous Daily PRN For total dose see MAR   • magnesium sulfate 2 g in sodium chloride 0.9% IVPB  2 g Intravenous Daily PRN For total dose see MAR       Continuous infusion  • dextrose 5 %  infusion             Laboratory Results:    Recent Labs  Lab 06/05/17  0350 06/04/17  0415 06/03/17  0349  05/31/17  0535   WBC 10.7 10.9 15.6*  < >  --    HCT 28.0* 25.8* 28.3*  < >  --    HGB 9.1* 8.5* 9.0*  < >  --     329 305  < >  --    INR  --   --   --   --  1.0   SODIUM 135 132* 133*  < >  --    POTASSIUM 3.8 4.2 4.5  < >  --    CHLORIDE 102 101 101  < >  --    CO2 23 26 25  < >  --    CALCIUM 8.3* 8.1* 8.3*  < >  --    GLUCOSE 135* 186* 211*  < >  --    BUN 15 15 14  < >  --    CREATININE 1.02 1.05 1.16  < >  --    GFRNA 82 79 70  < >  --    < > = values in this interval not displayed.    Imaging Results Reviewed Include: no new         DVT/VTE Prophylaxis:  VTE Pharmacologic Prophylaxis: Yes  VTE Mechanical Prophylaxis: Yes     Assessment and Plan:  1. Peripheral vascular disease status post aortobifemoral bypass and reimplantation of FREDDY and right profunda endarterectomy.  Off PCA, barely needing oral medications     2. Ileus post op. Resolved, advance diet   3. Hypertension. Reasonably controlled. Restarting all oral medications    4. CAD with recent CABG. Resumed antiplatelet medications   5. CKD stage 3   6. 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 Probably keep on sliding scale for now .    Doing well, I'll discharge when ready from a surgical perspective if he tolerates food       Discharge    GELA Expected Discharge Date: 06/06/17  Barriers: improvement   Destination: home        Ronak Lugo MD  [facility] Hospitalist  Pager [phone]  Contact the Hospitalist caring for the patient until 7:00pm. From 7:00pm to 7:00 am contact the Hospitalist on call

## 2025-07-07 ENCOUNTER — OFFICE VISIT (OUTPATIENT)
Dept: PHYSICAL THERAPY | Facility: CLINIC | Age: 75
End: 2025-07-07
Attending: FAMILY MEDICINE
Payer: COMMERCIAL

## 2025-07-07 DIAGNOSIS — M54.16 LUMBAR RADICULOPATHY: Primary | ICD-10-CM

## 2025-07-07 DIAGNOSIS — M54.50 CHRONIC BILATERAL LOW BACK PAIN, UNSPECIFIED WHETHER SCIATICA PRESENT: ICD-10-CM

## 2025-07-07 DIAGNOSIS — G89.29 CHRONIC BILATERAL LOW BACK PAIN, UNSPECIFIED WHETHER SCIATICA PRESENT: ICD-10-CM

## 2025-07-07 DIAGNOSIS — M25.551 RIGHT HIP PAIN: ICD-10-CM

## 2025-07-07 PROCEDURE — 97110 THERAPEUTIC EXERCISES: CPT

## 2025-07-07 PROCEDURE — 97140 MANUAL THERAPY 1/> REGIONS: CPT

## 2025-07-07 PROCEDURE — 97112 NEUROMUSCULAR REEDUCATION: CPT

## 2025-07-07 NOTE — PROGRESS NOTES
Daily Note     Today's date: 2025  Patient name: Ginger Chicas  : 1950  MRN: [id number]  Referring provider: Keegan Ruff MD  Dx:   Encounter Diagnosis     ICD-10-CM    1. Lumbar radiculopathy  M54.16       2. Chronic bilateral low back pain, unspecified whether sciatica present  M54.50     G89.29       3. Right hip pain  M25.551                      Subjective: Patient reports low back is feeling really good with no significant pain or muscle spasms since last visit.        Objective: See treatment diary below      Assessment: Tolerated treatment well. Patient demonstrated fatigue post treatment and would benefit from continued PT.  Good tolerance to established POC with no pain reported throughout treatment.  Minimal tenderness to R piriformis. Will continue to progress as appropriate.        Plan: Progress treatment as tolerated.       Precautions: history of vertigo, cervical spine pain, osteoporosis, thyroid disorder, lumbar pain      Manuals 6/5 6/10 6/20 6/23 6/26 7/1 7/7  5/22 5/29   STM lumbar and thoracic paraspinals              STM gapping lumbar spine              Lumbar   ACP            STM piriformis  Right side ACP  Right side ACP  Right side  ACP  Right side  ACP Right side  ACP Right side MC  Right side     ACP    Neuro Re-Ed             Supine marches with TrA         :05x2' :05x2'   Hip abd iso with belt         Supine :05x2'    Hip add with bolster              Supine hip abd with TB single leg               Prone hip extensions   5x ea                         Recumbent bike 6' 6' 6'  6' 9' NV  6' 6'                Ther Ex             Prone press ups   On elbows to mid range 10x  On elbows to mid range 10x           Supine piriformis stretch  RLE   :10   2x10  RLE :10 2x10  RLE :10 2x10   RLE :10 2x10   RLE :10 2x10  RLE   :10   2x10  RLE   :10   2x10    Clamshells   2x10 ea  2x10 ea   2x10 ea  2x10 ea  2x10  2x10    Supine cross legged QL stretch    :10   2x10  :10x2' :10x2'   :10 2x10   :10 2x10  :10x2' :10x2'   LTR  With pball     :05x2' With pball     :05x2' With pball     :05x2'  With pball     :05x2'  With pball     :05x2'  With pball     :05x2' With pball     :05x2'   Bridges   2x10  2x10  2x10   2x10  2x10    Pball roll ins  P! This date.  With red pball :10x2'  With red pball :10x2'  With red pball :10x2'  With red pball :10x2' With red pball :10x2'   Pball roll outs              Supine hamstring stretch with towel  :05x10             Nustep              Ther Activity             Mini squats              Hip hinge with dowel              Gait Training                                       Modalities             Moist heat           7 minutes

## 2025-07-08 ENCOUNTER — APPOINTMENT (OUTPATIENT)
Dept: PHYSICAL THERAPY | Facility: CLINIC | Age: 75
End: 2025-07-08
Attending: FAMILY MEDICINE
Payer: COMMERCIAL

## 2025-07-17 ENCOUNTER — OFFICE VISIT (OUTPATIENT)
Dept: PHYSICAL THERAPY | Facility: CLINIC | Age: 75
End: 2025-07-17
Attending: FAMILY MEDICINE
Payer: COMMERCIAL

## 2025-07-17 DIAGNOSIS — M25.551 RIGHT HIP PAIN: ICD-10-CM

## 2025-07-17 DIAGNOSIS — M54.16 LUMBAR RADICULOPATHY: Primary | ICD-10-CM

## 2025-07-17 DIAGNOSIS — G89.29 CHRONIC BILATERAL LOW BACK PAIN, UNSPECIFIED WHETHER SCIATICA PRESENT: ICD-10-CM

## 2025-07-17 DIAGNOSIS — M54.50 CHRONIC BILATERAL LOW BACK PAIN, UNSPECIFIED WHETHER SCIATICA PRESENT: ICD-10-CM

## 2025-07-17 PROCEDURE — 97110 THERAPEUTIC EXERCISES: CPT

## 2025-07-17 PROCEDURE — 97140 MANUAL THERAPY 1/> REGIONS: CPT

## 2025-07-17 NOTE — PROGRESS NOTES
Daily Note     Today's date: 2025  Patient name: Ginger Chicas  : 1950  MRN: [id number]  Referring provider: Keegan Ruff MD  Dx:   Encounter Diagnosis     ICD-10-CM    1. Lumbar radiculopathy  M54.16       2. Chronic bilateral low back pain, unspecified whether sciatica present  M54.50     G89.29       3. Right hip pain  M25.551           Start Time: 0945  Stop Time: 1038  Total time in clinic (min): 53 minutes    Subjective: Presents to therapy stating since last session she has not experienced any tightness or pain in her low back or hip. Reports decreased sensitivity when soft tissue work was performed in therapy. Notes improved tolerance to repetitive bending while doing card work.       Objective: See treatment diary below      Assessment: Patient tolerated today's session well, continuing to emphasize soft tissue extensibility of lumbar paraspinals and right piriformis muscle. Patient able to complete all exercises without adverse response and minimal cueing due to consistency performing HEP. Manual piriformis STM effective in decreased right piriformis tightness upon leaving clinic without onset of radicular symptoms. demonstrated fatigue post treatment and would benefit from continued PT.      Plan: Continue per plan of care.      Precautions: history of vertigo, cervical spine pain, osteoporosis, thyroid disorder, lumbar pain      Manuals 6/5 6/10 6/20 6/23 6/26 7/1 7/7 7/17 5/22 5/29   STM lumbar and thoracic paraspinals              STM gapping lumbar spine              Lumbar   ACP            STM piriformis  Right side ACP  Right side ACP  Right side  ACP  Right side  ACP Right side  ACP Right side MC Right side JS Right side     ACP    Neuro Re-Ed             Supine marches with TrA         :05x2' :05x2'   Hip abd iso with belt         Supine :05x2'    Hip add with bolster              Supine hip abd with TB single leg               Prone hip extensions   5x ea                          Recumbent bike 6' 6' 6'  6' 9' NV 8' 6' 6'                Ther Ex             Prone press ups   On elbows to mid range 10x  On elbows to mid range 10x           Supine piriformis stretch  RLE   :10   2x10  RLE :10 2x10  RLE :10 2x10   RLE :10 2x10   RLE :10 2x10 RLE :10 2x10 RLE   :10   2x10  RLE   :10   2x10    Clamshells   2x10 ea  2x10 ea   2x10 ea  2x10 ea 2x10 ea 2x10  2x10    Supine cross legged QL stretch    :10   2x10  :10x2' :10x2'  :10 2x10   :10 2x10 :10 2x10 :10x2' :10x2'   LTR  With pball     :05x2' With pball     :05x2' With pball     :05x2'  With pball     :05x2'  With pball     :05x2' With pball :05x2' With pball     :05x2' With pball     :05x2'   Bridges   2x10  2x10  2x10   2x10 2x10 2x10    Pball roll ins  P! This date.  With red pball :10x2'  With red pball :10x2'  With red pball :10x2' With red pball :10x2' With red pball :10x2' With red pball :10x2'   Pball roll outs              Supine hamstring stretch with towel  :05x10             Nustep              Ther Activity             Mini squats              Hip hinge with dowel              Gait Training                                       Modalities             Moist heat           7 minutes

## 2025-07-22 ENCOUNTER — OFFICE VISIT (OUTPATIENT)
Dept: PHYSICAL THERAPY | Facility: CLINIC | Age: 75
End: 2025-07-22
Attending: FAMILY MEDICINE
Payer: COMMERCIAL

## 2025-07-22 DIAGNOSIS — M54.50 CHRONIC BILATERAL LOW BACK PAIN, UNSPECIFIED WHETHER SCIATICA PRESENT: ICD-10-CM

## 2025-07-22 DIAGNOSIS — G89.29 CHRONIC BILATERAL LOW BACK PAIN, UNSPECIFIED WHETHER SCIATICA PRESENT: ICD-10-CM

## 2025-07-22 DIAGNOSIS — M25.551 RIGHT HIP PAIN: ICD-10-CM

## 2025-07-22 DIAGNOSIS — M54.16 LUMBAR RADICULOPATHY: Primary | ICD-10-CM

## 2025-07-22 PROCEDURE — 97110 THERAPEUTIC EXERCISES: CPT | Performed by: PHYSICAL THERAPIST

## 2025-07-22 PROCEDURE — 97112 NEUROMUSCULAR REEDUCATION: CPT | Performed by: PHYSICAL THERAPIST

## 2025-07-22 PROCEDURE — 97140 MANUAL THERAPY 1/> REGIONS: CPT | Performed by: PHYSICAL THERAPIST

## 2025-07-22 NOTE — PROGRESS NOTES
Re-Evaluation/Daily Note     Today's date: 2025  Patient name: Ginger Chicas  : 1950  MRN: [id number]  Referring provider: Keegan Ruff MD  Dx:   Encounter Diagnosis     ICD-10-CM    1. Lumbar radiculopathy  M54.16       2. Chronic bilateral low back pain, unspecified whether sciatica present  M54.50     G89.29       3. Right hip pain  M25.551               Start Time: 0900  Stop Time: 0945  Total time in clinic (min): 45 minutes  ASSESSMENT:  Ginger Chicas is a 74 y.o. female who presents with signs and symptoms consistent with chronic low back pain.The primary movement problem is posterior lumbar derangement, resulting in pathoanatomical symptoms of LBP and limiting patient's ability to perform normal daily activities without pain. Upon evaluation, patient demonstrates improvements with mobility and overall pain compared to last RE. Patient has met some of her short term goals and is progressing appropriately toward long term goals. Patient would benefit from continued skilled physical therapy to address the impairments, improve their level of function, and to improve their overall quality of life.      Impairments:    restricted ROM    decreased strength   pain with function   activity intolerance   Postural dysfunction     Prognosis:  Good  Positive and negative prognostic indicator(s):  positive attitude about recovery and prior success with conservative care    Goals:    Short Term Goals: to be achieved by 4 weeks  1) Patient to be independent with basic HEP. MET  2) Decrease pain to 2/10 at its worst. MET   3) Increase lumbar spine ROM by 25% in all deficient planes. PROGRESSING   4) Increase LE strength by 1/2 MMT grade in all deficient planes.PROGRESSING     Long Term Goals: to be achieved by discharge  1) FOTO equal to or greater than target score indicating improvements with overall function. MET  2) Patient to be independent with comprehensive HEP.PROGRESSING   3) Lumbar spine ROM WNL all  planes to improve a/iadls.   MET  4) Patient to report no sleep interruption secondary to pain. MET  5) Patient will be able to tolerate prolong sitting with little to no discomfort in order to participate in daily activities. PROGRESSING       Planned interventions:  home exercise program, patient education, manual therapy, graded activity, flexibility, functional range of motion exercises, strengthening, abdominal trunk stabilization, and modalities prn    Duration in visits:  4  Frequency: 1 visits per week  Duration in weeks:  4    History of Current Injury: Patient reports that the last two weeks she has been doing pretty well. Patient notes that she has been able to tolerate more bending over and lifting activities at home without much pain at all. Patient notes limited TTP of the piriformis over the last 2 weeks as well.     Pain location: bilateral low back and right radicular   Pain descriptors:  sharp, tightness  Current Pain: 0/10   Pain at worst: 1/10 (FROM 4/10)    Pain at best: 0/10     Aggravating factors: standing for longer periods of time, lifting   Easing factors: change of position, walking     Imaging: none at this time   Special Tests: denies numbness and tingling, denies B&B changes, no saddle paraesthesia      Patient goals: Patient reports goals for skilled PT would be  decreased pain    Objective     Concurrent Complaints  Positive for disturbed sleep. Negative for night pain, bladder dysfunction, bowel dysfunction and saddle (S4) numbness    Palpation   Left   Tenderness of the piriformis.     Right   Tenderness of the piriformis.       Neurological Testing     Sensation     Lumbar   Left   Intact: light touch    Right   Intact: light touch    Reflexes   Left   Patellar (L4): normal (2+)  Achilles (S1): normal (2+)  Clonus sign: negative    Right   Patellar (L4): normal (2+)  Achilles (S1): normal (2+)  Clonus sign: negative    Active Range of Motion   Left Hip   Normal active range of  motion    Right Hip   Normal active range of motion    Lumbar:   Flexion: minimal restrictions   Extension: minimal restrictions   Lateral bending R: minimal restrictions   Lateral bending L: minimal restrictions   Rotation R: minimal restrictions   Rotation L: minimal restrictions       Joint Play     Hypomobile: L1, L2, L3, L4 and L5     Tests     Lumbar     Left   Negative crossed SLR, passive SLR and slump test.     Right   Negative crossed SLR, passive SLR and slump test.     Left Hip   Positive piriformis.   Negative CELI and FADIR.     Right Hip   Positive piriformis.   Negative CELI and FADIR.     Additional Tests Details  Bilateral hamstring pain           Precautions: history of vertigo, cervical spine pain, osteoporosis, thyroid disorder, lumbar pain      Manuals 6/5 6/10 6/20 6/23 6/26 7/1 7/7 7/17 7/22    STM lumbar and thoracic paraspinals              STM gapping lumbar spine              Lumbar   ACP            STM piriformis  Right side ACP  Right side ACP  Right side  ACP  Right side  ACP Right side  ACP Right side MC Right side JS Right side     ACP    Neuro Re-Ed             Supine marches with TrA         :05x2'    Hip abd iso with belt         Supine :05x2'    Hip add with bolster              Supine hip abd with TB single leg               Prone hip extensions   5x ea                         Recumbent bike 6' 6' 6'  6' 9' NV 8' 6'                 Ther Ex             Prone press ups   On elbows to mid range 10x  On elbows to mid range 10x           Supine piriformis stretch  RLE   :10   2x10  RLE :10 2x10  RLE :10 2x10   RLE :10 2x10   RLE :10 2x10 RLE :10 2x10 RLE   :10   2x10     Clamshells   2x10 ea  2x10 ea   2x10 ea  2x10 ea 2x10 ea 2x10     Supine cross legged QL stretch    :10   2x10  :10x2' :10x2'  :10 2x10   :10 2x10 :10 2x10 :10x2'    LTR  With pball     :05x2' With pball     :05x2' With pball     :05x2'  With pball     :05x2'  With pball     :05x2' With pball :05x2' With pball      :05x2'    Bridges   2x10  2x10  2x10   2x10 2x10 2x10    Pball roll ins  P! This date.  With red pball :10x2'  With red pball :10x2'  With red pball :10x2' With red pball :10x2' With red pball :10x2'    Pball roll outs              Supine hamstring stretch with towel  :05x10             Nustep              Ther Activity             Mini squats              Hip hinge with dowel              Gait Training                                       Modalities             Moist heat

## 2025-07-29 ENCOUNTER — OFFICE VISIT (OUTPATIENT)
Dept: PHYSICAL THERAPY | Facility: CLINIC | Age: 75
End: 2025-07-29
Attending: FAMILY MEDICINE
Payer: COMMERCIAL

## 2025-07-29 DIAGNOSIS — M54.50 CHRONIC BILATERAL LOW BACK PAIN, UNSPECIFIED WHETHER SCIATICA PRESENT: ICD-10-CM

## 2025-07-29 DIAGNOSIS — M25.551 RIGHT HIP PAIN: ICD-10-CM

## 2025-07-29 DIAGNOSIS — G89.29 CHRONIC BILATERAL LOW BACK PAIN, UNSPECIFIED WHETHER SCIATICA PRESENT: ICD-10-CM

## 2025-07-29 DIAGNOSIS — M54.16 LUMBAR RADICULOPATHY: Primary | ICD-10-CM

## 2025-07-29 PROCEDURE — 97140 MANUAL THERAPY 1/> REGIONS: CPT | Performed by: PHYSICAL THERAPIST

## 2025-07-29 PROCEDURE — 97112 NEUROMUSCULAR REEDUCATION: CPT | Performed by: PHYSICAL THERAPIST

## 2025-07-29 PROCEDURE — 97110 THERAPEUTIC EXERCISES: CPT | Performed by: PHYSICAL THERAPIST

## 2025-08-05 ENCOUNTER — OFFICE VISIT (OUTPATIENT)
Dept: PHYSICAL THERAPY | Facility: CLINIC | Age: 75
End: 2025-08-05
Attending: FAMILY MEDICINE
Payer: COMMERCIAL

## 2025-08-05 DIAGNOSIS — M25.551 RIGHT HIP PAIN: ICD-10-CM

## 2025-08-05 DIAGNOSIS — G89.29 CHRONIC BILATERAL LOW BACK PAIN, UNSPECIFIED WHETHER SCIATICA PRESENT: ICD-10-CM

## 2025-08-05 DIAGNOSIS — M54.16 LUMBAR RADICULOPATHY: Primary | ICD-10-CM

## 2025-08-05 DIAGNOSIS — M54.50 CHRONIC BILATERAL LOW BACK PAIN, UNSPECIFIED WHETHER SCIATICA PRESENT: ICD-10-CM

## 2025-08-05 PROCEDURE — 97110 THERAPEUTIC EXERCISES: CPT | Performed by: PHYSICAL THERAPIST

## 2025-08-05 PROCEDURE — 97112 NEUROMUSCULAR REEDUCATION: CPT | Performed by: PHYSICAL THERAPIST

## 2025-08-05 PROCEDURE — 97140 MANUAL THERAPY 1/> REGIONS: CPT | Performed by: PHYSICAL THERAPIST

## 2025-08-07 DIAGNOSIS — E78.2 MIXED HYPERLIPIDEMIA: ICD-10-CM

## 2025-08-07 DIAGNOSIS — E72.11 HYPERHOMOCYSTEINEMIA (HCC): ICD-10-CM

## 2025-08-08 RX ORDER — MECOBAL/LEVOMEFOLAT CA/B6 PHOS 2-3-35 MG
2 TABLET ORAL DAILY
Qty: 180 TABLET | Refills: 0 | Status: SHIPPED | OUTPATIENT
Start: 2025-08-08 | End: 2025-11-06

## 2025-08-08 RX ORDER — FENOFIBRATE 160 MG/1
160 TABLET ORAL DAILY
Qty: 90 TABLET | Refills: 1 | Status: SHIPPED | OUTPATIENT
Start: 2025-08-08

## 2025-08-18 ENCOUNTER — OFFICE VISIT (OUTPATIENT)
Dept: FAMILY MEDICINE CLINIC | Facility: CLINIC | Age: 75
End: 2025-08-18
Payer: COMMERCIAL

## 2025-08-18 VITALS
OXYGEN SATURATION: 98 % | HEART RATE: 83 BPM | DIASTOLIC BLOOD PRESSURE: 78 MMHG | TEMPERATURE: 97.8 F | RESPIRATION RATE: 18 BRPM | BODY MASS INDEX: 31.32 KG/M2 | HEIGHT: 65 IN | WEIGHT: 188 LBS | SYSTOLIC BLOOD PRESSURE: 130 MMHG

## 2025-08-18 DIAGNOSIS — R03.0 ELEVATED BLOOD PRESSURE READING IN OFFICE WITHOUT DIAGNOSIS OF HYPERTENSION: Primary | ICD-10-CM

## 2025-08-18 DIAGNOSIS — J45.30 MILD PERSISTENT ASTHMA WITHOUT COMPLICATION: ICD-10-CM

## 2025-08-18 DIAGNOSIS — E03.9 ACQUIRED HYPOTHYROIDISM: ICD-10-CM

## 2025-08-18 PROCEDURE — G2211 COMPLEX E/M VISIT ADD ON: HCPCS | Performed by: FAMILY MEDICINE

## 2025-08-18 PROCEDURE — 99214 OFFICE O/P EST MOD 30 MIN: CPT | Performed by: FAMILY MEDICINE

## 2025-08-19 ENCOUNTER — OFFICE VISIT (OUTPATIENT)
Dept: PHYSICAL THERAPY | Facility: CLINIC | Age: 75
End: 2025-08-19
Attending: FAMILY MEDICINE
Payer: COMMERCIAL

## 2025-08-19 DIAGNOSIS — G89.29 CHRONIC BILATERAL LOW BACK PAIN, UNSPECIFIED WHETHER SCIATICA PRESENT: ICD-10-CM

## 2025-08-19 DIAGNOSIS — M54.16 LUMBAR RADICULOPATHY: Primary | ICD-10-CM

## 2025-08-19 DIAGNOSIS — M54.50 CHRONIC BILATERAL LOW BACK PAIN, UNSPECIFIED WHETHER SCIATICA PRESENT: ICD-10-CM

## 2025-08-19 DIAGNOSIS — M25.551 RIGHT HIP PAIN: ICD-10-CM

## 2025-08-19 PROCEDURE — 97112 NEUROMUSCULAR REEDUCATION: CPT | Performed by: PHYSICAL THERAPIST

## 2025-08-19 PROCEDURE — 97110 THERAPEUTIC EXERCISES: CPT | Performed by: PHYSICAL THERAPIST
